# Patient Record
Sex: FEMALE | Race: BLACK OR AFRICAN AMERICAN | NOT HISPANIC OR LATINO | Employment: UNEMPLOYED | URBAN - METROPOLITAN AREA
[De-identification: names, ages, dates, MRNs, and addresses within clinical notes are randomized per-mention and may not be internally consistent; named-entity substitution may affect disease eponyms.]

---

## 2017-01-31 ENCOUNTER — HISTORICAL (OUTPATIENT)
Dept: INTERNAL MEDICINE | Facility: CLINIC | Age: 47
End: 2017-01-31

## 2017-03-01 ENCOUNTER — HISTORICAL (OUTPATIENT)
Dept: ADMINISTRATIVE | Facility: HOSPITAL | Age: 47
End: 2017-03-01

## 2017-03-09 ENCOUNTER — HISTORICAL (OUTPATIENT)
Dept: ADMINISTRATIVE | Facility: HOSPITAL | Age: 47
End: 2017-03-09

## 2017-03-16 ENCOUNTER — HOSPITAL ENCOUNTER (OUTPATIENT)
Dept: MEDSURG UNIT | Facility: HOSPITAL | Age: 47
End: 2017-03-17
Attending: OBSTETRICS & GYNECOLOGY | Admitting: OBSTETRICS & GYNECOLOGY

## 2017-05-24 ENCOUNTER — HISTORICAL (OUTPATIENT)
Dept: ADMINISTRATIVE | Facility: HOSPITAL | Age: 47
End: 2017-05-24

## 2017-09-14 ENCOUNTER — HISTORICAL (OUTPATIENT)
Dept: INTERNAL MEDICINE | Facility: CLINIC | Age: 47
End: 2017-09-14

## 2017-09-14 LAB
ABS NEUT (OLG): 8.66 X10(3)/MCL (ref 2.1–9.2)
ALBUMIN SERPL-MCNC: 3.4 GM/DL (ref 3.4–5)
ALBUMIN/GLOB SERPL: 1 RATIO (ref 1–2)
ALP SERPL-CCNC: 75 UNIT/L (ref 45–117)
ALT SERPL-CCNC: 70 UNIT/L (ref 12–78)
AST SERPL-CCNC: 42 UNIT/L (ref 15–37)
BASOPHILS # BLD AUTO: 0.05 X10(3)/MCL
BASOPHILS NFR BLD AUTO: 0 % (ref 0–1)
BILIRUB SERPL-MCNC: 0.4 MG/DL (ref 0.2–1)
BILIRUBIN DIRECT+TOT PNL SERPL-MCNC: 0.1 MG/DL
BILIRUBIN DIRECT+TOT PNL SERPL-MCNC: 0.3 MG/DL
BUN SERPL-MCNC: 10 MG/DL (ref 7–18)
CALCIUM SERPL-MCNC: 8.8 MG/DL (ref 8.5–10.1)
CHLORIDE SERPL-SCNC: 107 MMOL/L (ref 98–107)
CHOLEST SERPL-MCNC: 216 MG/DL
CHOLEST/HDLC SERPL: 5 {RATIO} (ref 0–4.4)
CO2 SERPL-SCNC: 26 MMOL/L (ref 21–32)
CREAT SERPL-MCNC: 0.7 MG/DL (ref 0.6–1.3)
DEPRECATED CALCIDIOL+CALCIFEROL SERPL-MC: 15.87 NG/ML (ref 30–80)
EOSINOPHIL # BLD AUTO: 0.24 10*3/UL
EOSINOPHIL NFR BLD AUTO: 2 % (ref 0–5)
ERYTHROCYTE [DISTWIDTH] IN BLOOD BY AUTOMATED COUNT: 14 % (ref 11.5–14.5)
EST. AVERAGE GLUCOSE BLD GHB EST-MCNC: 108 MG/DL
GLOBULIN SER-MCNC: 4.6 GM/ML (ref 2.3–3.5)
GLUCOSE SERPL-MCNC: 85 MG/DL (ref 74–106)
HBA1C MFR BLD: 5.4 % (ref 4.2–6.3)
HCT VFR BLD AUTO: 42.5 % (ref 35–46)
HDLC SERPL-MCNC: 43 MG/DL
HGB BLD-MCNC: 13.9 GM/DL (ref 12–16)
IMM GRANULOCYTES # BLD AUTO: 0.04 10*3/UL
IMM GRANULOCYTES NFR BLD AUTO: 0 %
LDLC SERPL CALC-MCNC: 122 MG/DL (ref 0–130)
LYMPHOCYTES # BLD AUTO: 3.08 X10(3)/MCL
LYMPHOCYTES NFR BLD AUTO: 23 % (ref 15–40)
MCH RBC QN AUTO: 27.2 PG (ref 26–34)
MCHC RBC AUTO-ENTMCNC: 32.7 GM/DL (ref 31–37)
MCV RBC AUTO: 83.2 FL (ref 80–100)
MONOCYTES # BLD AUTO: 1.23 X10(3)/MCL
MONOCYTES NFR BLD AUTO: 9 % (ref 4–12)
NEUTROPHILS # BLD AUTO: 8.66 X10(3)/MCL
NEUTROPHILS NFR BLD AUTO: 65 X10(3)/MCL
PLATELET # BLD AUTO: 325 X10(3)/MCL (ref 130–400)
PMV BLD AUTO: 9.5 FL (ref 7.4–10.4)
POTASSIUM SERPL-SCNC: 3.9 MMOL/L (ref 3.5–5.1)
PROT SERPL-MCNC: 8 GM/DL (ref 6.4–8.2)
RBC # BLD AUTO: 5.11 X10(6)/MCL (ref 4–5.2)
SODIUM SERPL-SCNC: 139 MMOL/L (ref 136–145)
TRIGL SERPL-MCNC: 253 MG/DL
TSH SERPL-ACNC: 1.92 MIU/L (ref 0.36–3.74)
VLDLC SERPL CALC-MCNC: 51 MG/DL
WBC # SPEC AUTO: 13.3 X10(3)/MCL (ref 4.5–11)

## 2018-01-24 ENCOUNTER — HISTORICAL (OUTPATIENT)
Dept: RADIOLOGY | Facility: HOSPITAL | Age: 48
End: 2018-01-24

## 2018-02-05 ENCOUNTER — HISTORICAL (OUTPATIENT)
Dept: RADIOLOGY | Facility: HOSPITAL | Age: 48
End: 2018-02-05

## 2018-03-06 ENCOUNTER — HISTORICAL (OUTPATIENT)
Dept: RADIOLOGY | Facility: HOSPITAL | Age: 48
End: 2018-03-06

## 2018-03-12 ENCOUNTER — HISTORICAL (OUTPATIENT)
Dept: RESPIRATORY THERAPY | Facility: HOSPITAL | Age: 48
End: 2018-03-12

## 2018-03-29 ENCOUNTER — HISTORICAL (OUTPATIENT)
Dept: ADMINISTRATIVE | Facility: HOSPITAL | Age: 48
End: 2018-03-29

## 2018-03-29 LAB
CHOLEST SERPL-MCNC: 182 MG/DL
CHOLEST/HDLC SERPL: 5.4 {RATIO} (ref 0–4.4)
DEPRECATED CALCIDIOL+CALCIFEROL SERPL-MC: 11.77 NG/ML (ref 30–80)
EST. AVERAGE GLUCOSE BLD GHB EST-MCNC: 117 MG/DL
HBA1C MFR BLD: 5.7 % (ref 4.2–6.3)
HDLC SERPL-MCNC: 34 MG/DL
LDLC SERPL CALC-MCNC: 97 MG/DL (ref 0–130)
TRIGL SERPL-MCNC: 254 MG/DL
VLDLC SERPL CALC-MCNC: 51 MG/DL

## 2018-05-16 ENCOUNTER — HISTORICAL (OUTPATIENT)
Dept: SLEEP MEDICINE | Facility: HOSPITAL | Age: 48
End: 2018-05-16

## 2018-09-11 ENCOUNTER — HISTORICAL (OUTPATIENT)
Dept: ADMINISTRATIVE | Facility: HOSPITAL | Age: 48
End: 2018-09-11

## 2018-09-11 LAB
APPEARANCE, UA: ABNORMAL
BACTERIA #/AREA URNS AUTO: ABNORMAL /[HPF]
BILIRUB UR QL STRIP: NEGATIVE
COLOR UR: YELLOW
DEPRECATED CALCIDIOL+CALCIFEROL SERPL-MC: 10.34 NG/ML (ref 30–80)
GLUCOSE (UA): NORMAL
HGB UR QL STRIP: NEGATIVE
HYALINE CASTS #/AREA URNS LPF: ABNORMAL /[LPF]
KETONES UR QL STRIP: NEGATIVE
LEUKOCYTE ESTERASE UR QL STRIP: NEGATIVE
NITRITE UR QL STRIP: ABNORMAL
PH UR STRIP: 7.5 [PH] (ref 4.5–8)
PROT UR QL STRIP: NEGATIVE
RBC #/AREA URNS AUTO: ABNORMAL /[HPF]
SP GR UR STRIP: 1.02 (ref 1–1.03)
SQUAMOUS #/AREA URNS LPF: >100 /[LPF]
UROBILINOGEN UR STRIP-ACNC: NORMAL
WBC #/AREA URNS AUTO: ABNORMAL /HPF

## 2018-10-09 ENCOUNTER — HISTORICAL (OUTPATIENT)
Dept: PHYSICAL THERAPY | Facility: HOSPITAL | Age: 48
End: 2018-10-09

## 2018-11-01 ENCOUNTER — HISTORICAL (OUTPATIENT)
Dept: PHYSICAL THERAPY | Facility: HOSPITAL | Age: 48
End: 2018-11-01

## 2018-11-06 ENCOUNTER — HISTORICAL (OUTPATIENT)
Dept: PHYSICAL THERAPY | Facility: HOSPITAL | Age: 48
End: 2018-11-06

## 2018-11-08 ENCOUNTER — HISTORICAL (OUTPATIENT)
Dept: PHYSICAL THERAPY | Facility: HOSPITAL | Age: 48
End: 2018-11-08

## 2018-11-13 ENCOUNTER — HISTORICAL (OUTPATIENT)
Dept: PHYSICAL THERAPY | Facility: HOSPITAL | Age: 48
End: 2018-11-13

## 2018-11-15 ENCOUNTER — HISTORICAL (OUTPATIENT)
Dept: PHYSICAL THERAPY | Facility: HOSPITAL | Age: 48
End: 2018-11-15

## 2018-11-19 ENCOUNTER — HISTORICAL (OUTPATIENT)
Dept: PHYSICAL THERAPY | Facility: HOSPITAL | Age: 48
End: 2018-11-19

## 2018-11-21 ENCOUNTER — HISTORICAL (OUTPATIENT)
Dept: PHYSICAL THERAPY | Facility: HOSPITAL | Age: 48
End: 2018-11-21

## 2018-11-26 ENCOUNTER — HISTORICAL (OUTPATIENT)
Dept: PHYSICAL THERAPY | Facility: HOSPITAL | Age: 48
End: 2018-11-26

## 2018-11-28 ENCOUNTER — HISTORICAL (OUTPATIENT)
Dept: PHYSICAL THERAPY | Facility: HOSPITAL | Age: 48
End: 2018-11-28

## 2018-12-04 ENCOUNTER — HISTORICAL (OUTPATIENT)
Dept: PHYSICAL THERAPY | Facility: HOSPITAL | Age: 48
End: 2018-12-04

## 2018-12-05 ENCOUNTER — HISTORICAL (OUTPATIENT)
Dept: PHYSICAL THERAPY | Facility: HOSPITAL | Age: 48
End: 2018-12-05

## 2018-12-11 ENCOUNTER — HISTORICAL (OUTPATIENT)
Dept: PHYSICAL THERAPY | Facility: HOSPITAL | Age: 48
End: 2018-12-11

## 2018-12-12 ENCOUNTER — HISTORICAL (OUTPATIENT)
Dept: PHYSICAL THERAPY | Facility: HOSPITAL | Age: 48
End: 2018-12-12

## 2019-03-28 ENCOUNTER — HISTORICAL (OUTPATIENT)
Dept: PHYSICAL THERAPY | Facility: HOSPITAL | Age: 49
End: 2019-03-28

## 2019-04-15 ENCOUNTER — HISTORICAL (OUTPATIENT)
Dept: ADMINISTRATIVE | Facility: HOSPITAL | Age: 49
End: 2019-04-15

## 2019-04-15 LAB
APPEARANCE, UA: ABNORMAL
BACTERIA #/AREA URNS AUTO: ABNORMAL /[HPF]
BILIRUB UR QL STRIP: NEGATIVE
COLOR UR: ABNORMAL
GLUCOSE (UA): NORMAL
HGB UR QL STRIP: NEGATIVE
HYALINE CASTS #/AREA URNS LPF: ABNORMAL /[LPF]
KETONES UR QL STRIP: NEGATIVE
LEUKOCYTE ESTERASE UR QL STRIP: 75 LEU/UL
MUCOUS THREADS URNS QL MICRO: ABNORMAL
NITRITE UR QL STRIP: ABNORMAL
PH UR STRIP: 7 [PH] (ref 4.5–8)
PROT UR QL STRIP: NEGATIVE
RBC #/AREA URNS AUTO: ABNORMAL /[HPF]
SP GR UR STRIP: 1.01 (ref 1–1.03)
SQUAMOUS #/AREA URNS LPF: ABNORMAL /[LPF]
UROBILINOGEN UR STRIP-ACNC: NORMAL
WBC #/AREA URNS AUTO: ABNORMAL /HPF

## 2019-05-03 ENCOUNTER — HISTORICAL (OUTPATIENT)
Dept: RADIOLOGY | Facility: HOSPITAL | Age: 49
End: 2019-05-03

## 2019-05-07 ENCOUNTER — HISTORICAL (OUTPATIENT)
Dept: INTERNAL MEDICINE | Facility: CLINIC | Age: 49
End: 2019-05-07

## 2019-05-07 LAB
ABS NEUT (OLG): 8.93 X10(3)/MCL (ref 2.1–9.2)
ALBUMIN SERPL-MCNC: 3.2 GM/DL (ref 3.4–5)
ALBUMIN/GLOB SERPL: 0.7 RATIO (ref 1.1–2)
ALP SERPL-CCNC: 80 UNIT/L (ref 45–117)
ALT SERPL-CCNC: 31 UNIT/L (ref 12–78)
AST SERPL-CCNC: 14 UNIT/L (ref 15–37)
BASOPHILS # BLD AUTO: 0.03 X10(3)/MCL
BASOPHILS NFR BLD AUTO: 0 %
BILIRUB SERPL-MCNC: 0.5 MG/DL (ref 0.2–1)
BILIRUBIN DIRECT+TOT PNL SERPL-MCNC: 0.1 MG/DL
BILIRUBIN DIRECT+TOT PNL SERPL-MCNC: 0.4 MG/DL
BUN SERPL-MCNC: 11 MG/DL (ref 7–18)
CALCIUM SERPL-MCNC: 8.9 MG/DL (ref 8.5–10.1)
CHLORIDE SERPL-SCNC: 110 MMOL/L (ref 98–107)
CO2 SERPL-SCNC: 28 MMOL/L (ref 21–32)
CREAT SERPL-MCNC: 0.8 MG/DL (ref 0.6–1.3)
DEPRECATED CALCIDIOL+CALCIFEROL SERPL-MC: 18.29 NG/ML (ref 30–80)
EOSINOPHIL # BLD AUTO: 0.23 X10(3)/MCL
EOSINOPHIL NFR BLD AUTO: 2 %
ERYTHROCYTE [DISTWIDTH] IN BLOOD BY AUTOMATED COUNT: 14.8 % (ref 11.5–14.5)
EST. AVERAGE GLUCOSE BLD GHB EST-MCNC: 123 MG/DL
GLOBULIN SER-MCNC: 4.3 GM/ML (ref 2.3–3.5)
GLUCOSE SERPL-MCNC: 113 MG/DL (ref 74–106)
HBA1C MFR BLD: 5.9 % (ref 4.2–6.3)
HCT VFR BLD AUTO: 41.3 % (ref 35–46)
HGB BLD-MCNC: 13.6 GM/DL (ref 12–16)
IMM GRANULOCYTES # BLD AUTO: 0.05 10*3/UL
IMM GRANULOCYTES NFR BLD AUTO: 0 %
LYMPHOCYTES # BLD AUTO: 2.64 X10(3)/MCL
LYMPHOCYTES NFR BLD AUTO: 21 % (ref 13–40)
MCH RBC QN AUTO: 27.5 PG (ref 26–34)
MCHC RBC AUTO-ENTMCNC: 32.9 GM/DL (ref 31–37)
MCV RBC AUTO: 83.6 FL (ref 80–100)
MONOCYTES # BLD AUTO: 0.81 X10(3)/MCL
MONOCYTES NFR BLD AUTO: 6 % (ref 4–12)
NEUTROPHILS # BLD AUTO: 8.93 X10(3)/MCL
NEUTROPHILS NFR BLD AUTO: 70 X10(3)/MCL
PLATELET # BLD AUTO: 304 X10(3)/MCL (ref 130–400)
PMV BLD AUTO: 9.1 FL (ref 7.4–10.4)
POTASSIUM SERPL-SCNC: 4 MMOL/L (ref 3.5–5.1)
PROT SERPL-MCNC: 7.5 GM/DL (ref 6.4–8.2)
RBC # BLD AUTO: 4.94 X10(6)/MCL (ref 4–5.2)
SODIUM SERPL-SCNC: 142 MMOL/L (ref 136–145)
WBC # SPEC AUTO: 12.7 X10(3)/MCL (ref 4.5–11)

## 2019-05-15 ENCOUNTER — HISTORICAL (OUTPATIENT)
Dept: RADIOLOGY | Facility: HOSPITAL | Age: 49
End: 2019-05-15

## 2019-05-20 ENCOUNTER — HISTORICAL (OUTPATIENT)
Dept: PHYSICAL THERAPY | Facility: HOSPITAL | Age: 49
End: 2019-05-20

## 2019-05-22 ENCOUNTER — HISTORICAL (OUTPATIENT)
Dept: PHYSICAL THERAPY | Facility: HOSPITAL | Age: 49
End: 2019-05-22

## 2019-05-24 ENCOUNTER — HISTORICAL (OUTPATIENT)
Dept: PHYSICAL THERAPY | Facility: HOSPITAL | Age: 49
End: 2019-05-24

## 2019-05-27 ENCOUNTER — HISTORICAL (OUTPATIENT)
Dept: PHYSICAL THERAPY | Facility: HOSPITAL | Age: 49
End: 2019-05-27

## 2019-05-29 ENCOUNTER — HISTORICAL (OUTPATIENT)
Dept: PHYSICAL THERAPY | Facility: HOSPITAL | Age: 49
End: 2019-05-29

## 2019-05-31 ENCOUNTER — HISTORICAL (OUTPATIENT)
Dept: PHYSICAL THERAPY | Facility: HOSPITAL | Age: 49
End: 2019-05-31

## 2019-06-03 ENCOUNTER — HISTORICAL (OUTPATIENT)
Dept: PHYSICAL THERAPY | Facility: HOSPITAL | Age: 49
End: 2019-06-03

## 2019-06-07 ENCOUNTER — HISTORICAL (OUTPATIENT)
Dept: PHYSICAL THERAPY | Facility: HOSPITAL | Age: 49
End: 2019-06-07

## 2019-06-10 ENCOUNTER — HISTORICAL (OUTPATIENT)
Dept: PHYSICAL THERAPY | Facility: HOSPITAL | Age: 49
End: 2019-06-10

## 2019-06-12 ENCOUNTER — HISTORICAL (OUTPATIENT)
Dept: PHYSICAL THERAPY | Facility: HOSPITAL | Age: 49
End: 2019-06-12

## 2019-06-14 ENCOUNTER — HISTORICAL (OUTPATIENT)
Dept: PHYSICAL THERAPY | Facility: HOSPITAL | Age: 49
End: 2019-06-14

## 2019-06-17 ENCOUNTER — HISTORICAL (OUTPATIENT)
Dept: PHYSICAL THERAPY | Facility: HOSPITAL | Age: 49
End: 2019-06-17

## 2019-06-19 ENCOUNTER — HISTORICAL (OUTPATIENT)
Dept: PHYSICAL THERAPY | Facility: HOSPITAL | Age: 49
End: 2019-06-19

## 2019-06-21 ENCOUNTER — HISTORICAL (OUTPATIENT)
Dept: PHYSICAL THERAPY | Facility: HOSPITAL | Age: 49
End: 2019-06-21

## 2019-06-24 ENCOUNTER — HISTORICAL (OUTPATIENT)
Dept: PHYSICAL THERAPY | Facility: HOSPITAL | Age: 49
End: 2019-06-24

## 2019-06-26 ENCOUNTER — HISTORICAL (OUTPATIENT)
Dept: PHYSICAL THERAPY | Facility: HOSPITAL | Age: 49
End: 2019-06-26

## 2019-06-27 ENCOUNTER — HISTORICAL (OUTPATIENT)
Dept: PHYSICAL THERAPY | Facility: HOSPITAL | Age: 49
End: 2019-06-27

## 2019-07-16 ENCOUNTER — HISTORICAL (OUTPATIENT)
Dept: INTERNAL MEDICINE | Facility: CLINIC | Age: 49
End: 2019-07-16

## 2019-07-16 LAB
ABS NEUT (OLG): 9.41 X10(3)/MCL (ref 2.1–9.2)
BASOPHILS # BLD AUTO: 0.03 X10(3)/MCL
BASOPHILS NFR BLD AUTO: 0 %
CHOLEST SERPL-MCNC: 226 MG/DL
CHOLEST/HDLC SERPL: 5 {RATIO} (ref 0–4.4)
EOSINOPHIL # BLD AUTO: 0.21 X10(3)/MCL
EOSINOPHIL NFR BLD AUTO: 2 %
ERYTHROCYTE [DISTWIDTH] IN BLOOD BY AUTOMATED COUNT: 13.6 % (ref 11.5–14.5)
HCT VFR BLD AUTO: 41.7 % (ref 35–46)
HDLC SERPL-MCNC: 45 MG/DL
HGB BLD-MCNC: 13.3 GM/DL (ref 12–16)
HIV 1+2 AB+HIV1 P24 AG SERPL QL IA: NONREACTIVE
IMM GRANULOCYTES # BLD AUTO: 0.05 10*3/UL
IMM GRANULOCYTES NFR BLD AUTO: 0 %
LDLC SERPL CALC-MCNC: 132 MG/DL (ref 0–130)
LYMPHOCYTES # BLD AUTO: 2.86 X10(3)/MCL
LYMPHOCYTES NFR BLD AUTO: 21 % (ref 13–40)
MCH RBC QN AUTO: 27.1 PG (ref 26–34)
MCHC RBC AUTO-ENTMCNC: 31.9 GM/DL (ref 31–37)
MCV RBC AUTO: 84.9 FL (ref 80–100)
MONOCYTES # BLD AUTO: 0.87 X10(3)/MCL
MONOCYTES NFR BLD AUTO: 6 % (ref 4–12)
NEUTROPHILS # BLD AUTO: 9.41 X10(3)/MCL
NEUTROPHILS NFR BLD AUTO: 70 X10(3)/MCL
PLATELET # BLD AUTO: 262 X10(3)/MCL (ref 130–400)
PMV BLD AUTO: 9 FL (ref 7.4–10.4)
RBC # BLD AUTO: 4.91 X10(6)/MCL (ref 4–5.2)
TRIGL SERPL-MCNC: 246 MG/DL
VLDLC SERPL CALC-MCNC: 49 MG/DL
WBC # SPEC AUTO: 13.4 X10(3)/MCL (ref 4.5–11)

## 2019-07-24 ENCOUNTER — HISTORICAL (OUTPATIENT)
Dept: PHYSICAL THERAPY | Facility: HOSPITAL | Age: 49
End: 2019-07-24

## 2019-07-25 ENCOUNTER — HISTORICAL (OUTPATIENT)
Dept: PHYSICAL THERAPY | Facility: HOSPITAL | Age: 49
End: 2019-07-25

## 2019-07-29 ENCOUNTER — HISTORICAL (OUTPATIENT)
Dept: PHYSICAL THERAPY | Facility: HOSPITAL | Age: 49
End: 2019-07-29

## 2019-07-31 ENCOUNTER — HISTORICAL (OUTPATIENT)
Dept: PHYSICAL THERAPY | Facility: HOSPITAL | Age: 49
End: 2019-07-31

## 2019-08-02 ENCOUNTER — HISTORICAL (OUTPATIENT)
Dept: PHYSICAL THERAPY | Facility: HOSPITAL | Age: 49
End: 2019-08-02

## 2019-08-05 ENCOUNTER — HISTORICAL (OUTPATIENT)
Dept: PHYSICAL THERAPY | Facility: HOSPITAL | Age: 49
End: 2019-08-05

## 2019-08-07 ENCOUNTER — HISTORICAL (OUTPATIENT)
Dept: PHYSICAL THERAPY | Facility: HOSPITAL | Age: 49
End: 2019-08-07

## 2019-08-08 ENCOUNTER — HISTORICAL (OUTPATIENT)
Dept: SLEEP MEDICINE | Facility: HOSPITAL | Age: 49
End: 2019-08-08

## 2019-08-09 ENCOUNTER — HISTORICAL (OUTPATIENT)
Dept: PHYSICAL THERAPY | Facility: HOSPITAL | Age: 49
End: 2019-08-09

## 2019-08-13 ENCOUNTER — HISTORICAL (OUTPATIENT)
Dept: PHYSICAL THERAPY | Facility: HOSPITAL | Age: 49
End: 2019-08-13

## 2019-08-16 ENCOUNTER — HISTORICAL (OUTPATIENT)
Dept: PHYSICAL THERAPY | Facility: HOSPITAL | Age: 49
End: 2019-08-16

## 2019-08-19 ENCOUNTER — HISTORICAL (OUTPATIENT)
Dept: PHYSICAL THERAPY | Facility: HOSPITAL | Age: 49
End: 2019-08-19

## 2019-08-23 ENCOUNTER — HISTORICAL (OUTPATIENT)
Dept: PHYSICAL THERAPY | Facility: HOSPITAL | Age: 49
End: 2019-08-23

## 2020-05-20 ENCOUNTER — HISTORICAL (OUTPATIENT)
Dept: INTERNAL MEDICINE | Facility: CLINIC | Age: 50
End: 2020-05-20

## 2020-05-20 LAB
ABS NEUT (OLG): 7.31 X10(3)/MCL (ref 2.1–9.2)
ALBUMIN SERPL-MCNC: 3.4 GM/DL (ref 3.4–5)
ALBUMIN/GLOB SERPL: 0.8 RATIO (ref 1.1–2)
ALP SERPL-CCNC: 72 UNIT/L (ref 45–117)
ALT SERPL-CCNC: 66 UNIT/L (ref 12–78)
AST SERPL-CCNC: 30 UNIT/L (ref 15–37)
BASOPHILS # BLD AUTO: 0 X10(3)/MCL (ref 0–0.2)
BASOPHILS NFR BLD AUTO: 0 %
BILIRUB SERPL-MCNC: 0.6 MG/DL (ref 0.2–1)
BILIRUBIN DIRECT+TOT PNL SERPL-MCNC: 0.1 MG/DL (ref 0–0.2)
BILIRUBIN DIRECT+TOT PNL SERPL-MCNC: 0.5 MG/DL
BUN SERPL-MCNC: 11 MG/DL (ref 7–18)
CALCIUM SERPL-MCNC: 9 MG/DL (ref 8.5–10.1)
CHLORIDE SERPL-SCNC: 109 MMOL/L (ref 98–107)
CHOLEST SERPL-MCNC: 236 MG/DL
CHOLEST/HDLC SERPL: 6.1 {RATIO} (ref 0–4.4)
CO2 SERPL-SCNC: 26 MMOL/L (ref 21–32)
CREAT SERPL-MCNC: 0.9 MG/DL (ref 0.6–1.3)
DEPRECATED CALCIDIOL+CALCIFEROL SERPL-MC: 11.8 NG/ML (ref 30–80)
EOSINOPHIL # BLD AUTO: 0.2 X10(3)/MCL (ref 0–0.9)
EOSINOPHIL NFR BLD AUTO: 2 %
ERYTHROCYTE [DISTWIDTH] IN BLOOD BY AUTOMATED COUNT: 13.7 % (ref 11.5–14.5)
EST. AVERAGE GLUCOSE BLD GHB EST-MCNC: 137 MG/DL
GLOBULIN SER-MCNC: 4.5 GM/ML (ref 2.3–3.5)
GLUCOSE SERPL-MCNC: 136 MG/DL (ref 74–106)
HBA1C MFR BLD: 6.4 % (ref 4.2–6.3)
HCT VFR BLD AUTO: 42.1 % (ref 35–46)
HDLC SERPL-MCNC: 39 MG/DL (ref 40–59)
HGB BLD-MCNC: 13.6 GM/DL (ref 12–16)
IMM GRANULOCYTES # BLD AUTO: 0.04 10*3/UL
IMM GRANULOCYTES NFR BLD AUTO: 0 %
LDLC SERPL CALC-MCNC: 145 MG/DL
LYMPHOCYTES # BLD AUTO: 3.1 X10(3)/MCL (ref 0.6–4.6)
LYMPHOCYTES NFR BLD AUTO: 27 %
MCH RBC QN AUTO: 27.1 PG (ref 26–34)
MCHC RBC AUTO-ENTMCNC: 32.3 GM/DL (ref 31–37)
MCV RBC AUTO: 83.9 FL (ref 80–100)
MONOCYTES # BLD AUTO: 0.8 X10(3)/MCL (ref 0.1–1.3)
MONOCYTES NFR BLD AUTO: 7 %
NEUTROPHILS # BLD AUTO: 7.31 X10(3)/MCL (ref 2.1–9.2)
NEUTROPHILS NFR BLD AUTO: 63 %
PLATELET # BLD AUTO: 277 X10(3)/MCL (ref 130–400)
PMV BLD AUTO: 9.6 FL (ref 7.4–10.4)
POTASSIUM SERPL-SCNC: 4 MMOL/L (ref 3.5–5.1)
PROT SERPL-MCNC: 7.9 GM/DL (ref 6.4–8.2)
RBC # BLD AUTO: 5.02 X10(6)/MCL (ref 4–5.2)
SODIUM SERPL-SCNC: 139 MMOL/L (ref 136–145)
TRIGL SERPL-MCNC: 259 MG/DL
VLDLC SERPL CALC-MCNC: 52 MG/DL
WBC # SPEC AUTO: 11.6 X10(3)/MCL (ref 4.5–11)

## 2020-06-11 ENCOUNTER — HISTORICAL (OUTPATIENT)
Dept: RADIOLOGY | Facility: HOSPITAL | Age: 50
End: 2020-06-11

## 2020-08-25 ENCOUNTER — HISTORICAL (OUTPATIENT)
Dept: GASTROENTEROLOGY | Facility: CLINIC | Age: 50
End: 2020-08-25

## 2020-08-28 ENCOUNTER — HISTORICAL (OUTPATIENT)
Dept: ENDOSCOPY | Facility: HOSPITAL | Age: 50
End: 2020-08-28

## 2020-09-25 ENCOUNTER — HISTORICAL (OUTPATIENT)
Dept: RADIOLOGY | Facility: HOSPITAL | Age: 50
End: 2020-09-25

## 2020-11-23 ENCOUNTER — HISTORICAL (OUTPATIENT)
Dept: ADMINISTRATIVE | Facility: HOSPITAL | Age: 50
End: 2020-11-23

## 2020-11-30 ENCOUNTER — HISTORICAL (OUTPATIENT)
Dept: PHYSICAL THERAPY | Facility: HOSPITAL | Age: 50
End: 2020-11-30

## 2021-01-05 ENCOUNTER — HISTORICAL (OUTPATIENT)
Dept: PHYSICAL THERAPY | Facility: HOSPITAL | Age: 51
End: 2021-01-05

## 2021-01-08 ENCOUNTER — HISTORICAL (OUTPATIENT)
Dept: PHYSICAL THERAPY | Facility: HOSPITAL | Age: 51
End: 2021-01-08

## 2021-01-12 ENCOUNTER — HISTORICAL (OUTPATIENT)
Dept: PHYSICAL THERAPY | Facility: HOSPITAL | Age: 51
End: 2021-01-12

## 2021-01-14 ENCOUNTER — HISTORICAL (OUTPATIENT)
Dept: PHYSICAL THERAPY | Facility: HOSPITAL | Age: 51
End: 2021-01-14

## 2021-01-19 ENCOUNTER — HISTORICAL (OUTPATIENT)
Dept: PHYSICAL THERAPY | Facility: HOSPITAL | Age: 51
End: 2021-01-19

## 2021-01-21 ENCOUNTER — HISTORICAL (OUTPATIENT)
Dept: PHYSICAL THERAPY | Facility: HOSPITAL | Age: 51
End: 2021-01-21

## 2021-01-26 ENCOUNTER — HISTORICAL (OUTPATIENT)
Dept: PHYSICAL THERAPY | Facility: HOSPITAL | Age: 51
End: 2021-01-26

## 2021-01-28 ENCOUNTER — HISTORICAL (OUTPATIENT)
Dept: PHYSICAL THERAPY | Facility: HOSPITAL | Age: 51
End: 2021-01-28

## 2021-02-02 ENCOUNTER — HISTORICAL (OUTPATIENT)
Dept: PHYSICAL THERAPY | Facility: HOSPITAL | Age: 51
End: 2021-02-02

## 2021-02-04 ENCOUNTER — HISTORICAL (OUTPATIENT)
Dept: PHYSICAL THERAPY | Facility: HOSPITAL | Age: 51
End: 2021-02-04

## 2021-02-09 ENCOUNTER — HISTORICAL (OUTPATIENT)
Dept: PHYSICAL THERAPY | Facility: HOSPITAL | Age: 51
End: 2021-02-09

## 2021-02-11 ENCOUNTER — HISTORICAL (OUTPATIENT)
Dept: PHYSICAL THERAPY | Facility: HOSPITAL | Age: 51
End: 2021-02-11

## 2021-02-18 ENCOUNTER — HISTORICAL (OUTPATIENT)
Dept: PHYSICAL THERAPY | Facility: HOSPITAL | Age: 51
End: 2021-02-18

## 2021-02-23 ENCOUNTER — HISTORICAL (OUTPATIENT)
Dept: PHYSICAL THERAPY | Facility: HOSPITAL | Age: 51
End: 2021-02-23

## 2021-02-25 ENCOUNTER — HISTORICAL (OUTPATIENT)
Dept: PHYSICAL THERAPY | Facility: HOSPITAL | Age: 51
End: 2021-02-25

## 2021-03-11 ENCOUNTER — HISTORICAL (OUTPATIENT)
Dept: PHYSICAL THERAPY | Facility: HOSPITAL | Age: 51
End: 2021-03-11

## 2021-03-16 ENCOUNTER — HISTORICAL (OUTPATIENT)
Dept: PHYSICAL THERAPY | Facility: HOSPITAL | Age: 51
End: 2021-03-16

## 2021-03-18 ENCOUNTER — HISTORICAL (OUTPATIENT)
Dept: PHYSICAL THERAPY | Facility: HOSPITAL | Age: 51
End: 2021-03-18

## 2021-03-23 ENCOUNTER — HISTORICAL (OUTPATIENT)
Dept: PHYSICAL THERAPY | Facility: HOSPITAL | Age: 51
End: 2021-03-23

## 2021-03-25 ENCOUNTER — HISTORICAL (OUTPATIENT)
Dept: ADMINISTRATIVE | Facility: HOSPITAL | Age: 51
End: 2021-03-25

## 2021-03-25 ENCOUNTER — HISTORICAL (OUTPATIENT)
Dept: PHYSICAL THERAPY | Facility: HOSPITAL | Age: 51
End: 2021-03-25

## 2021-03-30 ENCOUNTER — HISTORICAL (OUTPATIENT)
Dept: PHYSICAL THERAPY | Facility: HOSPITAL | Age: 51
End: 2021-03-30

## 2021-03-31 ENCOUNTER — HISTORICAL (OUTPATIENT)
Dept: INTERNAL MEDICINE | Facility: CLINIC | Age: 51
End: 2021-03-31

## 2021-03-31 LAB
ABS NEUT (OLG): 9.27 X10(3)/MCL (ref 2.1–9.2)
ALBUMIN SERPL-MCNC: 3.8 GM/DL (ref 3.5–5)
ALBUMIN/GLOB SERPL: 0.9 RATIO (ref 1.1–2)
ALP SERPL-CCNC: 81 UNIT/L (ref 40–150)
ALT SERPL-CCNC: 41 UNIT/L (ref 0–55)
AST SERPL-CCNC: 27 UNIT/L (ref 5–34)
BASOPHILS # BLD AUTO: 0 X10(3)/MCL (ref 0–0.2)
BASOPHILS NFR BLD AUTO: 0 %
BILIRUB SERPL-MCNC: 0.6 MG/DL
BILIRUBIN DIRECT+TOT PNL SERPL-MCNC: 0.2 MG/DL (ref 0–0.5)
BILIRUBIN DIRECT+TOT PNL SERPL-MCNC: 0.4 MG/DL (ref 0–0.8)
BUN SERPL-MCNC: 9.9 MG/DL (ref 7–18.7)
CALCIUM SERPL-MCNC: 9.3 MG/DL (ref 8.4–10.2)
CHLORIDE SERPL-SCNC: 108 MMOL/L (ref 98–107)
CHOLEST SERPL-MCNC: 148 MG/DL
CHOLEST/HDLC SERPL: 4 {RATIO} (ref 0–5)
CO2 SERPL-SCNC: 23 MMOL/L (ref 22–29)
CREAT SERPL-MCNC: 0.78 MG/DL (ref 0.55–1.02)
CRP SERPL-MCNC: 1.43 MG/DL
DEPRECATED CALCIDIOL+CALCIFEROL SERPL-MC: 15.3 NG/ML (ref 30–80)
EOSINOPHIL # BLD AUTO: 0.5 X10(3)/MCL (ref 0–0.9)
EOSINOPHIL NFR BLD AUTO: 4 %
ERYTHROCYTE [DISTWIDTH] IN BLOOD BY AUTOMATED COUNT: 14.7 % (ref 11.5–14.5)
ERYTHROCYTE [SEDIMENTATION RATE] IN BLOOD: 16 MM/HR (ref 0–20)
EST. AVERAGE GLUCOSE BLD GHB EST-MCNC: 114 MG/DL
GLOBULIN SER-MCNC: 4.3 GM/DL (ref 2.4–3.5)
GLUCOSE SERPL-MCNC: 107 MG/DL (ref 74–100)
HBA1C MFR BLD: 5.6 %
HCT VFR BLD AUTO: 42.6 % (ref 35–46)
HDLC SERPL-MCNC: 40 MG/DL (ref 35–60)
HGB BLD-MCNC: 13.6 GM/DL (ref 12–16)
IMM GRANULOCYTES # BLD AUTO: 0.06 10*3/UL
IMM GRANULOCYTES NFR BLD AUTO: 0 %
LDLC SERPL CALC-MCNC: 73 MG/DL (ref 50–140)
LYMPHOCYTES # BLD AUTO: 2.7 X10(3)/MCL (ref 0.6–4.6)
LYMPHOCYTES NFR BLD AUTO: 20 %
MCH RBC QN AUTO: 26.6 PG (ref 26–34)
MCHC RBC AUTO-ENTMCNC: 31.9 GM/DL (ref 31–37)
MCV RBC AUTO: 83.2 FL (ref 80–100)
MONOCYTES # BLD AUTO: 0.8 X10(3)/MCL (ref 0.1–1.3)
MONOCYTES NFR BLD AUTO: 6 %
NEUTROPHILS # BLD AUTO: 9.27 X10(3)/MCL (ref 2.1–9.2)
NEUTROPHILS NFR BLD AUTO: 69 %
PLATELET # BLD AUTO: 294 X10(3)/MCL (ref 130–400)
PMV BLD AUTO: 9.2 FL (ref 7.4–10.4)
POTASSIUM SERPL-SCNC: 4.3 MMOL/L (ref 3.5–5.1)
PROT SERPL-MCNC: 8.1 GM/DL (ref 6.4–8.3)
RBC # BLD AUTO: 5.12 X10(6)/MCL (ref 4–5.2)
SODIUM SERPL-SCNC: 140 MMOL/L (ref 136–145)
TRIGL SERPL-MCNC: 177 MG/DL (ref 37–140)
TSH SERPL-ACNC: 1.95 UIU/ML (ref 0.35–4.94)
VLDLC SERPL CALC-MCNC: 35 MG/DL
WBC # SPEC AUTO: 13.4 X10(3)/MCL (ref 4.5–11)

## 2021-04-09 ENCOUNTER — HISTORICAL (OUTPATIENT)
Dept: RADIOLOGY | Facility: HOSPITAL | Age: 51
End: 2021-04-09

## 2021-05-27 ENCOUNTER — HISTORICAL (OUTPATIENT)
Dept: RADIOLOGY | Facility: HOSPITAL | Age: 51
End: 2021-05-27

## 2021-06-04 ENCOUNTER — HISTORICAL (OUTPATIENT)
Dept: ADMINISTRATIVE | Facility: HOSPITAL | Age: 51
End: 2021-06-04

## 2021-06-14 ENCOUNTER — HISTORICAL (OUTPATIENT)
Dept: ADMINISTRATIVE | Facility: HOSPITAL | Age: 51
End: 2021-06-14

## 2021-06-14 LAB — SARS-COV-2 RNA RESP QL NAA+PROBE: NOT DETECTED

## 2021-06-16 ENCOUNTER — HISTORICAL (OUTPATIENT)
Dept: SURGERY | Facility: HOSPITAL | Age: 51
End: 2021-06-16

## 2021-07-23 ENCOUNTER — HISTORICAL (OUTPATIENT)
Dept: RADIOLOGY | Facility: HOSPITAL | Age: 51
End: 2021-07-23

## 2021-07-27 ENCOUNTER — HISTORICAL (OUTPATIENT)
Dept: RADIOLOGY | Facility: HOSPITAL | Age: 51
End: 2021-07-27

## 2021-07-29 ENCOUNTER — HISTORICAL (OUTPATIENT)
Dept: ADMINISTRATIVE | Facility: HOSPITAL | Age: 51
End: 2021-07-29

## 2021-07-29 LAB
ABS NEUT (OLG): 11.72 X10(3)/MCL (ref 2.1–9.2)
ALBUMIN SERPL-MCNC: 3.7 GM/DL (ref 3.5–5)
ALBUMIN/GLOB SERPL: 0.9 RATIO (ref 1.1–2)
ALP SERPL-CCNC: 70 UNIT/L (ref 40–150)
ALT SERPL-CCNC: 36 UNIT/L (ref 0–55)
AST SERPL-CCNC: 16 UNIT/L (ref 5–34)
BASOPHILS # BLD AUTO: 0 X10(3)/MCL (ref 0–0.2)
BASOPHILS NFR BLD AUTO: 0 %
BILIRUB SERPL-MCNC: 0.3 MG/DL
BILIRUBIN DIRECT+TOT PNL SERPL-MCNC: 0.1 MG/DL (ref 0–0.8)
BILIRUBIN DIRECT+TOT PNL SERPL-MCNC: 0.2 MG/DL (ref 0–0.5)
BUN SERPL-MCNC: 8.5 MG/DL (ref 7–18.7)
CALCIUM SERPL-MCNC: 9.9 MG/DL (ref 8.4–10.2)
CHLORIDE SERPL-SCNC: 107 MMOL/L (ref 98–107)
CO2 SERPL-SCNC: 27 MMOL/L (ref 22–29)
CREAT SERPL-MCNC: 0.73 MG/DL (ref 0.55–1.02)
EOSINOPHIL # BLD AUTO: 0.8 X10(3)/MCL (ref 0–0.9)
EOSINOPHIL NFR BLD AUTO: 5 %
ERYTHROCYTE [DISTWIDTH] IN BLOOD BY AUTOMATED COUNT: 15.8 % (ref 11.5–14.5)
GLOBULIN SER-MCNC: 4.2 GM/DL (ref 2.4–3.5)
GLUCOSE SERPL-MCNC: 86 MG/DL (ref 74–100)
HCT VFR BLD AUTO: 40.9 % (ref 35–46)
HGB BLD-MCNC: 13 GM/DL (ref 12–16)
HIV 1+2 AB+HIV1 P24 AG SERPL QL IA: NONREACTIVE
IMM GRANULOCYTES # BLD AUTO: 0.07 10*3/UL
IMM GRANULOCYTES NFR BLD AUTO: 0 %
LYMPHOCYTES # BLD AUTO: 3.3 X10(3)/MCL (ref 0.6–4.6)
LYMPHOCYTES NFR BLD AUTO: 19 %
MCH RBC QN AUTO: 26.6 PG (ref 26–34)
MCHC RBC AUTO-ENTMCNC: 31.8 GM/DL (ref 31–37)
MCV RBC AUTO: 83.6 FL (ref 80–100)
MONOCYTES # BLD AUTO: 1.1 X10(3)/MCL (ref 0.1–1.3)
MONOCYTES NFR BLD AUTO: 7 %
NEUTROPHILS # BLD AUTO: 11.72 X10(3)/MCL (ref 2.1–9.2)
NEUTROPHILS NFR BLD AUTO: 68 %
NRBC BLD AUTO-RTO: 0 % (ref 0–0.2)
PLATELET # BLD AUTO: 345 X10(3)/MCL (ref 130–400)
PMV BLD AUTO: 9.4 FL (ref 7.4–10.4)
POTASSIUM SERPL-SCNC: 4 MMOL/L (ref 3.5–5.1)
PROT SERPL-MCNC: 7.9 GM/DL (ref 6.4–8.3)
RBC # BLD AUTO: 4.89 X10(6)/MCL (ref 4–5.2)
SODIUM SERPL-SCNC: 140 MMOL/L (ref 136–145)
WBC # SPEC AUTO: 17.1 X10(3)/MCL (ref 4.5–11)

## 2021-08-04 LAB — SARS-COV-2 AG RESP QL IA.RAPID: NEGATIVE

## 2021-08-12 ENCOUNTER — HISTORICAL (OUTPATIENT)
Dept: RADIOLOGY | Facility: HOSPITAL | Age: 51
End: 2021-08-12

## 2021-08-17 ENCOUNTER — HISTORICAL (OUTPATIENT)
Dept: RADIOLOGY | Facility: HOSPITAL | Age: 51
End: 2021-08-17

## 2021-08-24 ENCOUNTER — HISTORICAL (OUTPATIENT)
Dept: RADIOLOGY | Facility: HOSPITAL | Age: 51
End: 2021-08-24

## 2021-09-07 ENCOUNTER — HISTORICAL (OUTPATIENT)
Dept: ADMINISTRATIVE | Facility: HOSPITAL | Age: 51
End: 2021-09-07

## 2021-09-07 LAB — SARS-COV-2 AG RESP QL IA.RAPID: NEGATIVE

## 2021-09-30 ENCOUNTER — HISTORICAL (OUTPATIENT)
Dept: ADMINISTRATIVE | Facility: HOSPITAL | Age: 51
End: 2021-09-30

## 2021-09-30 LAB — SARS-COV-2 AG RESP QL IA.RAPID: NEGATIVE

## 2021-10-01 ENCOUNTER — HISTORICAL (OUTPATIENT)
Dept: ADMINISTRATIVE | Facility: HOSPITAL | Age: 51
End: 2021-10-01

## 2021-10-01 LAB — POC CREATININE: 0.6 MG/DL (ref 0.6–1.3)

## 2021-10-07 ENCOUNTER — HISTORICAL (OUTPATIENT)
Dept: ADMINISTRATIVE | Facility: HOSPITAL | Age: 51
End: 2021-10-07

## 2021-10-12 ENCOUNTER — HISTORICAL (OUTPATIENT)
Dept: ADMINISTRATIVE | Facility: HOSPITAL | Age: 51
End: 2021-10-12

## 2021-10-12 LAB — SARS-COV-2 AG RESP QL IA.RAPID: NEGATIVE

## 2021-10-13 ENCOUNTER — HISTORICAL (OUTPATIENT)
Dept: SURGERY | Facility: HOSPITAL | Age: 51
End: 2021-10-13

## 2021-11-04 ENCOUNTER — HISTORICAL (OUTPATIENT)
Dept: ADMINISTRATIVE | Facility: HOSPITAL | Age: 51
End: 2021-11-04

## 2021-11-04 LAB
APPEARANCE, UA: ABNORMAL
BACTERIA #/AREA URNS AUTO: ABNORMAL /HPF
BILIRUB UR QL STRIP: NEGATIVE
COLOR UR: YELLOW
DEPRECATED CALCIDIOL+CALCIFEROL SERPL-MC: 13.5 NG/ML (ref 30–80)
EST. AVERAGE GLUCOSE BLD GHB EST-MCNC: 114 MG/DL
GLUCOSE (UA): NEGATIVE
HBA1C MFR BLD: 5.6 %
HGB UR QL STRIP: NEGATIVE
HYALINE CASTS #/AREA URNS LPF: ABNORMAL /LPF
KETONES UR QL STRIP: ABNORMAL
LEUKOCYTE ESTERASE UR QL STRIP: NEGATIVE
NITRITE UR QL STRIP: ABNORMAL
PH UR STRIP: 5.5 [PH] (ref 4.5–8)
PROT UR QL STRIP: 10 MG/DL
RBC #/AREA URNS AUTO: ABNORMAL /HPF
SP GR UR STRIP: 1.03 (ref 1–1.03)
SQUAMOUS #/AREA URNS LPF: >100 /LPF
UROBILINOGEN UR STRIP-ACNC: NORMAL
WBC #/AREA URNS AUTO: ABNORMAL /HPF

## 2021-11-17 ENCOUNTER — HISTORICAL (OUTPATIENT)
Dept: RADIOLOGY | Facility: HOSPITAL | Age: 51
End: 2021-11-17

## 2021-12-29 ENCOUNTER — HISTORICAL (OUTPATIENT)
Dept: ADMINISTRATIVE | Facility: HOSPITAL | Age: 51
End: 2021-12-29

## 2021-12-29 LAB
APPEARANCE, UA: ABNORMAL
BACTERIA SPEC CULT: ABNORMAL
BILIRUB SERPL-MCNC: NEGATIVE MG/DL
BILIRUB UR QL STRIP: NEGATIVE
BLOOD URINE, POC: NORMAL
CLARITY, POC UA: CLEAR
COLOR UR: YELLOW
COLOR, POC UA: YELLOW
GLUCOSE (UA): NORMAL /UL
GLUCOSE UR QL STRIP: NEGATIVE
HGB UR QL STRIP: NEGATIVE /HPF
HYALINE CASTS #/AREA URNS LPF: ABNORMAL /LPF
KETONES UR QL STRIP: NEGATIVE
KETONES UR QL STRIP: NEGATIVE /UL
LEUKOCYTE EST, POC UA: NEGATIVE
LEUKOCYTE ESTERASE UR QL STRIP: 25
MUCOUS THREADS URNS QL MICRO: ABNORMAL /LPF
NITRITE UR QL STRIP: ABNORMAL
NITRITE, POC UA: POSITIVE
PH UR STRIP: 5.5 /UL (ref 4.5–8)
PH, POC UA: 6
PROT UR QL STRIP: ABNORMAL /UL
PROTEIN, POC: NEGATIVE
RBC #/AREA URNS HPF: ABNORMAL /HPF
SP GR UR STRIP: 1.03 (ref 1–1.03)
SPECIFIC GRAVITY, POC UA: 1.03
SQUAMOUS EPITHELIAL, UA: ABNORMAL /LPF
UROBILINOGEN UR STRIP-ACNC: NORMAL /HPF
UROBILINOGEN, POC UA: NORMAL
WBC #/AREA URNS HPF: ABNORMAL /HPF

## 2022-01-21 ENCOUNTER — HISTORICAL (OUTPATIENT)
Dept: ADMINISTRATIVE | Facility: HOSPITAL | Age: 52
End: 2022-01-21

## 2022-01-21 LAB
ABS NEUT (OLG): 10.81 X10(3)/MCL (ref 2.1–9.2)
ALBUMIN SERPL-MCNC: 3.4 GM/DL (ref 3.5–5)
ALBUMIN/GLOB SERPL: 0.8 RATIO (ref 1.1–2)
ALP SERPL-CCNC: 86 UNIT/L (ref 40–150)
ALT SERPL-CCNC: 44 UNIT/L (ref 0–55)
AST SERPL-CCNC: 14 UNIT/L (ref 5–34)
BASOPHILS # BLD AUTO: 0.1 X10(3)/MCL (ref 0–0.2)
BASOPHILS NFR BLD AUTO: 0 %
BILIRUB SERPL-MCNC: 0.3 MG/DL
BILIRUBIN DIRECT+TOT PNL SERPL-MCNC: 0.1 MG/DL (ref 0–0.5)
BILIRUBIN DIRECT+TOT PNL SERPL-MCNC: 0.2 MG/DL (ref 0–0.8)
BUN SERPL-MCNC: 7.7 MG/DL (ref 7–18.7)
CALCIUM SERPL-MCNC: 9.6 MG/DL (ref 8.7–10.5)
CHLORIDE SERPL-SCNC: 107 MMOL/L (ref 98–107)
CO2 SERPL-SCNC: 25 MMOL/L (ref 22–29)
CREAT SERPL-MCNC: 0.67 MG/DL (ref 0.55–1.02)
EOSINOPHIL # BLD AUTO: 1.6 X10(3)/MCL (ref 0–0.9)
EOSINOPHIL NFR BLD AUTO: 10 %
ERYTHROCYTE [DISTWIDTH] IN BLOOD BY AUTOMATED COUNT: 14.3 % (ref 11.5–14.5)
GLOBULIN SER-MCNC: 4.3 GM/DL (ref 2.4–3.5)
GLUCOSE SERPL-MCNC: 96 MG/DL (ref 74–100)
HCT VFR BLD AUTO: 38.3 % (ref 35–46)
HGB BLD-MCNC: 11.9 GM/DL (ref 12–16)
IMM GRANULOCYTES # BLD AUTO: 0.05 10*3/UL
IMM GRANULOCYTES NFR BLD AUTO: 0 %
LYMPHOCYTES # BLD AUTO: 2.6 X10(3)/MCL (ref 0.6–4.6)
LYMPHOCYTES NFR BLD AUTO: 16 %
MCH RBC QN AUTO: 26.5 PG (ref 26–34)
MCHC RBC AUTO-ENTMCNC: 31.1 GM/DL (ref 31–37)
MCV RBC AUTO: 85.3 FL (ref 80–100)
MONOCYTES # BLD AUTO: 1.1 X10(3)/MCL (ref 0.1–1.3)
MONOCYTES NFR BLD AUTO: 7 %
NEUTROPHILS # BLD AUTO: 10.81 X10(3)/MCL (ref 2.1–9.2)
NEUTROPHILS NFR BLD AUTO: 67 %
NRBC BLD AUTO-RTO: 0 % (ref 0–0.2)
PLATELET # BLD AUTO: 406 X10(3)/MCL (ref 130–400)
PMV BLD AUTO: 8.7 FL (ref 7.4–10.4)
POTASSIUM SERPL-SCNC: 3.8 MMOL/L (ref 3.5–5.1)
PROT SERPL-MCNC: 7.7 GM/DL (ref 6.4–8.3)
RBC # BLD AUTO: 4.49 X10(6)/MCL (ref 4–5.2)
SODIUM SERPL-SCNC: 137 MMOL/L (ref 136–145)
WBC # SPEC AUTO: 16.2 X10(3)/MCL (ref 4.5–11)

## 2022-01-25 ENCOUNTER — HISTORICAL (OUTPATIENT)
Dept: ADMINISTRATIVE | Facility: HOSPITAL | Age: 52
End: 2022-01-25

## 2022-01-25 LAB
ABS NEUT (OLG): 10.42 X10(3)/MCL (ref 2.1–9.2)
ALBUMIN SERPL-MCNC: 3.6 GM/DL (ref 3.5–5)
ALBUMIN/GLOB SERPL: 0.9 RATIO (ref 1.1–2)
ALP SERPL-CCNC: 95 UNIT/L (ref 40–150)
ALT SERPL-CCNC: 39 UNIT/L (ref 0–55)
APPEARANCE, UA: ABNORMAL
AST SERPL-CCNC: 14 UNIT/L (ref 5–34)
B-HCG SERPL QL: NEGATIVE
BACTERIA SPEC CULT: ABNORMAL /HPF
BASOPHILS # BLD AUTO: 0.1 X10(3)/MCL (ref 0–0.2)
BASOPHILS NFR BLD AUTO: 0 %
BILIRUB SERPL-MCNC: 0.2 MG/DL
BILIRUB UR QL STRIP: NEGATIVE
BILIRUBIN DIRECT+TOT PNL SERPL-MCNC: 0.1 MG/DL (ref 0–0.5)
BILIRUBIN DIRECT+TOT PNL SERPL-MCNC: 0.1 MG/DL (ref 0–0.8)
BUN SERPL-MCNC: 12.6 MG/DL (ref 7–18.7)
CALCIUM SERPL-MCNC: 9.4 MG/DL (ref 8.7–10.5)
CHLORIDE SERPL-SCNC: 107 MMOL/L (ref 98–107)
CO2 SERPL-SCNC: 25 MMOL/L (ref 22–29)
COLOR UR: YELLOW
CREAT SERPL-MCNC: 0.73 MG/DL (ref 0.55–1.02)
EOSINOPHIL # BLD AUTO: 1.6 X10(3)/MCL (ref 0–0.9)
EOSINOPHIL NFR BLD AUTO: 10 %
ERYTHROCYTE [DISTWIDTH] IN BLOOD BY AUTOMATED COUNT: 14.2 % (ref 11.5–14.5)
GLOBULIN SER-MCNC: 3.9 GM/DL (ref 2.4–3.5)
GLUCOSE (UA): NORMAL /UL
GLUCOSE SERPL-MCNC: 126 MG/DL (ref 74–100)
HCT VFR BLD AUTO: 39.4 % (ref 35–46)
HGB BLD-MCNC: 12.2 GM/DL (ref 12–16)
HGB UR QL STRIP: NEGATIVE /HPF
HIV 1+2 AB+HIV1 P24 AG SERPL QL IA: NONREACTIVE
HYALINE CASTS #/AREA URNS LPF: ABNORMAL /LPF
IMM GRANULOCYTES # BLD AUTO: 0.06 10*3/UL
IMM GRANULOCYTES NFR BLD AUTO: 0 %
KETONES UR QL STRIP: NEGATIVE /UL
LEUKOCYTE ESTERASE UR QL STRIP: NEGATIVE
LYMPHOCYTES # BLD AUTO: 3.2 X10(3)/MCL (ref 0.6–4.6)
LYMPHOCYTES NFR BLD AUTO: 20 %
MCH RBC QN AUTO: 26.6 PG (ref 26–34)
MCHC RBC AUTO-ENTMCNC: 31 GM/DL (ref 31–37)
MCV RBC AUTO: 85.8 FL (ref 80–100)
MONOCYTES # BLD AUTO: 0.9 X10(3)/MCL (ref 0.1–1.3)
MONOCYTES NFR BLD AUTO: 5 %
MUCOUS THREADS URNS QL MICRO: ABNORMAL
NEUTROPHILS # BLD AUTO: 10.42 X10(3)/MCL (ref 2.1–9.2)
NEUTROPHILS NFR BLD AUTO: 64 %
NITRITE UR QL STRIP: ABNORMAL
NRBC BLD AUTO-RTO: 0 % (ref 0–0.2)
PH UR STRIP: 5.5 /UL (ref 4.5–8)
PLATELET # BLD AUTO: 445 X10(3)/MCL (ref 130–400)
PMV BLD AUTO: 9 FL (ref 7.4–10.4)
POTASSIUM SERPL-SCNC: 4 MMOL/L (ref 3.5–5.1)
PROT SERPL-MCNC: 7.5 GM/DL (ref 6.4–8.3)
PROT UR QL STRIP: ABNORMAL /UL
RBC # BLD AUTO: 4.59 X10(6)/MCL (ref 4–5.2)
RBC #/AREA URNS HPF: ABNORMAL /HPF
SODIUM SERPL-SCNC: 139 MMOL/L (ref 136–145)
SP GR UR STRIP: 1.03 (ref 1–1.03)
SQUAMOUS EPITHELIAL, UA: ABNORMAL /HPF
T PALLIDUM AB SER QL: REACTIVE
UROBILINOGEN UR STRIP-ACNC: NORMAL /HPF
WBC # SPEC AUTO: 16.2 X10(3)/MCL (ref 4.5–11)
WBC #/AREA URNS HPF: ABNORMAL /HPF

## 2022-01-31 ENCOUNTER — HISTORICAL (OUTPATIENT)
Dept: RADIOLOGY | Facility: HOSPITAL | Age: 52
End: 2022-01-31

## 2022-01-31 ENCOUNTER — HISTORICAL (OUTPATIENT)
Dept: ADMINISTRATIVE | Facility: HOSPITAL | Age: 52
End: 2022-01-31

## 2022-02-21 ENCOUNTER — HISTORICAL (OUTPATIENT)
Dept: ADMINISTRATIVE | Facility: HOSPITAL | Age: 52
End: 2022-02-21

## 2022-02-21 LAB
ABS NEUT (OLG): 11.82 (ref 2.1–9.2)
BASOPHILS # BLD AUTO: 0.1 10*3/UL (ref 0–0.2)
BASOPHILS NFR BLD AUTO: 0 %
EOSINOPHIL # BLD AUTO: 1.8 10*3/UL (ref 0–0.9)
EOSINOPHIL NFR BLD AUTO: 10 %
ERYTHROCYTE [DISTWIDTH] IN BLOOD BY AUTOMATED COUNT: 14 % (ref 11.5–14.5)
FLAG2 (OHS): 60
FLAG3 (OHS): 80
FLAGS (OHS): 90
HCT VFR BLD AUTO: 35.5 % (ref 35–46)
HGB BLD-MCNC: 11.4 G/DL (ref 12–16)
IMM GRANULOCYTES # BLD AUTO: 0.07 10*3/UL
IMM GRANULOCYTES NFR BLD AUTO: 0 %
IMM. NE 1 SUSPECT FLAG (OHS): 10
IMM. NE 2 SUSPECT FLAG (OHS): 10
LOW EVENT # SUSPECT FLAG (OHS): 90
LYMPHOCYTES # BLD AUTO: 3.3 10*3/UL (ref 0.6–4.6)
LYMPHOCYTES NFR BLD AUTO: 18 %
MANUAL DIFF? (OHS): NO
MCH RBC QN AUTO: 26.5 PG (ref 26–34)
MCHC RBC AUTO-ENTMCNC: 32.1 G/DL (ref 31–37)
MCV RBC AUTO: 82.6 FL (ref 80–100)
MO BLASTS SUSPECT FLAG (OHS): 40
MONOCYTES # BLD AUTO: 1.1 10*3/UL (ref 0.1–1.3)
MONOCYTES NFR BLD AUTO: 6 %
NEUTROPHILS # BLD AUTO: 11.82 10*3/UL (ref 2.1–9.2)
NEUTROPHILS NFR BLD AUTO: 65 %
NRBC BLD AUTO-RTO: 0 % (ref 0–0.2)
PLATELET # BLD AUTO: 341 10*3/UL (ref 130–400)
PMV BLD AUTO: 8.9 FL (ref 7.4–10.4)
RBC # BLD AUTO: 4.3 10*6/UL (ref 4–5.2)
WBC # SPEC AUTO: 18.1 10*3/UL (ref 4.5–11)

## 2022-02-22 ENCOUNTER — HISTORICAL (OUTPATIENT)
Dept: RADIATION THERAPY | Facility: HOSPITAL | Age: 52
End: 2022-02-22

## 2022-03-02 ENCOUNTER — HISTORICAL (OUTPATIENT)
Dept: INFUSION THERAPY | Facility: HOSPITAL | Age: 52
End: 2022-03-02

## 2022-03-02 ENCOUNTER — HISTORICAL (OUTPATIENT)
Dept: RADIATION THERAPY | Facility: HOSPITAL | Age: 52
End: 2022-03-02

## 2022-03-02 LAB
ABS NEUT (OLG): 12.44 (ref 2.1–9.2)
ALBUMIN SERPL-MCNC: 3.4 G/DL (ref 3.5–5)
ALBUMIN/GLOB SERPL: 0.8 {RATIO} (ref 1.1–2)
ALP SERPL-CCNC: 90 U/L (ref 40–150)
ALT SERPL-CCNC: 26 U/L (ref 0–55)
AST SERPL-CCNC: 12 U/L (ref 5–34)
BASOPHILS # BLD AUTO: 0.1 10*3/UL (ref 0–0.2)
BASOPHILS NFR BLD AUTO: 0 %
BILIRUB SERPL-MCNC: 0.3 MG/DL
BILIRUBIN DIRECT+TOT PNL SERPL-MCNC: 0.1 (ref 0–0.8)
BILIRUBIN DIRECT+TOT PNL SERPL-MCNC: 0.2 (ref 0–0.5)
BUN SERPL-MCNC: 8.2 MG/DL (ref 7–18.7)
CALCIUM SERPL-MCNC: 9.4 MG/DL (ref 8.7–10.5)
CHLORIDE SERPL-SCNC: 104 MMOL/L (ref 98–107)
CO2 SERPL-SCNC: 27 MMOL/L (ref 22–29)
CREAT SERPL-MCNC: 0.73 MG/DL (ref 0.55–1.02)
EOSINOPHIL # BLD AUTO: 1.6 10*3/UL (ref 0–0.9)
EOSINOPHIL NFR BLD AUTO: 8 %
ERYTHROCYTE [DISTWIDTH] IN BLOOD BY AUTOMATED COUNT: 14 % (ref 11.5–14.5)
FLAG2 (OHS): 60
FLAG3 (OHS): 80
FLAGS (OHS): 90
GLOBULIN SER-MCNC: 4.3 G/DL (ref 2.4–3.5)
GLUCOSE SERPL-MCNC: 117 MG/DL (ref 74–100)
HCT VFR BLD AUTO: 35.7 % (ref 35–46)
HEMOLYSIS INTERF INDEX SERPL-ACNC: 2
HEMOLYSIS INTERF INDEX SERPL-ACNC: 2
HGB BLD-MCNC: 11.4 G/DL (ref 12–16)
ICTERIC INTERF INDEX SERPL-ACNC: 0
IMM GRANULOCYTES # BLD AUTO: 0.05 10*3/UL
IMM GRANULOCYTES NFR BLD AUTO: 0 %
IMM. NE 2 SUSPECT FLAG (OHS): 110
LIPEMIC INTERF INDEX SERPL-ACNC: 4
LOW EVENT # SUSPECT FLAG (OHS): 90
LYMPHOCYTES # BLD AUTO: 3.3 10*3/UL (ref 0.6–4.6)
LYMPHOCYTES NFR BLD AUTO: 18 %
MAGNESIUM SERPL-MCNC: 2 MG/DL (ref 1.6–2.6)
MANUAL DIFF? (OHS): NO
MCH RBC QN AUTO: 26.3 PG (ref 26–34)
MCHC RBC AUTO-ENTMCNC: 31.9 G/DL (ref 31–37)
MCV RBC AUTO: 82.4 FL (ref 80–100)
MO BLASTS SUSPECT FLAG (OHS): 110
MONOCYTES # BLD AUTO: 1 10*3/UL (ref 0.1–1.3)
MONOCYTES NFR BLD AUTO: 6 %
NEUTROPHILS # BLD AUTO: 12.44 10*3/UL (ref 2.1–9.2)
NEUTROPHILS NFR BLD AUTO: 67 %
NRBC BLD AUTO-RTO: 0 % (ref 0–0.2)
PLATELET # BLD AUTO: 384 10*3/UL (ref 130–400)
PLATELET CLUMPS SUSPECT FLAG (OHS): 10
PMV BLD AUTO: 8.9 FL (ref 7.4–10.4)
POTASSIUM SERPL-SCNC: 3.6 MMOL/L (ref 3.5–5.1)
PROT SERPL-MCNC: 7.7 G/DL (ref 6.4–8.3)
RBC # BLD AUTO: 4.33 10*6/UL (ref 4–5.2)
SODIUM SERPL-SCNC: 136 MMOL/L (ref 136–145)
WBC # SPEC AUTO: 18.4 10*3/UL (ref 4.5–11)

## 2022-03-07 ENCOUNTER — HISTORICAL (OUTPATIENT)
Dept: RADIATION THERAPY | Facility: HOSPITAL | Age: 52
End: 2022-03-07

## 2022-03-10 ENCOUNTER — HISTORICAL (OUTPATIENT)
Dept: ADMINISTRATIVE | Facility: HOSPITAL | Age: 52
End: 2022-03-10

## 2022-03-10 ENCOUNTER — HISTORICAL (OUTPATIENT)
Dept: RADIATION THERAPY | Facility: HOSPITAL | Age: 52
End: 2022-03-10

## 2022-03-10 LAB
ABS NEUT (OLG): 11.45 (ref 2.1–9.2)
ALBUMIN SERPL-MCNC: 3.1 G/DL (ref 3.5–5)
ALBUMIN/GLOB SERPL: 0.8 {RATIO} (ref 1.1–2)
ALP SERPL-CCNC: 74 U/L (ref 40–150)
ALT SERPL-CCNC: 19 U/L (ref 0–55)
AST SERPL-CCNC: 10 U/L (ref 5–34)
BASOPHILS # BLD AUTO: 0.1 10*3/UL (ref 0–0.2)
BASOPHILS NFR BLD AUTO: 0 %
BILIRUB SERPL-MCNC: 0.3 MG/DL
BILIRUBIN DIRECT+TOT PNL SERPL-MCNC: 0.1 (ref 0–0.5)
BILIRUBIN DIRECT+TOT PNL SERPL-MCNC: 0.2 (ref 0–0.8)
BUN SERPL-MCNC: 10.4 MG/DL (ref 7–18.7)
CALCIUM SERPL-MCNC: 8.8 MG/DL (ref 8.7–10.5)
CHLORIDE SERPL-SCNC: 105 MMOL/L (ref 98–107)
CO2 SERPL-SCNC: 26 MMOL/L (ref 22–29)
CREAT SERPL-MCNC: 0.66 MG/DL (ref 0.55–1.02)
EOSINOPHIL # BLD AUTO: 1.2 10*3/UL (ref 0–0.9)
EOSINOPHIL NFR BLD AUTO: 7 %
ERYTHROCYTE [DISTWIDTH] IN BLOOD BY AUTOMATED COUNT: 14.1 % (ref 11.5–14.5)
FLAG2 (OHS): 60
FLAG3 (OHS): 80
FLAGS (OHS): 90
GLOBULIN SER-MCNC: 3.7 G/DL (ref 2.4–3.5)
GLUCOSE SERPL-MCNC: 147 MG/DL (ref 74–100)
HCT VFR BLD AUTO: 33.2 % (ref 35–46)
HEMOLYSIS INTERF INDEX SERPL-ACNC: 3
HEMOLYSIS INTERF INDEX SERPL-ACNC: 3
HGB BLD-MCNC: 10.8 G/DL (ref 12–16)
ICTERIC INTERF INDEX SERPL-ACNC: 0
IMM GRANULOCYTES # BLD AUTO: 0.12 10*3/UL
IMM GRANULOCYTES NFR BLD AUTO: 1 %
IMM. NE 1 SUSPECT FLAG (OHS): 10
LIPEMIC INTERF INDEX SERPL-ACNC: 11
LOW EVENT # SUSPECT FLAG (OHS): 90
LYMPHOCYTES # BLD AUTO: 3.2 10*3/UL (ref 0.6–4.6)
LYMPHOCYTES NFR BLD AUTO: 19 %
MAGNESIUM SERPL-MCNC: 2.2 MG/DL (ref 1.6–2.6)
MANUAL DIFF? (OHS): NO
MCH RBC QN AUTO: 26.4 PG (ref 26–34)
MCHC RBC AUTO-ENTMCNC: 32.5 G/DL (ref 31–37)
MCV RBC AUTO: 81.2 FL (ref 80–100)
MO BLASTS SUSPECT FLAG (OHS): 40
MONOCYTES # BLD AUTO: 1.1 10*3/UL (ref 0.1–1.3)
MONOCYTES NFR BLD AUTO: 7 %
NEUTROPHILS # BLD AUTO: 11.45 10*3/UL (ref 2.1–9.2)
NEUTROPHILS NFR BLD AUTO: 67 %
NRBC BLD AUTO-RTO: 0 % (ref 0–0.2)
PLATELET # BLD AUTO: 404 10*3/UL (ref 130–400)
PMV BLD AUTO: 8.6 FL (ref 7.4–10.4)
POTASSIUM SERPL-SCNC: 3.5 MMOL/L (ref 3.5–5.1)
PROT SERPL-MCNC: 6.8 G/DL (ref 6.4–8.3)
RBC # BLD AUTO: 4.09 10*6/UL (ref 4–5.2)
SODIUM SERPL-SCNC: 138 MMOL/L (ref 136–145)
WBC # SPEC AUTO: 17.2 10*3/UL (ref 4.5–11)

## 2022-03-11 ENCOUNTER — HISTORICAL (OUTPATIENT)
Dept: RADIATION THERAPY | Facility: HOSPITAL | Age: 52
End: 2022-03-11

## 2022-03-14 ENCOUNTER — HISTORICAL (OUTPATIENT)
Dept: RADIATION THERAPY | Facility: HOSPITAL | Age: 52
End: 2022-03-14

## 2022-03-15 ENCOUNTER — HISTORICAL (OUTPATIENT)
Dept: RADIATION THERAPY | Facility: HOSPITAL | Age: 52
End: 2022-03-15

## 2022-03-16 ENCOUNTER — HISTORICAL (OUTPATIENT)
Dept: RADIATION THERAPY | Facility: HOSPITAL | Age: 52
End: 2022-03-16

## 2022-03-17 ENCOUNTER — HISTORICAL (OUTPATIENT)
Dept: RADIATION THERAPY | Facility: HOSPITAL | Age: 52
End: 2022-03-17

## 2022-03-17 ENCOUNTER — HISTORICAL (OUTPATIENT)
Dept: ADMINISTRATIVE | Facility: HOSPITAL | Age: 52
End: 2022-03-17

## 2022-03-17 LAB
ABS NEUT (OLG): 7.49 (ref 2.1–9.2)
ALBUMIN SERPL-MCNC: 2.8 G/DL (ref 3.5–5)
ALBUMIN/GLOB SERPL: 0.7 {RATIO} (ref 1.1–2)
ALP SERPL-CCNC: 64 U/L (ref 40–150)
ALT SERPL-CCNC: 17 U/L (ref 0–55)
AST SERPL-CCNC: 11 U/L (ref 5–34)
BASOPHILS # BLD AUTO: 0 10*3/UL (ref 0–0.2)
BASOPHILS NFR BLD AUTO: 0 %
BILIRUB SERPL-MCNC: 0.4 MG/DL
BILIRUBIN DIRECT+TOT PNL SERPL-MCNC: 0.2 (ref 0–0.5)
BILIRUBIN DIRECT+TOT PNL SERPL-MCNC: 0.2 (ref 0–0.8)
BUN SERPL-MCNC: 7.7 MG/DL (ref 7–18.7)
CALCIUM SERPL-MCNC: 9 MG/DL (ref 8.7–10.5)
CHLORIDE SERPL-SCNC: 106 MMOL/L (ref 98–107)
CO2 SERPL-SCNC: 24 MMOL/L (ref 22–29)
CREAT SERPL-MCNC: 0.7 MG/DL (ref 0.55–1.02)
EOSINOPHIL # BLD AUTO: 0.4 10*3/UL (ref 0–0.9)
EOSINOPHIL NFR BLD AUTO: 4 %
ERYTHROCYTE [DISTWIDTH] IN BLOOD BY AUTOMATED COUNT: 14.7 % (ref 11.5–14.5)
FLAG2 (OHS): 60
FLAG3 (OHS): 80
FLAGS (OHS): 90
GLOBULIN SER-MCNC: 3.8 G/DL (ref 2.4–3.5)
GLUCOSE SERPL-MCNC: 173 MG/DL (ref 74–100)
HCT VFR BLD AUTO: 30.8 % (ref 35–46)
HEMOLYSIS INTERF INDEX SERPL-ACNC: 2
HEMOLYSIS INTERF INDEX SERPL-ACNC: 2
HGB BLD-MCNC: 9.8 G/DL (ref 12–16)
ICTERIC INTERF INDEX SERPL-ACNC: 0
IMM GRANULOCYTES # BLD AUTO: 0.1 10*3/UL
IMM GRANULOCYTES NFR BLD AUTO: 1 %
IMM. NE 1 SUSPECT FLAG (OHS): 10
IMM. NE 2 SUSPECT FLAG (OHS): 10
LIPEMIC INTERF INDEX SERPL-ACNC: 3
LOW EVENT # SUSPECT FLAG (OHS): 90
LYMPHOCYTES # BLD AUTO: 0.8 10*3/UL (ref 0.6–4.6)
LYMPHOCYTES NFR BLD AUTO: 8 %
MAGNESIUM SERPL-MCNC: 2.3 MG/DL (ref 1.6–2.6)
MANUAL DIFF? (OHS): NO
MCH RBC QN AUTO: 26.4 PG (ref 26–34)
MCHC RBC AUTO-ENTMCNC: 31.8 G/DL (ref 31–37)
MCV RBC AUTO: 83 FL (ref 80–100)
MO BLASTS SUSPECT FLAG (OHS): 40
MONOCYTES # BLD AUTO: 0.5 10*3/UL (ref 0.1–1.3)
MONOCYTES NFR BLD AUTO: 5 %
NEUTROPHILS # BLD AUTO: 7.49 10*3/UL (ref 2.1–9.2)
NEUTROPHILS NFR BLD AUTO: 80 %
NRBC BLD AUTO-RTO: 0 % (ref 0–0.2)
PLATELET # BLD AUTO: 321 10*3/UL (ref 130–400)
PMV BLD AUTO: 8.6 FL (ref 7.4–10.4)
POTASSIUM SERPL-SCNC: 3.6 MMOL/L (ref 3.5–5.1)
PROT SERPL-MCNC: 6.6 G/DL (ref 6.4–8.3)
RBC # BLD AUTO: 3.71 10*6/UL (ref 4–5.2)
SODIUM SERPL-SCNC: 136 MMOL/L (ref 136–145)
WBC # SPEC AUTO: 9.3 10*3/UL (ref 4.5–11)

## 2022-03-18 ENCOUNTER — HISTORICAL (OUTPATIENT)
Dept: RADIATION THERAPY | Facility: HOSPITAL | Age: 52
End: 2022-03-18

## 2022-03-21 ENCOUNTER — HISTORICAL (OUTPATIENT)
Dept: RADIATION THERAPY | Facility: HOSPITAL | Age: 52
End: 2022-03-21

## 2022-03-22 ENCOUNTER — HISTORICAL (OUTPATIENT)
Dept: RADIATION THERAPY | Facility: HOSPITAL | Age: 52
End: 2022-03-22

## 2022-03-23 ENCOUNTER — HISTORICAL (OUTPATIENT)
Dept: RADIATION THERAPY | Facility: HOSPITAL | Age: 52
End: 2022-03-23

## 2022-03-24 ENCOUNTER — HISTORICAL (OUTPATIENT)
Dept: RADIATION THERAPY | Facility: HOSPITAL | Age: 52
End: 2022-03-24

## 2022-03-24 ENCOUNTER — HISTORICAL (OUTPATIENT)
Dept: ADMINISTRATIVE | Facility: HOSPITAL | Age: 52
End: 2022-03-24

## 2022-03-24 LAB
ABS NEUT (OLG): 3.53 (ref 2.1–9.2)
ALBUMIN SERPL-MCNC: 3.1 G/DL (ref 3.5–5)
ALBUMIN/GLOB SERPL: 0.8 {RATIO} (ref 1.1–2)
ALP SERPL-CCNC: 58 U/L (ref 40–150)
ALT SERPL-CCNC: 16 U/L (ref 0–55)
AST SERPL-CCNC: 13 U/L (ref 5–34)
BASOPHILS # BLD AUTO: 0 10*3/UL (ref 0–0.2)
BASOPHILS NFR BLD AUTO: 0 %
BILIRUB SERPL-MCNC: 0.3 MG/DL
BILIRUBIN DIRECT+TOT PNL SERPL-MCNC: 0.1 (ref 0–0.5)
BILIRUBIN DIRECT+TOT PNL SERPL-MCNC: 0.2 (ref 0–0.8)
BUN SERPL-MCNC: 12.3 MG/DL (ref 7–18.7)
CALCIUM SERPL-MCNC: 9.2 MG/DL (ref 8.7–10.5)
CHLORIDE SERPL-SCNC: 109 MMOL/L (ref 98–107)
CO2 SERPL-SCNC: 25 MMOL/L (ref 22–29)
CREAT SERPL-MCNC: 0.7 MG/DL (ref 0.55–1.02)
EOSINOPHIL # BLD AUTO: 0.3 10*3/UL (ref 0–0.9)
EOSINOPHIL NFR BLD AUTO: 7 %
ERYTHROCYTE [DISTWIDTH] IN BLOOD BY AUTOMATED COUNT: 15.7 % (ref 11.5–14.5)
FLAG2 (OHS): 60
FLAG3 (OHS): 80
FLAGS (OHS): 90
GLOBULIN SER-MCNC: 3.9 G/DL (ref 2.4–3.5)
GLUCOSE SERPL-MCNC: 117 MG/DL (ref 74–100)
HCT VFR BLD AUTO: 31.7 % (ref 35–46)
HEMOLYSIS INTERF INDEX SERPL-ACNC: 6
HEMOLYSIS INTERF INDEX SERPL-ACNC: 6
HGB BLD-MCNC: 10.1 G/DL (ref 12–16)
ICTERIC INTERF INDEX SERPL-ACNC: 0
IMM GRANULOCYTES # BLD AUTO: 0.02 10*3/UL
IMM GRANULOCYTES NFR BLD AUTO: 0 %
IMM. NE 2 SUSPECT FLAG (OHS): 10
LIPEMIC INTERF INDEX SERPL-ACNC: 11
LOW EVENT # SUSPECT FLAG (OHS): 90
LYMPHOCYTES # BLD AUTO: 0.6 10*3/UL (ref 0.6–4.6)
LYMPHOCYTES NFR BLD AUTO: 12 %
MAGNESIUM SERPL-MCNC: 2.3 MG/DL (ref 1.6–2.6)
MANUAL DIFF? (OHS): NO
MCH RBC QN AUTO: 26.4 PG (ref 26–34)
MCHC RBC AUTO-ENTMCNC: 31.9 G/DL (ref 31–37)
MCV RBC AUTO: 82.8 FL (ref 80–100)
MO BLASTS SUSPECT FLAG (OHS): 40
MONOCYTES # BLD AUTO: 0.4 10*3/UL (ref 0.1–1.3)
MONOCYTES NFR BLD AUTO: 8 %
NEUTROPHILS # BLD AUTO: 3.53 10*3/UL (ref 2.1–9.2)
NEUTROPHILS NFR BLD AUTO: 72 %
NRBC BLD AUTO-RTO: 0 % (ref 0–0.2)
PLATELET # BLD AUTO: 204 10*3/UL (ref 130–400)
PMV BLD AUTO: 8 FL (ref 7.4–10.4)
POTASSIUM SERPL-SCNC: 3.9 MMOL/L (ref 3.5–5.1)
PROT SERPL-MCNC: 7 G/DL (ref 6.4–8.3)
RBC # BLD AUTO: 3.83 10*6/UL (ref 4–5.2)
SODIUM SERPL-SCNC: 139 MMOL/L (ref 136–145)
WBC # SPEC AUTO: 4.9 10*3/UL (ref 4.5–11)

## 2022-03-25 ENCOUNTER — HISTORICAL (OUTPATIENT)
Dept: RADIATION THERAPY | Facility: HOSPITAL | Age: 52
End: 2022-03-25

## 2022-03-28 ENCOUNTER — HISTORICAL (OUTPATIENT)
Dept: RADIATION THERAPY | Facility: HOSPITAL | Age: 52
End: 2022-03-28

## 2022-03-29 ENCOUNTER — HISTORICAL (OUTPATIENT)
Dept: RADIATION THERAPY | Facility: HOSPITAL | Age: 52
End: 2022-03-29

## 2022-03-30 ENCOUNTER — HISTORICAL (OUTPATIENT)
Dept: RADIATION THERAPY | Facility: HOSPITAL | Age: 52
End: 2022-03-30

## 2022-03-31 ENCOUNTER — HISTORICAL (OUTPATIENT)
Dept: INFUSION THERAPY | Facility: HOSPITAL | Age: 52
End: 2022-03-31

## 2022-03-31 ENCOUNTER — HISTORICAL (OUTPATIENT)
Dept: RADIATION THERAPY | Facility: HOSPITAL | Age: 52
End: 2022-03-31

## 2022-03-31 LAB
ABS NEUT (OLG): 4.46 (ref 2.1–9.2)
ALBUMIN SERPL-MCNC: 3.2 G/DL (ref 3.5–5)
ALBUMIN/GLOB SERPL: 0.9 {RATIO} (ref 1.1–2)
ALP SERPL-CCNC: 68 U/L (ref 40–150)
ALT SERPL-CCNC: 24 U/L (ref 0–55)
AST SERPL-CCNC: 13 U/L (ref 5–34)
BASOPHILS # BLD AUTO: 0 10*3/UL (ref 0–0.2)
BASOPHILS NFR BLD AUTO: 0 %
BILIRUB SERPL-MCNC: 0.3 MG/DL
BILIRUBIN DIRECT+TOT PNL SERPL-MCNC: 0.1 (ref 0–0.5)
BILIRUBIN DIRECT+TOT PNL SERPL-MCNC: 0.2 (ref 0–0.8)
BUN SERPL-MCNC: 11.7 MG/DL (ref 7–18.7)
CALCIUM SERPL-MCNC: 9 MG/DL (ref 8.7–10.5)
CHLORIDE SERPL-SCNC: 107 MMOL/L (ref 98–107)
CO2 SERPL-SCNC: 25 MMOL/L (ref 22–29)
CREAT SERPL-MCNC: 0.71 MG/DL (ref 0.55–1.02)
EOSINOPHIL # BLD AUTO: 0.5 10*3/UL (ref 0–0.9)
EOSINOPHIL NFR BLD AUTO: 9 %
ERYTHROCYTE [DISTWIDTH] IN BLOOD BY AUTOMATED COUNT: 17.7 % (ref 11.5–14.5)
FLAG2 (OHS): 60
FLAG3 (OHS): 80
FLAGS (OHS): 80
GLOBULIN SER-MCNC: 3.7 G/DL (ref 2.4–3.5)
GLUCOSE SERPL-MCNC: 150 MG/DL (ref 74–100)
HCT VFR BLD AUTO: 31.8 % (ref 35–46)
HEMOLYSIS INTERF INDEX SERPL-ACNC: 5
HEMOLYSIS INTERF INDEX SERPL-ACNC: 5
HGB BLD-MCNC: 10.1 G/DL (ref 12–16)
ICTERIC INTERF INDEX SERPL-ACNC: 0
IMM GRANULOCYTES # BLD AUTO: 0.02 10*3/UL
IMM GRANULOCYTES NFR BLD AUTO: 0 %
LIPEMIC INTERF INDEX SERPL-ACNC: 13
LOW EVENT # SUSPECT FLAG (OHS): 80
LYMPHOCYTES # BLD AUTO: 0.4 10*3/UL (ref 0.6–4.6)
LYMPHOCYTES NFR BLD AUTO: 6 %
MAGNESIUM SERPL-MCNC: 2.1 MG/DL (ref 1.6–2.6)
MANUAL DIFF? (OHS): NO
MCH RBC QN AUTO: 27 PG (ref 26–34)
MCHC RBC AUTO-ENTMCNC: 31.8 G/DL (ref 31–37)
MCV RBC AUTO: 85 FL (ref 80–100)
MO BLASTS SUSPECT FLAG (OHS): 40
MONOCYTES # BLD AUTO: 0.4 10*3/UL (ref 0.1–1.3)
MONOCYTES NFR BLD AUTO: 6 %
NEUTROPHILS # BLD AUTO: 4.46 10*3/UL (ref 2.1–9.2)
NEUTROPHILS NFR BLD AUTO: 78 %
NRBC BLD AUTO-RTO: 0 % (ref 0–0.2)
PLATELET # BLD AUTO: 188 10*3/UL (ref 130–400)
PLATELET CLUMPS SUSPECT FLAG (OHS): 10
PMV BLD AUTO: 8.7 FL (ref 7.4–10.4)
POTASSIUM SERPL-SCNC: 4 MMOL/L (ref 3.5–5.1)
PROT SERPL-MCNC: 6.9 G/DL (ref 6.4–8.3)
RBC # BLD AUTO: 3.74 10*6/UL (ref 4–5.2)
SODIUM SERPL-SCNC: 138 MMOL/L (ref 136–145)
WBC # SPEC AUTO: 5.8 10*3/UL (ref 4.5–11)

## 2022-04-01 ENCOUNTER — HISTORICAL (OUTPATIENT)
Dept: RADIATION THERAPY | Facility: HOSPITAL | Age: 52
End: 2022-04-01

## 2022-04-04 ENCOUNTER — HISTORICAL (OUTPATIENT)
Dept: RADIATION THERAPY | Facility: HOSPITAL | Age: 52
End: 2022-04-04

## 2022-04-05 ENCOUNTER — HISTORICAL (OUTPATIENT)
Dept: RADIATION THERAPY | Facility: HOSPITAL | Age: 52
End: 2022-04-05

## 2022-04-06 ENCOUNTER — HISTORICAL (OUTPATIENT)
Dept: RADIATION THERAPY | Facility: HOSPITAL | Age: 52
End: 2022-04-06

## 2022-04-07 ENCOUNTER — HISTORICAL (OUTPATIENT)
Dept: ADMINISTRATIVE | Facility: HOSPITAL | Age: 52
End: 2022-04-07

## 2022-04-07 ENCOUNTER — HISTORICAL (OUTPATIENT)
Dept: RADIATION THERAPY | Facility: HOSPITAL | Age: 52
End: 2022-04-07

## 2022-04-07 LAB
ABS NEUT (OLG): 4.62 (ref 2.1–9.2)
ALBUMIN SERPL-MCNC: 3.1 G/DL (ref 3.5–5)
ALBUMIN/GLOB SERPL: 0.9 {RATIO} (ref 1.1–2)
ALP SERPL-CCNC: 60 U/L (ref 40–150)
ALT SERPL-CCNC: 15 U/L (ref 0–55)
AST SERPL-CCNC: 12 U/L (ref 5–34)
BASOPHILS # BLD AUTO: 0 10*3/UL (ref 0–0.2)
BASOPHILS NFR BLD AUTO: 0 %
BILIRUB SERPL-MCNC: 0.4 MG/DL
BILIRUBIN DIRECT+TOT PNL SERPL-MCNC: 0.1 (ref 0–0.5)
BILIRUBIN DIRECT+TOT PNL SERPL-MCNC: 0.3 (ref 0–0.8)
BUN SERPL-MCNC: 7.8 MG/DL (ref 7–18.7)
CALCIUM SERPL-MCNC: 8.9 MG/DL (ref 8.7–10.5)
CHLORIDE SERPL-SCNC: 109 MMOL/L (ref 98–107)
CO2 SERPL-SCNC: 24 MMOL/L (ref 22–29)
CREAT SERPL-MCNC: 0.64 MG/DL (ref 0.55–1.02)
EOSINOPHIL # BLD AUTO: 0.2 10*3/UL (ref 0–0.9)
EOSINOPHIL NFR BLD AUTO: 4 %
ERYTHROCYTE [DISTWIDTH] IN BLOOD BY AUTOMATED COUNT: 19.9 % (ref 11.5–14.5)
FLAG2 (OHS): 60
FLAG3 (OHS): 80
FLAGS (OHS): 70
GLOBULIN SER-MCNC: 3.5 G/DL (ref 2.4–3.5)
GLUCOSE SERPL-MCNC: 116 MG/DL (ref 74–100)
HCT VFR BLD AUTO: 31.6 % (ref 35–46)
HEMOLYSIS INTERF INDEX SERPL-ACNC: 1
HEMOLYSIS INTERF INDEX SERPL-ACNC: 1
HGB BLD-MCNC: 9.9 G/DL (ref 12–16)
ICTERIC INTERF INDEX SERPL-ACNC: 0
IMM GRANULOCYTES # BLD AUTO: 0.04 10*3/UL
IMM GRANULOCYTES NFR BLD AUTO: 1 %
IMM. NE 1 SUSPECT FLAG (OHS): 30
LIPEMIC INTERF INDEX SERPL-ACNC: 8
LOW EVENT # SUSPECT FLAG (OHS): 80
LYMPHOCYTES # BLD AUTO: 0.2 10*3/UL (ref 0.6–4.6)
LYMPHOCYTES NFR BLD AUTO: 4 %
MAGNESIUM SERPL-MCNC: 2 MG/DL (ref 1.6–2.6)
MANUAL DIFF? (OHS): NO
MCH RBC QN AUTO: 26.5 PG (ref 26–34)
MCHC RBC AUTO-ENTMCNC: 31.3 G/DL (ref 31–37)
MCV RBC AUTO: 84.5 FL (ref 80–100)
MO BLASTS SUSPECT FLAG (OHS): 50
MONOCYTES # BLD AUTO: 0.5 10*3/UL (ref 0.1–1.3)
MONOCYTES NFR BLD AUTO: 9 %
NEUTROPHILS # BLD AUTO: 4.62 10*3/UL (ref 2.1–9.2)
NEUTROPHILS NFR BLD AUTO: 82 %
NRBC BLD AUTO-RTO: 0 % (ref 0–0.2)
PLATELET # BLD AUTO: 201 10*3/UL (ref 130–400)
PLATELET CLUMPS SUSPECT FLAG (OHS): 10
PMV BLD AUTO: 8.9 FL (ref 7.4–10.4)
POTASSIUM SERPL-SCNC: 3.6 MMOL/L (ref 3.5–5.1)
PROT SERPL-MCNC: 6.6 G/DL (ref 6.4–8.3)
RBC # BLD AUTO: 3.74 10*6/UL (ref 4–5.2)
SODIUM SERPL-SCNC: 138 MMOL/L (ref 136–145)
WBC # SPEC AUTO: 5.6 10*3/UL (ref 4.5–11)

## 2022-04-08 ENCOUNTER — HISTORICAL (OUTPATIENT)
Dept: RADIATION THERAPY | Facility: HOSPITAL | Age: 52
End: 2022-04-08

## 2022-04-10 ENCOUNTER — HISTORICAL (OUTPATIENT)
Dept: ADMINISTRATIVE | Facility: HOSPITAL | Age: 52
End: 2022-04-10
Payer: MEDICAID

## 2022-04-11 ENCOUNTER — HISTORICAL (OUTPATIENT)
Dept: RADIATION THERAPY | Facility: HOSPITAL | Age: 52
End: 2022-04-11

## 2022-04-12 ENCOUNTER — HISTORICAL (OUTPATIENT)
Dept: RADIATION THERAPY | Facility: HOSPITAL | Age: 52
End: 2022-04-12

## 2022-04-13 ENCOUNTER — HISTORICAL (OUTPATIENT)
Dept: RADIATION THERAPY | Facility: HOSPITAL | Age: 52
End: 2022-04-13
Payer: MEDICAID

## 2022-04-14 ENCOUNTER — HISTORICAL (OUTPATIENT)
Dept: RADIATION THERAPY | Facility: HOSPITAL | Age: 52
End: 2022-04-14
Payer: MEDICAID

## 2022-04-14 ENCOUNTER — HISTORICAL (OUTPATIENT)
Dept: HEMATOLOGY/ONCOLOGY | Facility: CLINIC | Age: 52
End: 2022-04-14
Payer: MEDICAID

## 2022-04-14 LAB
ABS NEUT (OLG): 5.02 (ref 2.1–9.2)
ALBUMIN SERPL-MCNC: 3.2 G/DL (ref 3.5–5)
ALBUMIN/GLOB SERPL: 0.9 {RATIO} (ref 1.1–2)
ALP SERPL-CCNC: 55 U/L (ref 40–150)
ALT SERPL-CCNC: 19 U/L (ref 0–55)
AST SERPL-CCNC: 13 U/L (ref 5–34)
BASOPHILS # BLD AUTO: 0 10*3/UL (ref 0–0.2)
BASOPHILS NFR BLD AUTO: 0 %
BILIRUB SERPL-MCNC: 0.4 MG/DL
BILIRUBIN DIRECT+TOT PNL SERPL-MCNC: 0.1 (ref 0–0.5)
BILIRUBIN DIRECT+TOT PNL SERPL-MCNC: 0.3 (ref 0–0.8)
BUN SERPL-MCNC: 10.8 MG/DL (ref 7–18.7)
CALCIUM SERPL-MCNC: 9.3 MG/DL (ref 8.7–10.5)
CHLORIDE SERPL-SCNC: 106 MMOL/L (ref 98–107)
CO2 SERPL-SCNC: 23 MMOL/L (ref 22–29)
CREAT SERPL-MCNC: 0.69 MG/DL (ref 0.55–1.02)
EOSINOPHIL # BLD AUTO: 0.1 10*3/UL (ref 0–0.9)
EOSINOPHIL NFR BLD AUTO: 2 %
ERYTHROCYTE [DISTWIDTH] IN BLOOD BY AUTOMATED COUNT: 22.4 % (ref 11.5–14.5)
FLAG2 (OHS): 60
FLAG3 (OHS): 80
FLAGS (OHS): 60
GLOBULIN SER-MCNC: 3.6 G/DL (ref 2.4–3.5)
GLUCOSE SERPL-MCNC: 118 MG/DL (ref 74–100)
HCT VFR BLD AUTO: 32.2 % (ref 35–46)
HEMOLYSIS INTERF INDEX SERPL-ACNC: 3
HGB BLD-MCNC: 9.9 G/DL (ref 12–16)
ICTERIC INTERF INDEX SERPL-ACNC: 0
IMM GRANULOCYTES # BLD AUTO: 0.04 10*3/UL
IMM GRANULOCYTES NFR BLD AUTO: 1 %
IMM. NE 1 SUSPECT FLAG (OHS): 110
IMM. NE 2 SUSPECT FLAG (OHS): 100
LIPEMIC INTERF INDEX SERPL-ACNC: 3
LOW EVENT # SUSPECT FLAG (OHS): 80
LYMPHOCYTES # BLD AUTO: 0.2 10*3/UL (ref 0.6–4.6)
LYMPHOCYTES NFR BLD AUTO: 3 %
MANUAL DIFF? (OHS): NO
MCH RBC QN AUTO: 26.8 PG (ref 26–34)
MCHC RBC AUTO-ENTMCNC: 30.7 G/DL (ref 31–37)
MCV RBC AUTO: 87.3 FL (ref 80–100)
MO BLASTS SUSPECT FLAG (OHS): 100
MONOCYTES # BLD AUTO: 0.9 10*3/UL (ref 0.1–1.3)
MONOCYTES NFR BLD AUTO: 15 %
NEUTROPHILS # BLD AUTO: 5.02 10*3/UL (ref 2.1–9.2)
NEUTROPHILS NFR BLD AUTO: 80 %
NRBC BLD AUTO-RTO: 0 % (ref 0–0.2)
PLATELET # BLD AUTO: 217 10*3/UL (ref 130–400)
PLATELET CLUMPS SUSPECT FLAG (OHS): 20
PMV BLD AUTO: 9 FL (ref 7.4–10.4)
POTASSIUM SERPL-SCNC: 3.5 MMOL/L (ref 3.5–5.1)
PROT SERPL-MCNC: 6.8 G/DL (ref 6.4–8.3)
RBC # BLD AUTO: 3.69 10*6/UL (ref 4–5.2)
SODIUM SERPL-SCNC: 135 MMOL/L (ref 136–145)
WBC # SPEC AUTO: 6.3 10*3/UL (ref 4.5–11)

## 2022-04-18 ENCOUNTER — HISTORICAL (OUTPATIENT)
Dept: RADIATION THERAPY | Facility: HOSPITAL | Age: 52
End: 2022-04-18
Payer: MEDICAID

## 2022-04-21 ENCOUNTER — HISTORICAL (OUTPATIENT)
Dept: ADMINISTRATIVE | Facility: HOSPITAL | Age: 52
End: 2022-04-21
Payer: MEDICAID

## 2022-04-21 ENCOUNTER — HISTORICAL (OUTPATIENT)
Dept: RADIATION THERAPY | Facility: HOSPITAL | Age: 52
End: 2022-04-21
Payer: MEDICAID

## 2022-04-21 LAB
ABS NEUT (OLG): 5.15 (ref 2.1–9.2)
ALBUMIN SERPL-MCNC: 3 G/DL (ref 3.5–5)
ALBUMIN/GLOB SERPL: 0.8 {RATIO} (ref 1.1–2)
ALP SERPL-CCNC: 59 U/L (ref 40–150)
ALT SERPL-CCNC: 16 U/L (ref 0–55)
AST SERPL-CCNC: 14 U/L (ref 5–34)
BASOPHILS # BLD AUTO: 0 10*3/UL (ref 0–0.2)
BASOPHILS NFR BLD AUTO: 0 %
BILIRUB SERPL-MCNC: 0.4 MG/DL
BILIRUBIN DIRECT+TOT PNL SERPL-MCNC: 0.1 (ref 0–0.5)
BILIRUBIN DIRECT+TOT PNL SERPL-MCNC: 0.3 (ref 0–0.8)
BUN SERPL-MCNC: 6.5 MG/DL (ref 7–18.7)
CALCIUM SERPL-MCNC: 9.6 MG/DL (ref 8.7–10.5)
CHLORIDE SERPL-SCNC: 108 MMOL/L (ref 98–107)
CHOLEST SERPL-MCNC: 163 MG/DL
CHOLEST/HDLC SERPL: 6 {RATIO} (ref 0–5)
CO2 SERPL-SCNC: 22 MMOL/L (ref 22–29)
CREAT SERPL-MCNC: 0.7 MG/DL (ref 0.55–1.02)
DEPRECATED CALCIDIOL+CALCIFEROL SERPL-MC: 16 NG/ML (ref 30–80)
EOSINOPHIL # BLD AUTO: 0.1 10*3/UL (ref 0–0.9)
EOSINOPHIL NFR BLD AUTO: 2 %
ERYTHROCYTE [DISTWIDTH] IN BLOOD BY AUTOMATED COUNT: 22.9 % (ref 11.5–14.5)
EST. AVERAGE GLUCOSE BLD GHB EST-MCNC: 125.5 MG/DL
FLAG2 (OHS): 60
FLAG3 (OHS): 80
FLAGS (OHS): 60
GLOBULIN SER-MCNC: 3.7 G/DL (ref 2.4–3.5)
GLUCOSE SERPL-MCNC: 114 MG/DL (ref 74–100)
HBA1C MFR BLD: 6 %
HCT VFR BLD AUTO: 31.6 % (ref 35–46)
HDLC SERPL-MCNC: 27 MG/DL (ref 35–60)
HEMOLYSIS INTERF INDEX SERPL-ACNC: 1
HEMOLYSIS INTERF INDEX SERPL-ACNC: 1
HGB BLD-MCNC: 9.7 G/DL (ref 12–16)
ICTERIC INTERF INDEX SERPL-ACNC: 0
IMM GRANULOCYTES # BLD AUTO: 0.07 10*3/UL
IMM GRANULOCYTES NFR BLD AUTO: 1 %
IMM. NE 1 SUSPECT FLAG (OHS): 20
IMM. NE 2 SUSPECT FLAG (OHS): 100
LDLC SERPL CALC-MCNC: 81 MG/DL (ref 50–140)
LIPEMIC INTERF INDEX SERPL-ACNC: 5
LOW EVENT # SUSPECT FLAG (OHS): 80
LYMPHOCYTES # BLD AUTO: 0.4 10*3/UL (ref 0.6–4.6)
LYMPHOCYTES NFR BLD AUTO: 6 %
MAGNESIUM SERPL-MCNC: 2 MG/DL (ref 1.6–2.6)
MANUAL DIFF? (OHS): NO
MCH RBC QN AUTO: 27 PG (ref 26–34)
MCHC RBC AUTO-ENTMCNC: 30.7 G/DL (ref 31–37)
MCV RBC AUTO: 88 FL (ref 80–100)
MO BLASTS SUSPECT FLAG (OHS): 100
MONOCYTES # BLD AUTO: 0.9 10*3/UL (ref 0.1–1.3)
MONOCYTES NFR BLD AUTO: 14 %
NEUTROPHILS # BLD AUTO: 5.15 10*3/UL (ref 2.1–9.2)
NEUTROPHILS NFR BLD AUTO: 77 %
NRBC BLD AUTO-RTO: 0 % (ref 0–0.2)
PLATELET # BLD AUTO: 181 10*3/UL (ref 130–400)
PLATELET CLUMPS SUSPECT FLAG (OHS): 10
PMV BLD AUTO: 8.5 FL (ref 7.4–10.4)
POTASSIUM SERPL-SCNC: 3.6 MMOL/L (ref 3.5–5.1)
PROT SERPL-MCNC: 6.7 G/DL (ref 6.4–8.3)
RBC # BLD AUTO: 3.59 10*6/UL (ref 4–5.2)
SODIUM SERPL-SCNC: 138 MMOL/L (ref 136–145)
TRIGL SERPL-MCNC: 275 MG/DL (ref 37–140)
TSH SERPL-ACNC: 1.23 M[IU]/L (ref 0.35–4.94)
VLDLC SERPL CALC-MCNC: 55 MG/DL
WBC # SPEC AUTO: 6.7 10*3/UL (ref 4.5–11)

## 2022-04-25 ENCOUNTER — HISTORICAL (OUTPATIENT)
Dept: RADIATION THERAPY | Facility: HOSPITAL | Age: 52
End: 2022-04-25
Payer: MEDICAID

## 2022-04-26 ENCOUNTER — HISTORICAL (OUTPATIENT)
Dept: RADIATION THERAPY | Facility: HOSPITAL | Age: 52
End: 2022-04-26
Payer: MEDICAID

## 2022-04-27 ENCOUNTER — HISTORICAL (OUTPATIENT)
Dept: RADIATION THERAPY | Facility: HOSPITAL | Age: 52
End: 2022-04-27
Payer: MEDICAID

## 2022-04-28 ENCOUNTER — HISTORICAL (OUTPATIENT)
Dept: RADIATION THERAPY | Facility: HOSPITAL | Age: 52
End: 2022-04-28
Payer: MEDICAID

## 2022-04-28 ENCOUNTER — HISTORICAL (OUTPATIENT)
Dept: ADMINISTRATIVE | Facility: HOSPITAL | Age: 52
End: 2022-04-28
Payer: MEDICAID

## 2022-04-28 VITALS
HEIGHT: 66 IN | OXYGEN SATURATION: 99 % | WEIGHT: 239.44 LBS | SYSTOLIC BLOOD PRESSURE: 114 MMHG | DIASTOLIC BLOOD PRESSURE: 72 MMHG | BODY MASS INDEX: 38.48 KG/M2

## 2022-04-28 LAB
ABS NEUT (OLG): 3.52 (ref 2.1–9.2)
ALBUMIN SERPL-MCNC: 2.9 G/DL (ref 3.5–5)
ALBUMIN/GLOB SERPL: 0.8 {RATIO} (ref 1.1–2)
ALP SERPL-CCNC: 61 U/L (ref 40–150)
ALT SERPL-CCNC: 16 U/L (ref 0–55)
ANISOCYTOSIS BLD QL SMEAR: NORMAL
AST SERPL-CCNC: 17 U/L (ref 5–34)
BASOPHILS # BLD AUTO: 0 10*3/UL (ref 0–0.2)
BASOPHILS NFR BLD AUTO: 0 %
BILIRUB SERPL-MCNC: 0.3 MG/DL
BILIRUBIN DIRECT+TOT PNL SERPL-MCNC: 0.1 (ref 0–0.5)
BILIRUBIN DIRECT+TOT PNL SERPL-MCNC: 0.2 (ref 0–0.8)
BUN SERPL-MCNC: 8.1 MG/DL (ref 7–18.7)
CALCIUM SERPL-MCNC: 9 MG/DL (ref 8.7–10.5)
CEA SERPL-MCNC: <1.73 NG/ML (ref 0–3)
CHLORIDE SERPL-SCNC: 110 MMOL/L (ref 98–107)
CO2 SERPL-SCNC: 23 MMOL/L (ref 22–29)
CREAT SERPL-MCNC: 0.66 MG/DL (ref 0.55–1.02)
EOSINOPHIL # BLD AUTO: 0.2 10*3/UL (ref 0–0.9)
EOSINOPHIL NFR BLD AUTO: 3 %
ERYTHROCYTE [DISTWIDTH] IN BLOOD BY AUTOMATED COUNT: NORMAL % (ref 11.5–14.5)
FLAG2 (OHS): 60
FLAG3 (OHS): 80
GLOBULIN SER-MCNC: 3.5 G/DL (ref 2.4–3.5)
GLUCOSE SERPL-MCNC: 87 MG/DL (ref 74–100)
HCT VFR BLD AUTO: 31.1 % (ref 35–46)
HEMOLYSIS INTERF INDEX SERPL-ACNC: 9
HEMOLYSIS INTERF INDEX SERPL-ACNC: 9
HGB BLD-MCNC: 9.4 G/DL (ref 12–16)
ICTERIC INTERF INDEX SERPL-ACNC: 0
IMM GRANULOCYTES # BLD AUTO: 0.05 10*3/UL
IMM GRANULOCYTES NFR BLD AUTO: 1 %
IMM. NE 1 SUSPECT FLAG (OHS): 10
LIPEMIC INTERF INDEX SERPL-ACNC: 14
LYMPHOCYTES # BLD AUTO: 0.3 10*3/UL (ref 0.6–4.6)
LYMPHOCYTES NFR BLD AUTO: 7 %
MAGNESIUM SERPL-MCNC: 2.2 MG/DL (ref 1.6–2.6)
MANUAL DIFF? (OHS): NO
MCH RBC QN AUTO: 26.9 PG (ref 26–34)
MCHC RBC AUTO-ENTMCNC: 30.2 G/DL (ref 31–37)
MCV RBC AUTO: 89.1 FL (ref 80–100)
MO BLASTS SUSPECT FLAG (OHS): 40
MONOCYTES # BLD AUTO: 0.6 10*3/UL (ref 0.1–1.3)
MONOCYTES NFR BLD AUTO: 13 %
NEUTROPHILS # BLD AUTO: 3.52 10*3/UL (ref 2.1–9.2)
NEUTROPHILS NFR BLD AUTO: 75 %
NRBC BLD AUTO-RTO: 0 % (ref 0–0.2)
PLATELET # BLD AUTO: 209 10*3/UL (ref 130–400)
PLATELET # BLD EST: ADEQUATE 10*3/UL
PMV BLD AUTO: 9.3 FL (ref 7.4–10.4)
POTASSIUM SERPL-SCNC: 3.6 MMOL/L (ref 3.5–5.1)
PROT SERPL-MCNC: 6.4 G/DL (ref 6.4–8.3)
RBC # BLD AUTO: 3.49 10*6/UL (ref 4–5.2)
SCAN RECIEVED (OHS): YES
SODIUM SERPL-SCNC: 139 MMOL/L (ref 136–145)
WBC # SPEC AUTO: 4.7 10*3/UL (ref 4.5–11)

## 2022-04-29 ENCOUNTER — HISTORICAL (OUTPATIENT)
Dept: RADIATION THERAPY | Facility: HOSPITAL | Age: 52
End: 2022-04-29
Payer: MEDICAID

## 2022-05-02 PROCEDURE — 77386 HC IMRT, COMPLEX: CPT | Performed by: RADIOLOGY

## 2022-05-02 PROCEDURE — 77336 RADIATION PHYSICS CONSULT: CPT | Performed by: RADIOLOGY

## 2022-05-03 PROCEDURE — 77386 HC IMRT, COMPLEX: CPT | Performed by: RADIOLOGY

## 2022-05-04 DIAGNOSIS — F41.9 ANXIETY: Primary | ICD-10-CM

## 2022-05-04 PROCEDURE — 77386 HC IMRT, COMPLEX: CPT | Performed by: RADIOLOGY

## 2022-05-04 NOTE — HISTORICAL [facility] CERNER
This is a historical note converted from [employer]. Formatting and pictures may have been removed.  Please reference [employer] for original formatting and attached multimedia. Patient seen and examined. No changes to H&P performed within the last 30 days.  -To OR today for EUA and rectal mass biopsy  -Informed consent in chart  ?  Magaly Pastrana MD  [facility], PGY-1  ?

## 2022-05-04 NOTE — HISTORICAL [facility] CERNER
This is a historical note converted from [employer]. Formatting and pictures may have been removed.  Please reference [employer] for original formatting and attached multimedia. Indication for Surgery  Anal squamous cell carcinoma, need for infusion?chemotherapy  Preoperative Diagnosis  Anal squamous cell carcinoma  Postoperative Diagnosis  Anal squamous cell carcinoma  Operation  Insertion of tunnelled central venous catheter with port  Surgeon(s)  Silva- Staff  Yared- Resident  Salomon- Resident  Anesthesia  MAC and local, see anesthesia report  Estimated Blood Loss  5mL  Urine Output  None  Specimen(s)  None  Complications  None  Technique  After proper consent was obtained, the patient was brought into the operating room and placed in the supine position. MAC anesthesia was administered per anesthesia team and she was prepped and draped in the standard sterile fashion. A?time out was performed ensuring correct patient, procedure, medications, and other pertinent information. 1% lidocaine with epi was injected into the subcutaneous tissue just inferior to the L clavicle over the deltopectoral groove. A large bore needle was used to attempt subclavian venous access on the L side; however the subcutaneous tissue was noted to be thick and the decision was made to convert to a R internal jugular insertion site. Ultrasound was used to locate the R internal jugular vein and the skin was anesthetized above this area. A large bore needle was used to establish venous access and several ccs of venous blood were aspirated into the syringe. A guide wire was threaded easily into the needle and an xray confirmed placement of the guide wire. The needle was removed. next, lidocaine was injected into the skin and subcutaneous tissue just inferior the the right clavicle near the deltopectoral groove and a 3cm skin incision was made. A skin pocket was created using blunt dissection and hemostasis was noted. Lidocaine was injected along the  subcutaneous tissue between the skin incision and the venipuncture site. A tunneling device was used to create a tunnel between these two areas and a catheter was threaded through. Next, a dilator sheath was introduced over the wire and was inserted into the vein under direct fluoroscopic visualization. The catheter was threaded into the dilator and the sheath was removed. The port was attached to the catheter and secured to the underlying tissue with PDS suture at one point. The port was easily?flushed with saline & heparin lock. Vicryl suture was used to close the subcutaneous tissue and the skin was closed with monocryl in a running subcuticular fashion. The R IJ venipuncutre site was closed with a single interrupted monocryl. All counts were correct x2. Dr. Ascencio was available during the entire procedure. The patient was taken to PACU without complication and xray confirmed correct location of port, catheter, and no pneumothorax was noted.  ?  Luciana Salomon MD  [facility] HO-1  ?  I agree with resident documentation. I was physically present, supervised resident, ?and discussed plan of care.  Alejandra Ascencio MD

## 2022-05-04 NOTE — HISTORICAL [facility] CERNER
This is a historical note converted from [employer]. Formatting and pictures may have been removed.  Please reference [employer] for original formatting and attached multimedia. I have seen and examined the patient. There are no changes to the below H&P. Pt will undergo mediport insertion today. Consented and all questions answered.  ?   Luciana Salomon MD  [facility] HO-1  ?   I agree with resident documentation. I was physically present, supervised resident, ?and discussed plan of care.  Alejandra Ascencio MD  ?  ?   Chief Complaint  MRI results  History of Present Illness  Antoine Banegas is a 50 year old female with history of enlarging anal lump for 1 year with intermittent bleeding noted to have a fungating anal mass on the right anal verge ~ 3 cm in size s/p EUA with biopsy on?6/16 with squamous cell carcinoma. Intraoperatively she was found to have a mass extending from the anal verge from the right anterior midline to almost the posterior midline into the anal canal to the dentate line. Her case discussed in Tumor Board and she had work up scans performed. Her CT AP 5/19 with borderline mesorectal lymph node and no visualized mass. No acute abnormality in the CT chest. PET/CT with uptake along the anal canal and mildly hypermetabolic right sidewall and inguinal nodes. The patient was supposed to get an MRI of her pelvis but this was re-scheduled because of claustrophobia. The patient states that since her last visit she has had persistent drainage from her mass which she is controlling with pads. She is tolerating regular diet without nausea or vomiting and having regular bowel movements. She does have some bleeding from her wound intermittently and the pain limits her ability to lay supine or on a chair.  ?  Interval H&P 10/5/21: Patient presents today to follow up pelvic MRI results and schedule mediport placement. No significant change in her symptoms from the above.  Review of Systems  General: no  fevers, chills, fatigue  CV: denies CP  Pulm: denies cough, SOB, continues to smoke 1PPD  GI: continues to have some anal pain and hard stools, some bleeding from rectum, unchanged from prior  MSK: no complaints.  Physical Exam  ???Vitals & Measurements  ??T:?37.1? ?C (Oral)? HR:?86(Peripheral)? RR:?20? BP:?122/78? SpO2:?100%?  ??HT:?167.00?cm? WT:?105.400?kg? BMI:?37.79?  Gen - well appearing, nad  Neuro - alert and oriented  Head - atraumatic, normocephalic  Pulm - normal wob, no respiratory distress  Ext - moves all spontaneously  Skin - warm and dry  Assessment/Plan  1.?Anal squamous cell carcinoma?C21.0  ???50 year old female smoker (1 ppd) with history of anxiety, obesity, HOLLY on CPAP and bx proven squamous cell carcinoma of the anus. MRI completed 10/1/21 showing 55mm mass along the R anal canal and several suspicious perirectal lymph nodes  ?  - plan for mediport placement 10/13/21  - will inform oncology of plans for mediport placement to facilitate starting MARIANNE protocol  ?  ??Above Hx and assessment reviewed and discussed with Resident.  Agree with plan of care.  ?  Juan Franklin MD  ?  ?  ??Ordered:  Office/Outpatient Visit Level 4 Established [id number] PC, Anal squamous cell carcinoma, [facility], 10/05/21 10:58:00 CDT  Request for Surgery, Insertion of peripherally inserted central venous access device, with subcutaneous port; younger than 5 years of age (99026), Luciana Salomon MD, 10/13/21, General Endo, Supine, Outpatient, Anal squamous cell carcinoma  ? Problem List/Past Medical History  Ongoing  ??Anxiety  ?Chronic lower back pain  ?Chronic neck and back pain  ?Chronic pain of lower extremity, bilateral  ?Claustrophobia  ?Depression  ?Hyperlipemia  ?Hypertriglyceridemia  ?Obesity  ?HOLLY on CPAP  ?Osteoarthritis  ?Tobacco abuse  ?Tobacco user  ?Tumor of anal canal  ?Uterine fibroids  ?Vitamin D deficiency  ?Weakness of both legs  Historical  ??Bloody stool  ??Pregnant  Procedure/Surgical  HistoryExam Under Anesthesia Rectal (None) (06/16/2021)  Excision of Anus, Via Natural or Artificial Opening, Diagnostic (06/16/2021)  Unlisted procedure, anus (06/16/2021)  Colonoscopy (None) (08/28/2020)  Colonoscopy, flexible; with removal of tumor(s), polyp(s), or other lesion(s) by snare technique (08/28/2020)  Excision of Ascending Colon, Via Natural or Artificial Opening Endoscopic, Diagnostic (08/28/2020)  Polypectomy (None) (08/28/2020)  Cystoscopy (None) (03/16/2017)  Hysterectomy Total Laparoscopic (None) (03/16/2017)  Laparoscopy, surgical, with total hysterectomy, for uterus greater than 250 g; with removal of tube(s) and/or ovary(s) (03/16/2017)  Resection of Bilateral Fallopian Tubes, Percutaneous Endoscopic Approach (03/16/2017)  Resection of Cervix, Percutaneous Endoscopic Approach (03/16/2017)  Resection of Uterus, Percutaneous Endoscopic Approach (03/16/2017)  Salpingectomy (Bilateral) (03/16/2017)  FEMUR (04/03/2003)  Hip joint operations (04/03/2003)  left knee sx  left thigh sx   ?  Medications  ?acetaminophen-hydrocodone 325 mg-5 mg oral tablet, 1 tab(s), Oral, q6hr  ?amitriptyline 50 mg oral tablet, 50 mg= 1 tab(s), Oral, Once a day (at bedtime), 3 refills  ?atorvastatin 40 mg oral tablet, 40 mg= 1 tab(s), Oral, Daily, 3 refills  ?fluconazole 150 mg oral tablet, 150 mg= 1 tab(s), Oral, Once  ?Hemorrhoid seating cushion, See Instructions  ?hydrOXYzine pamoate 25 mg oral capsule, 25 mg= 1 cap(s), Oral, TID,? ?Not taking  ?ibuprofen 800 mg oral tablet, 800 mg= 1 tab(s), Oral, TID  ?lidocaine 5% topical ointment, TOP, TID  ?LORazepam 1 mg oral tablet, 1 mg= 1 tab(s), Oral, Daily  ?pregabalin 50 mg oral capsule, 50 mg= 1 cap(s), Oral, BID, 1 refills  ?Small base quad cane, See Instructions  ?traMADol 50 mg oral tablet, 50 mg= 1 tab(s), Oral, q6hr,? ?Not taking  ?Vistaril 50 mg oral capsule, 50 mg= 1 cap(s), Oral, QID, PRN, 2 refills  ?Vitamin D3 2000 intl units (50 mcg) oral capsule, 2000 IntUnit=  1 cap(s), Oral, Daily, 4 refills,? ?Not taking  Allergies  No Known Allergies  Social History  Abuse/Neglect  ?No, 10/01/2021  ?No, No, Yes, 09/02/2021  Alcohol - Low Risk, 09/22/2015  ?Current, Beer, 1-2 times per week, 08/27/2021  Employment/School  ?Unemployed, Work/School description: applied for disability. Activity level: Heavy physical work. Workplace hazards: Heavy lifting/twisting, Repetitive motion., 01/03/2019  Exercise - Does not exercise, 08/05/2015  Home/Environment  ?Lives with Significant other. Living situation: Home/Independent. Home equipment: CPAP/BiPAP, Walker/Cane. Alcohol abuse in household: No. Substance abuse in household: No. Smoker in household: Yes. Injuries/Abuse/Neglect in household: No. Feels unsafe at home: No. Safe place to go: Yes. Family/Friends available for support: Yes., 01/03/2019  Nutrition/Health  ?Regular, Good, 11/08/2019  Sexual  ?Sexual orientation: Straight or heterosexual. Gender Identity Identifies as female., 04/15/2019  Spiritual/Cultural  ?Voodoo, 08/02/2021  Substance Use - Denies Substance Abuse, 04/09/2013  ?Never, 08/27/2021  Tobacco - High Risk, 03/10/2014  ?10 or more cigarettes (1/2 pack or more)/day in last 30 days, Cigarettes, No, 20 per day., 10/05/2021  Family History  ?Diabetes: Mother.  ?Diabetes mellitus type 2: Mother.  ?Dialysis: Mother.  ?Heart disease: Mother.  ?Hypertension.: Mother, Mother and Sister.  ?Kidney failure: Mother.  ?Primary malignant neoplasm of lung: Grandmother.  ?Renal failure syndrome.: Mother.  ?Seizure: Sister.  ?Stroke: Mother.  Immunizations  ?Vaccine ?Date ?Status ?Comments   ?COVID-19 mRNA, LNP-S, PF - Moderna 06/01/2021 Recorded 2nd dose   ?COVID-19 mRNA, LNP-S, PF - Moderna 05/04/2021 Recorded 1st dose   ?tetanus/diphtheria/pertussis, acel(Tdap) 09/14/2017 Given    ?  Health Maintenance  Health Maintenance  ???Pending?(in the next year)  ???There are no current recommendations pending  ??? ??Due In Future?  ??? ? ?  ?Obesity Screening not due until??01/01/22??and every 1??year(s)  ??? ? ? ?Smoking Cessation not due until??01/01/22??and every 1??year(s)  ??? ? ? ?Alcohol Misuse Screening not due until??01/02/22??and every 1??year(s)  ??? ? ? ?Diabetes Screening not due until??08/24/22??and every 1??year(s)  ??? ? ? ?ADL Screening not due until??09/20/22??and every 1??year(s)  ???Satisfied?(in the past 1 year)  ??? ??Satisfied?  ??? ? ? ?ADL Screening on??09/20/21.??Satisfied by Randell Gonzáles  ??? ? ? ?Alcohol Misuse Screening on??07/12/21.??Satisfied by Marin Monte MD  ??? ? ? ?Blood Pressure Screening on??10/05/21.??Satisfied by Adele Haynes LPN  ??? ? ? ?Body Mass Index Check on??10/05/21.??Satisfied by Adele Haynes LPN  ??? ? ? ?Coronary Artery Disease Maintenance-Lipid Lowering Therapy on??07/12/21.??Satisfied by Monica Velez MD  ??? ? ? ?Depression Screening on??10/05/21.??Satisfied by Adele Haynes LPN  ??? ? ? ?Diabetes Screening on??07/29/21.??Satisfied by Benji Priest  ??? ? ? ?Influenza Vaccine on??10/05/21.??Satisfied by Adele Haynes LPN  ??? ? ? ?Lipid Screening on??03/31/21.??Satisfied by Lianna Parker  ??? ? ? ?Obesity Screening on??10/05/21.??Satisfied by Adele Haynes LPN  ??? ? ? ?Smoking Cessation on??07/12/21.??Satisfied by Marin Monte MD  ??? ??Refused?  ??? ? ? ?Influenza Vaccine on??03/25/21.??Recorded by Maxi Tovar LPN??Reason: Patient Refuses  ?  Diagnostic Results  MRI 10/1/21:  1. Mass along the right anal canal measuring up to 55 mm. Suspected  areas of soft tissue extension into the posterior lower  vagina/introitus.  2. Several suspicious perirectal lymph nodes. Nonspecific borderline  enlarged bilateral iliac and inguinal lymph nodes.

## 2022-05-04 NOTE — HISTORICAL [facility] CERNER
This is a historical note converted from [employer]. Formatting and pictures may have been removed.  Please reference [employer] for original formatting and attached multimedia. Indication for Surgery  49 yo F with anal mass noted for less than a year. Bedside biopsy attempted, pathology with markedly?atypical squamous epithelium. Here for exam under anesthesia for deeper biopsy.  Preoperative Diagnosis  anal mass  Postoperative Diagnosis  same  Operation  anal mass biopsy  Surgeon(s)  MD Berta Dickson MD PGY 3  Anesthesia  GETA  Estimated Blood Loss  10 cc  Findings  anal mass extending from right anterior midline almost to posterior midline into anal canal to dentate line  Specimen(s)  anal mass  Complications  none  Technique  Patient consented prior to procedure. All risks, benefits, alternatives explained. Patient brought into the operating suite and placed in lithotomy. Anesthesia induced. A timeout was held indicating correct patient, procedure, VTE prophylaxis and preoperative antibiotics. The anus was prepped and draped in the standard fashion.  ?  The mass extended from the anal verge from right anterior midline almost to posterior midline. A hill-vieira was inserted into the anus and it appeared that the mass extended into the anal canal to the dentate line. The mass was friable, hard and erythematous. Two sharp biopsies were obtained and handed off for permanent pathology. Hemostasis was achieved with bovie cautery and pressure. 0.5% marcaine was injected for local anesthetic. All counts correct x 2. The patient was awakened from anethesia and transferred to PACU with no complications. Dr. Robertson was present and scrubbed for the entire procedure.  ?  Given that we are concerned for malignancy, pending final pathology plan will be to order pelvic MRI/PET at follow up visit.  ?  Berta Garces MD PGY 3

## 2022-05-05 PROCEDURE — 80053 COMPREHEN METABOLIC PANEL: CPT | Performed by: INTERNAL MEDICINE

## 2022-05-05 PROCEDURE — 85025 COMPLETE CBC W/AUTO DIFF WBC: CPT | Performed by: INTERNAL MEDICINE

## 2022-05-14 NOTE — PROGRESS NOTES
Patient:   Antoine Banegas            MRN: [id number]            FIN: [id number]               Age:   51 years     Sex:  Female     :  1970   Associated Diagnoses:   None   Author:   Monica Velez MD      Called patient at [phone] and informed of urine analysis/culture results.    States that she is about to complete her Macrobid soon.    Urine cultures grew Klebsiella pneumonia subspecies pneumoniae which is sensitive to Macrobid that was prescribed to her.  Urine GC NAAT negative.    States that her itching is much improved, no dysuria, frequency, urgency.  No fevers chills.

## 2022-05-18 ENCOUNTER — APPOINTMENT (OUTPATIENT)
Dept: RADIATION THERAPY | Facility: HOSPITAL | Age: 52
End: 2022-05-18
Attending: RADIOLOGY
Payer: MEDICAID

## 2022-05-18 ENCOUNTER — OFFICE VISIT (OUTPATIENT)
Dept: WOUND CARE | Facility: HOSPITAL | Age: 52
End: 2022-05-18
Payer: MEDICAID

## 2022-05-18 DIAGNOSIS — M54.50 CHRONIC BILATERAL LOW BACK PAIN, UNSPECIFIED WHETHER SCIATICA PRESENT: ICD-10-CM

## 2022-05-18 DIAGNOSIS — M19.90 OSTEOARTHRITIS, UNSPECIFIED OSTEOARTHRITIS TYPE, UNSPECIFIED SITE: ICD-10-CM

## 2022-05-18 DIAGNOSIS — C21.0 MALIGNANT NEOPLASM OF ANUS: ICD-10-CM

## 2022-05-18 DIAGNOSIS — F32.A DEPRESSIVE DISORDER: ICD-10-CM

## 2022-05-18 DIAGNOSIS — F41.9 ANXIETY DISORDER, UNSPECIFIED TYPE: ICD-10-CM

## 2022-05-18 DIAGNOSIS — M79.604 PAIN IN BOTH LOWER EXTREMITIES: ICD-10-CM

## 2022-05-18 DIAGNOSIS — Z72.0 TOBACCO USER: ICD-10-CM

## 2022-05-18 DIAGNOSIS — S31.809A OPEN WOUND OF BUTTOCK WITH COMPLICATION, UNSPECIFIED LATERALITY, INITIAL ENCOUNTER: ICD-10-CM

## 2022-05-18 DIAGNOSIS — G89.29 CHRONIC BILATERAL LOW BACK PAIN, UNSPECIFIED WHETHER SCIATICA PRESENT: ICD-10-CM

## 2022-05-18 DIAGNOSIS — Z85.048 HISTORY OF RECTAL OR ANAL CANCER: ICD-10-CM

## 2022-05-18 DIAGNOSIS — G47.33 OBSTRUCTIVE SLEEP APNEA SYNDROME: ICD-10-CM

## 2022-05-18 DIAGNOSIS — L30.8 DERMATITIS ASSOCIATED WITH MOISTURE: ICD-10-CM

## 2022-05-18 DIAGNOSIS — E66.9 OBESITY, UNSPECIFIED CLASSIFICATION, UNSPECIFIED OBESITY TYPE, UNSPECIFIED WHETHER SERIOUS COMORBIDITY PRESENT: ICD-10-CM

## 2022-05-18 DIAGNOSIS — M79.605 PAIN IN BOTH LOWER EXTREMITIES: ICD-10-CM

## 2022-05-18 DIAGNOSIS — E55.9 VITAMIN D DEFICIENCY: ICD-10-CM

## 2022-05-18 PROBLEM — E78.5 HYPERLIPIDEMIA: Status: ACTIVE | Noted: 2022-05-18

## 2022-05-18 PROBLEM — M79.606 PAIN OF LOWER EXTREMITY: Status: ACTIVE | Noted: 2022-05-18

## 2022-05-18 PROBLEM — G82.20 PARAPARESIS: Status: ACTIVE | Noted: 2022-05-18

## 2022-05-18 PROCEDURE — 99203 OFFICE O/P NEW LOW 30 MIN: CPT | Performed by: NURSE PRACTITIONER

## 2022-05-18 RX ORDER — AMITRIPTYLINE HYDROCHLORIDE 50 MG/1
50 TABLET, FILM COATED ORAL NIGHTLY
COMMUNITY
Start: 2021-11-24

## 2022-05-18 RX ORDER — ESCITALOPRAM OXALATE 10 MG/1
10 TABLET ORAL DAILY
COMMUNITY
Start: 2022-01-06

## 2022-05-18 RX ORDER — OXYCODONE AND ACETAMINOPHEN 10; 325 MG/1; MG/1
1 TABLET ORAL EVERY 6 HOURS PRN
COMMUNITY
Start: 2022-04-21

## 2022-05-18 RX ORDER — PREGABALIN 50 MG/1
CAPSULE ORAL
COMMUNITY
Start: 2022-02-08 | End: 2022-08-11 | Stop reason: SDUPTHER

## 2022-05-18 RX ORDER — ERGOCALCIFEROL 1.25 MG/1
CAPSULE ORAL
COMMUNITY
Start: 2022-04-25 | End: 2022-08-11 | Stop reason: ALTCHOICE

## 2022-05-18 RX ORDER — MORPHINE SULFATE 30 MG/1
30 TABLET, FILM COATED, EXTENDED RELEASE ORAL
COMMUNITY
Start: 2022-04-21

## 2022-05-18 RX ORDER — ATORVASTATIN CALCIUM 40 MG/1
40 TABLET, FILM COATED ORAL
COMMUNITY
Start: 2021-11-04 | End: 2022-08-11 | Stop reason: SDUPTHER

## 2022-05-18 RX ORDER — ALPRAZOLAM 0.5 MG/1
TABLET ORAL
COMMUNITY
Start: 2022-01-25 | End: 2023-02-16

## 2022-05-18 NOTE — PROGRESS NOTES
Subjective:       Patient ID: Antoine Banegas is a 51 y.o. female.    Chief Complaint: No chief complaint on file.    51yr female referred to our clinic for wound care to non-healing wounds to her inner groin region as well as buttock cheeks and inner gluteal folds. 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 She had been having rectal pain and bleeding. A MRI done on 10/1/21 demonstrated a 5.5 cm mass along the right aspect of the anal canal extending to the anal margin and through the external sphincter. She had b/l external lumph nodes measurin gup to 8mm as well as prominent perirectal lymph nodes.   A bone scan done 1/31/22 was negative for metastatic lesions. However,  A CT of chest, abdomen, pelvis done 1/31/22 showed increase in soft tissue along right side of anal canal.   Patient underwent chemotherapy with Mitomycin-C and is on oral Xeloda - she then began radiotherapy on 3/2/22 and has subsequently completed it. However, during this time she has complained of persistant rectal pain as well as weakness in her legs (at thigh region/groin) with burning pain that began during /after radiation treatments. She has difficulty walking without a walker and must do so with legs stiff b/c hurts to flex/externally rotate her hips due to pain at groin and across pubic region. She is a very poor historian but seems very authentic in her pain. She is on chronic pain meds by her PCP.  She is also treated for anxiety and when I review her old chart she has also been treated for Depression since falling ill.   She often voids in the shower bc it is so painful to sit and pee due to skin irritation at labia and groin. She says most recently noticed a bad odor from vaginal area and was concerned.   She has problems having a bowel movement but her significant other tells me she often does not take the stool softener as prescribed.   PMH:  HOLLY, Obesity, Squamous carcinoma Anus, Chronic pain, HLD, OA, uterine fibroids, Anxiety disorder   PSH: anal mass biopsy, colonoscopy, cystoscopy, ORIF left hip/femur, L knee surgery, mediport right SCV, Total hysterectomy  SH- cigarettes: 1ppd x 20+ years, etoh; beer twice/week, drugs: neg  FH: + DM/ Seizures/ HTN      Review of Systems   Constitutional: Positive for activity change (poor appetite) and fatigue.   HENT: Negative.    Eyes: Negative.    Respiratory: Negative.    Cardiovascular: Negative.    Gastrointestinal: Positive for rectal pain.   Genitourinary: Positive for pelvic pain and vaginal pain.   Musculoskeletal: Positive for back pain.   Skin: Positive for rash (inner groin R>L with hyperpigmented skin changes, patches of ulcers innner buttock).   Neurological: Positive for weakness.   Hematological: Negative.    Psychiatric/Behavioral: The patient is nervous/anxious.        Objective:      Physical Exam  Vitals and nursing note reviewed.   Constitutional:       Appearance: She is normal weight.   HENT:      Head: Normocephalic and atraumatic.      Nose: Nose normal.      Mouth/Throat:      Mouth: Mucous membranes are moist.   Eyes:      Extraocular Movements: Extraocular movements intact.      Pupils: Pupils are equal, round, and reactive to light.   Cardiovascular:      Rate and Rhythm: Normal rate and regular rhythm.      Pulses: Normal pulses.      Heart sounds: Normal heart sounds.   Pulmonary:      Effort: Pulmonary effort is normal.      Breath sounds: Normal breath sounds.   Genitourinary:     Comments: Hairless vagina with skin darkening noted as well as some areas of firm tissue texture, inner groin with flat, dry excoriated rash noted, no open wound or bleeding, exudate buildup noted and odor  Musculoskeletal:         General: Swelling and tenderness present.      Cervical back: Normal range of motion.      Right lower leg: Edema present.      Left lower leg: Edema present.      Comments:  Both upper thigh are tender to move and palpate with fullness and fluid type feeling beneath the skin- predominantly on back of thigh, no skin changes to upper thighs noted/stiff hip joint with pain to externally rotate or internally move through FROM.   Skin:     General: Skin is warm.      Capillary Refill: Capillary refill takes less than 2 seconds.      Findings: Lesion (patches of pink ulceration that appears from moisture along inner buttock cheeks but tenderness to palpate buttock tissue as well. no redness or erythema.) present.   Neurological:      Mental Status: She is alert and oriented to person, place, and time. Mental status is at baseline.   Psychiatric:      Comments: Tearful and anxious about exam and treatment. Depressed affect         Assessment:       1. Anxiety disorder, unspecified type    2. Depressive disorder    3. History of rectal or anal cancer    4. Malignant neoplasm of anus    5. Obesity, unspecified classification, unspecified obesity type, unspecified whether serious comorbidity present    6. Vitamin D deficiency    7. Chronic bilateral low back pain, unspecified whether sciatica present    8. Osteoarthritis, unspecified osteoarthritis type, unspecified site    9. Pain in both lower extremities    10. Obstructive sleep apnea syndrome    11. Tobacco user    12. Dermatitis associated with moisture    13. Open wound of buttock with complication, unspecified laterality, initial encounter           Wound 05/18/22 1210 Radiation Right anterior Greater trochanter #1 (Active)   05/18/22 1210    Pre-existing: Yes   Primary Wound Type: Radiation   Side: Right   Orientation: anterior   Location: Greater trochanter   Wound Number: #1   Ankle-Brachial Index:    Pulses:    Removal Indication and Assessment:    Wound Outcome:    (Retired) Wound Type:    (Retired) Wound Length (cm):    (Retired) Wound Width (cm):    (Retired) Depth (cm):    Wound Description (Comments):    Removal Indications:     Dressing Appearance No dressing 05/18/22 1210   Drainage Amount Moderate 05/18/22 1210   Drainage Characteristics/Odor Serosanguineous;Yellow 05/18/22 1210   Appearance Pink;Blistered;Not granulating 05/18/22 1210   Tissue loss description Full thickness 05/18/22 1210   Black (%), Wound Tissue Color 0 % 05/18/22 1210   Red (%), Wound Tissue Color 0 % 05/18/22 1210   Yellow (%), Wound Tissue Color 0 % 05/18/22 1210   Periwound Area Intact;Dry 05/18/22 1210   Wound Edges Approximated 05/18/22 1210   Wound Length (cm) 1.5 cm 05/18/22 1210   Wound Width (cm) 1 cm 05/18/22 1210   Wound Depth (cm) 0.2 cm 05/18/22 1210   Wound Volume (cm^3) 0.3 cm^3 05/18/22 1210   Wound Surface Area (cm^2) 1.5 cm^2 05/18/22 1210   Periwound Care Absorptive dressing applied 05/18/22 1210            Wound 05/18/22 1210 Radiation Left anterior Greater trochanter #2 (Active)   05/18/22 1210    Pre-existing: Yes   Primary Wound Type: Radiation   Side: Left   Orientation: anterior   Location: Greater trochanter   Wound Number: #2   Ankle-Brachial Index:    Pulses:    Removal Indication and Assessment:    Wound Outcome:    (Retired) Wound Type:    (Retired) Wound Length (cm):    (Retired) Wound Width (cm):    (Retired) Depth (cm):    Wound Description (Comments):    Removal Indications:    Dressing Appearance No dressing 05/18/22 1210   Drainage Amount Moderate 05/18/22 1210   Drainage Characteristics/Odor Serosanguineous;Yellow 05/18/22 1210   Appearance Yellow;Fibrin 05/18/22 1210   Tissue loss description Full thickness 05/18/22 1210            Wound 05/18/22 1212 Radiation Coccyx #3 (Active)   05/18/22 1212    Pre-existing: Yes   Primary Wound Type: Radiation   Side:    Orientation:    Location: Coccyx   Wound Number: #3   Ankle-Brachial Index:    Pulses:    Removal Indication and Assessment:    Wound Outcome:    (Retired) Wound Type:    (Retired) Wound Length (cm):    (Retired) Wound Width (cm):    (Retired) Depth (cm):    Wound  Description (Comments):    Removal Indications:    Dressing Appearance No dressing 05/18/22 1300   Drainage Amount Moderate 05/18/22 1300   Drainage Characteristics/Odor Serosanguineous;Yellow 05/18/22 1300   Appearance Blistered 05/18/22 1300   Tissue loss description Full thickness 05/18/22 1300   Black (%), Wound Tissue Color 0 % 05/18/22 1300   Red (%), Wound Tissue Color 0 % 05/18/22 1300   Yellow (%), Wound Tissue Color 0 % 05/18/22 1300   Periwound Area Dry 05/18/22 1300   Wound Edges Defined 05/18/22 1300   Wound Length (cm) 2 cm 05/18/22 1300   Wound Width (cm) 2 cm 05/18/22 1300   Wound Depth (cm) 0.2 cm 05/18/22 1300   Wound Volume (cm^3) 0.8 cm^3 05/18/22 1300   Wound Surface Area (cm^2) 4 cm^2 05/18/22 1300   Care Cleansed with:;Soap and water 05/18/22 1300   Dressing Applied;Silver;Calcium alginate 05/18/22 1300       [REMOVED]      Wound 05/18/22 1211 Radiation Right posterior Greater trochanter #3 (Removed)   05/18/22 1211    Pre-existing: Yes   Primary Wound Type: Radiation   Side: Right   Orientation: posterior   Location: Greater trochanter   Wound Number: #3   Ankle-Brachial Index:    Pulses:    Removal Indication and Assessment: Other (see comments)   Wound Outcome:    (Retired) Wound Type:    (Retired) Wound Length (cm):    (Retired) Wound Width (cm):    (Retired) Depth (cm):    Wound Description (Comments):    Removal Indications:    Removed 05/18/22 1212   Dressing Appearance No dressing 05/18/22 1210           Plan:              1. Patient is new to clinic referred by Dr IZA Calhoun. She is a poor historian but clearly very uncomfortable in groin region since undergoing treatment /diagnosis for Anal carcinoma. I pulled up her most recent doctor visit I( Dr Jiang) as well as reviewing her radiation oncologist referral note.   2. Patient is unable to do normal daily activities such as sit on a toilet to void bc of pain-spouse tells me she often goes in shower to void bc it burns her skin  otherwise. She uses a walker to get around bc of decreased strength and pain in thighs /buttock to walk. I do not find any recent imaging (ultrasound or repeat MRI /CT) since 1/21.   3. I tried to debride her wounds but she really could not tolerate the scalpel so today we just applied numbing gel and cleaned area well. We then educated she and boyfriend how to dress her wounds which appear to be due to excess moisture and perhaps yeast as well as pressure with skin on skin.   4. I spoke with them about her diet as she has known vitamin D defeciency but is not currently taking her meds. Advised to start Vitamin C, D, zinc, MVI as well as protein twcie/day. I gave her sample of Juveen and told her to start atleast once/day and we can work up to twice.   5. Having outpatient lab tomorrow and I added a prealbumin to order  -given script for this.   6. I am going to order an outpatient MRI of pelvis and upper thighs to evaluate for possibility of metastasis orother issues causing the swelling and pain in her buttock and upper thighs. It is scheduled for next week.  7. I will have her return in one week.

## 2022-05-18 NOTE — PATIENT INSTRUCTIONS
Pt seen today by: Zaid/    Home health and self care DRESSING INSTRUCTIONS:        Wound location: Gluteal folds    Dressings to be changed Daily and as needed if soiled or not intact.  Home health to visit and assist with dressing changes :  3x/week on Thurs/Sat/Mon Patient will be seen in this clinic on Wednesdays  Patient and/or family may be asked to assist on other days.      Cleanse wound with wound cleanser or saline  Apply sensicare to the skin around the wound if needed  Apply silver alginate to the wound bed   Cover with mepilex transfer    Wound Location: Bilateral Inguinal Folds      Cleanse wound with wound cleanser or saline  Apply sensicare to the Gluteal folds  Cover with ultrasorb (tucked into folds)    Nutrition:  The current daily value (%DV) for protein is 50 grams per day and is meant as a general goal for most people. Further increasing your dietary protein intake is very important for wound healing. Typically one needs over 100g of protein per day to help with wound healing needs.  If you are a dialysis patient or have problems with your kidneys, talk to your Nephrologist about how much protein you can take in with your condition.  Examples of high protein items that can be added to your diet include: eggs, chicken, red meats, almonds, cottage cheese, Greek yogurt, beans, and peanut butter.  Fortified protein bars, shakes and drinks can add 15-30 additional grams of protein per serving.   Also add:   1 daily general multivitamin   Davin : 1 packet twice daily   Vitamin C : 500mg twice daily   Zinc 220 mg daily  Vit D : once daily  Call our [facility] wound clinic for questions/concerns a [phone] .

## 2022-05-20 NOTE — HISTORICAL [facility] CERNER
This is a historical note converted from Max. Formatting and pictures may have been removed.  Please reference Max for original formatting and attached multimedia. Chief Complaint  Carcinoma of the Anus  History of Present Illness  Problem List:  -Squamous cell carcinoma of anus  -Tobacco abuse, alcohol abuse  ?   Current Treatment:  1.? Capecitabine 825 mg/m? p.o. twice daily, Monday-Friday, on each day that radiation therapy is given, throughout the duration of radiation therapy (typically 28 treatment days);  2.? Mitomycin 10 mg/m? days 1 and 29.  Definitive CRT  ?   Start: 3/2/2022  ?   Treatment History:  ?   Past medical history: Chronic low back pain.? Chronic neck pain.? Dyslipidemia.? Obesity.? HOLLY, on CPAP.? Osteoarthritis.? Tobacco abuse.? Alcohol abuse.? Uterine fibroids.? Vitamin D deficiency.? Decreased hearing left ear.  Procedure/surgical history: Left eye surgery.? Left knee surgery.? Hip joint surgery (04/03/2003).? Total laparoscopic hysterectomy and bilateral salpingectomy for abnormal uterine bleeding, uterine fibroids, uteromegaly (03/16/2017).? Colon polypectomy (08/28/2020).? Uterine artery embolization (08/17/2016) for abnormal uterine bleeding secondary to enlarged uterus with multiple fibroids.  Social history: Single. ?Lives in [place], [place]. ?Has 1 child. ?Does not work. ?Has been smoking a pack of cigarettes daily for 18 years. ?Has been drinking 24 beers over the weekend for 16 years. ?Denies illicit drug abuse.??  Family history: Maternal grandmother experienced lung cancer in her 80s; used to smoke.  Health maintenance:  -07/20/2014: ThinPrep cervical Pap smear: NIL  -08/28/2020: Screening colonoscopy (positive FIT): 5 mm flat polyp ascending colon, removed (pathology: Ascending colon polyp, polypectomy: Adenomatous polyp; no evidence of malignancy) (rescope in 5 years)  -09/25/2020: Screening mammogram (comparison: 05/15/2019): Bilateral breast negative (BI-RADS  1)  Menstrual and OB/GYN history:?S/p?laparoscopic hysterectomy and bilateral salpingectomy?for abnormal uterine bleeding, uterine fibroids,?and uteromegaly (03/16/2017)  ?  ?   History of Present Illness:  50-year-old female referred by surgery, for evaluation and management of anal cancer.  Refer to  note dated 2/7/2022?for detailed HPI.  ?   Interval History 3/10/2022: Patient presents today for scheduled follow up/toxicity check for Squamous Cell Carcinoma of Anus. She is currently receiving CRT with Xeloda/Mitomycin. She reports tolerating well without any side effects. She continues to report perianal pain and constipation but relieved with laxative treatment. Lab work reviewed with patient, stable. Medications reviewed, no refills needed at this time.  Review of Systems  A complete 12-point ROS was performed in full with pertinent positives as described in interval history. Remainder of ROS done in full and are negative.  ?  Physical Exam  Vitals & Measurements  T:?37.0? ?C (Oral)? HR:?98(Peripheral)? RR:?18? BP:?136/91? SpO2:?100%?  HT:?167.64?cm? WT:?109.310?kg? BMI:?38.9?  Physical Exam:  General: Alert and oriented, No acute distress.?  Appearance: Well-groomed wearing mask  HEENT: Normocephalic, Oral mucosa is moist. Pupils are equal, round and reactive to light, Extraocular movements are intact, Normal conjunctiva.?  Neck: Supple, Non-tender, No lymphadenopathy, No thyromegaly.?  Respiratory: Lungs are clear to auscultation, Respirations are non-labored, Breath sounds are equal, Symmetrical chest wall expansion.?  Cardiovascular: Normal rate, Regular rhythm, No edema.?  Breast: Breast exam not performed on todays visit.?  Gastrointestinal: Rounded, Soft, Non-tender, Non-distended, Normal bowel sounds.?  Musculoskeletal: Normal strength. Ambulates without assistance  Integumentary: Warm, dry, intact.?  Neurologic: Alert, Oriented, No focal deficits, Cranial Nerves II-XII are grossly  intact.?  Cognition and Speech: Oriented, Speech clear and coherent.?  Psychiatric: Cooperative, Appropriate mood & affect.?  ECOG Performance Scale:?0 - Capable of all self-care no restrictions  ?  Assessment/Plan  1.?Anal cancer?C21.0  #Squamous cell carcinoma of anus:  -Presentation (05/2021): Enlarging anal lump for 1 year; anal pain and intermittent bleeding for 3 weeks  -Fungating anal mass on the right anal verge, approximately 3 cm?(05/19/2021)  -On EUA,?anal mass extending from?right anterior midline almost to posterior midline into anal canal dentate line  -Borderline mesorectal lymph node, 0.8 cm on CT A/P (05/19/2021)  -Anal mass?not visualized on CT A/P  -EUA and biopsy (06/16/2021)  -08/12/2021: CT chest with contrast (staging): No acute abnormality  -08/24/2021: PET/CT (comparison: CT chest 08/12/2021; CT A/P 05/19/2021): FDG uptake around the anal canal likely corresponding to known malignancy (maximum SUV 20.8); stable size of small mildly hypermetabolic right mesorectal, pelvic sidewall, and inguinal lymph nodes, nonspecific  -10/01/2021: MRI pelvis with and without contrast (comparison: PET/CT 08/24/2021): Mass along the right anal canal measuring up to 55 mm; suspected area of soft tissue extension into the posterior lower vagina/introitus; several suspicious perirectal lymph nodes; nonspecific borderline enlarged bilateral iliac and inguinal lymph nodes (mass 42 x 17 x 55 mm, extension anal margin, most likely extends through the external sphincter; several prominent perirectal lymph nodes measuring up to 8 mm in short axis; bilateral external iliac lymph nodes also measure up to 8 mm in short axis; bilateral inguinal lymph nodes measure up to 10 mm)  -10/13/2021: Mediport placed  -11/17/2021: Screening mammogram (comparison: 09/25/2020 mammogram): Right breast benign (BI-RADS 2); left breast negative (BI-RADS 1)  -12/09/2021: She called our office stating that she wants to start chemoradiation  therapy ASAP and that she is passing blood clots and that tumor in the anus is bleeding a lot  -01/05/2022: Presented to ED with worsening depression for 3 days; suicidal ideation; feeling overwhelmed; transferred to a psych facility ([facility] [place])  -Follows up with ID for latent syphilis (antibody + 01/06/2022 at [facility]; no prior treatment; ID planning to treat with Bicillin LA 2,400,000 units weekly x3 weeks)  -01/31/2022: Restaging CT C/A/P with contrast: Soft tissue along the right anal canal is stable to perhaps slightly larger since 10/01/2021 (43 x 31 mm, previously 42 x 23 mm); slight enlargement of the left internal iliac lymph node, now measuring up to 10 mm; multiple other pelvic lymph nodes are stable; no metastasis; trace ascites  -01/31/2022: Whole-body nuclear medicine bone scan: No bone metastasis  -01/21/2022: CMP unremarkable; mild neutrophilic leukocytosis, hemoglobin 11.9  -01/25/2022: Syphilis antibody positive; HIV negative; RPR nonreactive  To summarize:  -Squamous cell carcinoma of the anus  -Presentation (05/2021): Enlarging anal lump for 1 year; anal pain and intermittent bleeding for 3 weeks  -Fungating anal mass right anal verge  -S/p EUA (06/16/2021)  -Mass 3 cm on physical exam (05/19/2021); mass not visualized on CT A/P; mass 55 mm on MRI; mass extending into lower vagina/introitus on MRI  -Several suspicious perirectal and nonspecific borderline enlarged bilateral iliac, mesorectal,?and inguinal lymph nodes on MRI and PET/CT  >>> ?01/21/2022:?cT4 cN1c M0, at least stage IIIC  -Noncompliant with follow-up  -Restaging CTs and bone scan?(01/31/2022):?43 x 31 mm?right?anal canal?soft tissue (anal cancer);?left internal iliac lymph node 10 mm, slightly enlarged;?other pelvic lymph nodes stable; no metastasis  ?   #Tobacco abuse, alcohol abuse  ?   #History of depression, suicidal ideation  ?   #Latent syphilis:?Syphilis antibody positive (11/25/2022);?following up with ID,?Bicillin x3  doses plan  ?   #Comorbidities: Chronic low back pain, chronic neck pain, obesity, HOLLY, on CPAP, etc.  ?   Plan:  -Squamous cell carcinoma of anus, biopsy 06/16/2021, noncompliant with follow-up, lost to follow-up until 01/21/2022  -55 mm on MRI; extending into lower vagina/introitus on MRI; several suspicious perirectal and nonspecific borderline enlarged bilateral iliac and inguinal lymph nodes on MRI and PET/CT  -No distant metastasis on PET/CT  >>> cT4 cN1c M0, at least stage IIIC  ?   -Locoregional disease;?needs definitive concurrent chemoradiation therapy (RT + Capecitabine/mitomycin)?([facility] protocol)  ?   Chemotherapy:  1.? Capecitabine 825 mg/m? p.o. twice daily, Monday-Friday, on each day that radiation therapy is given, throughout the duration of radiation therapy (typically 28 treatment days);  2.? Mitomycin 10 mg/m? days 1 and 29.  ?  Potential side effects of Xeloda include but are not limited to coagulopathy, diarrhea, cardiotoxicity, increased risk of severe or fatal adverse reactions in the presence of deficiency of dihydropyridine dehydrogenase activity, dehydration and renal failure, mucocutaneous and dermatologic toxicity in the form of Cabrera-Servando syndrome, toxic epidermal necrolysis, hand-foot syndrome, hyperbilirubinemia, thrombocytopenia, neutropenia, nausea/vomiting/abdominal pain/weakness/fatigue, etc.  ?  Mitomycin-C:  Warnings/precautions:  1.? Bladder fibrosis/contraction with intravesical administration;  2.? Bone marrow suppression: May be severe.? WBC and platelet bharath usually occur at 4 weeks, although may occur at up to 8 weeks; recovery occurs within 10 weeks;  3.? Extravasation: Mitomycin is important vesicant;  4.? Heart failure: May either cause reversible direct myocardial toxicity or exacerbate underlying myocardial dysfunction;  5.? Hemolytic uremic syndrome: Involves microangiopathic hemolytic anemia; hematocrit anytime; may be exacerbated by blood transfusion;  6.?  Pulmonary toxicity: Acute respiratory distress syndrome in patients receiving mitomycin in combination with other chemotherapy who are maintained at FiO2 concentrations greater than 50% perioperatively.  Adverse reactions:  1.? In greater than 10%, anorexia, nausea, vomiting, bone marrow suppression, hemolytic uremic syndrome, TTP, fever;  2.? In 1% - 10%: Alopecia, mucous membrane toxicity, stomatitis, renal dysfunction, etc.  ?  During chemoradiation therapy,?check CBC and CMP weekly  Follow up w/NP in?3 week on 3/31/2022 toxicity check with CBC,CMP,MG prior to Mitomycin day 29  ?  ?  For response assessment, see below.  ?  Has already been referred to radiation oncology.? Will coordinate chemotherapy with them.  ?  -History of depression and suicidal ideation, ER presentation 01/05/2022, transferred to outside psych facility; as of 01/21/2022,?talking to a counselor via telephone  ?  -01/21/2022:?Started norco 5 mg every 4 hours?as needed for excruciating?anorectal pain secondary to worsening cancer  ?  -01/21/2022: Still smoking a pack of cigarettes daily;?tobacco cessation emphasized.  ?  -Latent syphilis;?follow-up with ID for Bicillin?weekly x3 doses  ?  Above discussed at length with the patient?and her boyfriend. ?All questions answered.  Discussed plan of management going forward.? Compliance emphasized.  Discussed potential side effects of?chemotherapy.?Formal chemotherapy teaching with nursing staff to follow.  Clearly explained to her that long-term prognosis is guarded.  She understands and agrees this plan.  ------------------  ?  Follow-up:  Evaluate in 8-12 weeks?(after completion of chemoradiation therapy) with exam + PERLA:  1.? If complete remission, then: Surveillance;  ?  2.? If persistent disease, then: Reevaluate in 4 weeks, including imaging studies; if progression or no progression on serial exams, then continue observation and reevaluation at 3-month intervals, utilizing imaging studies; if  complete remission, then surveillance  (Follow patients who have not achieved a complete clinical response with persistent anal cancer up to 6 months following completion of RT and chemotherapy as long as there is no evidence of progressive disease during this period of follow-up; persistent disease may continue to regress even at 26 weeks from the start of treatment)  ?  3.? If progressive disease, then: If biopsy-proven, then restage, including imaging studies; if locally recurrent, then, APR/groin dissection if positive inguinal lymph nodes, followed by surveillance; if, on restaging, metastatic disease, then, systemic therapy  (Follow patients who have not achieved a complete clinical response with persistent anal cancer up to 6 months following completion of RT and chemotherapy as long as there is no evidence of progressive disease during this period of follow-up; persistent disease may continue to regress even at 26 weeks from the start of treatment)   Problem List/Past Medical History  Ongoing  Anal cancer  Anxiety  Anxiety  Chronic lower back pain  Chronic neck and back pain  Chronic pain of lower extremity, bilateral  Claustrophobia  Depression  Depression  Hyperlipemia  Hypertriglyceridemia  Obesity  HOLLY on CPAP  Osteoarthritis  Squamous cell carcinoma of anus  Tobacco abuse  Tobacco user  Tumor of anal canal  Uterine fibroids  Vitamin D deficiency  Weakness of both legs  Historical  Bloody stool  Pregnant  Procedure/Surgical History  Catheter Insertion Mediport (None) (10/13/2021)  Insertion of Infusion Device into Superior Vena Cava, Percutaneous Approach (10/13/2021)  Insertion of tunneled centrally inserted central venous access device, with subcutaneous port; age 5 years or older (10/13/2021)  Exam Under Anesthesia Rectal (None) (06/16/2021)  Excision of Anus, Via Natural or Artificial Opening, Diagnostic (06/16/2021)  Unlisted procedure, anus (06/16/2021)  Colonoscopy (None)  (08/28/2020)  Colonoscopy, flexible; with removal of tumor(s), polyp(s), or other lesion(s) by snare technique (08/28/2020)  Excision of Ascending Colon, Via Natural or Artificial Opening Endoscopic, Diagnostic (08/28/2020)  Polypectomy (None) (08/28/2020)  Cystoscopy (None) (03/16/2017)  Hysterectomy Total Laparoscopic (None) (03/16/2017)  Laparoscopy, surgical, with total hysterectomy, for uterus greater than 250 g; with removal of tube(s) and/or ovary(s) (03/16/2017)  Resection of Bilateral Fallopian Tubes, Percutaneous Endoscopic Approach (03/16/2017)  Resection of Cervix, Percutaneous Endoscopic Approach (03/16/2017)  Resection of Uterus, Percutaneous Endoscopic Approach (03/16/2017)  Salpingectomy (Bilateral) (03/16/2017)  FEMUR (04/03/2003)  Hip joint operations (04/03/2003)  left knee sx  left thigh sx   Medications  acetaminophen-oxycodone 325 mg-5 mg oral tablet, 1 tab(s), Oral, QID  acetaminophen-oxycodone 325 mg-5 mg oral tablet, 1 tab(s), Oral, QID, PRN  amitriptyline 50 mg oral tablet, 50 mg= 1 tab(s), Oral, Once a day (at bedtime), 3 refills  atorvastatin 40 mg oral tablet, 40 mg= 1 tab(s), Oral, Daily, 3 refills  escitalopram 10 mg oral tablet, 10 mg= 1 tab(s), Oral, Daily  Hemorrhoid seating cushion, See Instructions,? ?Not taking  lidocaine 5% topical ointment, 1 jay, TOP, TID, 1 refills  LORazepam 1 mg oral tablet, 1 mg= 1 tab(s), Oral, Daily  pregabalin 50 mg oral capsule, 50 mg= 1 cap(s), Oral, BID, 1 refills  Small base quad cane, See Instructions,? ?Not taking  Vistaril 50 mg oral capsule, 50 mg= 1 cap(s), Oral, QID, PRN, 3 refills  Vitamin D3 2000 intl units (50 mcg) oral capsule, 50 mcg= 1 cap(s), Oral, Daily, 5 refills  Xanax 0.5 mg oral tablet, See Instructions,? ?Not taking  Xeloda 500 mg oral tablet, See Instructions  Zofran ODT 8 mg oral tablet, disintegrating, 8 mg= 1 tab(s), Oral, TID, 1 refills  Allergies  No Known Allergies  Social History  Abuse/Neglect  No, No, Yes,  03/10/2022  Alcohol - Low Risk, 09/22/2015  Current, wine cooler, 1-2 times per week, 11/04/2021  Employment/School  Unemployed, Work/School description: applied for disability. Activity level: Heavy physical work. Workplace hazards: Heavy lifting/twisting, Repetitive motion., 01/03/2019  Exercise - Does not exercise, 08/05/2015  Home/Environment  Lives with Significant other. Living situation: Home/Independent. Home equipment: CPAP/BiPAP, Walker/Cane. Alcohol abuse in household: No. Substance abuse in household: No. Smoker in household: Yes. Injuries/Abuse/Neglect in household: No. Feels unsafe at home: No. Safe place to go: Yes. Family/Friends available for support: Yes., 01/03/2019  Nutrition/Health  Regular, Good, 11/08/2019  Sexual  Sexual orientation: Straight or heterosexual. Gender Identity Identifies as female., 04/15/2019  Spiritual/Cultural  Restoration, 08/02/2021  Substance Use - Denies Substance Abuse, 04/09/2013  Never, 08/27/2021  Tobacco - High Risk, 03/10/2014  10 or more cigarettes (1/2 pack or more)/day in last 30 days, No, 03/10/2022  Family History  Diabetes: Mother.  Diabetes mellitus type 2: Mother.  Dialysis: Mother.  Heart disease: Mother.  Hypertension.: Mother, Mother and Sister.  Kidney failure: Mother.  Primary malignant neoplasm of lung: Grandmother.  Renal failure syndrome.: Mother.  Seizure: Sister.  Stroke: Mother.  Immunizations  Vaccine Date Status Comments   COVID-19 mRNA, LNP-S, PF - Moderna 06/01/2021 Recorded 2nd dose   COVID-19 mRNA, LNP-S, PF - Moderna 05/04/2021 Recorded 1st dose   tetanus/diphtheria/pertussis, acel(Tdap) 09/14/2017 Given    Health Maintenance  Health Maintenance  ???Pending?(in the next year)  ??? ??OverDue  ??? ? ? ?Alcohol Misuse Screening due??01/02/22??and every 1??year(s)  ??? ??Due In Future?  ??? ? ? ?ADL Screening not due until??11/24/22??and every 1??year(s)  ??? ? ? ?Obesity Screening not due until??01/01/23??and every 1??year(s)  ??? ? ? ?Smoking  Cessation not due until??01/01/23??and every 1??year(s)  ???Satisfied?(in the past 1 year)  ??? ??Satisfied?  ??? ? ? ?ADL Screening on??11/24/21.??Satisfied by Cherie Pagan  ??? ? ? ?Alcohol Misuse Screening on??07/12/21.??Satisfied by Marin Monte MD  ??? ? ? ?Blood Pressure Screening on??03/10/22.??Satisfied by MAXIMINO Mejias Ericka  ??? ? ? ?Body Mass Index Check on??03/10/22.??Satisfied by MAXIMINO Mejias Ericka  ??? ? ? ?Breast Cancer Screening on??11/17/21.??Satisfied by Antionette Barron  ??? ? ? ?Coronary Artery Disease Maintenance-Lipid Lowering Therapy on??11/04/21.??Satisfied by Monica Velez MD  ??? ? ? ?Depression Screening on??02/21/22.??Satisfied by MAXIMINO Mejias Ericka  ??? ? ? ?Diabetes Screening on??03/10/22.??Satisfied by Ava Nobles MT  ??? ? ? ?Influenza Vaccine on??03/10/22.??Satisfied by MAXIMINO Mejias Ericka  ??? ? ? ?Lipid Screening on??03/31/21.??Satisfied by Lianna Parker  ??? ? ? ?Obesity Screening on??03/10/22.??Satisfied by MAXIMINO Mejias Ericka  ??? ? ? ?Smoking Cessation on??01/25/22.??Satisfied by Maxi Tovar LPN??Reason: Expectation Satisfied Elsewhere  ??? ??Refused?  ??? ? ? ?Influenza Vaccine on??03/25/21.??Recorded by Maxi Tovar LPN??Reason: Patient Refuses  ?  Lab Results  Test Name Test Result Date/Time   Sodium Lvl 138 mmol/L 03/10/2022 10:44 CST   Potassium Lvl 3.5 mmol/L 03/10/2022 10:44 CST   Chloride 105 mmol/L 03/10/2022 10:44 CST   CO2 26 mmol/L 03/10/2022 10:44 CST   Calcium Lvl 8.8 mg/dL 03/10/2022 10:44 CST   Magnesium Lvl 2.20 mg/dL 03/10/2022 10:44 CST   Glucose Lvl 147 mg/dL (High) 03/10/2022 10:44 CST   BUN 10.4 mg/dL 03/10/2022 10:44 CST   Creatinine 0.66 mg/dL 03/10/2022 10:44 CST   eGFR-AA >105 03/10/2022 10:44 CST   eGFR-DURAN 101 mL/min/1.73 m2 03/10/2022 10:44 CST   Bili Total 0.3 mg/dL 03/10/2022 10:44 CST   Bili Direct 0.1 mg/dL 03/10/2022 10:44 CST   Bili Indirect 0.20 mg/dL 03/10/2022 10:44 CST   AST 10 unit/L 03/10/2022 10:44 CST    ALT 19 unit/L 03/10/2022 10:44 CST   Alk Phos 74 unit/L 03/10/2022 10:44 CST   Total Protein 6.8 gm/dL 03/10/2022 10:44 CST   Albumin Lvl 3.1 gm/dL (Low) 03/10/2022 10:44 CST   Globulin 3.7 gm/dL (High) 03/10/2022 10:44 CST   A/G Ratio 0.8 ratio (Low) 03/10/2022 10:44 CST   WBC 17.2 x10(3)/mcL (High) 03/10/2022 10:44 CST   RBC 4.09 x10(6)/mcL 03/10/2022 10:44 CST   Hgb 10.8 gm/dL (Low) 03/10/2022 10:44 CST   Hct 33.2 % (Low) 03/10/2022 10:44 CST   Platelet 404 x10(3)/mcL (High) 03/10/2022 10:44 CST   MCV 81.2 fL 03/10/2022 10:44 CST   MCH 26.4 pg 03/10/2022 10:44 CST   MCHC 32.5 gm/dL 03/10/2022 10:44 CST   RDW 14.1 % 03/10/2022 10:44 CST   MPV 8.6 fL 03/10/2022 10:44 CST   Abs Neut 11.45 x10(3)/mcL (High) 03/10/2022 10:44 CST   Neutro Auto 67 % 03/10/2022 10:44 CST   Lymph Auto 19 % 03/10/2022 10:44 CST   Mono Auto 7 % 03/10/2022 10:44 CST   Eos Auto 7 % 03/10/2022 10:44 CST   Abs Eos 1.2 x10(3)/mcL (High) 03/10/2022 10:44 CST   Basophil Auto 0 % 03/10/2022 10:44 CST   Abs Neutro 11.45 x10(3)/mcL (High) 03/10/2022 10:44 CST   Abs Lymph 3.2 x10(3)/mcL 03/10/2022 10:44 CST   Abs Mono 1.1 x10(3)/mcL 03/10/2022 10:44 CST   Abs Baso 0.1 x10(3)/mcL 03/10/2022 10:44 CST   NRBC% 0.0 % 03/10/2022 10:44 CST   IG% 1 % 03/10/2022 10:44 CST   IG# 0.120 03/10/2022 10:44 CST

## 2022-05-20 NOTE — HISTORICAL [facility] CERNER
This is a historical note converted from Max. Formatting and pictures may have been removed.  Please reference Max for original formatting and attached multimedia. History of Present Illness  Past medical history: Chronic low back pain.? Chronic neck pain.? Dyslipidemia.? Obesity.? HOLLY, on CPAP.? Osteoarthritis.? Tobacco abuse.? Alcohol abuse.? Uterine fibroids.? Vitamin D deficiency.? Decreased hearing left ear.  Procedure/surgical history: Left eye surgery.? Left knee surgery.? Hip joint surgery (04/03/2003).? Total laparoscopic hysterectomy and bilateral salpingectomy for abnormal uterine bleeding, uterine fibroids, uteromegaly (03/16/2017).? Colon polypectomy (08/28/2020).? Uterine artery embolization (08/17/2016) for abnormal uterine bleeding secondary to enlarged uterus with multiple fibroids.  Social history: Single. ?Lives in [place], [place]. ?Has 1 child. ?Does not work. ?Has been smoking a pack of cigarettes daily for 18 years. ?Has been drinking 24 beers over the weekend for 16 years. ?Denies illicit drug abuse.??  Family history: Maternal grandmother experienced lung cancer in her 80s; used to smoke.  Health maintenance:  -07/20/2014: ThinPrep cervical Pap smear: NIL  -08/28/2020: Screening colonoscopy (positive FIT): 5 mm flat polyp ascending colon, removed (pathology: Ascending colon polyp, polypectomy: Adenomatous polyp; no evidence of malignancy) (rescope in 5 years)  -09/25/2020: Screening mammogram (comparison: 05/15/2019): Bilateral breast negative (BI-RADS 1)  Menstrual and OB/GYN history:?S/p?laparoscopic hysterectomy and bilateral salpingectomy?for abnormal uterine bleeding, uterine fibroids,?and uteromegaly (03/16/2017)  ?  ?   Reason for follow-up:  -Squamous cell carcinoma of anus  -Tobacco abuse, alcohol abuse  ?  ?   History of present illness:  50-year-old female referred by surgery, for evaluation and management of anal cancer.  ?   She was evaluated by surgery on 05/19/2021  when she presented to emergency department with 3-week history of anal pain and intermittent bleeding.? She noticed a large lump that was painful and intermittently bleeding.? She first noticed this very small lump a few months/1 year prior and it was not painful nor bleeding at the time, therefore, she did not seek attention.? About 3 days prior to presentation, she noticed increasing pain in the anus with intermittent bleeding and then the pain became worse, then, she presented to the emergency department for evaluation.? Denied fevers, chills, nausea, vomiting, abdominal pain, changes in stool color or caliber, melena, dysuria, anal pruritus, vaginal discharge.? Denied being sexually active or history of STIs.? Reported smoking 1 pack of cigarettes daily, and drinking 12 beers over the weekend.? Denies illicit drug abuse.? Most recent colonoscopy 08/28/2020 showed a 5 mm adenomatous polyp which was removed.? No family history of cancers of colon, rectum, illness, etc.? Physical examination revealed a fungating anal mass on the right anal verge, approximately 3 cm size, without evidence of bleeding. Biopsy at bedside was attempted but patient did not tolerate well, resulting in nondiagnostic pathology.? EUA on 06/16/2021 found a mass extending from the anal verge from the right anterior midline almost to posterior midline into the anal canal to the dentate line.? Pathology returned as squamous cell carcinoma.? Significant pain from the mass.? Denies nausea or vomiting.? Normal bowel movements.? Denied fevers, chills, chest pain, dyspnea, nausea, vomiting, constipation, or diarrhea.  ?   04/09/2021: MRI lumbar spine without contrast (lumbar plexopathy, traumatic):  -Multifactorial spinal canal stenosis, severe at L3-L5, moderate to severe at L2-L3, and moderate at L1-L2; multilevel neural foraminal stenosis, severe on the right at L5-S1  ?   04/09/2021: CT head without contrast (TIA):  -No acute intracranial  findings  ?  05/19/2021: CT A/P with contrast (rectal mass) (comparison: CT A/P 08/10/2015):  -Trace free fluid in the pelvis; borderline mesorectal lymph node, nonspecific (0.8 cm)  ?  05/19/2021: Anal verge mass, biopsy:  -Superficial squamous mucosa and detached fragments of markedly atypical squamous epithelium; a more severe lesion cannot be ruled out; recommend the deeper biopsy for further diagnostic evaluation  ?  06/16/2021: Examination under anesthesia:  -Anal mass extending from the right anterior midline almost to posterior midline into anal canal dentate line  ?  06/16/2021: Anal mass, biopsy:  -Squamous cell carcinoma arising in a background of high-grade squamous intraepithelial lesion  ?  05/19/2021:  -CMP: Essentially unremarkable  -CBC: Essentially unremarkable, hemoglobin 12.7  ?  07/29/2021:  Presents for initial medical oncology consultation.? Pleasant lady.? In some discomfort from anorectal pain.  -States that?anorectal pain started after colonoscopy?which was performed on 08/28/2020.? Pain has been getting worse. ?Currently, pain level is 10/10.? Has been taking tramadol as needed.? Takes Ex-Lax?for constipation;?with Levaquin, bowel moods are good.? Blood on toilet wipes.? No blood in toilet bowl.? Throbbing pain in her rectal area.  -Generalized, constant aches and pains, especially in legs and feet  -Some numbness and tingling in feet  -No significant?weakness, fatigue, malaise, fevers, chills, anorexia,?unintentional weight loss, chest pain, cough, dyspnea,?nausea, vomiting, abdominal distention, etc.? No unusual headaches or focal?neurological symptoms.  ?  ?  Interval history:  ?  02/07/2022:  -Follows up with ID for latent syphilis (antibody + 01/06/2022 at [facility]; no prior treatment; ID planning to treat with Bicillin LA 2,400,000 units weekly x3 weeks)  -01/31/2022: Restaging CT C/A/P with contrast: Soft tissue along the right anal canal is stable to perhaps slightly larger since  10/01/2021 (43 x 31 mm, previously 42 x 23 mm); slight enlargement of the left internal iliac lymph node, now measuring up to 10 mm; multiple other pelvic lymph nodes are stable; no metastasis; trace ascites  -01/31/2022: Whole-body nuclear medicine bone scan: No bone metastasis  -01/21/2022: CMP unremarkable; mild neutrophilic leukocytosis, hemoglobin 11.9  -01/25/2022: Syphilis antibody positive; HIV negative; RPR nonreactive  Presents for a follow-up visit, accompanied by a male .? Complains of rectal pain, bleeding, and foul order.? We discussed all labs and scans in detail.? States that she will receive the last (third) shot of Bicillin, for latent syphilis, tomorrow.? No nausea or vomiting.? No abdominal bloating.? Fair appetite.? ECOG 1.? No fevers or chills.  ?  ?  Review of systems:  All systems reviewed, and found to be negative except for the symptoms detailed above.  ?  04/28/2022:  -Chemotherapy started 03/02/2022  -Day?#29 mitomycin C on 03/31/2022?(chemotherapy finished)  -Follows up with ID for history of latent syphilis; s/p Bicillin LA 2,400,000 units weekly x3 (01/25/2022, 02/01/2022, 02/08/2022)  -04/28/2022: Labs reviewed; CMP unremarkable, albumin 2.9; hemoglobin 9.4, MCV 89.1; WBC, differential, platelets normal  Presents for follow-up visit, accompanied by a male family member.? Her chemotherapy is over.? Says that radiation therapy will be done next week, Wednesday.? Says that she has done very well with chemoradiation therapy.? Says that the anal tumor is shrinking.? However, pain persists.? Complains of some radiation dermatitis in the anorectal area, as well as some blisters.? Applying Aquaphor ointment.? Says that she did not experience any significant Side effects with chemotherapy.? Reports good appetite.? No hematochezia.? Bowel movements are normal.? Continue to smoke a pack of cigarettes daily.  Chronic stable symptoms including fatigue, some blurry vision at times,  swelling in the legs/ankles, some exertional dyspnea, cough, burning with urination, numbness and tingling in hands and feet which is not severe, and pain in the buttocks, 8/10 in severity.  ?  ?  Physical examination:  VITAL SIGNS:? Reviewed.? ?  GENERAL:? In no apparent distress.?  HEAD:? No signs of head trauma.  EYES:? Pupils are equal.? Extraocular motions intact.?  EARS:? Hearing grossly intact.  MOUTH:? Oropharynx is normal.  NECK:? No adenopathy, no JVD.? ?  CHEST:? Chest with clear breath sounds bilaterally.? No wheezes, rales, or rhonchi.?  CARDIAC:? Regular rate and rhythm.? S1 and S2, without murmurs, gallops, or rubs.  VASCULAR:? No Edema.? Peripheral pulses normal and equal in all extremities.  ABDOMEN:? Soft, without detectable tenderness.? No sign of distention.? No? ?rebound or guarding, and no masses palpated.? ?Bowel Sounds normal.  MUSCULOSKELETAL:? Good range of motion of all major joints. Extremities without clubbing, cyanosis or edema.?  NEUROLOGIC EXAM:? Alert and oriented x 3.? No focal sensory or strength deficits.? ?Speech normal.? Follows commands.  PSYCHIATRIC:? Mood normal.  SKIN:? No rash or lesions.  07/29/2021:?I did not perform?anorectal examination.  ?  ?  Assessment:  #Squamous cell carcinoma of anus:  -Presentation (05/2021): Enlarging anal lump for 1 year; anal pain and intermittent bleeding for 3 weeks  -Fungating anal mass on the right anal verge, approximately 3 cm?(05/19/2021)  -On EUA,?anal mass extending from?right anterior midline almost to posterior midline into anal canal dentate line  -Borderline mesorectal lymph node, 0.8 cm on CT A/P (05/19/2021)  -Anal mass?not visualized on CT A/P  -EUA and biopsy (06/16/2021)  -08/12/2021: CT chest with contrast (staging): No acute abnormality  -08/24/2021: PET/CT (comparison: CT chest 08/12/2021; CT A/P 05/19/2021): FDG uptake around the anal canal likely corresponding to known malignancy (maximum SUV 20.8); stable size of small  mildly hypermetabolic right mesorectal, pelvic sidewall, and inguinal lymph nodes, nonspecific  -10/01/2021: MRI pelvis with and without contrast (comparison: PET/CT 08/24/2021): Mass along the right anal canal measuring up to 55 mm; suspected area of soft tissue extension into the posterior lower vagina/introitus; several suspicious perirectal lymph nodes; nonspecific borderline enlarged bilateral iliac and inguinal lymph nodes (mass 42 x 17 x 55 mm, extension anal margin, most likely extends through the external sphincter; several prominent perirectal lymph nodes measuring up to 8 mm in short axis; bilateral external iliac lymph nodes also measure up to 8 mm in short axis; bilateral inguinal lymph nodes measure up to 10 mm)  -10/13/2021: Mediport placed  -11/17/2021: Screening mammogram (comparison: 09/25/2020 mammogram): Right breast benign (BI-RADS 2); left breast negative (BI-RADS 1)  -12/09/2021: She called our office stating that she wants to start chemoradiation therapy ASAP and that she is passing blood clots and that tumor in the anus is bleeding a lot  -01/05/2022: Presented to ED with worsening depression for 3 days; suicidal ideation; feeling overwhelmed; [facility]  -Follows up with ID for latent syphilis (antibody + 01/06/2022 at [facility]; no prior treatment; ID planning to treat with Bicillin LA 2,400,000 units weekly x3 weeks)  -01/31/2022: Restaging CT C/A/P with contrast: Soft tissue along the right anal canal is stable to perhaps slightly larger since 10/01/2021 (43 x 31 mm, previously 42 x 23 mm); slight enlargement of the left internal iliac lymph node, now measuring up to 10 mm; multiple other pelvic lymph nodes are stable; no metastasis; trace ascites  -01/31/2022: Whole-body nuclear medicine bone scan: No bone metastasis  -01/21/2022: CMP unremarkable; mild neutrophilic leukocytosis, hemoglobin 11.9  -01/25/2022: Syphilis antibody positive; HIV  negative; RPR nonreactive  -Chemotherapy started 03/02/2022  -Day 29 mitomycin C on 03/31/2022  -Follows up with ID for history of latent syphilis; s/p Bicillin LA 2,400,000 units weekly x3 (01/25/2022, 02/01/2022, 02/08/2022)  To summarize:  -Squamous cell carcinoma of the anus  -Presentation (05/2021): Enlarging anal lump for 1 year; anal pain and intermittent bleeding for 3 weeks  -Fungating anal mass right anal verge  -S/p EUA (06/16/2021)  -Mass 3 cm on physical exam (05/19/2021); mass not visualized on CT A/P; mass 55 mm on MRI; mass extending into lower vagina/introitus on MRI  -Several suspicious perirectal and nonspecific borderline enlarged bilateral iliac, mesorectal,?and inguinal lymph nodes on MRI and PET/CT  >>>?01/21/2022:?cT4 cN1c M0, at least stage IIIC  -Noncompliant with follow-up  -Restaging CTs and bone scan?(01/31/2022):?43 x 31 mm?right?anal canal?soft tissue (anal cancer);?left internal iliac lymph node 10 mm, slightly enlarged;?other pelvic lymph nodes stable; no metastasis  -Of note,?her restaging PET/CT?and pelvic MRI scan was denied by her insurance.  -S/p chemotherapy 03/02/2022-03/31/2022  ?  ?  #Tobacco abuse, alcohol abuse  ?  #History of depression, suicidal ideation  ?  #Latent syphilis:?Syphilis antibody positive (11/25/2022);?following up with ID  -S/p?Bicillin LA 2,400,000 units weekly x3 (01/25/2022, 02/01/2022, 02/08/2022)  ?  #Comorbidities: Chronic low back pain, chronic neck pain, obesity, HOLLY, on CPAP, etc.  ?  ?  Plan:  -Squamous cell carcinoma of anus  -S/p chemotherapy?([facility] protocol) 03/02/2022-03/31/2022  -According to her,?radiation therapy is expected to finish?05/04/2022  ?  Chemotherapy:  1.? Capecitabine 825 mg/m? p.o. twice daily, Monday-Friday, on each day that radiation therapy is given, throughout the duration of radiation therapy (typically 28 treatment days);  2.? Mitomycin 10 mg/m? days 1 and 29.  ?  -It is not the standard of care to repeat imaging  studies after completion of chemoradiation therapy.? Imaging studies are recommended only if, on 8-12-week evaluation, there is persistent disease.? Therefore, at this time, we will not order any restaging imaging studies.  ?  For response assessment, see below.  ?  -History of depression and suicidal ideation, ER presentation 01/05/2022, transferred to outside psych facility; as of 01/21/2022,?talking to a counselor via telephone  ?  -01/21/2022:?Started 5 mg every 4 hours?as needed for excruciating?anorectal pain secondary to worsening cancer  ?  -01/21/2022: Still smoking a pack of cigarettes daily;?tobacco cessation emphasized.  ?  -Latent syphilis;?follow-up with ID; s/p?Bicillin LA 2.4 mL IM?weekly x3 (completed 02/08/2022)  ?  Follow-up in 3 months, with CBC and CMP;?to visit with NP  In the interim, follow-up with surgery.  ?  Above discussed at length with the patient. ?All questions answered.  She understands and agrees this plan.  ------------------  ?  ?  Follow-up:  Evaluate in 8-12 weeks?(after completion of chemoradiation therapy) with exam + PERLA:  1.? If complete remission, then: Surveillance;  ?  2.? If persistent disease, then: Reevaluate in 4 weeks, including imaging studies; if progression or no progression on serial exams, then continue observation and reevaluation at 3-month intervals, utilizing imaging studies; if complete remission, then surveillance  (Follow patients who have not achieved a complete clinical response with persistent anal cancer up to 6 months following completion of RT and chemotherapy as long as there is no evidence of progressive disease during this period of follow-up; persistent disease may continue to regress even at 26 weeks from the start of treatment)  ?  3.? If progressive disease, then: If biopsy-proven, then restage, including imaging studies; if locally recurrent, then, APR/groin dissection if positive inguinal lymph nodes, followed by surveillance; if, on  restaging, metastatic disease, then, systemic therapy  (Follow patients who have not achieved a complete clinical response with persistent anal cancer up to 6 months following completion of RT and chemotherapy as long as there is no evidence of progressive disease during this period of follow-up; persistent disease may continue to regress even at 26 weeks from the start of treatment)  ?  ?  Surveillance: (After complete remission):  1.? PERLA every 3-6 months for 5 years?(will defer to surgery)  2.? Inguinal lymph node palpation every 3-6 months for 5 years  3.? Anoscopy every 6-12 months for 3 years?(will defer to surgery)  4.? CT chest/abdomen/pelvis with contrast or chest CT without contrast and abdominal/pelvic MRI with contrast annually for 3 years (stage II-III)  Physical Exam  Vitals & Measurements  T:?37.1? ?C (Oral)? HR:?62(Peripheral)? RR:?20? BP:?113/68?  HT:?167.00?cm? WT:?107.900?kg? BMI:?38.69?   Problem List/Past Medical History  Ongoing  Anal cancer  Anxiety  Anxiety  Chronic lower back pain  Chronic neck and back pain  Chronic pain of lower extremity, bilateral  Claustrophobia  Depression  Depression  HLD (hyperlipidemia)  Hyperlipemia  Hypertriglyceridemia  Obesity  HOLLY on CPAP  Osteoarthritis  Squamous cell carcinoma of anus  Tobacco abuse  Tobacco user  Tumor of anal canal  Uterine fibroids  Vitamin D deficiency  Weakness of both legs  Historical  Bloody stool  Pregnant  Procedure/Surgical History  Catheter Insertion Mediport (None) (10/13/2021)  Insertion of Infusion Device into Superior Vena Cava, Percutaneous Approach (10/13/2021)  Insertion of tunneled centrally inserted central venous access device, with subcutaneous port; age 5 years or older (10/13/2021)  Exam Under Anesthesia Rectal (None) (06/16/2021)  Excision of Anus, Via Natural or Artificial Opening, Diagnostic (06/16/2021)  Unlisted procedure, anus (06/16/2021)  Colonoscopy (None) (08/28/2020)  Colonoscopy, flexible; with removal of  tumor(s), polyp(s), or other lesion(s) by snare technique (08/28/2020)  Excision of Ascending Colon, Via Natural or Artificial Opening Endoscopic, Diagnostic (08/28/2020)  Polypectomy (None) (08/28/2020)  Cystoscopy (None) (03/16/2017)  Hysterectomy Total Laparoscopic (None) (03/16/2017)  Laparoscopy, surgical, with total hysterectomy, for uterus greater than 250 g; with removal of tube(s) and/or ovary(s) (03/16/2017)  Resection of Bilateral Fallopian Tubes, Percutaneous Endoscopic Approach (03/16/2017)  Resection of Cervix, Percutaneous Endoscopic Approach (03/16/2017)  Resection of Uterus, Percutaneous Endoscopic Approach (03/16/2017)  Salpingectomy (Bilateral) (03/16/2017)  FEMUR (04/03/2003)  Hip joint operations (04/03/2003)  left knee sx  left thigh sx   Medications  acetaminophen-oxycodone 325 mg-10 mg oral tablet, 1 tab(s), Oral, q4hr  acetaminophen-oxycodone 325 mg-5 mg oral tablet, 1 tab(s), Oral, QID  acetaminophen-oxycodone 325 mg-5 mg oral tablet, 1 tab(s), Oral, QID, PRN  amitriptyline 50 mg oral tablet, 50 mg= 1 tab(s), Oral, Once a day (at bedtime), 3 refills  atorvastatin 40 mg oral tablet, 40 mg= 1 tab(s), Oral, Daily, 3 refills  escitalopram 10 mg oral tablet, 10 mg= 1 tab(s), Oral, Daily  Hemorrhoid seating cushion, See Instructions,? ?Not taking: Last Dose Date/Time Unknown  lidocaine 2% topical gel with applicator, 10 mL, TOP, Once,? ?Not taking: ran out Last Dose Date/Time Unknown  lidocaine 5% topical ointment, TOP, TID  LORazepam 1 mg oral tablet, 1 mg= 1 tab(s), Oral, Daily  morphine 30 mg/8 to 12 hr oral tablet, extended release, 30 mg= 1 tab(s), Oral, BID  Omega-3 1000 mg oral capsule, 1000 mg= 1 cap(s), Oral, BID, 4 refills  pregabalin 50 mg oral capsule, 50 mg= 1 cap(s), Oral, BID, 1 refills  Small base quad cane, See Instructions,? ?Not taking: Last Dose Date/Time Unknown  Vistaril 50 mg oral capsule, 50 mg= 1 cap(s), Oral, QID, PRN, 3 refills  Vitamin D3 2000 intl units (50 mcg) oral  capsule, 50 mcg= 1 cap(s), Oral, Daily, 5 refills  Vitamin D3 50,000 intl units (1250 mcg) oral capsule, 1250 mcg= 1 cap(s), Oral, qWeek  Xeloda 500 mg oral tablet, See Instructions  Allergies  No Known Allergies  Social History  Abuse/Neglect  No, No, Yes, 04/25/2022  Alcohol - Low Risk, 09/22/2015  Current, Beer, Wine, wine cooler, 1-2 times per week, Alcohol use interferes with work or home: No. Others hurt by drinking: No. Household alcohol concerns: No., 03/31/2022  Employment/School  Unemployed, Work/School description: applied for disability. Activity level: Heavy physical work. Workplace hazards: Heavy lifting/twisting, Repetitive motion., 01/03/2019  Exercise - Does not exercise, 08/05/2015  Home/Environment  Lives with Significant other. Living situation: Home/Independent. Home equipment: CPAP/BiPAP, Walker/Cane. Alcohol abuse in household: No. Substance abuse in household: No. Smoker in household: Yes. Injuries/Abuse/Neglect in household: No. Feels unsafe at home: No. Safe place to go: Yes. Family/Friends available for support: Yes., 01/03/2019  Nutrition/Health  Regular, Good, 11/08/2019  Sexual  Sexual orientation: Straight or heterosexual. Gender Identity Identifies as female., 04/15/2019  Spiritual/Cultural  Buddhism, 08/02/2021  Substance Use - Denies Substance Abuse, 04/09/2013  Never, 03/31/2022  Tobacco - High Risk, 03/10/2014  10 or more cigarettes (1/2 pack or more)/day in last 30 days, No, 04/25/2022  Family History  Diabetes: Mother.  Diabetes mellitus type 2: Mother.  Dialysis: Mother.  Heart disease: Mother.  Hypertension.: Mother, Mother and Sister.  Kidney failure: Mother.  Primary malignant neoplasm of lung: Grandmother.  Renal failure syndrome.: Mother.  Seizure: Sister.  Stroke: Mother.  Immunizations  Vaccine Date Status Comments   COVID-19 mRNA, LNP-S, PF - Moderna 06/01/2021 Recorded 2nd dose   COVID-19 mRNA, LNP-S, PF - Moderna 05/04/2021 Recorded 1st dose    tetanus/diphtheria/pertussis, acel(Tdap) 09/14/2017 Given

## 2022-05-20 NOTE — HISTORICAL [facility]
This is a historical note converted from Max. Formatting and pictures may have been removed.  Please reference Max for original formatting and attached multimedia. Chief Complaint  carcinoma of anus  History of Present Illness  Problem List:  -Squamous cell carcinoma of anus  -Tobacco abuse, alcohol abuse  ?   Current Treatment:  1.? Capecitabine 825 mg/m? p.o. twice daily, Monday-Friday, on each day that radiation therapy is given, throughout the duration of radiation therapy (typically 28 treatment days);  2.? Mitomycin 10 mg/m? days 1 and 29.  Definitive CRT  ?   Start: 3/2/2022  ?   Treatment History:  ?   Past medical history: Chronic low back pain.? Chronic neck pain.? Dyslipidemia.? Obesity.? HOLLY, on CPAP.? Osteoarthritis.? Tobacco abuse.? Alcohol abuse.? Uterine fibroids.? Vitamin D deficiency.? Decreased hearing left ear.  Procedure/surgical history: Left eye surgery.? Left knee surgery.? Hip joint surgery (04/03/2003).? Total laparoscopic hysterectomy and bilateral salpingectomy for abnormal uterine bleeding, uterine fibroids, uteromegaly (03/16/2017).? Colon polypectomy (08/28/2020).? Uterine artery embolization (08/17/2016) for abnormal uterine bleeding secondary to enlarged uterus with multiple fibroids.  Social history: Single. ?Lives in [place], [place]. ?Has 1 child. ?Does not work. ?Has been smoking a pack of cigarettes daily for 18 years. ?Has been drinking 24 beers over the weekend for 16 years. ?Denies illicit drug abuse.??  Family history: Maternal grandmother experienced lung cancer in her 80s; used to smoke.  Health maintenance:  -07/20/2014: ThinPrep cervical Pap smear: NIL  -08/28/2020: Screening colonoscopy (positive FIT): 5 mm flat polyp ascending colon, removed (pathology: Ascending colon polyp, polypectomy: Adenomatous polyp; no evidence of malignancy) (rescope in 5 years)  -09/25/2020: Screening mammogram (comparison: 05/15/2019): Bilateral breast negative (BI-RADS  1)  Menstrual and OB/GYN history:?S/p?laparoscopic hysterectomy and bilateral salpingectomy?for abnormal uterine bleeding, uterine fibroids,?and uteromegaly (03/16/2017)  ?  ?   History of Present Illness:  50-year-old female referred by surgery, for evaluation and management of anal cancer.  Refer to  note dated 2/7/2022?for detailed HPI.  ?   Interval History 3/31/2022: Patient presents today for scheduled follow up for Squamous Cell Carcinoma of Anus. She is currently receiving CRT with Xeloda/Mitomycin she is due to receive cycle 1 day 29 . She reports tolerating well without any side effects. She continues to report perianal pain and worse with bowel movements. She says that medication prescribed by radiation has not been effective. Patient advised to coat area with boudreauxs butte paste especially before bowel movements to avoid irritating area and to also allow area to heal and keep dry.Lab work reviewed with patient, stable. Medications reviewed, no refills needed at this time.  Review of Systems  A complete 12-point ROS was performed in full with pertinent positives as described in interval history. Remainder of ROS done in full and are negative.  ?  Physical Exam  Vitals & Measurements  T:?37.2? ?C (Oral)? HR:?92(Peripheral)? RR:?18? BP:?132/88? SpO2:?100%?  HT:?167.64?cm? WT:?108.680?kg? BMI:?38.67?  Physical Exam:  General: Alert and oriented, No acute distress.?  Appearance: Well-groomed wearing mask  HEENT: Normocephalic, Oral mucosa is moist. Pupils are equal, round and reactive to light, Extraocular movements are intact, Normal conjunctiva.?  Neck: Supple, Non-tender, No lymphadenopathy, No thyromegaly.?  Respiratory: Lungs are clear to auscultation, Respirations are non-labored, Breath sounds are equal, Symmetrical chest wall expansion.?  Cardiovascular: Normal rate, Regular rhythm, No edema.?  Breast: Breast exam not performed on todays visit.?  Gastrointestinal: Rounded, Soft,  Non-tender, Non-distended, Normal bowel sounds.?  Musculoskeletal: Normal strength. Ambulates without assistance  Integumentary: Warm, dry, intact.?  Neurologic: Alert, Oriented, No focal deficits, Cranial Nerves II-XII are grossly intact.?  Cognition and Speech: Oriented, Speech clear and coherent.?  Psychiatric: Cooperative, Appropriate mood & affect.?  ECOG Performance Scale: 0- Capable of all self-care no restrictions  Assessment/Plan  1.?Anal cancer?C21.0  #Squamous cell carcinoma of anus:  To summarize:  -Squamous cell carcinoma of the anus  -Presentation (05/2021): Enlarging anal lump for 1 year; anal pain and intermittent bleeding for 3 weeks  -Fungating anal mass right anal verge  -S/p EUA (06/16/2021)  -Mass 3 cm on physical exam (05/19/2021); mass not visualized on CT A/P; mass 55 mm on MRI; mass extending into lower vagina/introitus on MRI  -Several suspicious perirectal and nonspecific borderline enlarged bilateral iliac, mesorectal,?and inguinal lymph nodes on MRI and PET/CT  >>> ?01/21/2022:?cT4 cN1c M0, at least stage IIIC  -Noncompliant with follow-up  -Restaging CTs and bone scan?(01/31/2022):?43 x 31 mm?right?anal canal?soft tissue (anal cancer);?left internal iliac lymph node 10 mm, slightly enlarged;?other pelvic lymph nodes stable; no metastasis  ?   #Tobacco abuse, alcohol abuse  ?   #History of depression, suicidal ideation  ?   #Latent syphilis:?Syphilis antibody positive (11/25/2022);?following up with ID,?Bicillin x3 doses plan  ?   #Comorbidities: Chronic low back pain, chronic neck pain, obesity, HOLLY, on CPAP, etc.  >>>>>>>  ?  -History of depression and suicidal ideation, ER presentation 01/05/2022, transferred to outside psych facility; as of 01/21/2022,?talking to a counselor via telephone  ?  Plan:  ?   Chemotherapy:  1.? Capecitabine 825 mg/m? p.o. twice daily, Monday-Friday, on each day that radiation therapy is given, throughout the duration of radiation therapy (typically 28  treatment days);  2.? Mitomycin 10 mg/m? days 1 and 29.  ?  During chemoradiation therapy,?check CBC and CMP weekly  ?   Follow up w/ in?4 weeks on 4/28/2022 with CBC,CMP,MG CEA,? completion of CRT.  ?   Expected XRT completion 4/25/2022  ?  -01/21/2022:?Started norco 5 mg every 4 hours?as needed for excruciating?anorectal pain secondary to worsening cancer  ?   -01/21/2022: Still smoking a pack of cigarettes daily;?tobacco cessation emphasized.  ?   -Latent syphilis;?follow-up with ID for Bicillin?weekly x3 doses  Potential side effects of Xeloda include but are not limited to coagulopathy, diarrhea, cardiotoxicity, increased risk of severe or fatal adverse reactions in the presence of deficiency of dihydropyridine dehydrogenase activity, dehydration and renal failure, mucocutaneous and dermatologic toxicity in the form of Cabrera-Servando syndrome, toxic epidermal necrolysis, hand-foot syndrome, hyperbilirubinemia, thrombocytopenia, neutropenia, nausea/vomiting/abdominal pain/weakness/fatigue, etc.  ?   Mitomycin-C:  Warnings/precautions:  1.? Bladder fibrosis/contraction with intravesical administration;  2.? Bone marrow suppression: May be severe.? WBC and platelet bharath usually occur at 4 weeks, although may occur at up to 8 weeks; recovery occurs within 10 weeks;  3.? Extravasation: Mitomycin is important vesicant;  4.? Heart failure: May either cause reversible direct myocardial toxicity or exacerbate underlying myocardial dysfunction;  5.? Hemolytic uremic syndrome: Involves microangiopathic hemolytic anemia; hematocrit anytime; may be exacerbated by blood transfusion;  6.? Pulmonary toxicity: Acute respiratory distress syndrome in patients receiving mitomycin in combination with other chemotherapy who are maintained at FiO2 concentrations greater than 50% perioperatively.  Adverse reactions:  1.? In greater than 10%, anorexia, nausea, vomiting, bone marrow suppression, hemolytic uremic syndrome,  TTP, fever;  2.? In 1% - 10%: Alopecia, mucous membrane toxicity, stomatitis, renal dysfunction, etc.  ?  ?  Above discussed at length with the patient?and her boyfriend. ?All questions answered.  Discussed plan of management going forward.? Compliance emphasized.  Discussed potential side effects of?chemotherapy.?Formal chemotherapy teaching with nursing staff to follow.  Clearly explained to her that long-term prognosis is guarded.  She understands and agrees this plan.  ------------------  For response assessment, see below.  Follow-up:  Evaluate in 8-12 weeks?(after completion of chemoradiation therapy) with exam + PERLA:  1.? If complete remission, then: Surveillance;  ?  2.? If persistent disease, then: Reevaluate in 4 weeks, including imaging studies; if progression or no progression on serial exams, then continue observation and reevaluation at 3-month intervals, utilizing imaging studies; if complete remission, then surveillance  (Follow patients who have not achieved a complete clinical response with persistent anal cancer up to 6 months following completion of RT and chemotherapy as long as there is no evidence of progressive disease during this period of follow-up; persistent disease may continue to regress even at 26 weeks from the start of treatment)  ?  3.? If progressive disease, then: If biopsy-proven, then restage, including imaging studies; if locally recurrent, then, APR/groin dissection if positive inguinal lymph nodes, followed by surveillance; if, on restaging, metastatic disease, then, systemic therapy  (Follow patients who have not achieved a complete clinical response with persistent anal cancer up to 6 months following completion of RT and chemotherapy as long as there is no evidence of progressive disease during this period of follow-up; persistent disease may continue to regress even at 26 weeks from the start of treatment)  Ordered:  ?   Problem List/Past Medical History  Ongoing  Anal  cancer  Anxiety  Anxiety  Chronic lower back pain  Chronic neck and back pain  Chronic pain of lower extremity, bilateral  Claustrophobia  Depression  Depression  Hyperlipemia  Hypertriglyceridemia  Obesity  HOLLY on CPAP  Osteoarthritis  Squamous cell carcinoma of anus  Tobacco abuse  Tobacco user  Tumor of anal canal  Uterine fibroids  Vitamin D deficiency  Weakness of both legs  Historical  Bloody stool  Pregnant  Procedure/Surgical History  Catheter Insertion Mediport (None) (10/13/2021)  Insertion of Infusion Device into Superior Vena Cava, Percutaneous Approach (10/13/2021)  Insertion of tunneled centrally inserted central venous access device, with subcutaneous port; age 5 years or older (10/13/2021)  Exam Under Anesthesia Rectal (None) (06/16/2021)  Excision of Anus, Via Natural or Artificial Opening, Diagnostic (06/16/2021)  Unlisted procedure, anus (06/16/2021)  Colonoscopy (None) (08/28/2020)  Colonoscopy, flexible; with removal of tumor(s), polyp(s), or other lesion(s) by snare technique (08/28/2020)  Excision of Ascending Colon, Via Natural or Artificial Opening Endoscopic, Diagnostic (08/28/2020)  Polypectomy (None) (08/28/2020)  Cystoscopy (None) (03/16/2017)  Hysterectomy Total Laparoscopic (None) (03/16/2017)  Laparoscopy, surgical, with total hysterectomy, for uterus greater than 250 g; with removal of tube(s) and/or ovary(s) (03/16/2017)  Resection of Bilateral Fallopian Tubes, Percutaneous Endoscopic Approach (03/16/2017)  Resection of Cervix, Percutaneous Endoscopic Approach (03/16/2017)  Resection of Uterus, Percutaneous Endoscopic Approach (03/16/2017)  Salpingectomy (Bilateral) (03/16/2017)  FEMUR (04/03/2003)  Hip joint operations (04/03/2003)  left knee sx  left thigh sx   Medications  acetaminophen-oxycodone 325 mg-5 mg oral tablet, 1 tab(s), Oral, QID  acetaminophen-oxycodone 325 mg-5 mg oral tablet, 1 tab(s), Oral, QID, PRN  amitriptyline 50 mg oral tablet, 50 mg= 1 tab(s), Oral, Once a  day (at bedtime), 3 refills  atorvastatin 40 mg oral tablet, 40 mg= 1 tab(s), Oral, Daily, 3 refills  escitalopram 10 mg oral tablet, 10 mg= 1 tab(s), Oral, Daily  Hemorrhoid seating cushion, See Instructions,? ?Not taking  lidocaine 5% topical ointment, TOP, TID  LORazepam 1 mg oral tablet, 1 mg= 1 tab(s), Oral, Daily  pregabalin 50 mg oral capsule, 50 mg= 1 cap(s), Oral, BID, 1 refills  Small base quad cane, See Instructions,? ?Not taking  Vistaril 50 mg oral capsule, 50 mg= 1 cap(s), Oral, QID, PRN, 3 refills  Vitamin D3 2000 intl units (50 mcg) oral capsule, 50 mcg= 1 cap(s), Oral, Daily, 5 refills  Xanax 0.5 mg oral tablet, See Instructions,? ?Not taking  Xeloda 500 mg oral tablet, See Instructions  Allergies  No Known Allergies  Social History  Abuse/Neglect  No, No, Yes, 03/31/2022  Alcohol - Low Risk, 09/22/2015  Current, Beer, Wine, wine cooler, 1-2 times per week, Alcohol use interferes with work or home: No. Others hurt by drinking: No. Household alcohol concerns: No., 03/31/2022  Employment/School  Unemployed, Work/School description: applied for disability. Activity level: Heavy physical work. Workplace hazards: Heavy lifting/twisting, Repetitive motion., 01/03/2019  Exercise - Does not exercise, 08/05/2015  Home/Environment  Lives with Significant other. Living situation: Home/Independent. Home equipment: CPAP/BiPAP, Walker/Cane. Alcohol abuse in household: No. Substance abuse in household: No. Smoker in household: Yes. Injuries/Abuse/Neglect in household: No. Feels unsafe at home: No. Safe place to go: Yes. Family/Friends available for support: Yes., 01/03/2019  Nutrition/Health  Regular, Good, 11/08/2019  Sexual  Sexual orientation: Straight or heterosexual. Gender Identity Identifies as female., 04/15/2019  Spiritual/Cultural  Mormonism, 08/02/2021  Substance Use - Denies Substance Abuse, 04/09/2013  Never, 03/31/2022  Tobacco - High Risk, 03/10/2014  10 or more cigarettes (1/2 pack or more)/day in  last 30 days, No, Cigarettes, 31 year(s). Household tobacco concerns: No., 03/31/2022  Family History  Diabetes: Mother.  Diabetes mellitus type 2: Mother.  Dialysis: Mother.  Heart disease: Mother.  Hypertension.: Mother, Mother and Sister.  Kidney failure: Mother.  Primary malignant neoplasm of lung: Grandmother.  Renal failure syndrome.: Mother.  Seizure: Sister.  Stroke: Mother.  Immunizations  Vaccine Date Status Comments   COVID-19 mRNA, LNP-S, PF - Moderna 06/01/2021 Recorded 2nd dose   COVID-19 mRNA, LNP-S, PF - Moderna 05/04/2021 Recorded 1st dose   tetanus/diphtheria/pertussis, acel(Tdap) 09/14/2017 Given    Health Maintenance  Health Maintenance  ???Pending?(in the next year)  ??? ??OverDue  ??? ? ? ?Influenza Vaccine due??10/01/21??and every 1??day(s)  ??? ? ? ?Alcohol Misuse Screening due??01/02/22??and every 1??year(s)  ??? ??Due In Future?  ??? ? ? ?ADL Screening not due until??11/24/22??and every 1??year(s)  ??? ? ? ?Obesity Screening not due until??01/01/23??and every 1??year(s)  ??? ? ? ?Smoking Cessation not due until??01/01/23??and every 1??year(s)  ???Satisfied?(in the past 1 year)  ??? ??Satisfied?  ??? ? ? ?ADL Screening on??11/24/21.??Satisfied by Cherie Pagan  ??? ? ? ?Alcohol Misuse Screening on??07/12/21.??Satisfied by Marin Monte MD  ??? ? ? ?Blood Pressure Screening on??03/31/22.??Satisfied by MAXIMINO Mejias Ericka  ??? ? ? ?Body Mass Index Check on??03/31/22.??Satisfied by MAXIMINO Mejias Ericka  ??? ? ? ?Breast Cancer Screening on??11/17/21.??Satisfied by Antionette Barron  ??? ? ? ?Coronary Artery Disease Maintenance-Lipid Lowering Therapy on??11/04/21.??Satisfied by Monica Velez MD  ??? ? ? ?Depression Screening on??03/31/22.??Satisfied by Sisi Almendarez  ??? ? ? ?Diabetes Screening on??03/31/22.??Satisfied by Ava Nobles MT.  ??? ? ? ?Influenza Vaccine on??03/31/22.??Satisfied by Sisi Almendarez  ??? ? ? ?Obesity Screening on??03/31/22.??Satisfied by MAXIMINO Mejias,  Huong  ??? ? ? ?Smoking Cessation on??01/25/22.??Satisfied by Maxi Tovar LPN??Reason: Expectation Satisfied Elsewhere  ?  Lab Results  Test Name Test Result Date/Time   Sodium Lvl 138 mmol/L 03/31/2022 09:02 CDT   Potassium Lvl 4.0 mmol/L 03/31/2022 09:02 CDT   Chloride 107 mmol/L 03/31/2022 09:02 CDT   CO2 25 mmol/L 03/31/2022 09:02 CDT   Calcium Lvl 9.0 mg/dL 03/31/2022 09:02 CDT   Magnesium Lvl 2.10 mg/dL 03/31/2022 09:02 CDT   Glucose Lvl 150 mg/dL (High) 03/31/2022 09:02 CDT   BUN 11.7 mg/dL 03/31/2022 09:02 CDT   Creatinine 0.71 mg/dL 03/31/2022 09:02 CDT   Est Creat Clearance Ser 188.32 mL/min 03/31/2022 10:23 CDT   eGFR-AA >105 03/31/2022 09:02 CDT   eGFR-DURAN 93 mL/min/1.73 m2 03/31/2022 09:02 CDT   Bili Total 0.3 mg/dL 03/31/2022 09:02 CDT   Bili Direct 0.1 mg/dL 03/31/2022 09:02 CDT   Bili Indirect 0.20 mg/dL 03/31/2022 09:02 CDT   AST 13 unit/L 03/31/2022 09:02 CDT   ALT 24 unit/L 03/31/2022 09:02 CDT   Alk Phos 68 unit/L 03/31/2022 09:02 CDT   Total Protein 6.9 gm/dL 03/31/2022 09:02 CDT   Albumin Lvl 3.2 gm/dL (Low) 03/31/2022 09:02 CDT   Globulin 3.7 gm/dL (High) 03/31/2022 09:02 CDT   A/G Ratio 0.9 ratio (Low) 03/31/2022 09:02 CDT   WBC 5.8 x10(3)/mcL 03/31/2022 09:02 CDT   RBC 3.74 x10(6)/mcL (Low) 03/31/2022 09:02 CDT   Hgb 10.1 gm/dL (Low) 03/31/2022 09:02 CDT   Hct 31.8 % (Low) 03/31/2022 09:02 CDT   Platelet 188 x10(3)/mcL 03/31/2022 09:02 CDT   MCV 85.0 fL 03/31/2022 09:02 CDT   MCH 27.0 pg 03/31/2022 09:02 CDT   MCHC 31.8 gm/dL 03/31/2022 09:02 CDT   RDW 17.7 % (High) 03/31/2022 09:02 CDT   MPV 8.7 fL 03/31/2022 09:02 CDT   Abs Neut 4.46 x10(3)/mcL 03/31/2022 09:02 CDT   Neutro Auto 78 % 03/31/2022 09:02 CDT   Lymph Auto 6 % 03/31/2022 09:02 CDT   Mono Auto 6 % 03/31/2022 09:02 CDT   Eos Auto 9 % 03/31/2022 09:02 CDT   Abs Eos 0.5 x10(3)/mcL 03/31/2022 09:02 CDT   Basophil Auto 0 % 03/31/2022 09:02 CDT   Abs Neutro 4.46 x10(3)/mcL 03/31/2022 09:02 CDT   Abs Lymph 0.4 x10(3)/mcL (Low)  03/31/2022 09:02 CDT   Abs Mono 0.4 x10(3)/mcL 03/31/2022 09:02 CDT   Abs Baso 0.0 x10(3)/mcL 03/31/2022 09:02 CDT   NRBC% 0.0 % 03/31/2022 09:02 CDT   IG% 0 % 03/31/2022 09:02 CDT   IG# 0.020 03/31/2022 09:02 CDT

## 2022-05-25 ENCOUNTER — HOSPITAL ENCOUNTER (OUTPATIENT)
Dept: WOUND CARE | Facility: HOSPITAL | Age: 52
Discharge: HOME | End: 2022-05-25
Payer: MEDICAID

## 2022-05-25 VITALS
HEIGHT: 66 IN | SYSTOLIC BLOOD PRESSURE: 124 MMHG | WEIGHT: 230 LBS | RESPIRATION RATE: 18 BRPM | TEMPERATURE: 99 F | HEART RATE: 118 BPM | DIASTOLIC BLOOD PRESSURE: 76 MMHG | BODY MASS INDEX: 36.96 KG/M2

## 2022-05-25 DIAGNOSIS — M19.90 OSTEOARTHRITIS, UNSPECIFIED OSTEOARTHRITIS TYPE, UNSPECIFIED SITE: ICD-10-CM

## 2022-05-25 DIAGNOSIS — F41.9 ANXIETY DISORDER, UNSPECIFIED TYPE: ICD-10-CM

## 2022-05-25 DIAGNOSIS — S31.809A OPEN WOUND OF BUTTOCK WITH COMPLICATION, UNSPECIFIED LATERALITY, INITIAL ENCOUNTER: Primary | ICD-10-CM

## 2022-05-25 DIAGNOSIS — M79.605 PAIN IN BOTH LOWER EXTREMITIES: ICD-10-CM

## 2022-05-25 DIAGNOSIS — M79.604 PAIN IN BOTH LOWER EXTREMITIES: ICD-10-CM

## 2022-05-25 DIAGNOSIS — L30.8 DERMATITIS ASSOCIATED WITH MOISTURE: ICD-10-CM

## 2022-05-25 DIAGNOSIS — Z85.048 HISTORY OF RECTAL OR ANAL CANCER: ICD-10-CM

## 2022-05-25 DIAGNOSIS — E66.9 OBESITY, UNSPECIFIED CLASSIFICATION, UNSPECIFIED OBESITY TYPE, UNSPECIFIED WHETHER SERIOUS COMORBIDITY PRESENT: ICD-10-CM

## 2022-05-25 DIAGNOSIS — G47.33 OBSTRUCTIVE SLEEP APNEA SYNDROME: ICD-10-CM

## 2022-05-25 DIAGNOSIS — C21.0 MALIGNANT NEOPLASM OF ANUS: ICD-10-CM

## 2022-05-25 PROCEDURE — 89240 UNLISTED MISC PATH TEST: CPT

## 2022-05-25 PROCEDURE — 97597 DBRDMT OPN WND 1ST 20 CM/<: CPT | Performed by: NURSE PRACTITIONER

## 2022-05-25 NOTE — PATIENT INSTRUCTIONS
Pt seen today by: Dr. Gabby Hollins, DO      Ask the infusion nurse if she can find out if the doctor is planning to do a repeat MRI of your pelvis,  if not they we will proceed with ordering you one.  Call us at [phone] and let us know      Self care DRESSING INSTRUCTIONS:       Wound location: coccyx and right and left posterior inner/upper thighs    Cleanse wound with wound cleanser or saline  Apply sensicare to the skin around the wound if needed  Apply silver alginate to the wound bed   Cover with mepilex transfer  Change dressing daily    Wound Location: Bilateral groin    Cleanse wound with wound cleanser or saline  Apply sensicare  Cover with ultrasorb (tucked into folds)    Nutrition:  The current daily value (%DV) for protein is 50 grams per day and is meant as a general goal for most people. Further increasing your dietary protein intake is very important for wound healing. Typically one needs over 100g of protein per day to help with wound healing needs.  If you are a dialysis patient or have problems with your kidneys, talk to your Nephrologist about how much protein you can take in with your condition.  Examples of high protein items that can be added to your diet include: eggs, chicken, red meats, almonds, cottage cheese, Greek yogurt, beans, and peanut butter.  Fortified protein bars, shakes and drinks can add 15-30 additional grams of protein per serving.   Also add:   1 daily general multivitamin   Davin : 1 packet twice daily   Vitamin C : 500mg twice daily   Zinc 220 mg daily  Vit D : once daily  Call our [facility] wound clinic for questions/concerns [address] [phone] .

## 2022-05-26 ENCOUNTER — INFUSION (OUTPATIENT)
Dept: INFUSION THERAPY | Facility: HOSPITAL | Age: 52
End: 2022-05-26
Attending: INTERNAL MEDICINE
Payer: MEDICAID

## 2022-05-26 VITALS
TEMPERATURE: 99 F | SYSTOLIC BLOOD PRESSURE: 123 MMHG | WEIGHT: 218.06 LBS | HEIGHT: 66 IN | BODY MASS INDEX: 35.04 KG/M2 | RESPIRATION RATE: 24 BRPM | HEART RATE: 105 BPM | DIASTOLIC BLOOD PRESSURE: 75 MMHG

## 2022-05-26 DIAGNOSIS — Z85.048 HISTORY OF RECTAL OR ANAL CANCER: Primary | ICD-10-CM

## 2022-05-26 PROCEDURE — 96523 IRRIG DRUG DELIVERY DEVICE: CPT

## 2022-05-26 PROCEDURE — 25000003 PHARM REV CODE 250: Performed by: INTERNAL MEDICINE

## 2022-05-26 PROCEDURE — A4216 STERILE WATER/SALINE, 10 ML: HCPCS | Performed by: INTERNAL MEDICINE

## 2022-05-26 PROCEDURE — 63600175 PHARM REV CODE 636 W HCPCS: Performed by: INTERNAL MEDICINE

## 2022-05-26 RX ORDER — SODIUM CHLORIDE 0.9 % (FLUSH) 0.9 %
10 SYRINGE (ML) INJECTION
Status: DISCONTINUED | OUTPATIENT
Start: 2022-05-26 | End: 2022-05-26 | Stop reason: HOSPADM

## 2022-05-26 RX ORDER — HEPARIN 100 UNIT/ML
500 SYRINGE INTRAVENOUS
Status: CANCELLED | OUTPATIENT
Start: 2022-05-26

## 2022-05-26 RX ORDER — HEPARIN SODIUM (PORCINE) LOCK FLUSH IV SOLN 100 UNIT/ML 100 UNIT/ML
100 SOLUTION INTRAVENOUS
Status: DISCONTINUED | OUTPATIENT
Start: 2022-05-26 | End: 2022-05-26 | Stop reason: HOSPADM

## 2022-05-26 RX ORDER — SODIUM CHLORIDE 0.9 % (FLUSH) 0.9 %
10 SYRINGE (ML) INJECTION
Status: CANCELLED | OUTPATIENT
Start: 2022-05-26

## 2022-05-26 RX ADMIN — HEPARIN SODIUM (PORCINE) LOCK FLUSH IV SOLN 100 UNIT/ML 100 UNITS: 100 SOLUTION at 02:05

## 2022-05-26 RX ADMIN — SODIUM CHLORIDE, PRESERVATIVE FREE 10 ML: 5 INJECTION INTRAVENOUS at 02:05

## 2022-05-26 NOTE — NURSING
Pt arrived for MP flush in WC accomp by family member.  Reports having blisters to Left buttock and rates LOP 6.  Pt very anxious to flush MP.  Pt used ice pack to numb site.  Pt does report coughing and SOB and smokes a pack a day.

## 2022-05-26 NOTE — PROCEDURES
"Debridement    Date/Time: 5/25/2022 9:59 AM  Performed by: Gabby Hollins MD  Authorized by: Gabby Hollins MD     Time out: Immediately prior to procedure a "time out" was called to verify the correct patient, procedure, equipment, support staff and site/side marked as required.    Consent Done?:  Yes (Verbal)    Preparation: Patient was prepped and draped in usual sterile fashion    Local anesthesia used?: Yes    Local anesthetic:  Topical anesthetic    Debridement - 1st Wound - General Location: left upper thigh.    Type of Debridement:  Non-excisional       Length (cm):  2       Area (sq cm):  5       Width (cm):  2.5       Percent Debrided (%):  100       Depth (cm):  0.1       Total Area Debrided (sq cm):  5    Depth of debridement:  Epidermis/Dermis    Tissue debrided:  Epidermis and Dermis    Devitalized tissue debrided:  Biofilm and Slough    Instruments:  Blade    2nd Wound Details:     Debridement - 2nd Wound - General Location: inner buttock cheeks.    Type of Debridement:  Non-excisional       Length (cm):  2       Area (sq cm):  5       Width (cm):  2.5       Percent Debrided (%):  100       Depth (cm):  0.1       Total Area Debrided (sq cm):  5    Depth of debridement:  Epidermis/Dermis    Tissue debrided:  Epidermis and Dermis    Devitalized tissue debrided:  Biofilm and Slough    Instruments:  Blade    Bleeding:  Minimal  Hemostasis Achieved: Yes    Method Used:  Pressure  Patient tolerance:  Patient tolerated the procedure well with no immediate complications     Jumped every time I touched her but did let me debride wounds      "

## 2022-05-26 NOTE — PROGRESS NOTES
Subjective:       Patient ID: Antoine Banegas is a 51 y.o. female.    Chief Complaint: Open wound of buttock with complication, unspecified latera (Right and left trochanter and coccyx)  No chief complaint on file.     51yr female referred to our clinic for wound care to non-healing wounds to her inner groin region as well as buttock cheeks and inner gluteal folds. 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 She had been having rectal pain and bleeding. A MRI done on 10/1/21 demonstrated a 5.5 cm mass along the right aspect of the anal canal extending to the anal margin and through the external sphincter. She had b/l external lumph nodes measurin gup to 8mm as well as prominent perirectal lymph nodes.   A bone scan done 1/31/22 was negative for metastatic lesions. However,  A CT of chest, abdomen, pelvis done 1/31/22 showed increase in soft tissue along right side of anal canal.   Patient underwent chemotherapy with Mitomycin-C and is on oral Xeloda - she then began radiotherapy on 3/2/22 and has subsequently completed it. However, during this time she has complained of persistant rectal pain as well as weakness in her legs (at thigh region/groin) with burning pain that began during /after radiation treatments. She has difficulty walking without a walker and must do so with legs stiff b/c hurts to flex/externally rotate her hips due to pain at groin and across pubic region. She is a very poor historian but seems very authentic in her pain. She is on chronic pain meds by her PCP.  She is also treated for anxiety and when I review her old chart she has also been treated for Depression since falling ill.   She often voids in the shower bc it is so painful to sit and pee due to skin irritation at labia and groin.   We tried to get a MRI at last visit but she was unable to get it arranged. Then today, she told me she was due to go to  9th floor and get a chemo infusion from her oncologist so I have decided to wait on repeat CT as maybe she has one scheduled. She is a very poor historian.  On today's visit the open rash in her groin is all healed and her buttock splatter of wounds look better. She no longer has some of the odor noted at first visit..   HPI  Review of Systems   Musculoskeletal: Positive for arthralgias and gait problem.   Skin: Positive for wound.   Neurological: Positive for weakness.   All other systems reviewed and are negative.      Objective:      Physical Exam  Vitals and nursing note reviewed.   Constitutional:       Appearance: Normal appearance. She is obese.   HENT:      Head: Normocephalic and atraumatic.      Nose: Nose normal.      Mouth/Throat:      Mouth: Mucous membranes are moist.   Eyes:      Pupils: Pupils are equal, round, and reactive to light.   Cardiovascular:      Rate and Rhythm: Regular rhythm. Tachycardia present.      Pulses: Normal pulses.      Heart sounds: Normal heart sounds.   Pulmonary:      Effort: Pulmonary effort is normal.      Breath sounds: Normal breath sounds.   Abdominal:      General: Bowel sounds are normal.      Palpations: Abdomen is soft.   Genitourinary:     General: Normal vulva.   Musculoskeletal:         General: Swelling present.      Cervical back: Normal range of motion.   Skin:     Capillary Refill: Capillary refill takes 2 to 3 seconds.      Findings: Lesion (buttock with small splatter of areas of skin breakdown from moisture / no drainage or odor.biofilm and slough) present.   Neurological:      General: No focal deficit present.      Mental Status: She is alert and oriented to person, place, and time. Mental status is at baseline.   Psychiatric:         Mood and Affect: Mood normal.         Assessment:       1. Open wound of buttock with complication, unspecified laterality, initial encounter    2. Dermatitis associated with moisture    3. Anxiety disorder, unspecified  type    4. Pain in both lower extremities    5. History of rectal or anal cancer    6. Malignant neoplasm of anus    7. Obesity, unspecified classification, unspecified obesity type, unspecified whether serious comorbidity present    8. Osteoarthritis, unspecified osteoarthritis type, unspecified site    9. Obstructive sleep apnea syndrome           Wound 05/18/22 1210 Radiation Right posterior;upper Thigh #1 (Active)   05/18/22 1210    Pre-existing: Yes   Primary Wound Type: Radiation   Side: Right   Orientation: posterior;upper   Location: Thigh   Wound Number: #1   Ankle-Brachial Index:    Pulses:    Removal Indication and Assessment:    Wound Outcome:    (Retired) Wound Type:    (Retired) Wound Length (cm):    (Retired) Wound Width (cm):    (Retired) Depth (cm):    Wound Description (Comments):    Removal Indications:    Wound Image   05/25/22 1044   Dressing Appearance Intact;Moist drainage 05/25/22 1044   Drainage Amount Moderate 05/25/22 1044   Drainage Characteristics/Odor Yellow;No odor 05/25/22 1044   Appearance Yellow;Pink 05/25/22 1044   Tissue loss description Full thickness 05/25/22 1044   Black (%), Wound Tissue Color 0 % 05/25/22 1044   Red (%), Wound Tissue Color 10 % 05/25/22 1044   Yellow (%), Wound Tissue Color 90 % 05/25/22 1044   Periwound Area Dry;Intact 05/25/22 1044   Wound Edges Defined 05/25/22 1044   Wound Length (cm) 1.9 cm 05/25/22 1044   Wound Width (cm) 1.5 cm 05/25/22 1044   Wound Depth (cm) 0.1 cm 05/25/22 1044   Wound Volume (cm^3) 0.285 cm^3 05/25/22 1044   Wound Surface Area (cm^2) 2.85 cm^2 05/25/22 1044   Care Cleansed with:;Wound cleanser;Applied: 05/25/22 1044   Dressing Applied;Calcium alginate;Silver;Other (comment) 05/25/22 1100   Periwound Care Moisture barrier applied 05/25/22 1100   Dressing Change Due 05/26/22 05/25/22 1100            Wound 05/18/22 1210 Radiation Left posterior;upper Thigh #2 (Active)   05/18/22 1210    Pre-existing: Yes   Primary Wound Type:  Radiation   Side: Left   Orientation: posterior;upper   Location: Thigh   Wound Number: #2   Ankle-Brachial Index:    Pulses:    Removal Indication and Assessment:    Wound Outcome:    (Retired) Wound Type:    (Retired) Wound Length (cm):    (Retired) Wound Width (cm):    (Retired) Depth (cm):    Wound Description (Comments):    Removal Indications:    Wound Image   05/25/22 1043   Dressing Appearance Intact;Moist drainage 05/25/22 1044   Drainage Amount Moderate 05/25/22 1044   Drainage Characteristics/Odor Yellow;No odor 05/25/22 1044   Appearance Yellow;Pink 05/25/22 1044   Tissue loss description Full thickness 05/25/22 1044   Black (%), Wound Tissue Color 0 % 05/25/22 1044   Red (%), Wound Tissue Color 10 % 05/25/22 1044   Yellow (%), Wound Tissue Color 90 % 05/25/22 1044   Periwound Area Dry;Intact 05/25/22 1044   Wound Edges Defined 05/25/22 1044   Wound Length (cm) 2 cm 05/25/22 1044   Wound Width (cm) 2.5 cm 05/25/22 1044   Wound Depth (cm) 0.1 cm 05/25/22 1044   Wound Volume (cm^3) 0.5 cm^3 05/25/22 1044   Wound Surface Area (cm^2) 5 cm^2 05/25/22 1044   Care Cleansed with:;Wound cleanser;Applied: 05/25/22 1044   Dressing Applied;Calcium alginate;Silver 05/25/22 1100   Periwound Care Moisture barrier applied 05/25/22 1100   Dressing Change Due 05/26/22 05/25/22 1100            Wound 05/18/22 1212 Radiation Coccyx #3 (Active)   05/18/22 1212    Pre-existing: Yes   Primary Wound Type: Radiation   Side:    Orientation:    Location: Coccyx   Wound Number: #3   Ankle-Brachial Index:    Pulses:    Removal Indication and Assessment:    Wound Outcome:    (Retired) Wound Type:    (Retired) Wound Length (cm):    (Retired) Wound Width (cm):    (Retired) Depth (cm):    Wound Description (Comments):    Removal Indications:    Wound Image   05/25/22 1043   Dressing Appearance Intact;Moist drainage 05/25/22 1044   Drainage Amount Moderate 05/25/22 1044   Drainage Characteristics/Odor Yellow;No odor 05/25/22 1047    Appearance Yellow;Pink 05/25/22 1044   Tissue loss description Full thickness 05/25/22 1044   Black (%), Wound Tissue Color 0 % 05/25/22 1044   Red (%), Wound Tissue Color 10 % 05/25/22 1044   Yellow (%), Wound Tissue Color 90 % 05/25/22 1044   Periwound Area Intact;Dry 05/25/22 1044   Wound Edges Defined 05/25/22 1044   Wound Length (cm) 2 cm 05/25/22 1044   Wound Width (cm) 2.5 cm 05/25/22 1044   Wound Depth (cm) 0.1 cm 05/25/22 1044   Wound Volume (cm^3) 0.5 cm^3 05/25/22 1044   Wound Surface Area (cm^2) 5 cm^2 05/25/22 1044   Care Cleansed with:;Wound cleanser;Applied: 05/25/22 1044   Dressing Applied;Calcium alginate;Silver 05/25/22 1100   Periwound Care Moisture barrier applied 05/25/22 1100   Dressing Change Due 05/26/22 05/25/22 1100           Plan:                1. Patient first seen last week. Her wounds look better today and she seems to be moving better with less stiffness and pain in upper thighs. I have elected to hold off on repeat CT (last 5/21) since I now know she has an oncologist even if she can not give me any name or other information.  2. Will continue with same and bring her back in 2 weeks with hopes she is healed.   3. Reminded them about hygiene / protein intake and movement.

## 2022-05-31 ENCOUNTER — HOSPITAL ENCOUNTER (OUTPATIENT)
Dept: RADIOLOGY | Facility: HOSPITAL | Age: 52
Discharge: HOME | End: 2022-05-31
Attending: EMERGENCY MEDICINE
Payer: MEDICAID

## 2022-05-31 DIAGNOSIS — Z85.048 HISTORY OF RECTAL OR ANAL CANCER: ICD-10-CM

## 2022-05-31 DIAGNOSIS — M54.50 CHRONIC BILATERAL LOW BACK PAIN, UNSPECIFIED WHETHER SCIATICA PRESENT: ICD-10-CM

## 2022-05-31 DIAGNOSIS — M19.90 OSTEOARTHRITIS, UNSPECIFIED OSTEOARTHRITIS TYPE, UNSPECIFIED SITE: ICD-10-CM

## 2022-05-31 DIAGNOSIS — S31.809A OPEN WOUND OF BUTTOCK WITH COMPLICATION, UNSPECIFIED LATERALITY, INITIAL ENCOUNTER: ICD-10-CM

## 2022-05-31 DIAGNOSIS — G89.29 CHRONIC BILATERAL LOW BACK PAIN, UNSPECIFIED WHETHER SCIATICA PRESENT: ICD-10-CM

## 2022-05-31 DIAGNOSIS — C21.0 MALIGNANT NEOPLASM OF ANUS: ICD-10-CM

## 2022-05-31 PROCEDURE — 25500020 PHARM REV CODE 255

## 2022-05-31 PROCEDURE — A9577 INJ MULTIHANCE: HCPCS

## 2022-05-31 PROCEDURE — 72197 MRI PELVIS W/O & W/DYE: CPT | Mod: TC

## 2022-05-31 RX ADMIN — GADOBENATE DIMEGLUMINE 20 ML: 529 INJECTION, SOLUTION INTRAVENOUS at 08:05

## 2022-06-08 ENCOUNTER — HOSPITAL ENCOUNTER (OUTPATIENT)
Dept: WOUND CARE | Facility: HOSPITAL | Age: 52
Discharge: HOME | End: 2022-06-08
Attending: EMERGENCY MEDICINE
Payer: MEDICAID

## 2022-06-08 DIAGNOSIS — T66.XXXA RADIATION INJURY, INITIAL ENCOUNTER: ICD-10-CM

## 2022-06-08 DIAGNOSIS — L30.9 DERMATITIS: Primary | ICD-10-CM

## 2022-06-08 PROCEDURE — 89240 UNLISTED MISC PATH TEST: CPT

## 2022-06-08 PROCEDURE — 99213 OFFICE O/P EST LOW 20 MIN: CPT

## 2022-06-08 NOTE — PROGRESS NOTES
Subjective:       Patient ID: Antoine Banegas is a 51 y.o. female.    Chief Complaint: Non-healing Wound       51-year-old BF diagnosed with anal- rectal cell carcinoma in the summer of 2021 although actual treatment of the cancer just started in the spring of 2022. She is receiving  chemotherapy and also had radiation treatments from March 10th - May 4th complicated by ongoing severe rectal pain along with diffuse desquamation of her perianal and bilateral gluteal folds.   Radiation oncology treated with Neosporin and Vaseline and I believe then referred to this wound care clinic where she was seen by Dr. Hollins twice on 05/18/22 and on  05/25/22. I am meeting her today for first time: pt reports much better and feels much better. Buttock portion of wounds healed. Still with proximal posterior thigh wounds but much better.          Review of Systems   Constitutional: Negative for chills and fever.   Skin: Wound: see hpi.         Objective:      Physical Exam  Skin:     Capillary Refill: Capillary refill takes less than 2 seconds.      Comments: Buttock wounds healed; still with wounds/ulcers on proximal medial thighs but seem superficial; no signs of infection   Neurological:      General: No focal deficit present.      Mental Status: She is alert.              Wound 05/18/22 1210 Radiation Right posterior;upper Thigh #1 (Active)   05/18/22 1210    Pre-existing: Yes   Primary Wound Type: Radiation   Side: Right   Orientation: posterior;upper   Location: Thigh   Wound Number: #1   Ankle-Brachial Index:    Pulses:    Removal Indication and Assessment:    Wound Outcome:    (Retired) Wound Type:    (Retired) Wound Length (cm):    (Retired) Wound Width (cm):    (Retired) Depth (cm):    Wound Description (Comments):    Removal Indications:    Wound Image   06/08/22 1031   Dressing Appearance Intact;Moist drainage 06/08/22 1034   Drainage Amount Moderate 06/08/22 1034   Drainage Characteristics/Odor Yellow;No odor  06/08/22 1034   Appearance Yellow;Pink 06/08/22 1034   Tissue loss description Full thickness 06/08/22 1034   Black (%), Wound Tissue Color 0 % 06/08/22 1034   Red (%), Wound Tissue Color 10 % 06/08/22 1034   Yellow (%), Wound Tissue Color 90 % 06/08/22 1034   Periwound Area Intact;Dry 06/08/22 1034   Wound Edges Defined 06/08/22 1034   Wound Length (cm) 1.5 cm 06/08/22 1034   Wound Width (cm) 1.5 cm 06/08/22 1034   Wound Depth (cm) 0.1 cm 06/08/22 1034   Wound Volume (cm^3) 0.225 cm^3 06/08/22 1034   Wound Surface Area (cm^2) 2.25 cm^2 06/08/22 1034   Care Cleansed with:;Wound cleanser 06/08/22 1034   Dressing Applied;Honey;Island/border 06/08/22 1109            Wound 05/18/22 1210 Radiation Left posterior;upper Thigh #2 (Active)   05/18/22 1210    Pre-existing: Yes   Primary Wound Type: Radiation   Side: Left   Orientation: posterior;upper   Location: Thigh   Wound Number: #2   Ankle-Brachial Index:    Pulses:    Removal Indication and Assessment:    Wound Outcome:    (Retired) Wound Type:    (Retired) Wound Length (cm):    (Retired) Wound Width (cm):    (Retired) Depth (cm):    Wound Description (Comments):    Removal Indications:    Wound Image   06/08/22 1034   Dressing Appearance Intact;Moist drainage 06/08/22 1034   Drainage Amount Moderate 06/08/22 1034   Drainage Characteristics/Odor Yellow;No odor 06/08/22 1034   Appearance Yellow;Pink 06/08/22 1034   Tissue loss description Full thickness 06/08/22 1034   Black (%), Wound Tissue Color 0 % 06/08/22 1034   Red (%), Wound Tissue Color 90 % 06/08/22 1034   Yellow (%), Wound Tissue Color 10 % 06/08/22 1034   Periwound Area Intact;Dry 06/08/22 1034   Wound Edges Defined 06/08/22 1034   Wound Length (cm) 2.5 cm 06/08/22 1034   Wound Width (cm) 1 cm 06/08/22 1034   Wound Depth (cm) 0.1 cm 06/08/22 1034   Wound Volume (cm^3) 0.25 cm^3 06/08/22 1034   Wound Surface Area (cm^2) 2.5 cm^2 06/08/22 1034   Care Cleansed with:;Soap and water;Applied: 06/08/22 1034    Dressing Applied;Honey;Island/border 06/08/22 1109       [REMOVED]      Wound 05/18/22 1210 Radiation Coccyx #3 (Removed)   05/18/22 1210    Pre-existing: Yes   Primary Wound Type: Radiation   Side:    Orientation:    Location: Coccyx   Wound Number: #3   Ankle-Brachial Index:    Pulses:    Removal Indication and Assessment:    Wound Outcome: Healed   (Retired) Wound Type:    (Retired) Wound Length (cm):    (Retired) Wound Width (cm):    (Retired) Depth (cm):    Wound Description (Comments):    Removal Indications:    Removed 06/08/22    Wound Image   06/08/22 1034   Dressing Appearance Intact;Moist drainage 06/08/22 1034   Drainage Amount Moderate 06/08/22 1034   Drainage Characteristics/Odor Yellow;No odor 06/08/22 1034   Appearance White;Pink 06/08/22 1034   Tissue loss description Full thickness 06/08/22 1034   Black (%), Wound Tissue Color 0 % 06/08/22 1034   Red (%), Wound Tissue Color 10 % 06/08/22 1034   Yellow (%), Wound Tissue Color 90 % 06/08/22 1034   Periwound Area Intact;Dry 06/08/22 1034   Wound Edges Defined 06/08/22 1034   Wound Length (cm) 2 cm 06/08/22 1034   Wound Width (cm) 2 cm 06/08/22 1034   Wound Depth (cm) 0.1 cm 06/08/22 1034   Wound Volume (cm^3) 0.4 cm^3 06/08/22 1034   Wound Surface Area (cm^2) 4 cm^2 06/08/22 1034   Care Cleansed with:;Wound cleanser;Applied: 06/08/22 1034           Assessment:       1. Dermatitis    2. Radiation injury, initial encounter              Lab Results   Component Value Date    WBC 6.4 05/05/2022    HGB 10.0 (L) 05/05/2022    HCT 33.0 (L) 05/05/2022    MCV 90.2 05/05/2022     05/05/2022         CMP  Sodium Level   Date Value Ref Range Status   05/05/2022 139 136 - 145 mmol/L Final     Potassium Level   Date Value Ref Range Status   05/05/2022 3.7 3.5 - 5.1 mmol/L Final     Carbon Dioxide   Date Value Ref Range Status   05/05/2022 22 22 - 29 mmol/L Final     Blood Urea Nitrogen   Date Value Ref Range Status   05/05/2022 8.9 7.0 - 18.7 mg/dL Final      Creatinine   Date Value Ref Range Status   05/05/2022 0.67 0.55 - 1.02 mg/dL Final     Calcium Level Total   Date Value Ref Range Status   05/05/2022 9.5 8.4 - 10.2 mg/dL Final     Albumin Level   Date Value Ref Range Status   05/05/2022 3.2 (L) 3.5 - 5.0 gm/dL Final     Bilirubin Total   Date Value Ref Range Status   05/05/2022 0.5 <=1.5 mg/dL Final     Alkaline Phosphatase   Date Value Ref Range Status   05/05/2022 56 40 - 150 unit/L Final     Aspartate Aminotransferase   Date Value Ref Range Status   05/05/2022 16 5 - 34 unit/L Final     Alanine Aminotransferase   Date Value Ref Range Status   05/05/2022 17 0 - 55 unit/L Final     Estimated GFR-Non    Date Value Ref Range Status   04/28/2022 101 >=90      Lab Results   Component Value Date    HGBA1C 6.0 04/21/2022     Lab Results   Component Value Date    SEDRATE 16 03/31/2021     Lab Results   Component Value Date    CRP 1.43 (H) 03/31/2021       Plan:         Plan of Care:    1. Wounds were assessed  2. Wound Care Orders: dressings of  medihoney gel on open areas covered by foam dressing  3. Offloading: must offload the wound to potentiate wound healing: use darco shoe, rolling knee scooter, ROHO/offloading cushion , RITO mattress with frequent turning/standing;  4. Nutrition: Must have a high protein diet to support wound  healing; (if renal disease, see nephrologist for amount allowed):  this should be over 100g protein /day (if no kidney issues); Also rec MVI along with vit C, vit D, zinc and Davin  5. rtc 2 weeks for recheck     The time spent including preparing to see the patient, obtaining patient history and assessment, evaluation of the plan of care, patient/caregiver counseling and education, orders, documentation, coordination of care, and other professional medical management activities for today's encounter was 20 minutes.

## 2022-06-08 NOTE — PATIENT INSTRUCTIONS
Pt seen today by: Dr. Nasrin Macias      Ask the infusion nurse if she can find out if the doctor is planning to do a repeat MRI of your pelvis,  if not they we will proceed with ordering you one.  Call us at [phone] and let us know      Self care DRESSING INSTRUCTIONS:       Wound location: coccyx and right and left posterior inner/upper thighs    Cleanse wound with wound cleanser or saline  Apply sensicare to the skin around the wound if needed  Apply silver alginate to the wound bed   Cover with mepilex bordered foam  Change dressing daily    Wound Location: Bilateral groin    Cleanse wound with wound cleanser or saline  Apply sensicare  Cover with ultrasorb (tucked into folds)    Nutrition:  The current daily value (%DV) for protein is 50 grams per day and is meant as a general goal for most people. Further increasing your dietary protein intake is very important for wound healing. Typically one needs over 100g of protein per day to help with wound healing needs.  If you are a dialysis patient or have problems with your kidneys, talk to your Nephrologist about how much protein you can take in with your condition.  Examples of high protein items that can be added to your diet include: eggs, chicken, red meats, almonds, cottage cheese, Greek yogurt, beans, and peanut butter.  Fortified protein bars, shakes and drinks can add 15-30 additional grams of protein per serving.   Also add:   1 daily general multivitamin   Davin : 1 packet twice daily   Vitamin C : 500mg twice daily   Zinc 220 mg daily  Vit D : once daily  Call our [facility] wound clinic for questions/concerns aditya [phone] .

## 2022-06-22 ENCOUNTER — HOSPITAL ENCOUNTER (OUTPATIENT)
Dept: WOUND CARE | Facility: HOSPITAL | Age: 52
Discharge: HOME | End: 2022-06-22
Attending: NURSE PRACTITIONER
Payer: MEDICAID

## 2022-06-22 VITALS
BODY MASS INDEX: 33.59 KG/M2 | SYSTOLIC BLOOD PRESSURE: 107 MMHG | DIASTOLIC BLOOD PRESSURE: 67 MMHG | HEIGHT: 66 IN | TEMPERATURE: 64 F | WEIGHT: 209 LBS | HEART RATE: 97 BPM | RESPIRATION RATE: 18 BRPM

## 2022-06-22 DIAGNOSIS — E66.9 OBESITY, UNSPECIFIED CLASSIFICATION, UNSPECIFIED OBESITY TYPE, UNSPECIFIED WHETHER SERIOUS COMORBIDITY PRESENT: ICD-10-CM

## 2022-06-22 DIAGNOSIS — S31.809A OPEN WOUND OF BUTTOCK WITH COMPLICATION, UNSPECIFIED LATERALITY, INITIAL ENCOUNTER: ICD-10-CM

## 2022-06-22 DIAGNOSIS — E55.9 VITAMIN D DEFICIENCY: ICD-10-CM

## 2022-06-22 DIAGNOSIS — L30.8 DERMATITIS ASSOCIATED WITH MOISTURE: ICD-10-CM

## 2022-06-22 DIAGNOSIS — Z72.0 TOBACCO USER: ICD-10-CM

## 2022-06-22 DIAGNOSIS — F32.A DEPRESSIVE DISORDER: ICD-10-CM

## 2022-06-22 DIAGNOSIS — G82.20 PARAPARESIS: ICD-10-CM

## 2022-06-22 DIAGNOSIS — Z85.048 HISTORY OF RECTAL OR ANAL CANCER: ICD-10-CM

## 2022-06-22 PROCEDURE — 27000999 HC MEDICAL RECORD PHOTO DOCUMENTATION: Performed by: NURSE PRACTITIONER

## 2022-06-22 PROCEDURE — 11042 DBRDMT SUBQ TIS 1ST 20SQCM/<: CPT | Performed by: NURSE PRACTITIONER

## 2022-06-22 NOTE — PATIENT INSTRUCTIONS
Return to clinic on Wednesday, July 6, 2022 at       Self care DRESSING INSTRUCTIONS:    We will order you some supplies, if you don't get them by next Monday call us       Wound location: coccyx and right and left posterior inner/upper thighs    Cleanse wound with wound cleanser or saline  Apply sensicare to the skin around the wound if needed  Apply therahoney sheet to the wound bed   Cover with mepilex bordered foam  Change dressing daily    Wound Location: Bilateral groin    Cleanse wound with wound cleanser or saline  Apply sensicare  Cover with ultrasorb (tucked into folds)    Nutrition:  The current daily value (%DV) for protein is 50 grams per day and is meant as a general goal for most people. Further increasing your dietary protein intake is very important for wound healing. Typically one needs over 100g of protein per day to help with wound healing needs.  If you are a dialysis patient or have problems with your kidneys, talk to your Nephrologist about how much protein you can take in with your condition.  Examples of high protein items that can be added to your diet include: eggs, chicken, red meats, almonds, cottage cheese, Greek yogurt, beans, and peanut butter.  Fortified protein bars, shakes and drinks can add 15-30 additional grams of protein per serving.   Also add:   1 daily general multivitamin   Davin : 1 packet twice daily   Vitamin C : 500mg twice daily   Zinc 220 mg daily  Vit D : once daily  Call our [facility] wound clinic for questions/concerns a [phone] .

## 2022-06-22 NOTE — PROGRESS NOTES
Subjective:       Patient ID: Antoine Banegas is a 51 y.o. female.    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 She is receiving chemotherapy and also had radiation treatments from March 10th - May 4th complicated by ongoing severe rectal pain along with diffuse desquamation of her perianal and bilateral gluteal folds. Radiation oncology treated with Neosporin and Vaseline and we believe then referred to this wound care clinic where she was seen by Dr. Hollins on 05/18/22 and on 05/25/22. Since then, buttock portion of wounds healed. Still with proximal posterior thigh wounds but much better.    Today: presents to clinic for continued wound care with  present. Denies fever, chills, increased wound pain or malodorous drainage. Tolerating current wound care orders but states that she needs more wound supplies. No new wounds are reported. No other complaints or concerns at this time.    Review of Systems    Constitutional: Negative for chills and fever.   Skin: Wound: see HPI.    Objective:      Physical Exam  Vitals reviewed.   Constitutional:       General: She is not in acute distress.     Appearance: Normal appearance. She is obese.   HENT:      Head: Normocephalic and atraumatic.      Right Ear: External ear normal.      Left Ear: External ear normal.      Nose: Nose normal. No congestion.      Mouth/Throat:      Mouth: Mucous membranes are moist.      Pharynx: Oropharynx is clear.   Eyes:      General: No scleral icterus.     Extraocular Movements: Extraocular movements intact.      Conjunctiva/sclera: Conjunctivae normal.      Pupils: Pupils are equal, round, and reactive to light.   Cardiovascular:      Rate and Rhythm: Normal rate and regular rhythm.      Pulses: Normal pulses.   Pulmonary:      Effort: Pulmonary effort is  normal. No respiratory distress.      Breath sounds: No wheezing.   Chest:      Chest wall: No tenderness.   Abdominal:      General: There is no distension.      Palpations: Abdomen is soft.      Tenderness: There is no abdominal tenderness.   Musculoskeletal:         General: No deformity. Normal range of motion.      Cervical back: Normal range of motion and neck supple.      Right lower leg: No edema.      Left lower leg: No edema.   Skin:     General: Skin is warm and dry.      Capillary Refill: Capillary refill takes less than 2 seconds.      Findings: Wound present. No rash.          Neurological:      General: No focal deficit present.      Mental Status: She is alert and oriented to person, place, and time. Mental status is at baseline.   Psychiatric:         Mood and Affect: Mood normal.         Behavior: Behavior normal.                Wound 06/22/22 0920 Radiation Right posterior;upper Thigh #1 (Active)   06/22/22 0920    Pre-existing: Yes   Primary Wound Type: Radiation   Side: Right   Orientation: posterior;upper   Location: Thigh   Wound Number: #1   Ankle-Brachial Index:    Pulses:    Removal Indication and Assessment:    Wound Outcome:    (Retired) Wound Type:    (Retired) Wound Length (cm):    (Retired) Wound Width (cm):    (Retired) Depth (cm):    Wound Description (Comments):    Removal Indications:    Wound Image   06/22/22 0913   Dressing Appearance Intact;Moist drainage 06/22/22 0915   Drainage Amount Moderate 06/22/22 0915   Drainage Characteristics/Odor Yellow;No odor 06/22/22 0915   Appearance Yellow 06/22/22 0915   Tissue loss description Not applicable 06/22/22 0915   Black (%), Wound Tissue Color 0 % 06/22/22 0915   Red (%), Wound Tissue Color 0 % 06/22/22 0915   Yellow (%), Wound Tissue Color 100 % 06/22/22 0915   Periwound Area Intact;Dry 06/22/22 0915   Wound Edges Defined 06/22/22 0915   Wound Length (cm) 1.5 cm 06/22/22 0915   Wound Width (cm) 1.5 cm 06/22/22 0915   Wound Depth (cm)  0.1 cm 06/22/22 0915   Wound Volume (cm^3) 0.225 cm^3 06/22/22 0915   Wound Surface Area (cm^2) 2.25 cm^2 06/22/22 0915   Care Cleansed with:;Wound cleanser;Applied: 06/22/22 0915            Wound 06/22/22 0920 Radiation Left posterior;upper Thigh #2 (Active)   06/22/22 0920    Pre-existing: Yes   Primary Wound Type: Radiation   Side: Left   Orientation: posterior;upper   Location: Thigh   Wound Number: #2   Ankle-Brachial Index:    Pulses:    Removal Indication and Assessment:    Wound Outcome:    (Retired) Wound Type:    (Retired) Wound Length (cm):    (Retired) Wound Width (cm):    (Retired) Depth (cm):    Wound Description (Comments):    Removal Indications:    Wound Image   06/22/22 0915   Dressing Appearance Intact;Moist drainage 06/22/22 0915   Drainage Amount Moderate 06/22/22 0915   Drainage Characteristics/Odor Yellow;No odor 06/22/22 0915   Appearance Yellow 06/22/22 0915   Tissue loss description Not applicable 06/22/22 0915   Black (%), Wound Tissue Color 0 % 06/22/22 0915   Red (%), Wound Tissue Color 0 % 06/22/22 0915   Yellow (%), Wound Tissue Color 100 % 06/22/22 0915   Periwound Area Intact;Dry 06/22/22 0915   Wound Length (cm) 2 cm 06/22/22 0915   Wound Width (cm) 0.6 cm 06/22/22 0915   Wound Depth (cm) 0.1 cm 06/22/22 0915   Wound Volume (cm^3) 0.12 cm^3 06/22/22 0915   Wound Surface Area (cm^2) 1.2 cm^2 06/22/22 0915   Care Cleansed with:;Wound cleanser;Applied: 06/22/22 0915     Assessment:       1. Open wound of buttock with complication, unspecified laterality, initial encounter    2. Dermatitis associated with moisture    3. Paraparesis    4. Obesity, unspecified classification, unspecified obesity type, unspecified whether serious comorbidity present    5. History of rectal or anal cancer    6. Depressive disorder    7. Vitamin D deficiency    8. Tobacco user              Plan:                1. Debrided the right posterior thigh wound today per procedure note. Hemostasis achieved prior to  leaving the room.  2. Wound Care Orders: honey dressings on open areas covered by foam dressing.  3. Monitor for S&S of infection/deterioration.  4. Offloading: must offload the wound to potentiate wound healing; avoid prolonged sitting and turn q2h when laying down.  5. Nutrition: Must have a high protein diet to support wound healing; (if renal disease, see nephrologist for amount allowed): this should be over 100g protein /day (if no kidney issues); Also rec MVI along with vit C, vit D, zinc and Davin.  6. Plan of care discussed with patient and she verbalized understanding.  7. RTC in 2 weeks for recheck.

## 2022-06-22 NOTE — PROCEDURES
"Debridement    Date/Time: 6/22/2022 8:46 AM  Performed by: MELVI Donohue  Authorized by: MELVI Donohue     Time out: Immediately prior to procedure a "time out" was called to verify the correct patient, procedure, equipment, support staff and site/side marked as required.    Consent Done?:  Yes (Written)  Local anesthesia used?: Yes    Local anesthetic:  Topical anesthetic    Debridement - 1st Wound - General Location: right posterior thigh.    Type of Debridement:  Excisional       Length (cm):  2       Area (sq cm):  1.2       Width (cm):  0.6       Percent Debrided (%):  100       Depth (cm):  0.1       Total Area Debrided (sq cm):  1.2    Depth of debridement:  Subcutaneous tissue    Tissue debrided:  Subcutaneous    Devitalized tissue debrided:  Biofilm and Exudate    Instruments:  Curette    Bleeding:  Minimal  Hemostasis Achieved: Yes    Method Used:  Pressure  Patient tolerance:  Patient tolerated the procedure well with no immediate complications      "

## 2022-07-06 ENCOUNTER — HOSPITAL ENCOUNTER (OUTPATIENT)
Dept: WOUND CARE | Facility: HOSPITAL | Age: 52
Discharge: HOME | End: 2022-07-06
Attending: EMERGENCY MEDICINE
Payer: MEDICAID

## 2022-07-06 DIAGNOSIS — E66.9 OBESITY, UNSPECIFIED CLASSIFICATION, UNSPECIFIED OBESITY TYPE, UNSPECIFIED WHETHER SERIOUS COMORBIDITY PRESENT: ICD-10-CM

## 2022-07-06 DIAGNOSIS — L30.8 DERMATITIS ASSOCIATED WITH MOISTURE: ICD-10-CM

## 2022-07-06 DIAGNOSIS — T66.XXXA RADIATION INJURY, INITIAL ENCOUNTER: ICD-10-CM

## 2022-07-06 DIAGNOSIS — G82.20 PARAPARESIS: ICD-10-CM

## 2022-07-06 DIAGNOSIS — Z85.048 HISTORY OF RECTAL OR ANAL CANCER: ICD-10-CM

## 2022-07-06 DIAGNOSIS — F41.9 ANXIETY DISORDER, UNSPECIFIED TYPE: ICD-10-CM

## 2022-07-06 DIAGNOSIS — S31.809A OPEN WOUND OF BUTTOCK WITH COMPLICATION, UNSPECIFIED LATERALITY, INITIAL ENCOUNTER: Primary | ICD-10-CM

## 2022-07-06 PROCEDURE — 27000999 HC MEDICAL RECORD PHOTO DOCUMENTATION

## 2022-07-06 PROCEDURE — 11042 DBRDMT SUBQ TIS 1ST 20SQCM/<: CPT

## 2022-07-06 RX ORDER — SILVER SULFADIAZINE 10 G/1000G
CREAM TOPICAL
COMMUNITY
Start: 2022-05-03 | End: 2023-10-30

## 2022-07-06 RX ORDER — HYDROCODONE BITARTRATE AND ACETAMINOPHEN 5; 325 MG/1; MG/1
2 TABLET ORAL EVERY 6 HOURS PRN
COMMUNITY
Start: 2022-01-21 | End: 2023-10-30

## 2022-07-06 NOTE — PATIENT INSTRUCTIONS
Return to clinic on Wednesday, July 13, 2022 at 8 am      Self care DRESSING INSTRUCTIONS:    We will order you some supplies, if you don't get them by next Monday call us       Wound location: coccyx and right and left posterior inner/upper thighs    Cleanse wound with wound cleanser or saline  Apply sensicare to the skin around the wound if needed  Apply therahoney sheet to the wound bed   Cover with mepilex bordered foam  Change dressing daily    Wound Location: Bilateral groin    Cleanse wound with wound cleanser or saline  Apply sensicare  Cover with ultrasorb (tucked into folds)    Nutrition:  The current daily value (%DV) for protein is 50 grams per day and is meant as a general goal for most people. Further increasing your dietary protein intake is very important for wound healing. Typically one needs over 100g of protein per day to help with wound healing needs.  If you are a dialysis patient or have problems with your kidneys, talk to your Nephrologist about how much protein you can take in with your condition.  Examples of high protein items that can be added to your diet include: eggs, chicken, red meats, almonds, cottage cheese, Greek yogurt, beans, and peanut butter.  Fortified protein bars, shakes and drinks can add 15-30 additional grams of protein per serving.   Also add:   1 daily general multivitamin   Davin : 1 packet twice daily   Vitamin C : 500mg twice daily   Zinc 220 mg daily  Vit D : once daily  Call our [facility] wound clinic for questions/concerns a [phone] .

## 2022-07-07 PROBLEM — T66.XXXA EFFECT, RADIATION: Status: ACTIVE | Noted: 2022-07-07

## 2022-07-07 NOTE — PROCEDURES
"Debridement    Date/Time: 7/6/2022 8:30 AM  Performed by: Gabby Hollins DO  Authorized by: Gabby Hollins DO     Time out: Immediately prior to procedure a "time out" was called to verify the correct patient, procedure, equipment, support staff and site/side marked as required.    Consent Done?:  Yes (Verbal)    Preparation: Patient was prepped and draped in usual sterile fashion    Local anesthesia used?: Yes    Local anesthetic:  Topical anesthetic    Wound Details:    Location:  Right leg    Type of Debridement:  Non-excisional       Length (cm):  1.3       Area (sq cm):  1.95       Width (cm):  1.5       Percent Debrided (%):  100       Depth (cm):  0.1       Total Area Debrided (sq cm):  1.95    Depth of debridement:  Epidermis/Dermis    Devitalized tissue debrided:  Biofilm and Slough    Instruments:  Blade    2nd Wound Details:     Location:  Left leg    Type of Debridement:  Non-excisional       Length (cm):  2       Area (sq cm):  1.2       Width (cm):  0.6       Percent Debrided (%):  100       Depth (cm):  0.1       Total Area Debrided (sq cm):  1.2    Depth of debridement:  Epidermis/Dermis    Tissue debrided:  Epidermis and Dermis    Devitalized tissue debrided:  Slough and Biofilm    Instruments:  Blade    Bleeding:  Minimal  Hemostasis Achieved: Yes    Method Used:  Pressure  Patient tolerance:  Patient tolerated the procedure well with no immediate complications     Patient jumps to touch skin /no drainage      "

## 2022-07-07 NOTE — PROGRESS NOTES
Subjective:       Patient ID: Antoine Banegas is a 51 y.o. female.    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 She is receiving chemotherapy and also had radiation treatments from March 10th - May 4th complicated by ongoing severe rectal pain along with diffuse desquamation of her perianal and bilateral gluteal folds. Radiation oncology treated with Neosporin and Vaseline. She was first seen in our clinc on 5/18/22 by me and at that time she had  more open wounds along  Inner groin skin folds as well as inner buttock cheeks and back of thighs. Since then, buttock portion of wounds and inner thigh ulcerations have healed. Still with proximal posterior thigh wounds but much better. She is very stiff in this area -maybe bc of prior radiation treatments leaving scar tissue. She tells me she is not getting chemo currently, just having port flushed.     Today: presents to clinic for continued wound care with  present. Denies fever, chills, increased wound pain or malodorous drainage. She was seen by my colleagues over last month and I am seeing her again for the first time since 5/25/22.  No new wounds are reported. No other complaints or concerns at this time.    Review of Systems   HENT: Negative.    Eyes: Negative.    Respiratory: Negative.    Cardiovascular: Negative.    Gastrointestinal: Negative.    Endocrine: Negative.    Musculoskeletal: Positive for arthralgias and myalgias.   Skin: Positive for wound (full thickness wounds to inner posterior upper thigh regions).   Neurological: Positive for weakness.   Psychiatric/Behavioral: Negative.        Constitutional: Negative for chills and fever.   Skin: Wound: see HPI.    Objective:      Physical Exam  Vitals reviewed.   Constitutional:       General: She is not in acute  distress.     Appearance: Normal appearance. She is obese.   HENT:      Head: Normocephalic and atraumatic.      Right Ear: External ear normal.      Left Ear: External ear normal.      Nose: Nose normal. No congestion.      Mouth/Throat:      Mouth: Mucous membranes are moist.      Pharynx: Oropharynx is clear.   Eyes:      General: No scleral icterus.     Extraocular Movements: Extraocular movements intact.      Conjunctiva/sclera: Conjunctivae normal.      Pupils: Pupils are equal, round, and reactive to light.   Cardiovascular:      Rate and Rhythm: Normal rate and regular rhythm.      Pulses: Normal pulses.   Pulmonary:      Effort: Pulmonary effort is normal. No respiratory distress.      Breath sounds: No wheezing.   Chest:      Chest wall: No tenderness.   Abdominal:      General: There is no distension.      Palpations: Abdomen is soft.      Tenderness: There is no abdominal tenderness.   Musculoskeletal:         General: No deformity. Normal range of motion.      Cervical back: Normal range of motion and neck supple.      Right lower leg: No edema.      Left lower leg: No edema.   Skin:     General: Skin is warm and dry.      Capillary Refill: Capillary refill takes less than 2 seconds.      Findings: Lesion and wound present. No rash.          Neurological:      General: No focal deficit present.      Mental Status: She is alert and oriented to person, place, and time. Mental status is at baseline.   Psychiatric:         Mood and Affect: Mood normal.         Behavior: Behavior normal.                Wound 06/22/22 0920 Radiation Right posterior;upper Thigh #1 (Active)   06/22/22 0920    Pre-existing: Yes   Primary Wound Type: Radiation   Side: Right   Orientation: posterior;upper   Location: Thigh   Wound Number: #1   Ankle-Brachial Index:    Pulses:    Removal Indication and Assessment:    Wound Outcome:    (Retired) Wound Type:    (Retired) Wound Length (cm):    (Retired) Wound Width (cm):    (Retired)  Depth (cm):    Wound Description (Comments):    Removal Indications:    Wound Image   06/22/22 0913   Dressing Appearance Intact;Moist drainage 06/22/22 0915   Drainage Amount Moderate 06/22/22 0915   Drainage Characteristics/Odor Yellow;No odor 06/22/22 0915   Appearance Yellow 06/22/22 0915   Tissue loss description Not applicable 06/22/22 0915   Black (%), Wound Tissue Color 0 % 06/22/22 0915   Red (%), Wound Tissue Color 0 % 06/22/22 0915   Yellow (%), Wound Tissue Color 100 % 06/22/22 0915   Periwound Area Intact;Dry 06/22/22 0915   Wound Edges Defined 06/22/22 0915   Wound Length (cm) 1.5 cm 06/22/22 0915   Wound Width (cm) 1.5 cm 06/22/22 0915   Wound Depth (cm) 0.1 cm 06/22/22 0915   Wound Volume (cm^3) 0.225 cm^3 06/22/22 0915   Wound Surface Area (cm^2) 2.25 cm^2 06/22/22 0915   Care Cleansed with:;Wound cleanser;Applied: 06/22/22 0915            Wound 06/22/22 0920 Radiation Left posterior;upper Thigh #2 (Active)   06/22/22 0920    Pre-existing: Yes   Primary Wound Type: Radiation   Side: Left   Orientation: posterior;upper   Location: Thigh   Wound Number: #2   Ankle-Brachial Index:    Pulses:    Removal Indication and Assessment:    Wound Outcome:    (Retired) Wound Type:    (Retired) Wound Length (cm):    (Retired) Wound Width (cm):    (Retired) Depth (cm):    Wound Description (Comments):    Removal Indications:    Wound Image   06/22/22 0915   Dressing Appearance Intact;Moist drainage 06/22/22 0915   Drainage Amount Moderate 06/22/22 0915   Drainage Characteristics/Odor Yellow;No odor 06/22/22 0915   Appearance Yellow 06/22/22 0915   Tissue loss description Not applicable 06/22/22 0915   Black (%), Wound Tissue Color 0 % 06/22/22 0915   Red (%), Wound Tissue Color 0 % 06/22/22 0915   Yellow (%), Wound Tissue Color 100 % 06/22/22 0915   Periwound Area Intact;Dry 06/22/22 0915   Wound Length (cm) 2 cm 06/22/22 0915   Wound Width (cm) 0.6 cm 06/22/22 0915   Wound Depth (cm) 0.1 cm 06/22/22 0915   Wound  Volume (cm^3) 0.12 cm^3 06/22/22 0915   Wound Surface Area (cm^2) 1.2 cm^2 06/22/22 0915   Care Cleansed with:;Wound cleanser;Applied: 06/22/22 0915     Assessment:       1. Open wound of buttock with complication, unspecified laterality, initial encounter    2. Radiation injury, initial encounter    3. Dermatitis associated with moisture    4. Paraparesis    5. History of rectal or anal cancer    6. Anxiety disorder, unspecified type    7. Obesity, unspecified classification, unspecified obesity type, unspecified whether serious comorbidity present              Plan:                1. Debrided the right posterior thigh wound today per procedure note. Hemostasis achieved prior to leaving the room.  2. Wound Care Orders: honey dressings on open areas covered by foam dressing.  3. Monitor for S&S of infection/deterioration.  4. Offloading: must offload the wound to potentiate wound healing; avoid prolonged sitting and turn q2h when laying down. I encouraged her to get up and walk around house and move more.  5. Nutrition: Must have a high protein diet to support wound healing; (if renal disease, see nephrologist for amount allowed): this should be over 100g protein /day (if no kidney issues); Also rec MVI along with vit C, vit D, zinc and Davin.  6. Plan of care discussed with patient and she verbalized understanding.  7. RTC in 2 weeks for recheck.

## 2022-07-13 ENCOUNTER — HOSPITAL ENCOUNTER (OUTPATIENT)
Dept: WOUND CARE | Facility: HOSPITAL | Age: 52
Discharge: HOME | End: 2022-07-13
Payer: MEDICAID

## 2022-07-13 VITALS
BODY MASS INDEX: 35.2 KG/M2 | HEART RATE: 90 BPM | TEMPERATURE: 97 F | WEIGHT: 219 LBS | DIASTOLIC BLOOD PRESSURE: 85 MMHG | SYSTOLIC BLOOD PRESSURE: 120 MMHG | HEIGHT: 66 IN | RESPIRATION RATE: 18 BRPM

## 2022-07-13 DIAGNOSIS — G82.20 PARAPARESIS: ICD-10-CM

## 2022-07-13 DIAGNOSIS — S31.809A OPEN WOUND OF BUTTOCK WITH COMPLICATION, UNSPECIFIED LATERALITY, INITIAL ENCOUNTER: Primary | ICD-10-CM

## 2022-07-13 DIAGNOSIS — T66.XXXA RADIATION INJURY, INITIAL ENCOUNTER: ICD-10-CM

## 2022-07-13 DIAGNOSIS — L30.8 DERMATITIS ASSOCIATED WITH MOISTURE: ICD-10-CM

## 2022-07-13 DIAGNOSIS — Z85.048 HISTORY OF RECTAL OR ANAL CANCER: ICD-10-CM

## 2022-07-13 PROCEDURE — 27000999 HC MEDICAL RECORD PHOTO DOCUMENTATION

## 2022-07-13 PROCEDURE — 99213 OFFICE O/P EST LOW 20 MIN: CPT

## 2022-07-13 RX ORDER — OMEGA-3S/DHA/EPA/FISH OIL 300-1000MG
1000 CAPSULE ORAL
COMMUNITY
Start: 2022-04-22

## 2022-07-13 RX ORDER — ACETAMINOPHEN 500 MG
50 TABLET ORAL
COMMUNITY
Start: 2021-11-05 | End: 2022-08-11 | Stop reason: SDUPTHER

## 2022-07-13 NOTE — PATIENT INSTRUCTIONS
Return to clinic on Wednesday, July 27, 2022 at 8 am      Self care DRESSING INSTRUCTIONS:         Wound location: right and left posterior inner/upper thighs      Apply sensicare to the skin around the newly healed wound if needed  Apply mepilex bordered foam to newly healed area   Change dressing daily    Wound Location: Bilateral groin    Cleanse wound with wound cleanser or saline  Apply sensicare  Cover with ultrasorb (tucked into folds)    Nutrition:  The current daily value (%DV) for protein is 50 grams per day and is meant as a general goal for most people. Further increasing your dietary protein intake is very important for wound healing. Typically one needs over 100g of protein per day to help with wound healing needs.  If you are a dialysis patient or have problems with your kidneys, talk to your Nephrologist about how much protein you can take in with your condition.  Examples of high protein items that can be added to your diet include: eggs, chicken, red meats, almonds, cottage cheese, Greek yogurt, beans, and peanut butter.  Fortified protein bars, shakes and drinks can add 15-30 additional grams of protein per serving.   Also add:   1 daily general multivitamin   Davin : 1 packet twice daily   Vitamin C : 500mg twice daily   Zinc 220 mg daily  Vit D : once daily  Call our [facility] wound clinic for questions/concerns a [phone] .

## 2022-07-13 NOTE — PROCEDURES
"Debridement    Date/Time: 7/13/2022 8:00 AM  Performed by: Gabby Hollins DO  Authorized by: Gabby Hollins DO     Time out: Immediately prior to procedure a "time out" was called to verify the correct patient, procedure, equipment, support staff and site/side marked as required.    Consent Done?:  Yes (Verbal)    Preparation: Patient was prepped and draped in usual sterile fashion    Local anesthesia used?: Yes    Local anesthetic:  Topical anesthetic    Debridement - 1st Wound - General Location: left inner thigh.    Type of Debridement:  Non-excisional       Length (cm):  0.3       Area (sq cm):  0.09       Width (cm):  0.3       Percent Debrided (%):  100       Depth (cm):  0       Total Area Debrided (sq cm):  0.09    Depth of debridement:  Epidermis/Dermis    Tissue debrided:  Dermis    Devitalized tissue debrided:  Biofilm    Instruments:  Blade    Bleeding:  None      "

## 2022-07-13 NOTE — PROGRESS NOTES
Subjective:       Patient ID: Antoine Banegas is a 51 y.o. female.    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 She is receiving chemotherapy and also had radiation treatments from March 10th - May 4th complicated by ongoing severe rectal pain along with diffuse desquamation of her perianal and bilateral gluteal folds. Radiation oncology treated with Neosporin and Vaseline. She was first seen in our clinc on 5/18/22 by me and at that time she had  more open wounds along  Inner groin skin folds as well as inner buttock cheeks and back of thighs. Since then, buttock portion of wounds and inner thigh ulcerations have healed. Still with proximal posterior thigh wounds but much better. She is very stiff in this area -maybe bc of prior radiation treatments leaving scar tissue. She tells me she is not getting chemo currently, just having port flushed.     Today: presents to clinic for continued wound care with  present. Denies fever, chills, increased wound pain or malodorous drainage. On today's visit her wounds are essentially healed. There is pink epithelial tissue noted within inner thighs. She does compliant of a lot of itching to pubic region and groin where he have previously healed ulcers as well. Nothing new is open but it is where she sustained radiation treatment.    Review of Systems   HENT: Negative.    Eyes: Negative.    Respiratory: Negative.    Cardiovascular: Negative.    Gastrointestinal: Negative.    Endocrine: Negative.    Musculoskeletal: Positive for arthralgias and myalgias.   Skin: Wound: inner thighs with pink devon-epithelial tissue noted. scars on buttock.   Neurological: Positive for weakness.   Psychiatric/Behavioral: Negative.        Constitutional: Negative for chills and fever.   Skin: Wound: see  HPI.    Objective:      Physical Exam  Vitals reviewed.   Constitutional:       General: She is not in acute distress.     Appearance: Normal appearance. She is obese.   HENT:      Head: Normocephalic and atraumatic.      Right Ear: External ear normal.      Left Ear: External ear normal.      Nose: Nose normal. No congestion.      Mouth/Throat:      Mouth: Mucous membranes are moist.      Pharynx: Oropharynx is clear.   Eyes:      General: No scleral icterus.     Extraocular Movements: Extraocular movements intact.      Conjunctiva/sclera: Conjunctivae normal.      Pupils: Pupils are equal, round, and reactive to light.   Cardiovascular:      Rate and Rhythm: Normal rate and regular rhythm.      Pulses: Normal pulses.   Pulmonary:      Effort: Pulmonary effort is normal. No respiratory distress.      Breath sounds: No wheezing.   Chest:      Chest wall: No tenderness.   Abdominal:      General: There is no distension.      Palpations: Abdomen is soft.      Tenderness: There is no abdominal tenderness.   Musculoskeletal:         General: No deformity. Normal range of motion.      Cervical back: Normal range of motion and neck supple.      Right lower leg: No edema.      Left lower leg: No edema.   Skin:     General: Skin is warm and dry.      Capillary Refill: Capillary refill takes less than 2 seconds.      Findings: Lesion and wound present. No rash.          Neurological:      General: No focal deficit present.      Mental Status: She is alert and oriented to person, place, and time. Mental status is at baseline.   Psychiatric:         Mood and Affect: Mood normal.         Behavior: Behavior normal.                Wound 06/22/22 0920 Radiation Right posterior;upper Thigh #1 (Active)   06/22/22 0920    Pre-existing: Yes   Primary Wound Type: Radiation   Side: Right   Orientation: posterior;upper   Location: Thigh   Wound Number: #1   Ankle-Brachial Index:    Pulses:    Removal Indication and Assessment:    Wound  Outcome:    (Retired) Wound Type:    (Retired) Wound Length (cm):    (Retired) Wound Width (cm):    (Retired) Depth (cm):    Wound Description (Comments):    Removal Indications:    Wound Image   06/22/22 0913   Dressing Appearance Intact;Moist drainage 06/22/22 0915   Drainage Amount Moderate 06/22/22 0915   Drainage Characteristics/Odor Yellow;No odor 06/22/22 0915   Appearance Yellow 06/22/22 0915   Tissue loss description Not applicable 06/22/22 0915   Black (%), Wound Tissue Color 0 % 06/22/22 0915   Red (%), Wound Tissue Color 0 % 06/22/22 0915   Yellow (%), Wound Tissue Color 100 % 06/22/22 0915   Periwound Area Intact;Dry 06/22/22 0915   Wound Edges Defined 06/22/22 0915   Wound Length (cm) 1.5 cm 06/22/22 0915   Wound Width (cm) 1.5 cm 06/22/22 0915   Wound Depth (cm) 0.1 cm 06/22/22 0915   Wound Volume (cm^3) 0.225 cm^3 06/22/22 0915   Wound Surface Area (cm^2) 2.25 cm^2 06/22/22 0915   Care Cleansed with:;Wound cleanser;Applied: 06/22/22 0915            Wound 06/22/22 0920 Radiation Left posterior;upper Thigh #2 (Active)   06/22/22 0920    Pre-existing: Yes   Primary Wound Type: Radiation   Side: Left   Orientation: posterior;upper   Location: Thigh   Wound Number: #2   Ankle-Brachial Index:    Pulses:    Removal Indication and Assessment:    Wound Outcome:    (Retired) Wound Type:    (Retired) Wound Length (cm):    (Retired) Wound Width (cm):    (Retired) Depth (cm):    Wound Description (Comments):    Removal Indications:    Wound Image   06/22/22 0915   Dressing Appearance Intact;Moist drainage 06/22/22 0915   Drainage Amount Moderate 06/22/22 0915   Drainage Characteristics/Odor Yellow;No odor 06/22/22 0915   Appearance Yellow 06/22/22 0915   Tissue loss description Not applicable 06/22/22 0915   Black (%), Wound Tissue Color 0 % 06/22/22 0915   Red (%), Wound Tissue Color 0 % 06/22/22 0915   Yellow (%), Wound Tissue Color 100 % 06/22/22 0915   Periwound Area Intact;Dry 06/22/22 0915   Wound Length  (cm) 2 cm 06/22/22 0915   Wound Width (cm) 0.6 cm 06/22/22 0915   Wound Depth (cm) 0.1 cm 06/22/22 0915   Wound Volume (cm^3) 0.12 cm^3 06/22/22 0915   Wound Surface Area (cm^2) 1.2 cm^2 06/22/22 0915   Care Cleansed with:;Wound cleanser;Applied: 06/22/22 0915     Assessment:       1. Open wound of buttock with complication, unspecified laterality, initial encounter    2. Dermatitis associated with moisture    3. Radiation injury, initial encounter    4. History of rectal or anal cancer    5. Paraparesis              Plan:                1. Attempted to debride  the right posterior thigh wounds but they are healed. I do have concerns about excess moisture building up but think if she is aware and uses Bordered foam for another week as well as ultrasorb she will be ok..  2. Wound Care Orders: foam dressing. Due to concern about itching I wrote her for Calazime paste by [facility] to see if it would hel take away some of the itch. She has no new skin breakdown in groin area.  3. Monitor for S&S of infection/deterioration.  4. Offloading: must offload the wound to potentiate wound healing; avoid prolonged sitting and turn q2h when laying down. I encouraged her to get up and walk around house and move more.  5. Nutrition: Must have a high protein diet to support wound healing; (if renal disease, see nephrologist for amount allowed): this should be over 100g protein /day (if no kidney issues); Also rec MVI along with vit C, vit D, zinc and Davin.  6. Plan of care discussed with patient and she verbalized understanding.  7. RTC in 2 weeks for recheck.

## 2022-07-27 ENCOUNTER — HOSPITAL ENCOUNTER (OUTPATIENT)
Dept: WOUND CARE | Facility: HOSPITAL | Age: 52
Discharge: HOME | End: 2022-07-27
Attending: EMERGENCY MEDICINE
Payer: MEDICAID

## 2022-07-27 VITALS
SYSTOLIC BLOOD PRESSURE: 124 MMHG | HEIGHT: 66 IN | WEIGHT: 219 LBS | HEART RATE: 81 BPM | RESPIRATION RATE: 18 BRPM | DIASTOLIC BLOOD PRESSURE: 85 MMHG | TEMPERATURE: 98 F | BODY MASS INDEX: 35.2 KG/M2

## 2022-07-27 DIAGNOSIS — S31.809A OPEN WOUND OF BUTTOCK WITH COMPLICATION, UNSPECIFIED LATERALITY, INITIAL ENCOUNTER: ICD-10-CM

## 2022-07-27 DIAGNOSIS — T66.XXXA RADIATION INJURY, INITIAL ENCOUNTER: ICD-10-CM

## 2022-07-27 DIAGNOSIS — L30.8 DERMATITIS ASSOCIATED WITH MOISTURE: Primary | ICD-10-CM

## 2022-07-27 DIAGNOSIS — Z85.048 HISTORY OF RECTAL OR ANAL CANCER: ICD-10-CM

## 2022-07-27 DIAGNOSIS — G82.20 PARAPARESIS: ICD-10-CM

## 2022-07-27 PROCEDURE — 99213 OFFICE O/P EST LOW 20 MIN: CPT

## 2022-07-27 PROCEDURE — 27000999 HC MEDICAL RECORD PHOTO DOCUMENTATION

## 2022-07-27 NOTE — PROGRESS NOTES
Subjective:       Patient ID: Antoine Banegas is a 51 y.o. female.    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 She is receiving chemotherapy and also had radiation treatments from March 10th - May 4th complicated by ongoing severe rectal pain along with diffuse desquamation of her perianal and bilateral gluteal folds. Radiation oncology treated with Neosporin and Vaseline. She was first seen in our clinc on 5/18/22 and at that time she had  more open wounds along  Inner groin skin folds as well as inner buttock cheeks and back of thighs. Since then, buttock portion of wounds and inner thigh ulcerations have healed, as have her  proximal posterior thigh wounds.  She is very full and slightly firm in this area. When I review her MRI done in 5/22 it shows swollen lymph nodes in groin which may impact why her thighs swell due to difficulty with  lymphatic drainage.      Today: presents to clinic for continued wound care with  present. Denies fever, chills, increased wound pain or malodorous drainage. On today's visit her wounds are essentially healed. There is pink epithelial tissue noted within inner thighs.   Review of Systems   HENT: Negative.    Eyes: Negative.    Respiratory: Negative.    Cardiovascular: Negative.    Gastrointestinal: Negative.    Endocrine: Negative.    Musculoskeletal: Positive for arthralgias and myalgias.   Skin: Wound: inner thighs with pink devon-epithelial tissue noted. scars on buttock.   Neurological: Positive for weakness.   Psychiatric/Behavioral: Negative.        Constitutional: Negative for chills and fever.   Skin: Wound: see HPI.    Objective:      Physical Exam  Vitals reviewed.   Constitutional:       General: She is not in acute distress.     Appearance: Normal appearance. She is obese.   HENT:      Head:  Normocephalic and atraumatic.      Right Ear: External ear normal.      Left Ear: External ear normal.      Nose: Nose normal. No congestion.      Mouth/Throat:      Mouth: Mucous membranes are moist.      Pharynx: Oropharynx is clear.   Eyes:      General: No scleral icterus.     Extraocular Movements: Extraocular movements intact.      Conjunctiva/sclera: Conjunctivae normal.      Pupils: Pupils are equal, round, and reactive to light.   Cardiovascular:      Rate and Rhythm: Normal rate and regular rhythm.      Pulses: Normal pulses.   Pulmonary:      Effort: Pulmonary effort is normal. No respiratory distress.      Breath sounds: No wheezing.   Chest:      Chest wall: No tenderness.   Abdominal:      General: There is no distension.      Palpations: Abdomen is soft.      Tenderness: There is no abdominal tenderness.   Musculoskeletal:         General: No deformity. Normal range of motion.      Cervical back: Normal range of motion and neck supple.      Right lower leg: No edema.      Left lower leg: No edema.   Skin:     General: Skin is warm and dry.      Capillary Refill: Capillary refill takes less than 2 seconds.      Findings: pink scar tissue present. No rash.          Neurological:      General: No focal deficit present.      Mental Status: She is alert and oriented to person, place, and time. Mental status is at baseline.   Psychiatric:         Mood and Affect: Mood normal.         Behavior: Behavior normal.         Assessment:  Inner thigh dermatitis  Buttock ulcer  Radiation tissue damage  Chronic swollen lymph nodes groin  Hx rectal /anal cancer              Assessment:       1. Dermatitis associated with moisture    2. Radiation injury, initial encounter    3. Open wound of buttock with complication, unspecified laterality, initial encounter    4. Paraparesis    5. History of rectal or anal cancer           Wound 06/22/22 0920 Radiation Right posterior;upper Thigh #1 (Active)   06/22/22 0920     Pre-existing: Yes   Primary Wound Type: Radiation   Side: Right   Orientation: posterior;upper   Location: Thigh   Wound Number: #1   Ankle-Brachial Index:    Pulses:    Removal Indication and Assessment:    Wound Outcome:    (Retired) Wound Type:    (Retired) Wound Length (cm):    (Retired) Wound Width (cm):    (Retired) Depth (cm):    Wound Description (Comments):    Removal Indications:    Wound Image   07/27/22 0817   Dressing Appearance Dry;Open to air 07/27/22 0814   Drainage Amount None 07/27/22 0814   Appearance Epithelialization 07/27/22 0814   Tissue loss description Full thickness 07/27/22 0814   Black (%), Wound Tissue Color 0 % 07/27/22 0814   Red (%), Wound Tissue Color 0 % 07/27/22 0814   Yellow (%), Wound Tissue Color 0 % 07/27/22 0814   Periwound Area Intact 07/27/22 0814   Wound Edges Approximated 07/27/22 0814   Wound Length (cm) 0 cm 07/27/22 0814   Wound Width (cm) 0 cm 07/27/22 0814   Wound Depth (cm) 0 cm 07/27/22 0814   Wound Volume (cm^3) 0 cm^3 07/27/22 0814   Wound Surface Area (cm^2) 0 cm^2 07/27/22 0814   Care Cleansed with: 07/27/22 0814            Wound 06/22/22 0920 Radiation Left posterior;upper Thigh #2 (Active)   06/22/22 0920    Pre-existing: Yes   Primary Wound Type: Radiation   Side: Left   Orientation: posterior;upper   Location: Thigh   Wound Number: #2   Ankle-Brachial Index:    Pulses:    Removal Indication and Assessment:    Wound Outcome:    (Retired) Wound Type:    (Retired) Wound Length (cm):    (Retired) Wound Width (cm):    (Retired) Depth (cm):    Wound Description (Comments):    Removal Indications:    Wound Image   07/27/22 0819   Dressing Appearance Open to air;Dry 07/27/22 0814   Drainage Amount None 07/27/22 0814   Appearance Epithelialization 07/27/22 0814   Tissue loss description Full thickness 07/27/22 0814   Black (%), Wound Tissue Color 0 % 07/27/22 0814   Red (%), Wound Tissue Color 0 % 07/27/22 0814   Yellow (%), Wound Tissue Color 0 % 07/27/22 0814    Periwound Area Intact 07/27/22 0814   Wound Edges Approximated 07/27/22 0814   Wound Length (cm) 0 cm 07/27/22 0814   Wound Width (cm) 0 cm 07/27/22 0814   Wound Depth (cm) 0 cm 07/27/22 0814   Wound Volume (cm^3) 0 cm^3 07/27/22 0814   Wound Surface Area (cm^2) 0 cm^2 07/27/22 0814   Care Cleansed with:;Wound cleanser 07/27/22 0814           Plan:            1. I debrided her wounds today but there is nothing open at this point. However, due to her immobility and chronic swelling it is possible that she will reopen these as she sweats in these areas. I have recommended to use Ultrasorb in these areas.  2. Continue to practice good hygiene and monitor areas closely  3. Can use cornstarch sprinkled in areas as well.  4. I offered to discharge her from clinic but she would like to return one more time for recheck.

## 2022-07-27 NOTE — PATIENT INSTRUCTIONS
Return to clinic on Wednesday, August 3, 2022 at 8:30 am      Self care DRESSING INSTRUCTIONS:         Wound location: right and left posterior inner/upper thighs      Apply sensicare to the skin around the newly healed wound if needed  Apply mepilex bordered foam to newly healed area   Change dressing daily    Wound Location: Bilateral groin    Cleanse wound with wound cleanser or saline  Apply sensicare  Cover with ultrasorb (tucked into folds)    Nutrition:  The current daily value (%DV) for protein is 50 grams per day and is meant as a general goal for most people. Further increasing your dietary protein intake is very important for wound healing. Typically one needs over 100g of protein per day to help with wound healing needs.  If you are a dialysis patient or have problems with your kidneys, talk to your Nephrologist about how much protein you can take in with your condition.  Examples of high protein items that can be added to your diet include: eggs, chicken, red meats, almonds, cottage cheese, Greek yogurt, beans, and peanut butter.  Fortified protein bars, shakes and drinks can add 15-30 additional grams of protein per serving.   Also add:   1 daily general multivitamin   Davin : 1 packet twice daily   Vitamin C : 500mg twice daily   Zinc 220 mg daily  Vit D : once daily  Call our [facility] wound clinic for questions/concerns a [phone] .

## 2022-07-28 ENCOUNTER — OFFICE VISIT (OUTPATIENT)
Dept: HEMATOLOGY/ONCOLOGY | Facility: CLINIC | Age: 52
End: 2022-07-28
Payer: MEDICAID

## 2022-07-28 ENCOUNTER — CLINICAL SUPPORT (OUTPATIENT)
Dept: HEMATOLOGY/ONCOLOGY | Facility: CLINIC | Age: 52
End: 2022-07-28
Payer: MEDICAID

## 2022-07-28 ENCOUNTER — INFUSION (OUTPATIENT)
Dept: INFUSION THERAPY | Facility: HOSPITAL | Age: 52
End: 2022-07-28
Attending: INTERNAL MEDICINE
Payer: MEDICAID

## 2022-07-28 VITALS
HEIGHT: 66 IN | OXYGEN SATURATION: 100 % | DIASTOLIC BLOOD PRESSURE: 79 MMHG | BODY MASS INDEX: 34.87 KG/M2 | SYSTOLIC BLOOD PRESSURE: 120 MMHG | RESPIRATION RATE: 18 BRPM | TEMPERATURE: 98 F | WEIGHT: 217 LBS | HEART RATE: 82 BPM

## 2022-07-28 DIAGNOSIS — C21.0 MALIGNANT NEOPLASM OF ANUS: ICD-10-CM

## 2022-07-28 DIAGNOSIS — Z08 ENCOUNTER FOR FOLLOW-UP SURVEILLANCE OF ANAL CANCER: ICD-10-CM

## 2022-07-28 DIAGNOSIS — Z78.9 CENTRAL VENOUS CATHETER IN PLACE: ICD-10-CM

## 2022-07-28 DIAGNOSIS — Z85.048 HISTORY OF RECTAL OR ANAL CANCER: ICD-10-CM

## 2022-07-28 DIAGNOSIS — Z85.048 HISTORY OF RECTAL OR ANAL CANCER: Primary | ICD-10-CM

## 2022-07-28 DIAGNOSIS — Z85.048 ENCOUNTER FOR FOLLOW-UP SURVEILLANCE OF ANAL CANCER: ICD-10-CM

## 2022-07-28 DIAGNOSIS — F41.9 ANXIETY: Primary | ICD-10-CM

## 2022-07-28 LAB
ALBUMIN SERPL-MCNC: 3.6 GM/DL (ref 3.5–5)
ALBUMIN/GLOB SERPL: 0.9 RATIO (ref 1.1–2)
ALP SERPL-CCNC: 58 UNIT/L (ref 40–150)
ALT SERPL-CCNC: 28 UNIT/L (ref 0–55)
AST SERPL-CCNC: 18 UNIT/L (ref 5–34)
BASOPHILS # BLD AUTO: 0.03 X10(3)/MCL (ref 0–0.2)
BASOPHILS NFR BLD AUTO: 0.4 %
BILIRUBIN DIRECT+TOT PNL SERPL-MCNC: 0.5 MG/DL
BUN SERPL-MCNC: 11 MG/DL (ref 7–18.7)
CALCIUM SERPL-MCNC: 9.7 MG/DL (ref 8.4–10.2)
CHLORIDE SERPL-SCNC: 106 MMOL/L (ref 98–107)
CO2 SERPL-SCNC: 27 MMOL/L (ref 22–29)
CREAT SERPL-MCNC: 0.79 MG/DL (ref 0.55–1.02)
EOSINOPHIL # BLD AUTO: 0.11 X10(3)/MCL (ref 0–0.9)
EOSINOPHIL NFR BLD AUTO: 1.6 %
ERYTHROCYTE [DISTWIDTH] IN BLOOD BY AUTOMATED COUNT: 15.1 % (ref 11.5–17)
GLOBULIN SER-MCNC: 3.9 GM/DL (ref 2.4–3.5)
GLUCOSE SERPL-MCNC: 114 MG/DL (ref 74–100)
HCT VFR BLD AUTO: 41.4 % (ref 37–47)
HGB BLD-MCNC: 13 GM/DL (ref 12–16)
IMM GRANULOCYTES # BLD AUTO: 0.04 X10(3)/MCL (ref 0–0.04)
IMM GRANULOCYTES NFR BLD AUTO: 0.6 %
LYMPHOCYTES # BLD AUTO: 0.64 X10(3)/MCL (ref 0.6–4.6)
LYMPHOCYTES NFR BLD AUTO: 9.6 %
MAGNESIUM SERPL-MCNC: 2.2 MG/DL (ref 1.6–2.6)
MCH RBC QN AUTO: 27.6 PG (ref 27–31)
MCHC RBC AUTO-ENTMCNC: 31.4 MG/DL (ref 33–36)
MCV RBC AUTO: 87.9 FL (ref 80–94)
MONOCYTES # BLD AUTO: 0.44 X10(3)/MCL (ref 0.1–1.3)
MONOCYTES NFR BLD AUTO: 6.6 %
NEUTROPHILS # BLD AUTO: 5.4 X10(3)/MCL (ref 2.1–9.2)
NEUTROPHILS NFR BLD AUTO: 81.2 %
NRBC BLD AUTO-RTO: 0 %
PLATELET # BLD AUTO: 306 X10(3)/MCL (ref 130–400)
PMV BLD AUTO: 9.9 FL (ref 7.4–10.4)
POTASSIUM SERPL-SCNC: 3.4 MMOL/L (ref 3.5–5.1)
PROT SERPL-MCNC: 7.5 GM/DL (ref 6.4–8.3)
RBC # BLD AUTO: 4.71 X10(6)/MCL (ref 4.2–5.4)
SODIUM SERPL-SCNC: 141 MMOL/L (ref 136–145)
TSH SERPL-ACNC: 1.78 UIU/ML (ref 0.35–4.94)
WBC # SPEC AUTO: 6.7 X10(3)/MCL (ref 4.5–11.5)

## 2022-07-28 PROCEDURE — 3074F PR MOST RECENT SYSTOLIC BLOOD PRESSURE < 130 MM HG: ICD-10-PCS | Mod: CPTII,,, | Performed by: NURSE PRACTITIONER

## 2022-07-28 PROCEDURE — A4216 STERILE WATER/SALINE, 10 ML: HCPCS | Performed by: INTERNAL MEDICINE

## 2022-07-28 PROCEDURE — 83735 ASSAY OF MAGNESIUM: CPT

## 2022-07-28 PROCEDURE — 3008F BODY MASS INDEX DOCD: CPT | Mod: CPTII,,, | Performed by: NURSE PRACTITIONER

## 2022-07-28 PROCEDURE — 99214 PR OFFICE/OUTPT VISIT, EST, LEVL IV, 30-39 MIN: ICD-10-PCS | Mod: S$PBB,,, | Performed by: NURSE PRACTITIONER

## 2022-07-28 PROCEDURE — 63600175 PHARM REV CODE 636 W HCPCS: Performed by: INTERNAL MEDICINE

## 2022-07-28 PROCEDURE — 1159F PR MEDICATION LIST DOCUMENTED IN MEDICAL RECORD: ICD-10-PCS | Mod: CPTII,,, | Performed by: NURSE PRACTITIONER

## 2022-07-28 PROCEDURE — 84443 ASSAY THYROID STIM HORMONE: CPT

## 2022-07-28 PROCEDURE — 3078F PR MOST RECENT DIASTOLIC BLOOD PRESSURE < 80 MM HG: ICD-10-PCS | Mod: CPTII,,, | Performed by: NURSE PRACTITIONER

## 2022-07-28 PROCEDURE — 99214 OFFICE O/P EST MOD 30 MIN: CPT | Mod: PBBFAC,25 | Performed by: NURSE PRACTITIONER

## 2022-07-28 PROCEDURE — 1159F MED LIST DOCD IN RCRD: CPT | Mod: CPTII,,, | Performed by: NURSE PRACTITIONER

## 2022-07-28 PROCEDURE — 3074F SYST BP LT 130 MM HG: CPT | Mod: CPTII,,, | Performed by: NURSE PRACTITIONER

## 2022-07-28 PROCEDURE — 3078F DIAST BP <80 MM HG: CPT | Mod: CPTII,,, | Performed by: NURSE PRACTITIONER

## 2022-07-28 PROCEDURE — 36415 COLL VENOUS BLD VENIPUNCTURE: CPT

## 2022-07-28 PROCEDURE — 96523 IRRIG DRUG DELIVERY DEVICE: CPT

## 2022-07-28 PROCEDURE — 99214 OFFICE O/P EST MOD 30 MIN: CPT | Mod: S$PBB,,, | Performed by: NURSE PRACTITIONER

## 2022-07-28 PROCEDURE — 80053 COMPREHEN METABOLIC PANEL: CPT

## 2022-07-28 PROCEDURE — 25000003 PHARM REV CODE 250: Performed by: INTERNAL MEDICINE

## 2022-07-28 PROCEDURE — 3008F PR BODY MASS INDEX (BMI) DOCUMENTED: ICD-10-PCS | Mod: CPTII,,, | Performed by: NURSE PRACTITIONER

## 2022-07-28 RX ORDER — HEPARIN 100 UNIT/ML
500 SYRINGE INTRAVENOUS
Status: CANCELLED | OUTPATIENT
Start: 2022-07-28

## 2022-07-28 RX ORDER — LIDOCAINE AND PRILOCAINE 25; 25 MG/G; MG/G
CREAM TOPICAL
Qty: 30 G | Refills: 0 | Status: SHIPPED | OUTPATIENT
Start: 2022-07-28 | End: 2023-10-30

## 2022-07-28 RX ORDER — SODIUM CHLORIDE 0.9 % (FLUSH) 0.9 %
10 SYRINGE (ML) INJECTION
Status: CANCELLED | OUTPATIENT
Start: 2022-07-28

## 2022-07-28 RX ORDER — HEPARIN 100 UNIT/ML
500 SYRINGE INTRAVENOUS
Status: DISCONTINUED | OUTPATIENT
Start: 2022-07-28 | End: 2022-07-28 | Stop reason: HOSPADM

## 2022-07-28 RX ORDER — SODIUM CHLORIDE 0.9 % (FLUSH) 0.9 %
10 SYRINGE (ML) INJECTION
Status: DISCONTINUED | OUTPATIENT
Start: 2022-07-28 | End: 2022-07-28 | Stop reason: HOSPADM

## 2022-07-28 RX ADMIN — Medication 500 UNITS: at 09:07

## 2022-07-28 RX ADMIN — SODIUM CHLORIDE, PRESERVATIVE FREE 10 ML: 5 INJECTION INTRAVENOUS at 09:07

## 2022-07-28 NOTE — PROGRESS NOTES
Chief complaint:  Anal carcinoma; follow up     Reason for follow-up:  -Squamous cell carcinoma of anus  -Tobacco abuse, alcohol abuse    Treatment history:  1. Capecitabine 825 mg/m² p.o. twice daily, Monday-Friday, on each day that radiation therapy is given, throughout the duration of radiation therapy (typically 28 treatment days);  2. Mitomycin 10 mg/m² days 1 and 29. Chemotherapy 03/02/2022-03/31/2022  Definitive CRT; completed 5/4/2022    Social history: Single. Lives in [place], [place]. Has 1 child. Does not work. Has been smoking a pack of cigarettes daily for 18 years. Has been drinking 24 beers over the weekend for 16 years. Denies illicit drug abuse.   Family history: Maternal grandmother experienced lung cancer in her 80s; used to smoke.  Health maintenance:  -07/20/2014: ThinPrep cervical Pap smear: NIL  -08/28/2020: Screening colonoscopy (positive FIT): 5 mm flat polyp ascending colon, removed (pathology: Ascending colon polyp, polypectomy: Adenomatous polyp; no evidence of malignancy) (rescope in 5 years)  -09/25/2020: Screening mammogram (comparison: 05/15/2019): Bilateral breast negative (BI-RADS 1)  Menstrual and OB/GYN history: S/p laparoscopic hysterectomy and bilateral salpingectomy for abnormal uterine bleeding, uterine fibroids, and uteromegaly (03/16/2017)    History of present illness:  51 year-old female referred by surgery, for evaluation and management of anal cancer.    She was evaluated by surgery on 05/19/2021 when she presented to emergency department with 3-week history of anal pain and intermittent bleeding. She noticed a large lump that was painful and intermittently bleeding. She first noticed this very small lump a few months/1 year prior and it was not painful nor bleeding at the time, therefore, she did not seek attention. About 3 days prior to presentation, she noticed increasing pain in the anus with intermittent bleeding and then the pain became worse, then, she  presented to the emergency department for evaluation. Denied fevers, chills, nausea, vomiting, abdominal pain, changes in stool color or caliber, melena, dysuria, anal pruritus, vaginal discharge. Denied being sexually active or history of STIs. Reported smoking 1 pack of cigarettes daily, and drinking 12 beers over the weekend. Denies illicit drug abuse. Most recent colonoscopy 08/28/2020 showed a 5 mm adenomatous polyp which was removed. No family history of cancers of colon, rectum, illness, etc. Physical examination revealed a fungating anal mass on the right anal verge, approximately 3 cm size, without evidence of bleeding. Biopsy at bedside was attempted but patient did not tolerate well, resulting in nondiagnostic pathology. EUA on 06/16/2021 found a mass extending from the anal verge from the right anterior midline almost to posterior midline into the anal canal to the dentate line. Pathology returned as squamous cell carcinoma. Significant pain from the mass. Denies nausea or vomiting. Normal bowel movements. Denied fevers, chills, chest pain, dyspnea, nausea, vomiting, constipation, or diarrhea.    04/09/2021: MRI lumbar spine without contrast (lumbar plexopathy, traumatic):  -Multifactorial spinal canal stenosis, severe at L3-L5, moderate to severe at L2-L3, and moderate at L1-L2; multilevel neural foraminal stenosis, severe on the right at L5-S1    04/09/2021: CT head without contrast (TIA):  -No acute intracranial findings    05/19/2021: CT A/P with contrast (rectal mass) (comparison: CT A/P 08/10/2015):  -Trace free fluid in the pelvis; borderline mesorectal lymph node, nonspecific (0.8 cm)    05/19/2021: Anal verge mass, biopsy:  -Superficial squamous mucosa and detached fragments of markedly atypical squamous epithelium; a more severe lesion cannot be ruled out; recommend the deeper biopsy for further diagnostic evaluation    06/16/2021: Examination under anesthesia:  -Anal mass extending from the  right anterior midline almost to posterior midline into anal canal dentate line    06/16/2021: Anal mass, biopsy:  -Squamous cell carcinoma arising in a background of high-grade squamous intraepithelial lesion    07/29/2021:  Presents for initial medical oncology consultation.     -01/31/2022: Restaging CT C/A/P with contrast: Soft tissue along the right anal canal is stable to perhaps slightly larger since 10/01/2021 (43 x 31 mm, previously 42 x 23 mm); slight enlargement of the left internal iliac lymph node, now measuring up to 10 mm; multiple other pelvic lymph nodes are stable; no metastasis; trace ascites  -01/31/2022: Whole-body nuclear medicine bone scan: No bone metastasis  -01/21/2022: CMP unremarkable; mild neutrophilic leukocytosis, hemoglobin 11.9  -01/25/2022: Syphilis antibody positive; HIV negative; RPR nonreactive    Interval history   Seen in follow up today, 7/28/22; accompanied by her significant other; no new significant symptoms or concerns per her account.     Physical Exam  Vitals:    07/28/22 0758   BP: 120/79   Pulse: 82   Resp: 18   Temp: 97.8 °F (36.6 °C)     General: Alert and oriented. No acute distress  Eye: Pupils are equal, round and reactive to light, Extraocular movements are intact. Normal conjunctiva  HENT: Normocephalic. Oropharynx exam deferred; mask in place due to coronavirus  Neck: Supple, Non-tender  Respiratory: Respirations are non-labored, Symmetrical chest wall expansion. Breath sounds CTA bilaterally  Cardiovascular: Regular rate, rhythm, Normal peripheral perfusion, No bilateral lower extremity edema  Breast: Exam deferred  Gastrointestinal: Non-distended, Present bowel sounds   GYN: Exam deferred  Genitourinary: Exam deferred  Lymphatics: No lymphadenopathy appreciated  Musculoskeletal: Moves all extremities  Integumentary: Intact. Warm, dry. No rashes, or lesions to visible skin  Neurologic: No focal deficits  Psychiatric: Cooperative. Appropriate mood and affect      Lab Results   Component Value Date    WBC 6.7 07/28/2022    RBC 4.71 07/28/2022    HGB 13.0 07/28/2022    HCT 41.4 07/28/2022    MCV 87.9 07/28/2022    MCH 27.6 07/28/2022    MCHC 31.4 (L) 07/28/2022    RDW 15.1 07/28/2022     07/28/2022    MPV 9.9 07/28/2022     CMP  Sodium Level   Date Value Ref Range Status   07/28/2022 141 136 - 145 mmol/L Final     Potassium Level   Date Value Ref Range Status   07/28/2022 3.4 (L) 3.5 - 5.1 mmol/L Final     Carbon Dioxide   Date Value Ref Range Status   07/28/2022 27 22 - 29 mmol/L Final     Blood Urea Nitrogen   Date Value Ref Range Status   07/28/2022 11.0 7.0 - 18.7 mg/dL Final     Creatinine   Date Value Ref Range Status   07/28/2022 0.79 0.55 - 1.02 mg/dL Final     Calcium Level Total   Date Value Ref Range Status   07/28/2022 9.7 8.4 - 10.2 mg/dL Final     Albumin Level   Date Value Ref Range Status   07/28/2022 3.6 3.5 - 5.0 gm/dL Final     Bilirubin Total   Date Value Ref Range Status   07/28/2022 0.5 <=1.5 mg/dL Final     Alkaline Phosphatase   Date Value Ref Range Status   07/28/2022 58 40 - 150 unit/L Final     Aspartate Aminotransferase   Date Value Ref Range Status   07/28/2022 18 5 - 34 unit/L Final     Alanine Aminotransferase   Date Value Ref Range Status   07/28/2022 28 0 - 55 unit/L Final     Estimated GFR-Non    Date Value Ref Range Status   04/28/2022 101 >=90        Assessment:  1.) Squamous cell carcinoma of anus:  -Presentation (05/2021): Enlarging anal lump for 1 year; anal pain and intermittent bleeding for 3 weeks  -Fungating anal mass on the right anal verge, approximately 3 cm (05/19/2021)  -On EUA, anal mass extending from right anterior midline almost to posterior midline into anal canal dentate line  -Borderline mesorectal lymph node, 0.8 cm on CT A/P (05/19/2021)  -Anal mass not visualized on CT A/P  -EUA and biopsy (06/16/2021)  -08/12/2021: CT chest with contrast (staging): No acute abnormality  -08/24/2021: PET/CT  (comparison: CT chest 08/12/2021; CT A/P 05/19/2021): FDG uptake around the anal canal likely corresponding to known malignancy (maximum SUV 20.8); stable size of small mildly hypermetabolic right mesorectal, pelvic sidewall, and inguinal lymph nodes, nonspecific  -10/01/2021: MRI pelvis with and without contrast (comparison: PET/CT 08/24/2021): Mass along the right anal canal measuring up to 55 mm; suspected area of soft tissue extension into the posterior lower vagina/introitus; several suspicious perirectal lymph nodes; nonspecific borderline enlarged bilateral iliac and inguinal lymph nodes (mass 42 x 17 x 55 mm, extension anal margin, most likely extends through the external sphincter; several prominent perirectal lymph nodes measuring up to 8 mm in short axis; bilateral external iliac lymph nodes also measure up to 8 mm in short axis; bilateral inguinal lymph nodes measure up to 10 mm)  -10/13/2021: Mediport placed  -11/17/2021: Screening mammogram (comparison: 09/25/2020 mammogram): Right breast benign (BI-RADS 2); left breast negative (BI-RADS 1)  -12/09/2021: She called our office stating that she wants to start chemoradiation therapy ASAP and that she is passing blood clots and that tumor in the anus is bleeding a lot  -01/05/2022: Presented to ED with worsening depression for 3 days; suicidal ideation; feeling overwhelmed; transferred to a psych facility ([facility] [place])  -Follows up with ID for latent syphilis (antibody + 01/06/2022 at [facility]; no prior treatment; ID planning to treat with Bicillin LA 2,400,000 units weekly x3 weeks)  -01/31/2022: Restaging CT C/A/P with contrast: Soft tissue along the right anal canal is stable to perhaps slightly larger since 10/01/2021 (43 x 31 mm, previously 42 x 23 mm); slight enlargement of the left internal iliac lymph node, now measuring up to 10 mm; multiple other pelvic lymph nodes are stable; no metastasis; trace ascites  -01/31/2022: Whole-body nuclear  medicine bone scan: No bone metastasis  -01/21/2022: CMP unremarkable; mild neutrophilic leukocytosis, hemoglobin 11.9  -01/25/2022: Syphilis antibody positive; HIV negative; RPR nonreactive  -Chemotherapy started 03/02/2022; Day 29 mitomycin C on 03/31/2022  -Follows up with ID for history of latent syphilis; s/p Bicillin LA 2,400,000 units weekly x3 (01/25/2022, 02/01/2022, 02/08/2022)    To summarize:  -Squamous cell carcinoma of the anus  -Presentation (05/2021): Enlarging anal lump for 1 year; anal pain and intermittent bleeding for 3 weeks  -Fungating anal mass right anal verge  -s/p EUA (06/16/2021)  -Mass 3 cm on physical exam (05/19/2021); mass not visualized on CT A/P; mass 55 mm on MRI; mass extending into lower vagina/introitus on MRI  -Several suspicious perirectal and nonspecific borderline enlarged bilateral iliac, mesorectal, and inguinal lymph nodes on MRI and PET/CT  >>> 01/21/2022: cT4 cN1c M0, at least stage IIIC  -Noncompliant with follow-up  -Restaging CTs and bone scan (01/31/2022): 43 x 31 mm right anal canal soft tissue (anal cancer); left internal iliac lymph node 10 mm, slightly enlarged; other pelvic lymph nodes stable; no metastasis  -Of note, her restaging PET/CT and pelvic MRI scan was denied by her insurance.    -s/p chemotherapy 03/02/2022-03/31/2022    #Tobacco abuse, alcohol abuse    #History of depression, suicidal ideation    #Latent syphilis: Syphilis antibody positive (11/25/2022); following up with ID  -S/p Bicillin LA 2,400,000 units weekly x3 (01/25/2022, 02/01/2022, 02/08/2022)    #Comorbidities: Chronic low back pain, chronic neck pain, obesity, HOLLY, on CPAP, etc.    Plan:  -Squamous cell carcinoma of anus  -s/p chemotherapy ([facility] protocol) 03/02/2022-03/31/2022  -According to her, radiation therapy finished 05/04/2022    Chemotherapy:  1. Capecitabine 825 mg/m² p.o. twice daily, Monday-Friday, on each day that radiation therapy is given, throughout the duration of  radiation therapy (typically 28 treatment days);  2. Mitomycin 10 mg/m² days 1 and 29.    -It is not the standard of care to repeat imaging studies after completion of chemoradiation therapy. Imaging studies are recommended only if, on 8-12-week evaluation, there is persistent disease. Therefore, at this time, we will not order any restaging imaging studies.    For response assessment, see below.    -History of depression and suicidal ideation, ER presentation 01/05/2022, transferred to outside psych facility; as of 01/21/2022, talking to a counselor via telephone    -01/21/2022: Started Norco 5 mg every 4 hours as needed for excruciating anorectal pain secondary to worsening cancer    -01/21/2022: Still smoking a pack of cigarettes daily; tobacco cessation emphasized.    -Latent syphilis; follow-up with ID; s/p Bicillin LA 2.4 mL IM weekly x3 (completed 02/08/2022)    Reviewed /discussed labs. Increase dietary sources of potassium for mild hypokalemia   Follow-up in 3 months for labs, visit with NP  In the interim, follow-up with surgery    Above discussed at length with the patient. All questions answered.  She understands and agrees this plan.      Follow-up:  Evaluate in 8-12 weeks (after completion of chemoradiation therapy) with exam + PERLA:  1. If complete remission, then: Surveillance;    2. If persistent disease, then: Reevaluate in 4 weeks, including imaging studies; if progression or no progression on serial exams, then continue observation and reevaluation at 3-month intervals, utilizing imaging studies; if complete remission, then surveillance  (Follow patients who have not achieved a complete clinical response with persistent anal cancer up to 6 months following completion of RT and chemotherapy as long as there is no evidence of progressive disease during this period of follow-up; persistent disease may continue to regress even at 26 weeks from the start of treatment)    3. If progressive disease,  then: If biopsy-proven, then restage, including imaging studies; if locally recurrent, then, APR/groin dissection if positive inguinal lymph nodes, followed by surveillance; if, on restaging, metastatic disease, then, systemic therapy  (Follow patients who have not achieved a complete clinical response with persistent anal cancer up to 6 months following completion of RT and chemotherapy as long as there is no evidence of progressive disease during this period of follow-up; persistent disease may continue to regress even at 26 weeks from the start of treatment)      Surveillance: (After complete remission):  1. PERLA every 3-6 months for 5 years (will defer to surgery)  2. Inguinal lymph node palpation every 3-6 months for 5 years  3. Anoscopy every 6-12 months for 3 years (will defer to surgery)  4. CT chest/abdomen/pelvis with contrast or chest CT without contrast and abdominal/pelvic MRI with contrast annually for 3 years (stage II-III)

## 2022-07-28 NOTE — NURSING
Pt did labs, provider visit and now MP flush.  Pt accomp by male.  Rates LOP to rt leg 9/10.  Reports some SOB.  Pt walks with assistance of cane.  States has a radiation follow up appt today.  Pt using ice to numb MP site as she ran out of numbing cream.  We did happen to have some tubes on the unit and she was given one for next time.  Pt did not feel needle insertion as it was adequately numb from ice.

## 2022-08-05 DIAGNOSIS — C44.520 SQUAMOUS CELL CARCINOMA OF PERIANAL REGION: Primary | ICD-10-CM

## 2022-08-10 ENCOUNTER — HOSPITAL ENCOUNTER (OUTPATIENT)
Dept: WOUND CARE | Facility: HOSPITAL | Age: 52
Discharge: HOME | End: 2022-08-10
Attending: EMERGENCY MEDICINE
Payer: MEDICAID

## 2022-08-10 VITALS
SYSTOLIC BLOOD PRESSURE: 120 MMHG | RESPIRATION RATE: 16 BRPM | HEART RATE: 94 BPM | WEIGHT: 217 LBS | BODY MASS INDEX: 36.15 KG/M2 | TEMPERATURE: 98 F | HEIGHT: 65 IN | DIASTOLIC BLOOD PRESSURE: 74 MMHG

## 2022-08-10 DIAGNOSIS — L30.8 DERMATITIS ASSOCIATED WITH MOISTURE: Primary | ICD-10-CM

## 2022-08-10 DIAGNOSIS — G82.20 PARAPARESIS: ICD-10-CM

## 2022-08-10 DIAGNOSIS — T66.XXXA RADIATION INJURY, INITIAL ENCOUNTER: ICD-10-CM

## 2022-08-10 DIAGNOSIS — S31.809A OPEN WOUND OF BUTTOCK WITH COMPLICATION, UNSPECIFIED LATERALITY, INITIAL ENCOUNTER: ICD-10-CM

## 2022-08-10 PROCEDURE — 27000999 HC MEDICAL RECORD PHOTO DOCUMENTATION

## 2022-08-10 PROCEDURE — 99213 OFFICE O/P EST LOW 20 MIN: CPT

## 2022-08-10 NOTE — PATIENT INSTRUCTIONS
Return to clinic on Wednesday, August 3, 2022 at 8:30 am      Self care DRESSING INSTRUCTIONS:         Wound location: right and left posterior inner/upper thighs--healed      No return clinic appointment needed at this time; may call and reschedule if any wounds appear.     Nutrition:  The current daily value (%DV) for protein is 50 grams per day and is meant as a general goal for most people. Further increasing your dietary protein intake is very important for wound healing. Typically one needs over 100g of protein per day to help with wound healing needs.  If you are a dialysis patient or have problems with your kidneys, talk to your Nephrologist about how much protein you can take in with your condition.  Examples of high protein items that can be added to your diet include: eggs, chicken, red meats, almonds, cottage cheese, Greek yogurt, beans, and peanut butter.  Fortified protein bars, shakes and drinks can add 15-30 additional grams of protein per serving.   Also add:   1 daily general multivitamin   Davin : 1 packet twice daily   Vitamin C : 500mg twice daily   Zinc 220 mg daily  Vit D : once daily  Call our [facility] wound clinic for questions/concerns a [phone] .

## 2022-08-11 ENCOUNTER — OFFICE VISIT (OUTPATIENT)
Dept: INTERNAL MEDICINE | Facility: CLINIC | Age: 52
End: 2022-08-11
Payer: MEDICAID

## 2022-08-11 VITALS
HEART RATE: 81 BPM | WEIGHT: 222.44 LBS | DIASTOLIC BLOOD PRESSURE: 84 MMHG | TEMPERATURE: 99 F | RESPIRATION RATE: 20 BRPM | BODY MASS INDEX: 35.75 KG/M2 | SYSTOLIC BLOOD PRESSURE: 121 MMHG | HEIGHT: 66 IN

## 2022-08-11 DIAGNOSIS — E55.9 VITAMIN D DEFICIENCY: ICD-10-CM

## 2022-08-11 DIAGNOSIS — F32.A DEPRESSIVE DISORDER: ICD-10-CM

## 2022-08-11 DIAGNOSIS — M54.50 CHRONIC BILATERAL LOW BACK PAIN, UNSPECIFIED WHETHER SCIATICA PRESENT: ICD-10-CM

## 2022-08-11 DIAGNOSIS — H93.12 TINNITUS, LEFT EAR: ICD-10-CM

## 2022-08-11 DIAGNOSIS — Z72.0 TOBACCO USER: ICD-10-CM

## 2022-08-11 DIAGNOSIS — E78.5 HYPERLIPIDEMIA, UNSPECIFIED HYPERLIPIDEMIA TYPE: ICD-10-CM

## 2022-08-11 DIAGNOSIS — M19.90 OSTEOARTHRITIS, UNSPECIFIED OSTEOARTHRITIS TYPE, UNSPECIFIED SITE: ICD-10-CM

## 2022-08-11 DIAGNOSIS — M48.062 LUMBAR STENOSIS WITH NEUROGENIC CLAUDICATION: ICD-10-CM

## 2022-08-11 DIAGNOSIS — G89.29 CHRONIC BILATERAL LOW BACK PAIN, UNSPECIFIED WHETHER SCIATICA PRESENT: ICD-10-CM

## 2022-08-11 DIAGNOSIS — A53.0 LATENT SYPHILIS: ICD-10-CM

## 2022-08-11 DIAGNOSIS — F41.9 ANXIETY DISORDER, UNSPECIFIED TYPE: ICD-10-CM

## 2022-08-11 DIAGNOSIS — Z85.048 HISTORY OF RECTAL OR ANAL CANCER: Primary | ICD-10-CM

## 2022-08-11 DIAGNOSIS — G47.33 OBSTRUCTIVE SLEEP APNEA SYNDROME: ICD-10-CM

## 2022-08-11 DIAGNOSIS — Z00.00 HEALTHCARE MAINTENANCE: ICD-10-CM

## 2022-08-11 DIAGNOSIS — C21.0 MALIGNANT NEOPLASM OF ANUS: ICD-10-CM

## 2022-08-11 PROCEDURE — 99215 OFFICE O/P EST HI 40 MIN: CPT | Mod: PBBFAC

## 2022-08-11 RX ORDER — PREGABALIN 50 MG/1
50 CAPSULE ORAL 2 TIMES DAILY
Qty: 60 CAPSULE | Refills: 11 | Status: SHIPPED | OUTPATIENT
Start: 2022-08-11 | End: 2023-07-10 | Stop reason: SDUPTHER

## 2022-08-11 RX ORDER — ATORVASTATIN CALCIUM 40 MG/1
40 TABLET, FILM COATED ORAL NIGHTLY
Qty: 90 TABLET | Refills: 3 | Status: SHIPPED | OUTPATIENT
Start: 2022-08-11 | End: 2023-04-18 | Stop reason: SDUPTHER

## 2022-08-11 RX ORDER — ACETAMINOPHEN 500 MG
2000 TABLET ORAL DAILY
Qty: 30 CAPSULE | Refills: 3 | Status: SHIPPED | OUTPATIENT
Start: 2022-08-11 | End: 2022-12-29
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
## 2022-08-11 NOTE — PROGRESS NOTES
Attending Physician Addendum  Pt seen and discussed with medicine resident in clinic  Care provided is appropriate  Reviewed MRI results  Agree with above ASSESSMENT AND PLAN

## 2022-08-13 NOTE — PROGRESS NOTES
Subjective:       Patient ID: Antoine Banegas is a 51 y.o. female.    Chief Complaint: Non-healing Wound Follow Up    HPI  Patient has been being followed for a dermatitis to her inner thighs as well as buttock region where she underwent radiation treatment for rectal cancer. She has had moist type rash wax and wane over the last few months. We have taught her what to do when she feels it coming on as well as encouraged her to move and not sit all day. She comes in today with areas looking completely healed. She is in good spirits.  She does  Have episodes where her thigh soft tissue seems to swell more than usual. I reviewed MRI done in June which addressed this and it seems to be due to lymphadenopathy. I suspect maybe radiation tissue changes as well.   Review of Systems    No fever or chills / some pain around inner thigh area where she has soft tissue edema felt to be due to radiation tissue changes.  Objective:      Physical Exam    Skin-pink scarring inner thighs and ihnner buttock region, no open wound, no yeast rash noted, some scarrring inner groin, labia region from prior wounds.  Upper thigh soft issue seems full to palpate(right more left ) but no firmness or redness or warmth  Assessment:       1. Dermatitis associated with moisture    2. Radiation injury, initial encounter    3. Open wound of buttock with complication, unspecified laterality, initial encounter    4. Paraparesis      [REMOVED]      Wound 06/22/22 0920 Radiation Right posterior;upper Thigh #1 (Removed)   06/22/22 0920    Pre-existing: Yes   Primary Wound Type: Radiation   Side: Right   Orientation: posterior;upper   Location: Thigh   Wound Number: #1   Ankle-Brachial Index:    Pulses:    Removal Indication and Assessment:    Wound Outcome:    (Retired) Wound Type:    (Retired) Wound Length (cm):    (Retired) Wound Width (cm):    (Retired) Depth (cm):    Wound Description (Comments):    Removal Indications:    Removed 08/10/22     Wound Image   08/10/22 0848   Dressing Appearance Dry 08/10/22 0848   Drainage Amount None 08/10/22 0848   Appearance Intact 08/10/22 0848   Black (%), Wound Tissue Color 0 % 08/10/22 0848   Red (%), Wound Tissue Color 0 % 08/10/22 0848   Yellow (%), Wound Tissue Color 0 % 08/10/22 0848   Periwound Area Intact 08/10/22 0848   Wound Edges Approximated 08/10/22 0848   Wound Length (cm) 0 cm 08/10/22 0848   Wound Width (cm) 0 cm 08/10/22 0848   Wound Depth (cm) 0 cm 08/10/22 0848   Wound Volume (cm^3) 0 cm^3 08/10/22 0848   Wound Surface Area (cm^2) 0 cm^2 08/10/22 0848   Care Cleansed with:;Wound cleanser 08/10/22 0848       [REMOVED]      Wound 06/22/22 0920 Radiation Left posterior;upper Thigh #2 (Removed)   06/22/22 0920    Pre-existing: Yes   Primary Wound Type: Radiation   Side: Left   Orientation: posterior;upper   Location: Thigh   Wound Number: #2   Ankle-Brachial Index:    Pulses:    Removal Indication and Assessment:    Wound Outcome:    (Retired) Wound Type:    (Retired) Wound Length (cm):    (Retired) Wound Width (cm):    (Retired) Depth (cm):    Wound Description (Comments):    Removal Indications:    Removed 08/10/22    Wound Image   08/10/22 0849   Dressing Appearance Dry 08/10/22 0849   Drainage Amount None 08/10/22 0849   Appearance Intact 08/10/22 0849   Black (%), Wound Tissue Color 0 % 08/10/22 0849   Red (%), Wound Tissue Color 0 % 08/10/22 0849   Yellow (%), Wound Tissue Color 0 % 08/10/22 0849   Periwound Area Intact 08/10/22 0849   Wound Edges Approximated 08/10/22 0849   Wound Length (cm) 0 cm 08/10/22 0849   Wound Width (cm) 0 cm 08/10/22 0849   Wound Depth (cm) 0 cm 08/10/22 0849   Wound Volume (cm^3) 0 cm^3 08/10/22 0849   Wound Surface Area (cm^2) 0 cm^2 08/10/22 0849   Care Cleansed with:;Wound cleanser 08/10/22 0849           Plan:       1. Patients wounds are healed. We have given her tools and resources on what to do if they reoccur.  2. I did suggest to try seeing if wearing a pair  of tights with lyrica helps with the swelling in thighs. She has a pair of knee high stockings but I do not think they will do the job to help with tissue swelling in uppper thigh. She has had ultrasounds before so I think this is just tissue changes as during our course of treatment I have seen it wax and wane.  3. I am discharging her and instructed to return at any time.  4. I encouraged her to continue to eat well, take multivitamins and exercise.

## 2022-08-23 ENCOUNTER — OFFICE VISIT (OUTPATIENT)
Dept: SURGERY | Facility: CLINIC | Age: 52
End: 2022-08-23
Payer: MEDICAID

## 2022-08-23 VITALS
DIASTOLIC BLOOD PRESSURE: 78 MMHG | WEIGHT: 218.5 LBS | BODY MASS INDEX: 35.12 KG/M2 | TEMPERATURE: 98 F | SYSTOLIC BLOOD PRESSURE: 112 MMHG | HEIGHT: 66 IN | RESPIRATION RATE: 20 BRPM | OXYGEN SATURATION: 100 % | HEART RATE: 83 BPM

## 2022-08-23 DIAGNOSIS — C44.520 SQUAMOUS CELL CARCINOMA OF PERIANAL REGION: ICD-10-CM

## 2022-08-23 PROCEDURE — 99214 OFFICE O/P EST MOD 30 MIN: CPT | Mod: PBBFAC

## 2022-08-23 NOTE — PROGRESS NOTES
Chief complaint:  had concerns including perianal ca (Here for F/U).     HPI:   Antoine Banegas is a 51 y.o. female presents with a PMHx significant for anal squamous cell carcinoma (biopsy on 6/16/2021) on the right anal verge. Patient states that she completed her chemotherapy in March 2022 and her radiation about 3 months ago. She is doing well, but she does complain of some itching and burning in the groin area, which she attributes to the radiation. Patient states that she has had regular bowel movements. Patient denies any nausea, vomiting, abdominal pain, or hematochezia.     ROS:  As stated above      PMHx:  has a past medical history of Anxiety and Circulatory anomaly.   Patient Active Problem List   Diagnosis    Anxiety disorder    History of rectal or anal cancer    Chronic low back pain    Depressive disorder    Hyperlipidemia    Malignant neoplasm of anus    Obstructive sleep apnea syndrome    Obesity    Osteoarthritis    Pain of lower extremity    Paraparesis    Tobacco user    Vitamin D deficiency    Dermatitis associated with moisture    Open wound of buttock with complication    Dermatitis    Effect, radiation    Lumbar stenosis with neurogenic claudication    Tinnitus, left ear    Latent syphilis    Healthcare maintenance     PSHx:  has a past surgical history that includes Insertion of subcutaneous port and perianal surgery.   Past Surgical History:   Procedure Laterality Date    INSERTION OF SUBCUTANEOUS PORT      perianal surgery       FamHx: family history includes Diabetes in her mother; Hypertension in her mother; No Known Problems in her maternal aunt, maternal grandfather, maternal grandmother, maternal uncle, paternal aunt, paternal grandfather, paternal grandmother, paternal uncle, and another family member; Seizures in her mother.  SocHx:  reports that she has been smoking cigarettes. She started smoking about 31 years ago. She has been smoking about 1.00 pack  "per day. She has never used smokeless tobacco. She reports current alcohol use of about 1.0 standard drink of alcohol per week. She reports that she does not use drugs.    Physical Exam:  Vitals: Blood pressure 112/78, pulse 83, temperature 98.2 °F (36.8 °C), temperature source Oral, resp. rate 20, height 5' 6.14" (1.68 m), weight 99.1 kg (218 lb 7.6 oz), SpO2 100 %.  General: awake, alert, cooperative, in no acute distress. Pt oriented x3.  Mood/affect normal.   Lungs: stable on room air and non-labored breathing  Anus: No palpable masses noted on a digital rectal exam. Radiation changes and excisional wound noted on R side of anal verge.  Neuro: AAO x 3, follows commands    Assessment and Plan:  51F w/ SCC of R anal verge s/p radiotherapy completion    - No masses palpated on PERLA, radiation changes and excisional site appreciated. Continue w wound care for these.  -  Return to clinic in 3 months to schedule surveillance EUA and anoscopy.  - Continue to follow up with woundcare and Hem/onc    Return to clinic in 3 months    Woodrow Sánchez MD    "

## 2022-08-23 NOTE — PROGRESS NOTES
Placed in room. Seen by med studentSaadia and Dr. Garces and Dr. Sánchez. Spoke with patient. RTC IN 3 months.

## 2022-08-23 NOTE — PROGRESS NOTES
I have reviewed the notes, assessments, and/or procedures performed by the resident, I concur with her/his documentation of Antoine Banegas.     Alejandra Ascencio MD

## 2022-08-23 NOTE — PROGRESS NOTES
[facility] General Surgery Clinic Note    CC:   Antoine Banegas is a 51 y.o. female presenting for continued surveillance of anal SCC.    HPI:    50F w/ anal SCC, first dx'd in 6/2021 s/p radiation completion. Pt referred to us for PERLA cancer surveillance     PMH:   Past Medical History:   Diagnosis Date    Anxiety     Circulatory anomaly        PSH:   Past Surgical History:   Procedure Laterality Date    INSERTION OF SUBCUTANEOUS PORT      perianal surgery         FamHx:   Family History   Problem Relation Age of Onset    Seizures Mother     Hypertension Mother     Diabetes Mother     No Known Problems Maternal Aunt     No Known Problems Maternal Uncle     No Known Problems Paternal Aunt     No Known Problems Paternal Uncle     No Known Problems Paternal Grandmother     No Known Problems Paternal Grandfather     No Known Problems Maternal Grandmother     No Known Problems Maternal Grandfather     No Known Problems Other        SocHx:  Social History     Socioeconomic History    Marital status: Single   Tobacco Use    Smoking status: Current Every Day Smoker     Packs/day: 1.00     Types: Cigarettes     Start date: 8/11/1991    Smokeless tobacco: Never Used   Substance and Sexual Activity    Alcohol use: Yes     Alcohol/week: 1.0 standard drink     Types: 1 Cans of beer per week     Comment: on occassion    Drug use: Never    Sexual activity: Yes     Partners: Male       Allergies:   Review of patient's allergies indicates:  No Known Allergies    Medications:  Current Outpatient Medications on File Prior to Visit   Medication Sig Dispense Refill    ALPRAZolam (XANAX) 0.5 MG tablet   See Instructions, Take 1 tab by mouth 30 mins prior to MRI and 1 tab 30 mins before PET scan., # 2 tab(s), 0 Refill(s), Pharmacy: [facility], 168, cm, Height/Length Dosing, 01/25/22 12:51:00 CST, 108.6, kg, Weight Dosing, 01/25/...      amitriptyline (ELAVIL) 50 MG tablet Take 50 mg by mouth.       atorvastatin (LIPITOR) 40 MG tablet Take 1 tablet (40 mg total) by mouth every evening. 90 tablet 3    cholecalciferol, vitamin D3, (VITAMIN D3) 50 mcg (2,000 unit) Cap capsule Take 1 capsule (2,000 Units total) by mouth once daily. 30 capsule 3    EScitalopram oxalate (LEXAPRO) 10 MG tablet       HYDROcodone-acetaminophen (NORCO) 5-325 mg per tablet       LIDOcaine-prilocaine (EMLA) cream Apply a dime size amount to right chest port area 45 min to an hour prior to port being accessed 30 g 0    morphine (MS CONTIN) 30 MG 12 hr tablet Take 30 mg by mouth.      omega-3s-dha-epa-fish oil (OMEGA-3 FISH OIL) 300-1,000 mg Cap Take 1,000 mg by mouth.      oxyCODONE-acetaminophen (PERCOCET)  mg per tablet Take by mouth.      pregabalin (LYRICA) 50 MG capsule Take 1 capsule (50 mg total) by mouth 2 (two) times daily. 60 capsule 11    silver sulfADIAZINE 1% (SILVADENE) 1 % cream        No current facility-administered medications on file prior to visit.         [unfilled]    Objective:  Physical Exam:  [unfilled]    Results  @LABRCNTIP(WBC:4,HGB:4,HCT:4,PLT:4,INR:4,NA:4,pot:4,Chloride:4,co2:4,bun:4,creatinine:4,glucose:4,calcium:4,m,phos:4,tbili:4,SGPTALT:4,SGOTAST:4,ALKPHOS:4,INR:4,AMYLASE:4,LIPASE:4,LA:4)@    Imaging:  ***    A/P:   51 y.o. female ***    -Anoscopy    Magaly Pastrana MD  [employer] General Surgery, PGY-3

## 2022-08-24 ENCOUNTER — HOSPITAL ENCOUNTER (OUTPATIENT)
Dept: WOUND CARE | Facility: HOSPITAL | Age: 52
Discharge: HOME | End: 2022-08-24
Attending: EMERGENCY MEDICINE
Payer: MEDICAID

## 2022-08-24 VITALS
BODY MASS INDEX: 35.03 KG/M2 | HEIGHT: 66 IN | RESPIRATION RATE: 18 BRPM | DIASTOLIC BLOOD PRESSURE: 74 MMHG | SYSTOLIC BLOOD PRESSURE: 109 MMHG | HEART RATE: 84 BPM | TEMPERATURE: 98 F | WEIGHT: 218 LBS

## 2022-08-24 DIAGNOSIS — L59.8 OTHER SPECIFIED DISORDERS OF THE SKIN AND SUBCUTANEOUS TISSUE RELATED TO RADIATION: ICD-10-CM

## 2022-08-24 DIAGNOSIS — F32.A DEPRESSIVE DISORDER: ICD-10-CM

## 2022-08-24 DIAGNOSIS — Z85.048 HISTORY OF RECTAL OR ANAL CANCER: ICD-10-CM

## 2022-08-24 DIAGNOSIS — L58.9 RADIATION DERMATITIS: ICD-10-CM

## 2022-08-24 DIAGNOSIS — T66.XXXA RADIATION INJURY, INITIAL ENCOUNTER: ICD-10-CM

## 2022-08-24 DIAGNOSIS — E55.9 VITAMIN D DEFICIENCY: ICD-10-CM

## 2022-08-24 DIAGNOSIS — Z72.0 TOBACCO USER: ICD-10-CM

## 2022-08-24 DIAGNOSIS — F41.9 ANXIETY DISORDER, UNSPECIFIED TYPE: ICD-10-CM

## 2022-08-24 PROCEDURE — 99202 OFFICE O/P NEW SF 15 MIN: CPT

## 2022-08-24 RX ORDER — BALSAM PERU/CASTOR OIL
OINTMENT (GRAM) TOPICAL DAILY
Status: DISCONTINUED | OUTPATIENT
Start: 2022-08-25 | End: 2023-10-30

## 2022-08-24 NOTE — PROGRESS NOTES
Subjective:       Patient ID: Antoine Banegas is a 51 y.o. female.    Chief Complaint: No chief complaint on file.    51-year-old black female  [facility] and saw Dr. Hollins since May of 2022 for dermatitis and other radiation skin issues to her perianal and buttock area from radiation treatments.  Patient has anal/ rectal cell carcinoma that was diagnosed in the summer of 2021.  She was treated with chemotherapy and radiation in the spring of 2022. Referred by radiation oncologist. She saw Dr Hollins a few times in August and discharged on 8/13/22 as the small skin wounds/lesions has closed.  She is here today on 8/24/22. I am covering for Dr Hollins. Pt is c/o burning skin pain which has been chronic but no open wounds. She says she didn't like the sensicare cream but can't tell me what worked that she liked.  Here with .        PMHx:  Anxiety disorder   History of rectal or anal cancer   Chronic low back pain   Depressive disorder   Hyperlipidemia   Malignant neoplasm of anus   Obstructive sleep apnea syndrome   Obesity   Osteoarthritis   Pain of lower extremity   Paraparesis   Tobacco user   Vitamin D deficiency   Dermatitis associated with moisture   Open wound of buttock with complication   Dermatitis   Effect, radiation   Lumbar stenosis with neurogenic claudication   Tinnitus, left ear   Latent syphilis   Healthcare maintenance     PSHx:  : INSERTION OF SUBCUTANEOUS PORT, perianal surgery         FamHx: Diabetes and hypertension  in her mother;  SH: +cig      Review of Systems   Constitutional: Negative.    HENT: Negative.    Respiratory: Negative.    Cardiovascular: Negative.    Gastrointestinal: Negative.    Musculoskeletal: Positive for back pain.   Skin: Negative for wound.   Neurological: Negative.    Psychiatric/Behavioral: Positive for dysphoric mood.         Objective:      Vitals:    08/24/22 1000   BP: 109/74   Pulse: 84   Resp: 18   Temp: 98.2 °F  (36.8 °C)     @poctglucose@  No results for input(s): POCTGLUCOSE in the last 24 hours.  Physical Exam  Vitals reviewed.   Constitutional:       Appearance: Normal appearance. She is obese.   HENT:      Head: Normocephalic and atraumatic.      Mouth/Throat:      Pharynx: Oropharynx is clear.   Eyes:      Conjunctiva/sclera: Conjunctivae normal.   Cardiovascular:      Pulses: Normal pulses.   Pulmonary:      Effort: Pulmonary effort is normal.   Genitourinary:     Comments: No wounds/lesions/rash  Musculoskeletal:         General: No swelling.   Skin:     General: Skin is warm and dry.      Capillary Refill: Capillary refill takes less than 2 seconds.      Findings: No bruising, erythema, lesion or rash.   Neurological:      General: No focal deficit present.      Mental Status: She is alert and oriented to person, place, and time. Mental status is at baseline.                  Assessment:       1. Other specified disorders of the skin and subcutaneous tissue related to radiation    2. Radiation dermatitis    3. Radiation injury, initial encounter    4. History of rectal or anal cancer    5. Tobacco user    6. Depressive disorder    7. Anxiety disorder, unspecified type    8. Vitamin D deficiency         1. Malignant neoplasm of anus s/p chemo and radiation  2. Radiation induced skin changes, inflammation, dermatiits  3. Chronic smoker  4. Vit Ddef  5. Chronic pain  6. Sleep apnea  7. Anxiety          Lab Results   Component Value Date    WBC 6.7 07/28/2022    HGB 13.0 07/28/2022    HCT 41.4 07/28/2022    MCV 87.9 07/28/2022     07/28/2022         CMP  Sodium Level   Date Value Ref Range Status   07/28/2022 141 136 - 145 mmol/L Final     Potassium Level   Date Value Ref Range Status   07/28/2022 3.4 (L) 3.5 - 5.1 mmol/L Final     Carbon Dioxide   Date Value Ref Range Status   07/28/2022 27 22 - 29 mmol/L Final     Blood Urea Nitrogen   Date Value Ref Range Status   07/28/2022 11.0 7.0 - 18.7 mg/dL Final      Creatinine   Date Value Ref Range Status   07/28/2022 0.79 0.55 - 1.02 mg/dL Final     Calcium Level Total   Date Value Ref Range Status   07/28/2022 9.7 8.4 - 10.2 mg/dL Final     Albumin Level   Date Value Ref Range Status   07/28/2022 3.6 3.5 - 5.0 gm/dL Final     Bilirubin Total   Date Value Ref Range Status   07/28/2022 0.5 <=1.5 mg/dL Final     Alkaline Phosphatase   Date Value Ref Range Status   07/28/2022 58 40 - 150 unit/L Final     Aspartate Aminotransferase   Date Value Ref Range Status   07/28/2022 18 5 - 34 unit/L Final     Alanine Aminotransferase   Date Value Ref Range Status   07/28/2022 28 0 - 55 unit/L Final     Estimated GFR-Non    Date Value Ref Range Status   04/28/2022 101 >=90      Lab Results   Component Value Date    HGBA1C 6.0 04/21/2022     Lab Results   Component Value Date    SEDRATE 16 03/31/2021     Lab Results   Component Value Date    CRP 1.43 (H) 03/31/2021       Plan:     Plan of Care:    1. My first time meeting patient  2. No wounds  3. She is complaining of a chronic burning sensation to inside thigh areas  4. I will dispense her venalex ointment from pharmacy and see if this helps her sensation; she has no open wounds or lesions  5.   6. D/c from clinic   The time spent including preparing to see the patient, obtaining patient history and assessment, evaluation of the plan of care, patient/caregiver counseling and education, orders, documentation, coordination of care, and other professional medical management activities for today's encounter was minutes.    Time spent performing procedures during today's encounter was 20 minutes.

## 2022-08-24 NOTE — PATIENT INSTRUCTIONS
Self care DRESSING INSTRUCTIONS:         Wound location: right and left posterior inner/upper thighs--healed  Apply venalex oint 1 or 2 tiems daily    No return clinic appointment needed at this time; may call and reschedule if any wounds appear.     Nutrition:  The current daily value (%DV) for protein is 50 grams per day and is meant as a general goal for most people. Further increasing your dietary protein intake is very important for wound healing. Typically one needs over 100g of protein per day to help with wound healing needs.  If you are a dialysis patient or have problems with your kidneys, talk to your Nephrologist about how much protein you can take in with your condition.  Examples of high protein items that can be added to your diet include: eggs, chicken, red meats, almonds, cottage cheese, Greek yogurt, beans, and peanut butter.  Fortified protein bars, shakes and drinks can add 15-30 additional grams of protein per serving.   Also add:   1 daily general multivitamin   Daivn : 1 packet twice daily   Vitamin C : 500mg twice daily   Zinc 220 mg daily  Vit D : once daily  Call our [facility] wound clinic for questions/concerns a [phone] .

## 2022-08-29 ENCOUNTER — CLINICAL SUPPORT (OUTPATIENT)
Dept: AUDIOLOGY | Facility: HOSPITAL | Age: 52
End: 2022-08-29
Payer: MEDICAID

## 2022-08-29 ENCOUNTER — OFFICE VISIT (OUTPATIENT)
Dept: FAMILY MEDICINE | Facility: CLINIC | Age: 52
End: 2022-08-29
Payer: MEDICAID

## 2022-08-29 VITALS
HEIGHT: 66 IN | BODY MASS INDEX: 34.61 KG/M2 | TEMPERATURE: 98 F | DIASTOLIC BLOOD PRESSURE: 70 MMHG | SYSTOLIC BLOOD PRESSURE: 107 MMHG | RESPIRATION RATE: 18 BRPM | WEIGHT: 215.38 LBS | HEART RATE: 83 BPM

## 2022-08-29 DIAGNOSIS — H93.12 TINNITUS, LEFT EAR: ICD-10-CM

## 2022-08-29 DIAGNOSIS — H90.A32 MIXED CONDUCTIVE AND SENSORINEURAL HEARING LOSS OF LEFT EAR WITH RESTRICTED HEARING OF RIGHT EAR: Primary | ICD-10-CM

## 2022-08-29 DIAGNOSIS — C21.0 ANAL CANCER: ICD-10-CM

## 2022-08-29 DIAGNOSIS — Z12.39 ENCOUNTER FOR SCREENING BREAST EXAMINATION: Primary | ICD-10-CM

## 2022-08-29 PROCEDURE — 1159F MED LIST DOCD IN RCRD: CPT | Mod: CPTII,,, | Performed by: FAMILY MEDICINE

## 2022-08-29 PROCEDURE — 99213 OFFICE O/P EST LOW 20 MIN: CPT | Mod: S$PBB,,, | Performed by: FAMILY MEDICINE

## 2022-08-29 PROCEDURE — 3078F PR MOST RECENT DIASTOLIC BLOOD PRESSURE < 80 MM HG: ICD-10-PCS | Mod: CPTII,,, | Performed by: FAMILY MEDICINE

## 2022-08-29 PROCEDURE — 1159F PR MEDICATION LIST DOCUMENTED IN MEDICAL RECORD: ICD-10-PCS | Mod: CPTII,,, | Performed by: FAMILY MEDICINE

## 2022-08-29 PROCEDURE — 99214 OFFICE O/P EST MOD 30 MIN: CPT | Mod: PBBFAC,25 | Performed by: FAMILY MEDICINE

## 2022-08-29 PROCEDURE — 99213 PR OFFICE/OUTPT VISIT, EST, LEVL III, 20-29 MIN: ICD-10-PCS | Mod: S$PBB,,, | Performed by: FAMILY MEDICINE

## 2022-08-29 PROCEDURE — 92557 COMPREHENSIVE HEARING TEST: CPT | Performed by: AUDIOLOGIST

## 2022-08-29 PROCEDURE — 3078F DIAST BP <80 MM HG: CPT | Mod: CPTII,,, | Performed by: FAMILY MEDICINE

## 2022-08-29 PROCEDURE — 3008F BODY MASS INDEX DOCD: CPT | Mod: CPTII,,, | Performed by: FAMILY MEDICINE

## 2022-08-29 PROCEDURE — 3074F SYST BP LT 130 MM HG: CPT | Mod: CPTII,,, | Performed by: FAMILY MEDICINE

## 2022-08-29 PROCEDURE — 3008F PR BODY MASS INDEX (BMI) DOCUMENTED: ICD-10-PCS | Mod: CPTII,,, | Performed by: FAMILY MEDICINE

## 2022-08-29 PROCEDURE — 92567 TYMPANOMETRY: CPT | Performed by: AUDIOLOGIST

## 2022-08-29 PROCEDURE — 3074F PR MOST RECENT SYSTOLIC BLOOD PRESSURE < 130 MM HG: ICD-10-PCS | Mod: CPTII,,, | Performed by: FAMILY MEDICINE

## 2022-08-29 PROCEDURE — 92551 PURE TONE HEARING TEST AIR: CPT | Performed by: AUDIOLOGIST

## 2022-08-29 NOTE — PROGRESS NOTES
"  Subjective:       Patient ID: Antoine Banegas is a 51 y.o. female.    Chief Complaint:  Annual Exam      History of Present Illness  Patient has PMH hyperlipidemia, obesity, HOLLY on CPAP, tobacco use, low vitamin D, anal carcinoma Followed by PCP in [facility] [place] [place]. No complaints today. Denied breast complaints. Recently completed radiation. Pelvic areas is very sore.    Patient is s/p hysterectomy and bilateral salpingectomy in 2017    Denied History of Abnormal Pap  Mammogram  2021  Denied family history of breast, ovarian, uterine cancer    GYN & OB History  No LMP recorded (lmp unknown). Patient is postmenopausal.   Date of Last Pap: No result found    Review of patient's allergies indicates:  No Known Allergies  Past Medical History:   Diagnosis Date    Anxiety     Circulatory anomaly      OB History   No obstetric history on file.        Review of Systems  Review of Systems    Negative except for pertinent findings for positives per HPI     Objective:    Physical Exam    /70   Pulse 83   Temp 98.1 °F (36.7 °C)   Resp 18   Ht 5' 6" (1.676 m)   Wt 97.7 kg (215 lb 6.2 oz)   LMP  (LMP Unknown)   BMI 34.76 kg/m²   GENERAL: Well-developed female in no acute distress.  NECK: Supple with full range of motion.   SKIN: Normal to inspection, warm and intact.  BREASTS: No masses, lumps, discharge, tenderness.  ABDOMEN: Soft, non tender.  VULVA: post radiation changes; declined speculum exam, bi manual exam  PSYCHIATRIC: Patient is oriented to person, place, and time. Mood and affect are normal.        Assessment:       1. Encounter for screening breast examination    2. Anal cancer           Plan:   Antoine was seen today for annual exam.    Diagnoses and all orders for this visit:    Encounter for screening breast examination    Anal cancer     Breast exam WNL  Mammogram UTD  Cscope   Recommend healthy diet, lifestyle modifications  Recommend dental eye exam  Pelvic exam declined; would like to " wait until healed; will set back up with GYN  Recommend tobacco cessation    No follow-ups on file.

## 2022-08-29 NOTE — PROGRESS NOTES
Hearing Evaluation      Patient History: Ms. Banegas is in for a hearing evaluation reporting decreased hearing (Lt>Rt) and constant tinnitus, left ear only.  She denies vertigo or middle ear issues.  Her last hearing evaluation was on 11/23/20 indicating a functional hearing loss.  She has a history of cancer treated with chemo and radiation. All additional history is unremarkable.      Test Results:       Pure Tone Testing:     Right ear:   Moderate to moderately severe sensorineural hearing loss from 250-8kHz. Speech reception threshold is in agreement with puretone findings.  Discrimination score of 92% is considered excellent.      Left ear: Severe to profound sensorineural hearing loss from 250-8kHz. Speech reception threshold is in agreement with puretone findings.  Discrimination score of 76% is considered good.    Puretone kirk tested again.  Initially, patient was giving positive responses but then gave negative responses confirming asymmetry.         Tympanometry:        Right ear:   Type 'A' tymp, normal middle ear pressure/function    Left ear: Type 'A' tymp, normal middle ear pressure/function       Distortion Product Otoacoustic Emission Testing (DPOAE):         Right ear: Absent emissions from 1k-6kHz      Left ear: Absent emissions from 1k-6kHz         Interpretations:     Behavioral test findings suggest an asymmetric hearing loss (left mixed>right SNHL). Speech reception thresholds obtained at 45dB, AD and 85dB, AS, and are in agreement with puretone findings. Speech discrimination scores of 92%, AD and 76%, AS, are considered excellent/good.  DPOAE findings suggest cochlear site of lesion, bilaterally. Immittance measures indicate normal middle ear pressure/function, bilaterally.     **NOTE: severity of loss is still a little questionable with patient initially giving positive kirk results.**        Recommendations:   Ms. Banegas is being referred to ENT due to left mixed hearing loss  identified today.  Gave information on [place] [employer] for binaural amplification. Recommend repeat testing in one year or sooner if patient notices changes in auditory status.

## 2022-08-31 ENCOUNTER — OFFICE VISIT (OUTPATIENT)
Dept: OTOLARYNGOLOGY | Facility: CLINIC | Age: 52
End: 2022-08-31
Payer: MEDICAID

## 2022-08-31 DIAGNOSIS — H90.A32 MIXED CONDUCTIVE AND SENSORINEURAL HEARING LOSS OF LEFT EAR WITH RESTRICTED HEARING OF RIGHT EAR: ICD-10-CM

## 2022-08-31 DIAGNOSIS — H90.A31 MIXED CONDUCTIVE AND SENSORINEURAL HEARING LOSS OF RIGHT EAR WITH RESTRICTED HEARING OF LEFT EAR: Primary | ICD-10-CM

## 2022-08-31 DIAGNOSIS — H90.A22 SENSORINEURAL HEARING LOSS (SNHL) OF LEFT EAR WITH RESTRICTED HEARING OF RIGHT EAR: ICD-10-CM

## 2022-08-31 PROCEDURE — 99213 OFFICE O/P EST LOW 20 MIN: CPT | Mod: PBBFAC | Performed by: OTOLARYNGOLOGY

## 2022-08-31 NOTE — PROGRESS NOTES
Patient Name: Antoine Banegas   YOB: 1970     Chief Complaint: No chief complaint on file.       History of Present Illness:  August 31, 2022:  51-year-old female presents today for evaluation of hearing loss in her left ear.  The patient is indicated for the past 3 years she is had subjective hearing loss in left ear were it sounds like it is far.  She feels she hears okay out of her right ear.  She does admit to ringing tinnitus in both ears.  She denies any associated ear pain, otorrhea, or dizziness.  She does admit she has difficulty following conversations and will sometimes find herself reading lips and often asking to have people repeat things sent to her.  She believes she may have had recurrent ear infections as a child but is unable to elaborate.  She does not have a history of ear surgery.  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  She does not have any unilateral noise exposure to the left ear.  No history of head trauma or cerebrovascular accidents.  She does have a history of anal cancer for which she would undergone treatment with radiation therapy and chemotherapy completing her treatments 2 months ago.  She does not recall what medication she was treated with.  She denies use of aspirin or any diuretics.  Family history noncontributory his for his hearing loss.  Patient does smoke 1 pack of cigarettes per day and drinks alcohol socially.  Denies illicit drug use.    Patient had an audiogram done on August 29, 2022, which showed asymmetric hearing loss with the left ear being greater than right.  In the left ear she had a severe to profound mixed hearing loss in his right ear moderate to moderately severe sensorineural hearing loss.  Speech reception threshold in the right ear was 45 decibels and 85 decibels on the left ear with 92% discrimination in the right ear and 76% discrimination in the left ear.  Type a  tympanograms bilaterally.  Distortion product otoacoustic emission testing suggested cochlear site of lesion bilaterally.    Past Medical History:  Past Medical History:   Diagnosis Date    Anxiety     Cancer     Circulatory anomaly      Past Surgical History:   Procedure Laterality Date    HYSTERECTOMY      INSERTION OF SUBCUTANEOUS PORT      perianal surgery         Review of Systems:  Unremarkable except as mentioned above.    Current Medications:  Current Outpatient Medications   Medication Sig    ALPRAZolam (XANAX) 0.5 MG tablet   See Instructions, Take 1 tab by mouth 30 mins prior to MRI and 1 tab 30 mins before PET scan., # 2 tab(s), 0 Refill(s), Pharmacy: [facility], 168, cm, Height/Length Dosing, 01/25/22 12:51:00 CST, 108.6, kg, Weight Dosing, 01/25/...    amitriptyline (ELAVIL) 50 MG tablet Take 50 mg by mouth.    atorvastatin (LIPITOR) 40 MG tablet Take 1 tablet (40 mg total) by mouth every evening.    cholecalciferol, vitamin D3, (VITAMIN D3) 50 mcg (2,000 unit) Cap capsule Take 1 capsule (2,000 Units total) by mouth once daily.    EScitalopram oxalate (LEXAPRO) 10 MG tablet     HYDROcodone-acetaminophen (NORCO) 5-325 mg per tablet     LIDOcaine-prilocaine (EMLA) cream Apply a dime size amount to right chest port area 45 min to an hour prior to port being accessed    morphine (MS CONTIN) 30 MG 12 hr tablet Take 30 mg by mouth.    omega-3s-dha-epa-fish oil (OMEGA-3 FISH OIL) 300-1,000 mg Cap Take 1,000 mg by mouth.    oxyCODONE-acetaminophen (PERCOCET)  mg per tablet Take by mouth.    pregabalin (LYRICA) 50 MG capsule Take 1 capsule (50 mg total) by mouth 2 (two) times daily.    silver sulfADIAZINE 1% (SILVADENE) 1 % cream      Current Facility-Administered Medications   Medication    balsam peru-castor oiL Oint        Allergies:  Review of patient's allergies indicates:  No Known Allergies     Physical Exam:  Vital signs: There were no vitals filed for this visit.   General:   Well-developed well-nourished female in no acute distress.  Voice is normal.  Head and face:  Normocephalic.  No facial lesions.  No temporomandibular joint tenderness or click.  Ears:  Right ear-auricle is normally developed.  External auditory canal is clear.  Tympanic membrane is nonerythematous.  No middle ear effusion.  Left ear-auricle is normally developed.  External auditory canal is clear.  Tympanic membrane is nonerythematous.  No middle ear effusion.  512 tuning fork exam: Pruitt testing lateralizes to the right. Rinne testing shows was not done.   Nose:  Nasal dorsum is unremarkable.  No significant septal deviation.  No significant intranasal congestion.  Secretions are clear.  Oral cavity and oropharynx:  Tongue and floor mouth are without lesions.  Mucosa is moist.  No pharyngeal erythema or exudates.  No oropharyngeal masses.  No tonsillar hypertrophy.  Neck:  Supple without adenopathy or thyromegaly.  Trachea is in the midline.  Parotid and submandibular glands are normal to palpation without masses or tenderness.  Eyes:  Extraocular muscles intact.  No nystagmus.  No exophthalmos or enophthalmos.  Pupils are equal round and reactive to light.  Neurologic:  Alert and oriented.  Cranial nerves 2-12 are grossly normal.      Assessment/Plan:   51-year-old female with asymmetric hearing with a mixed hearing loss in the left ear and sensorineural hearing loss in the right ear.  Bone conduction thresholds or asymmetric.      Plan:  Will obtain MRI scan of the temporal bones and CT scan of the temporal bones for further analysis.    Follow-up in 6 weeks to review those results.      Chris Oneal M.D.

## 2022-09-07 ENCOUNTER — HOSPITAL ENCOUNTER (OUTPATIENT)
Dept: RADIOLOGY | Facility: HOSPITAL | Age: 52
Discharge: HOME | End: 2022-09-07
Attending: OTOLARYNGOLOGY
Payer: MEDICAID

## 2022-09-07 DIAGNOSIS — H90.A22 SENSORINEURAL HEARING LOSS (SNHL) OF LEFT EAR WITH RESTRICTED HEARING OF RIGHT EAR: ICD-10-CM

## 2022-09-07 DIAGNOSIS — H90.A32 MIXED CONDUCTIVE AND SENSORINEURAL HEARING LOSS OF LEFT EAR WITH RESTRICTED HEARING OF RIGHT EAR: ICD-10-CM

## 2022-09-07 PROCEDURE — 25500020 PHARM REV CODE 255

## 2022-09-07 PROCEDURE — A9577 INJ MULTIHANCE: HCPCS

## 2022-09-07 PROCEDURE — 70480 CT ORBIT/EAR/FOSSA W/O DYE: CPT | Mod: TC

## 2022-09-07 PROCEDURE — 70553 MRI BRAIN STEM W/O & W/DYE: CPT | Mod: TC

## 2022-09-07 RX ADMIN — GADOBENATE DIMEGLUMINE 20 ML: 529 INJECTION, SOLUTION INTRAVENOUS at 02:09

## 2022-09-22 ENCOUNTER — INFUSION (OUTPATIENT)
Dept: INFUSION THERAPY | Facility: HOSPITAL | Age: 52
End: 2022-09-22
Attending: INTERNAL MEDICINE
Payer: MEDICAID

## 2022-09-22 VITALS
SYSTOLIC BLOOD PRESSURE: 136 MMHG | TEMPERATURE: 98 F | DIASTOLIC BLOOD PRESSURE: 94 MMHG | HEART RATE: 85 BPM | BODY MASS INDEX: 34.44 KG/M2 | OXYGEN SATURATION: 97 % | WEIGHT: 214.31 LBS | RESPIRATION RATE: 18 BRPM | HEIGHT: 66 IN

## 2022-09-22 DIAGNOSIS — Z85.048 HISTORY OF RECTAL OR ANAL CANCER: Primary | ICD-10-CM

## 2022-09-22 PROCEDURE — 63600175 PHARM REV CODE 636 W HCPCS: Performed by: INTERNAL MEDICINE

## 2022-09-22 PROCEDURE — A4216 STERILE WATER/SALINE, 10 ML: HCPCS | Performed by: INTERNAL MEDICINE

## 2022-09-22 PROCEDURE — 96523 IRRIG DRUG DELIVERY DEVICE: CPT

## 2022-09-22 PROCEDURE — 25000003 PHARM REV CODE 250: Performed by: INTERNAL MEDICINE

## 2022-09-22 RX ORDER — SODIUM CHLORIDE 0.9 % (FLUSH) 0.9 %
10 SYRINGE (ML) INJECTION
Status: DISCONTINUED | OUTPATIENT
Start: 2022-09-22 | End: 2022-09-22 | Stop reason: HOSPADM

## 2022-09-22 RX ORDER — HEPARIN 100 UNIT/ML
500 SYRINGE INTRAVENOUS
Status: CANCELLED | OUTPATIENT
Start: 2022-09-22

## 2022-09-22 RX ORDER — SODIUM CHLORIDE 0.9 % (FLUSH) 0.9 %
10 SYRINGE (ML) INJECTION
Status: CANCELLED | OUTPATIENT
Start: 2022-09-22

## 2022-09-22 RX ORDER — HEPARIN 100 UNIT/ML
500 SYRINGE INTRAVENOUS
Status: DISCONTINUED | OUTPATIENT
Start: 2022-09-22 | End: 2022-09-22 | Stop reason: HOSPADM

## 2022-09-22 RX ADMIN — Medication 500 UNITS: at 10:09

## 2022-09-22 RX ADMIN — SODIUM CHLORIDE, PRESERVATIVE FREE 10 ML: 5 INJECTION INTRAVENOUS at 10:09

## 2022-10-12 ENCOUNTER — OFFICE VISIT (OUTPATIENT)
Dept: OTOLARYNGOLOGY | Facility: CLINIC | Age: 52
End: 2022-10-12
Payer: MEDICAID

## 2022-10-12 VITALS — BODY MASS INDEX: 34.94 KG/M2 | WEIGHT: 216.5 LBS

## 2022-10-12 DIAGNOSIS — H90.A22 SENSORINEURAL HEARING LOSS (SNHL) OF LEFT EAR WITH RESTRICTED HEARING OF RIGHT EAR: ICD-10-CM

## 2022-10-12 DIAGNOSIS — H90.A31 MIXED CONDUCTIVE AND SENSORINEURAL HEARING LOSS OF RIGHT EAR WITH RESTRICTED HEARING OF LEFT EAR: Primary | ICD-10-CM

## 2022-10-12 PROBLEM — H93.12 TINNITUS, LEFT EAR: Status: RESOLVED | Noted: 2022-08-11 | Resolved: 2022-10-12

## 2022-10-12 PROCEDURE — 99213 OFFICE O/P EST LOW 20 MIN: CPT | Mod: PBBFAC | Performed by: OTOLARYNGOLOGY

## 2022-10-12 NOTE — PROGRESS NOTES
Patient Name: Antoine Banegas   YOB: 1970     Chief Complaint:   Chief Complaint   Patient presents with    Hearing Loss     results        History of Present Illness:  August 31, 2022:  51-year-old female presents today for evaluation of hearing loss in her left ear.  The patient is indicated for the past 3 years she is had subjective hearing loss in left ear were it sounds like it is far.  She feels she hears okay out of her right ear.  She does admit to ringing tinnitus in both ears.  She denies any associated ear pain, otorrhea, or dizziness.  She does admit she has difficulty following conversations and will sometimes find herself reading lips and often asking to have people repeat things sent to her.  She believes she may have had recurrent ear infections as a child but is unable to elaborate.  She does not have a history of ear surgery.  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  She does not have any unilateral noise exposure to the left ear.  No history of head trauma or cerebrovascular accidents.  She does have a history of anal cancer for which she would undergone treatment with radiation therapy and chemotherapy completing her treatments 2 months ago.  She does not recall what medication she was treated with.  She denies use of aspirin or any diuretics.  Family history noncontributory his for his hearing loss.  Patient does smoke 1 pack of cigarettes per day and drinks alcohol socially.  Denies illicit drug use.    Patient had an audiogram done on August 29, 2022, which showed asymmetric hearing loss with the left ear being greater than right.  In the left ear she had a severe to profound mixed hearing loss in his right ear moderate to moderately severe sensorineural hearing loss.  Speech reception threshold in the right ear was 45 decibels and 85 decibels on the left ear with 92% discrimination in the right ear and 76%  discrimination in the left ear.  Type a tympanograms bilaterally.  Distortion product otoacoustic emission testing suggested cochlear site of lesion bilaterally.    October 12, 2022:   Patient presents today for follow-up of her hearing loss and review of her CT scan of the temporal bones and MRI scan of the internal auditory canals.  Patient has not had any subjective change in her hearing since her last appointment.    CT scan of the temporal bones did not show any abnormalities.  She does not have any middle ear effusions.  Ossicles appeared to be intact.  No evidence of cholesteatoma.  MRI scan of the internal auditory canals did show the presence of a right internal auditory canal vascular loop but there was no evidence of retrocochlear pathology involving either ear.  Results were discussed with the patient.    The patient had placed a call to the [place] handicap commission in regards to assistance for hearing aids.  She is currently on a waiting list.    She has no other new problems today.    Past Medical History:  Past Medical History:   Diagnosis Date    Anxiety     Cancer     Circulatory anomaly      Past Surgical History:   Procedure Laterality Date    HYSTERECTOMY      INSERTION OF SUBCUTANEOUS PORT      perianal surgery         Review of Systems:  Unremarkable except as mentioned above.    Current Medications:  Current Outpatient Medications   Medication Sig    ALPRAZolam (XANAX) 0.5 MG tablet   See Instructions, Take 1 tab by mouth 30 mins prior to MRI and 1 tab 30 mins before PET scan., # 2 tab(s), 0 Refill(s), Pharmacy: [facility], 168, cm, Height/Length Dosing, 01/25/22 12:51:00 CST, 108.6, kg, Weight Dosing, 01/25/...    amitriptyline (ELAVIL) 50 MG tablet Take 50 mg by mouth.    atorvastatin (LIPITOR) 40 MG tablet Take 1 tablet (40 mg total) by mouth every evening.    cholecalciferol, vitamin D3, (VITAMIN D3) 50 mcg (2,000 unit) Cap capsule Take 1 capsule (2,000 Units  total) by mouth once daily.    EScitalopram oxalate (LEXAPRO) 10 MG tablet     HYDROcodone-acetaminophen (NORCO) 5-325 mg per tablet     LIDOcaine-prilocaine (EMLA) cream Apply a dime size amount to right chest port area 45 min to an hour prior to port being accessed    morphine (MS CONTIN) 30 MG 12 hr tablet Take 30 mg by mouth.    omega-3s-dha-epa-fish oil (OMEGA-3 FISH OIL) 300-1,000 mg Cap Take 1,000 mg by mouth.    oxyCODONE-acetaminophen (PERCOCET)  mg per tablet Take by mouth.    pregabalin (LYRICA) 50 MG capsule Take 1 capsule (50 mg total) by mouth 2 (two) times daily.    silver sulfADIAZINE 1% (SILVADENE) 1 % cream      Current Facility-Administered Medications   Medication    balsam peru-castor oiL Oint        Allergies:  Review of patient's allergies indicates:  No Known Allergies     Physical Exam:  Vital signs:   Vitals:    10/12/22 0809   Weight: 98.2 kg (216 lb 8 oz)   General:  Well-developed well-nourished female in no acute distress.  Voice is normal.  Head and face:  Normocephalic.  No facial lesions.  No temporomandibular joint tenderness or click.  Ears:  Right ear-auricle is normally developed.  External auditory canal is clear.  Tympanic membrane is nonerythematous.  No middle ear effusion.  Left ear-auricle is normally developed.  External auditory canal is clear.  Tympanic membrane is nonerythematous.  No middle ear effusion.  Nose:  Nasal dorsum is unremarkable.  No significant septal deviation.  No significant intranasal congestion.  Secretions are clear.  Oral cavity and oropharynx:  Tongue and floor mouth are without lesions.  Mucosa is moist.  No pharyngeal erythema or exudates.  No oropharyngeal masses.  No tonsillar hypertrophy.  Neck:  Supple without adenopathy or thyromegaly.  Trachea is in the midline.  Parotid and submandibular glands are normal to palpation without masses or tenderness.  Eyes:  Extraocular muscles intact.  No nystagmus.  No exophthalmos or  enophthalmos.  Pupils are equal round and reactive to light.  Neurologic:  Alert and oriented.  Cranial nerves 2-12 are grossly normal.    Assessment/Plan:  44-year-old female with asymmetric hearing loss with a mixed hearing loss in the left ear and sensorineural hearing loss in the right ear.  The hearing loss in the left ear may possibly be secondary to otosclerosis.      Plan:   Recommend that the patient proceed with fitting of a hearing aid when she [facility] admission.  I discussed with her that any type of surgical intervention would not eliminate need for hearing aid and she does understand this.  Discussed noise avoidance with the patient preserved with hearing she does have.    Will follow-up in 6 months with preclinic audiogram.  His hearing loss is stable will then follow annually after that.      Chris Oneal M.D.

## 2022-10-27 DIAGNOSIS — Z85.048 HISTORY OF RECTAL OR ANAL CANCER: Primary | ICD-10-CM

## 2022-10-28 ENCOUNTER — HOSPITAL ENCOUNTER (EMERGENCY)
Facility: HOSPITAL | Age: 52
Discharge: HOME | End: 2022-10-28
Attending: FAMILY MEDICINE
Payer: MEDICAID

## 2022-10-28 ENCOUNTER — OFFICE VISIT (OUTPATIENT)
Dept: HEMATOLOGY/ONCOLOGY | Facility: CLINIC | Age: 52
End: 2022-10-28
Payer: MEDICAID

## 2022-10-28 VITALS
HEART RATE: 74 BPM | BODY MASS INDEX: 34.97 KG/M2 | WEIGHT: 215 LBS | RESPIRATION RATE: 18 BRPM | OXYGEN SATURATION: 98 % | TEMPERATURE: 99 F | SYSTOLIC BLOOD PRESSURE: 141 MMHG | DIASTOLIC BLOOD PRESSURE: 90 MMHG

## 2022-10-28 VITALS
HEIGHT: 66 IN | DIASTOLIC BLOOD PRESSURE: 85 MMHG | TEMPERATURE: 99 F | BODY MASS INDEX: 34.55 KG/M2 | SYSTOLIC BLOOD PRESSURE: 127 MMHG | RESPIRATION RATE: 20 BRPM | OXYGEN SATURATION: 100 % | WEIGHT: 215 LBS | HEART RATE: 82 BPM

## 2022-10-28 DIAGNOSIS — K62.5 RECTAL BLEEDING: Primary | ICD-10-CM

## 2022-10-28 DIAGNOSIS — Z08 ENCOUNTER FOR FOLLOW-UP SURVEILLANCE OF RECTAL CANCER: ICD-10-CM

## 2022-10-28 DIAGNOSIS — Z85.048 ENCOUNTER FOR FOLLOW-UP SURVEILLANCE OF RECTAL CANCER: ICD-10-CM

## 2022-10-28 DIAGNOSIS — Z85.048 HISTORY OF RECTAL OR ANAL CANCER: Primary | ICD-10-CM

## 2022-10-28 DIAGNOSIS — N30.00 ACUTE CYSTITIS WITHOUT HEMATURIA: ICD-10-CM

## 2022-10-28 DIAGNOSIS — K62.5 RECTAL BLEEDING: ICD-10-CM

## 2022-10-28 DIAGNOSIS — Z85.048 HISTORY OF RECTAL OR ANAL CANCER: ICD-10-CM

## 2022-10-28 LAB
ALBUMIN SERPL-MCNC: 3.7 GM/DL (ref 3.5–5)
ALBUMIN/GLOB SERPL: 0.9 RATIO (ref 1.1–2)
ALP SERPL-CCNC: 84 UNIT/L (ref 40–150)
ALT SERPL-CCNC: 34 UNIT/L (ref 0–55)
APPEARANCE UR: ABNORMAL
AST SERPL-CCNC: 22 UNIT/L (ref 5–34)
BACTERIA #/AREA URNS AUTO: ABNORMAL /HPF
BASOPHILS # BLD AUTO: 0.03 X10(3)/MCL (ref 0–0.2)
BASOPHILS NFR BLD AUTO: 0.3 %
BILIRUB UR QL STRIP.AUTO: NEGATIVE MG/DL
BILIRUBIN DIRECT+TOT PNL SERPL-MCNC: 0.3 MG/DL
BUN SERPL-MCNC: 13.1 MG/DL (ref 7–18.7)
CALCIUM SERPL-MCNC: 10.1 MG/DL (ref 8.4–10.2)
CHLORIDE SERPL-SCNC: 107 MMOL/L (ref 98–107)
CO2 SERPL-SCNC: 23 MMOL/L (ref 22–29)
COLOR UR AUTO: ABNORMAL
CREAT SERPL-MCNC: 0.79 MG/DL (ref 0.55–1.02)
EOSINOPHIL # BLD AUTO: 0.17 X10(3)/MCL (ref 0–0.9)
EOSINOPHIL NFR BLD AUTO: 1.6 %
ERYTHROCYTE [DISTWIDTH] IN BLOOD BY AUTOMATED COUNT: 14.3 % (ref 11.5–17)
GFR SERPLBLD CREATININE-BSD FMLA CKD-EPI: >60 MLS/MIN/1.73/M2
GLOBULIN SER-MCNC: 4.1 GM/DL (ref 2.4–3.5)
GLUCOSE SERPL-MCNC: 104 MG/DL (ref 74–100)
GLUCOSE UR QL STRIP.AUTO: NORMAL MG/DL
HCT VFR BLD AUTO: 43.1 % (ref 37–47)
HGB BLD-MCNC: 13.7 GM/DL (ref 12–16)
HYALINE CASTS #/AREA URNS LPF: ABNORMAL /LPF
IMM GRANULOCYTES # BLD AUTO: 0.07 X10(3)/MCL (ref 0–0.04)
IMM GRANULOCYTES NFR BLD AUTO: 0.7 %
KETONES UR QL STRIP.AUTO: NEGATIVE MG/DL
LEUKOCYTE ESTERASE UR QL STRIP.AUTO: 250 UNIT/L
LYMPHOCYTES # BLD AUTO: 1.07 X10(3)/MCL (ref 0.6–4.6)
LYMPHOCYTES NFR BLD AUTO: 10 %
MCH RBC QN AUTO: 27.6 PG (ref 27–31)
MCHC RBC AUTO-ENTMCNC: 31.8 MG/DL (ref 33–36)
MCV RBC AUTO: 86.9 FL (ref 80–94)
MONOCYTES # BLD AUTO: 0.76 X10(3)/MCL (ref 0.1–1.3)
MONOCYTES NFR BLD AUTO: 7.1 %
MUCOUS THREADS URNS QL MICRO: ABNORMAL /LPF
NEUTROPHILS # BLD AUTO: 8.6 X10(3)/MCL (ref 2.1–9.2)
NEUTROPHILS NFR BLD AUTO: 80.3 %
NITRITE UR QL STRIP.AUTO: ABNORMAL
NRBC BLD AUTO-RTO: 0 %
PH UR STRIP.AUTO: 5 [PH]
PLATELET # BLD AUTO: 316 X10(3)/MCL (ref 130–400)
PMV BLD AUTO: 8.8 FL (ref 7.4–10.4)
POTASSIUM SERPL-SCNC: 4.1 MMOL/L (ref 3.5–5.1)
PROT SERPL-MCNC: 7.8 GM/DL (ref 6.4–8.3)
PROT UR QL STRIP.AUTO: ABNORMAL MG/DL
RBC # BLD AUTO: 4.96 X10(6)/MCL (ref 4.2–5.4)
RBC #/AREA URNS AUTO: ABNORMAL /HPF
RBC UR QL AUTO: ABNORMAL UNIT/L
SODIUM SERPL-SCNC: 141 MMOL/L (ref 136–145)
SP GR UR STRIP.AUTO: 1.03
SQUAMOUS #/AREA URNS LPF: ABNORMAL /HPF
UROBILINOGEN UR STRIP-ACNC: NORMAL MG/DL
WBC # SPEC AUTO: 10.7 X10(3)/MCL (ref 4.5–11.5)
WBC #/AREA URNS AUTO: ABNORMAL /HPF

## 2022-10-28 PROCEDURE — 99214 OFFICE O/P EST MOD 30 MIN: CPT | Mod: PBBFAC | Performed by: NURSE PRACTITIONER

## 2022-10-28 PROCEDURE — 81001 URINALYSIS AUTO W/SCOPE: CPT | Performed by: PHYSICIAN ASSISTANT

## 2022-10-28 PROCEDURE — 1159F MED LIST DOCD IN RCRD: CPT | Mod: CPTII,,, | Performed by: NURSE PRACTITIONER

## 2022-10-28 PROCEDURE — 99214 PR OFFICE/OUTPT VISIT, EST, LEVL IV, 30-39 MIN: ICD-10-PCS | Mod: S$PBB,,, | Performed by: NURSE PRACTITIONER

## 2022-10-28 PROCEDURE — 3079F DIAST BP 80-89 MM HG: CPT | Mod: CPTII,,, | Performed by: NURSE PRACTITIONER

## 2022-10-28 PROCEDURE — 99283 EMERGENCY DEPT VISIT LOW MDM: CPT | Mod: 25,27

## 2022-10-28 PROCEDURE — 87077 CULTURE AEROBIC IDENTIFY: CPT | Performed by: PHYSICIAN ASSISTANT

## 2022-10-28 PROCEDURE — 1159F PR MEDICATION LIST DOCUMENTED IN MEDICAL RECORD: ICD-10-PCS | Mod: CPTII,,, | Performed by: NURSE PRACTITIONER

## 2022-10-28 PROCEDURE — 3074F SYST BP LT 130 MM HG: CPT | Mod: CPTII,,, | Performed by: NURSE PRACTITIONER

## 2022-10-28 PROCEDURE — 36415 COLL VENOUS BLD VENIPUNCTURE: CPT

## 2022-10-28 PROCEDURE — 3074F PR MOST RECENT SYSTOLIC BLOOD PRESSURE < 130 MM HG: ICD-10-PCS | Mod: CPTII,,, | Performed by: NURSE PRACTITIONER

## 2022-10-28 PROCEDURE — 3079F PR MOST RECENT DIASTOLIC BLOOD PRESSURE 80-89 MM HG: ICD-10-PCS | Mod: CPTII,,, | Performed by: NURSE PRACTITIONER

## 2022-10-28 PROCEDURE — 99214 OFFICE O/P EST MOD 30 MIN: CPT | Mod: S$PBB,,, | Performed by: NURSE PRACTITIONER

## 2022-10-28 RX ORDER — CEFDINIR 300 MG/1
300 CAPSULE ORAL 2 TIMES DAILY
Qty: 14 CAPSULE | Refills: 0 | Status: SHIPPED | OUTPATIENT
Start: 2022-10-28 | End: 2022-11-04

## 2022-10-28 NOTE — PROGRESS NOTES
Chief complaint:  Anal carcinoma; follow up     Reason for follow-up:  -Squamous cell carcinoma of anus  -Tobacco abuse, alcohol abuse    Treatment history:  1. Capecitabine 825 mg/m² p.o. twice daily, Monday-Friday, on each day that radiation therapy is given, throughout the duration of radiation therapy (typically 28 treatment days);  2. Mitomycin 10 mg/m² days 1 and 29. Chemotherapy 03/02/2022-03/31/2022  Definitive CRT; completed 5/4/2022    Social history: Single. Lives in [place], [place]. Has 1 child. Does not work. Has been smoking a pack of cigarettes daily for 18 years. Has been drinking 24 beers over the weekend for 16 years. Denies illicit drug abuse.   Family history: Maternal grandmother experienced lung cancer in her 80s; used to smoke.  Health maintenance:  -07/20/2014: ThinPrep cervical Pap smear: NIL  -08/28/2020: Screening colonoscopy (positive FIT): 5 mm flat polyp ascending colon, removed (pathology: Ascending colon polyp, polypectomy: Adenomatous polyp; no evidence of malignancy) (rescope in 5 years)  -09/25/2020: Screening mammogram (comparison: 05/15/2019): Bilateral breast negative (BI-RADS 1)  Menstrual and OB/GYN history: s/p laparoscopic hysterectomy and bilateral salpingectomy for abnormal uterine bleeding, uterine fibroids, and uteromegaly (03/16/2017)    History of present illness:  51 year-old female referred by surgery, for evaluation and management of anal cancer    She was evaluated by surgery on 05/19/2021 when she presented to emergency department with 3-week history of anal pain and intermittent bleeding. She noticed a large lump that was painful and intermittently bleeding. She first noticed this very small lump a few months/1 year prior and it was not painful nor bleeding at the time, therefore, she did not seek attention. About 3 days prior to presentation, she noticed increasing pain in the anus with intermittent bleeding and then the pain became worse, then, she  presented to the emergency department for evaluation. Denied fevers, chills, nausea, vomiting, abdominal pain, changes in stool color or caliber, melena, dysuria, anal pruritus, vaginal discharge. Denied being sexually active or history of STIs. Reported smoking 1 pack of cigarettes daily, and drinking 12 beers over the weekend. Denies illicit drug abuse. Most recent colonoscopy 08/28/2020 showed a 5 mm adenomatous polyp which was removed. No family history of cancers of colon, rectum, illness, etc. Physical examination revealed a fungating anal mass on the right anal verge, approximately 3 cm size, without evidence of bleeding. Biopsy at bedside was attempted but patient did not tolerate well, resulting in nondiagnostic pathology. EUA on 06/16/2021 found a mass extending from the anal verge from the right anterior midline almost to posterior midline into the anal canal to the dentate line. Pathology returned as squamous cell carcinoma. Significant pain from the mass. Denies nausea or vomiting. Normal bowel movements. Denied fevers, chills, chest pain, dyspnea, nausea, vomiting, constipation, or diarrhea    04/09/2021: MRI lumbar spine without contrast (lumbar plexopathy, traumatic):  -Multifactorial spinal canal stenosis, severe at L3-L5, moderate to severe at L2-L3, and moderate at L1-L2; multilevel neural foraminal stenosis, severe on the right at L5-S1    04/09/2021: CT head without contrast (TIA):  -No acute intracranial findings    05/19/2021: CT A/P with contrast (rectal mass) (comparison: CT A/P 08/10/2015):  -Trace free fluid in the pelvis; borderline mesorectal lymph node, nonspecific (0.8 cm)    05/19/2021: Anal verge mass, biopsy:  -Superficial squamous mucosa and detached fragments of markedly atypical squamous epithelium; a more severe lesion cannot be ruled out; recommend the deeper biopsy for further diagnostic evaluation    06/16/2021: Examination under anesthesia:  -Anal mass extending from the  right anterior midline almost to posterior midline into anal canal dentate line    06/16/2021: Anal mass, biopsy:  -Squamous cell carcinoma arising in a background of high-grade squamous intraepithelial lesion    07/29/2021:  Presents for initial medical oncology consultation.     -01/31/2022: Restaging CT C/A/P with contrast: Soft tissue along the right anal canal is stable to perhaps slightly larger since 10/01/2021 (43 x 31 mm, previously 42 x 23 mm); slight enlargement of the left internal iliac lymph node, now measuring up to 10 mm; multiple other pelvic lymph nodes are stable; no metastasis; trace ascites  -01/31/2022: Whole-body nuclear medicine bone scan: No bone metastasis  -01/21/2022: CMP unremarkable; mild neutrophilic leukocytosis, hemoglobin 11.9  -01/25/2022: Syphilis antibody positive; HIV negative; RPR nonreactive    Interval history   Seen in follow up today, 10/28/22; accompanied by her significant other; reporting an approximate two week history of bright red bleeding from her rectum and having bleeding she notes currently during her visit. She notes follow up with surgery clinic 8/2022 with no abnormal findings on PERLA with no palpable masses. Otherwise today denies other significant symptoms or concerns. Labs stable. Denies unexplained weight loss, nausea, vomiting. No CP, palpitations, breathing stable. No present pain complaints at this time but does note having pelvic pain intermittently. Has been following regularly with rad onc and surgery.     Physical Exam  Vitals:    10/28/22 0853   BP: 127/85   Pulse: 82   Resp: 20   Temp: 98.8 °F (37.1 °C)     General: Alert and oriented. No acute distress  Eye: Pupils are equal, round and reactive to light, Extraocular movements are intact. Normal conjunctiva  HENT: Normocephalic. Oropharynx exam deferred; mask in place due to coronavirus  Neck: Supple, Non-tender  Respiratory: Respirations are non-labored, Symmetrical chest wall expansion. Breath  sounds CTA bilaterally  Cardiovascular: Regular rate, rhythm, Normal peripheral perfusion, No bilateral lower extremity edema  Breast: Exam deferred  Gastrointestinal: Non-distended, Present bowel sounds   GYN: Exam deferred  Genitourinary: Exam deferred  Lymphatics: No lymphadenopathy appreciated  Musculoskeletal: Moves all extremities  Integumentary: Intact. Warm, dry. No rashes, or lesions to visible skin  Neurologic: No focal deficits  Psychiatric: Cooperative. Appropriate mood and affect     Assessment:  1.) Squamous cell carcinoma of anus:  -Presentation (05/2021): Enlarging anal lump for 1 year; anal pain and intermittent bleeding for 3 weeks  -Fungating anal mass on the right anal verge, approximately 3 cm (05/19/2021)  -On EUA, anal mass extending from right anterior midline almost to posterior midline into anal canal dentate line  -Borderline mesorectal lymph node, 0.8 cm on CT A/P (05/19/2021)  -Anal mass not visualized on CT A/P  -EUA and biopsy (06/16/2021)  -08/12/2021: CT chest with contrast (staging): No acute abnormality  -08/24/2021: PET/CT (comparison: CT chest 08/12/2021; CT A/P 05/19/2021): FDG uptake around the anal canal likely corresponding to known malignancy (maximum SUV 20.8); stable size of small mildly hypermetabolic right mesorectal, pelvic sidewall, and inguinal lymph nodes, nonspecific  -10/01/2021: MRI pelvis with and without contrast (comparison: PET/CT 08/24/2021): Mass along the right anal canal measuring up to 55 mm; suspected area of soft tissue extension into the posterior lower vagina/introitus; several suspicious perirectal lymph nodes; nonspecific borderline enlarged bilateral iliac and inguinal lymph nodes (mass 42 x 17 x 55 mm, extension anal margin, most likely extends through the external sphincter; several prominent perirectal lymph nodes measuring up to 8 mm in short axis; bilateral external iliac lymph nodes also measure up to 8 mm in short axis; bilateral inguinal  lymph nodes measure up to 10 mm)  -10/13/2021: Mediport placed  -11/17/2021: Screening mammogram (comparison: 09/25/2020 mammogram): Right breast benign (BI-RADS 2); left breast negative (BI-RADS 1)  -12/09/2021: She called our office stating that she wants to start chemoradiation therapy ASAP and that she is passing blood clots and that tumor in the anus is bleeding a lot  -01/05/2022: Presented to ED with worsening depression for 3 days; suicidal ideation; feeling overwhelmed; transferred to a psych facility ([facility] [place])  -Follows up with ID for latent syphilis (antibody + 01/06/2022 at [facility]; no prior treatment; ID planning to treat with Bicillin LA 2,400,000 units weekly x3 weeks)  -01/31/2022: Restaging CT C/A/P with contrast: Soft tissue along the right anal canal is stable to perhaps slightly larger since 10/01/2021 (43 x 31 mm, previously 42 x 23 mm); slight enlargement of the left internal iliac lymph node, now measuring up to 10 mm; multiple other pelvic lymph nodes are stable; no metastasis; trace ascites  -01/31/2022: Whole-body nuclear medicine bone scan: No bone metastasis  -01/21/2022: CMP unremarkable; mild neutrophilic leukocytosis, hemoglobin 11.9  -01/25/2022: Syphilis antibody positive; HIV negative; RPR nonreactive  -s/p Capecitabine 825 mg/m² p.o. twice daily, Monday-Friday, on each day that radiation therapy is given, throughout the duration of radiation therapy (typically 28 treatment days);  -s/p Mitomycin 10 mg/m² days 1 and 29. Chemotherapy 03/02/2022-03/31/2022  -s/p Definitive CRT; completed 5/4/2022    -5/31/2022: MRI pelvis revealing localized area of asymmetric thickening, enhancement involving the posterolateral aspect of the anal canal, the site of the previous described mass which is significantly smaller in the interval likely representing residual post treatment scarring with residual tumor not entirely excluded  -8/2022: Per surg note; no apparent abnormalities on PERLA;  next follow up 11/29/2022     # History of tobacco abuse, alcohol abuse    # History of depression, suicidal ideation    # Latent syphilis: Syphilis antibody positive (11/25/2022); following up with ID  -s/p Bicillin LA 2,400,000 units weekly x3 (01/25/2022, 02/01/2022, 02/08/2022)    #Comorbidities: Chronic low back pain, chronic neck pain, obesity, HOLLY, on CPAP, etc    -Reviewed / discussed labs; stable; sending to our emergency department now for further evaluation of reported 2 week history of bright red bleeding from rectum; concerns for anal cancer recurrence vs GI bleeding vs hemorrhoids or other abnormality. ED team has been notified    -In the meantime will plan next clinic follow up in 4 weeks; sooner if needed pending hospital findings   -Above discussed at length with the patient  -All questions answered. She understands and agrees this plan      Follow-up:  Evaluate in 8-12 weeks (after completion of chemoradiation therapy) with exam + PERLA:  1. If complete remission, then: Surveillance;    2. If persistent disease, then: Reevaluate in 4 weeks, including imaging studies; if progression or no progression on serial exams, then continue observation and reevaluation at 3-month intervals, utilizing imaging studies; if complete remission, then surveillance  (Follow patients who have not achieved a complete clinical response with persistent anal cancer up to 6 months following completion of RT and chemotherapy as long as there is no evidence of progressive disease during this period of follow-up; persistent disease may continue to regress even at 26 weeks from the start of treatment)    3. If progressive disease, then: If biopsy-proven, then restage, including imaging studies; if locally recurrent, then, APR/groin dissection if positive inguinal lymph nodes, followed by surveillance; if, on restaging, metastatic disease, then, systemic therapy  (Follow patients who have not achieved a complete clinical response  with persistent anal cancer up to 6 months following completion of RT and chemotherapy as long as there is no evidence of progressive disease during this period of follow-up; persistent disease may continue to regress even at 26 weeks from the start of treatment)     Surveillance: (After complete remission):  1. PERLA every 3-6 months for 5 years (will defer to surgery)  2. Inguinal lymph node palpation every 3-6 months for 5 years  3. Anoscopy every 6-12 months for 3 years (will defer to surgery)  4. CT chest/abdomen/pelvis with contrast or chest CT without contrast and abdominal/pelvic MRI with contrast annually for 3 years (stage II-III)

## 2022-10-28 NOTE — ED NOTES
General Surgery clinic contacted, appointment moved up to Nov. 1st at 8am.  Provider XU Butler and patient informed of appointment.

## 2022-10-28 NOTE — ED PROVIDER NOTES
Encounter Date: 10/28/2022     History     Chief Complaint   Patient presents with    Rectal Bleeding     Rectal bleeding x2 weeks, hx of rectal CA, sent by Dr. Arceo for eval. Last chemo February this year. +dysuria      Patient with pmhx of anal cancer presents today c/o painless rectal bleeding for 5 days. She reports passing clots with bowel movements. She was seen by oncologist this morning and was advised to come to the ED for evaluation. Completed chemo and radiation. Also reports dysuria for 2 weeks.     The history is provided by the patient. No  was used.   Review of patient's allergies indicates:  No Known Allergies  Past Medical History:   Diagnosis Date    Anxiety     Cancer     Circulatory anomaly      Past Surgical History:   Procedure Laterality Date    HYSTERECTOMY      INSERTION OF SUBCUTANEOUS PORT      perianal surgery       Family History   Problem Relation Age of Onset    Seizures Mother     Hypertension Mother     Diabetes Mother     No Known Problems Maternal Aunt     No Known Problems Maternal Uncle     No Known Problems Paternal Aunt     No Known Problems Paternal Uncle     No Known Problems Paternal Grandmother     No Known Problems Paternal Grandfather     No Known Problems Maternal Grandmother     No Known Problems Maternal Grandfather     No Known Problems Other      Social History     Tobacco Use    Smoking status: Every Day     Packs/day: 1.00     Types: Cigarettes     Start date: 8/11/1991    Smokeless tobacco: Never   Substance Use Topics    Alcohol use: Yes     Alcohol/week: 1.0 standard drink     Types: 1 Cans of beer per week     Comment: on occassion    Drug use: Never     Review of Systems   Constitutional:  Negative for chills and fever.   Respiratory:  Negative for cough, chest tightness and shortness of breath.    Cardiovascular:  Negative for chest pain, palpitations and leg swelling.   Gastrointestinal:  Positive for anal bleeding and blood in stool.  Negative for abdominal distention, abdominal pain, constipation, diarrhea, nausea, rectal pain and vomiting.   Genitourinary:  Positive for dysuria. Negative for flank pain and hematuria.   Musculoskeletal:  Negative for arthralgias and myalgias.   Skin:  Negative for rash.   Neurological:  Negative for syncope, light-headedness and headaches.     Physical Exam     Initial Vitals [10/28/22 1043]   BP Pulse Resp Temp SpO2   (!) 160/98 79 18 98.5 °F (36.9 °C) 99 %      MAP       --         Physical Exam    Vitals reviewed.  Constitutional: She appears well-developed and well-nourished. She is not diaphoretic. No distress.   HENT:   Head: Normocephalic and atraumatic.   Mouth/Throat: Oropharynx is clear and moist. No oropharyngeal exudate.   Eyes: Conjunctivae and EOM are normal. No scleral icterus.   Neck: Neck supple.   Normal range of motion.  Cardiovascular:  Normal rate, regular rhythm, normal heart sounds and intact distal pulses.           Pulmonary/Chest: Breath sounds normal. No respiratory distress.   Abdominal: Abdomen is soft. Bowel sounds are normal. She exhibits no distension. There is no abdominal tenderness. There is no rebound and no guarding.   Genitourinary: Rectum:      Guaiac result positive.   Guaiac positive stool. : Acceptable.   Genitourinary Comments: Perianal skin changes noted from prior radiation. PERLA performed - small amount of bright red blood noted on finger. No palpable or visible masses.      Musculoskeletal:         General: No edema.      Cervical back: Normal range of motion and neck supple.     Neurological: She is alert and oriented to person, place, and time. GCS score is 15. GCS eye subscore is 4. GCS verbal subscore is 5. GCS motor subscore is 6.   Skin: Skin is warm and dry.   Psychiatric: She has a normal mood and affect.       ED Course   Procedures  Labs Reviewed   URINALYSIS, REFLEX TO URINE CULTURE - Abnormal; Notable for the following components:        Result Value    Appearance, UA Turbid (*)     Protein, UA Trace (*)     Blood, UA 1+ (*)     Nitrites, UA 2+ (*)     Leukocyte Esterase,  (*)     WBC, UA 51-99 (*)     Bacteria, UA Trace (*)     Squamous Epithelial Cells, UA Few (*)     Mucous, UA Few (*)     RBC, UA 11-20 (*)     All other components within normal limits   CULTURE, URINE     Normal CBC and CMP from today         Imaging Results    None          Medications - No data to display              ED Course as of 10/28/22 1503   Fri Oct 28, 2022   1320 CBC and CMP drawn this morning and reviewed. Results are within normal limits. Patient is not anemic. Case discussed with ED case manager and general surgery. Patients appt in surgery clinic for EUA and anoscopy will be moved up to 11/1/22 at 8am. Patient made aware. Stable for discharge. Strict return to ED precautions given.  [SA]      ED Course User Index  [SA] XU To                 Clinical Impression:   Final diagnoses:  [K62.5] Rectal bleeding (Primary)  [N30.00] Acute cystitis without hematuria      ED Disposition Condition    Discharge Stable          ED Prescriptions       Medication Sig Dispense Start Date End Date Auth. Provider    cefdinir (OMNICEF) 300 MG capsule Take 1 capsule (300 mg total) by mouth 2 (two) times daily. for 7 days 14 capsule 10/28/2022 11/4/2022 XU To          Follow-up Information       Follow up With Specialties Details Why Contact Info    [facility] General Surgery On 11/1/2022 at 8:00am [address] [facility] [address] [phone]    [facility] - Emergency Dept Emergency Medicine  If symptoms worsen return to ED immediately [address] [facility] [address] [phone]    [facility] Hematology and Oncology In 2 days  [address] [address] [phone]    Primary Care Provider within  1-2 days  Go in 1 day               XU To  10/28/22 4534

## 2022-10-31 LAB — BACTERIA UR CULT: ABNORMAL

## 2022-11-01 ENCOUNTER — OFFICE VISIT (OUTPATIENT)
Dept: SURGERY | Facility: CLINIC | Age: 52
End: 2022-11-01
Payer: MEDICAID

## 2022-11-01 VITALS
SYSTOLIC BLOOD PRESSURE: 106 MMHG | TEMPERATURE: 98 F | BODY MASS INDEX: 34.91 KG/M2 | OXYGEN SATURATION: 100 % | HEIGHT: 66 IN | HEART RATE: 77 BPM | DIASTOLIC BLOOD PRESSURE: 70 MMHG | WEIGHT: 217.19 LBS

## 2022-11-01 DIAGNOSIS — C21.0 ANAL SQUAMOUS CELL CARCINOMA: Primary | ICD-10-CM

## 2022-11-01 PROCEDURE — 99214 OFFICE O/P EST MOD 30 MIN: CPT | Mod: PBBFAC

## 2022-11-01 NOTE — PROGRESS NOTES
Surgery Clinic Note     CC: anal cancer f/u    HPI:  51-year-old female presents to clinic for evaluation prior to EUA and anoscopy for surveillance of anal squamous cell carcinoma. Patient reports having completed chemo and radiation therapy earlier this year. She reports  a 9 day history bleeding per rectum that is not associated with pain, for which she visited the ED. She states that the blood is both on toilet paper when she wipes and is also seen in the toilet; she additionally reports occasional blood clots on the tissue paper. The ED instructed her to come here today to schedule EUA and anoscopy. She denies diarrhea and constipation. She was additionally prescribed omnicef for acute cystitis. She reports that her urinary symptoms have resolved since her ED visit. Denies f/c/n/v/CP/SOB/weight loss.    PMH:   Past Medical History:   Diagnosis Date    Anxiety     Cancer     Circulatory anomaly         PSH:   Past Surgical History:   Procedure Laterality Date    HYSTERECTOMY      INSERTION OF SUBCUTANEOUS PORT      perianal surgery          Fam Hx:   Family History   Problem Relation Age of Onset    Seizures Mother     Hypertension Mother     Diabetes Mother     No Known Problems Maternal Aunt     No Known Problems Maternal Uncle     No Known Problems Paternal Aunt     No Known Problems Paternal Uncle     No Known Problems Paternal Grandmother     No Known Problems Paternal Grandfather     No Known Problems Maternal Grandmother     No Known Problems Maternal Grandfather     No Known Problems Other         Social Hx:   Social History     Socioeconomic History    Marital status: Single   Tobacco Use    Smoking status: Every Day     Packs/day: 1.00     Types: Cigarettes     Start date: 8/11/1991    Smokeless tobacco: Never   Substance and Sexual Activity    Alcohol use: Yes     Alcohol/week: 1.0 standard drink     Types: 1 Cans of beer per week     Comment: on occassion    Drug use: Never    Sexual activity: Yes      Partners: Male        Allergies:   Review of patient's allergies indicates:  No Known Allergies     ROS: Negative except above     Current Outpatient Medications on File Prior to Visit   Medication Sig Dispense Refill    ALPRAZolam (XANAX) 0.5 MG tablet   See Instructions, Take 1 tab by mouth 30 mins prior to MRI and 1 tab 30 mins before PET scan., # 2 tab(s), 0 Refill(s), Pharmacy: [facility], 168, cm, Height/Length Dosing, 01/25/22 12:51:00 CST, 108.6, kg, Weight Dosing, 01/25/...      amitriptyline (ELAVIL) 50 MG tablet Take 50 mg by mouth.      atorvastatin (LIPITOR) 40 MG tablet Take 1 tablet (40 mg total) by mouth every evening. 90 tablet 3    cefdinir (OMNICEF) 300 MG capsule Take 1 capsule (300 mg total) by mouth 2 (two) times daily. for 7 days 14 capsule 0    cholecalciferol, vitamin D3, (VITAMIN D3) 50 mcg (2,000 unit) Cap capsule Take 1 capsule (2,000 Units total) by mouth once daily. 30 capsule 3    EScitalopram oxalate (LEXAPRO) 10 MG tablet       HYDROcodone-acetaminophen (NORCO) 5-325 mg per tablet       LIDOcaine-prilocaine (EMLA) cream Apply a dime size amount to right chest port area 45 min to an hour prior to port being accessed 30 g 0    morphine (MS CONTIN) 30 MG 12 hr tablet Take 30 mg by mouth.      omega-3s-dha-epa-fish oil (OMEGA-3 FISH OIL) 300-1,000 mg Cap Take 1,000 mg by mouth.      oxyCODONE-acetaminophen (PERCOCET)  mg per tablet Take by mouth.      pregabalin (LYRICA) 50 MG capsule Take 1 capsule (50 mg total) by mouth 2 (two) times daily. 60 capsule 11    silver sulfADIAZINE 1% (SILVADENE) 1 % cream        Current Facility-Administered Medications on File Prior to Visit   Medication Dose Route Frequency Provider Last Rate Last Admin    balsam peru-castor oiL Oint   Topical (Top) Daily Nasrin Macias MD           Physical Exam  /70 (BP Location: Right arm, Patient Position: Sitting, BP Method: Medium (Automatic))   Pulse 77   Temp 98.3 °F  "(36.8 °C) (Oral)   Ht 5' 6" (1.676 m)   Wt 98.5 kg (217 lb 3.2 oz)   LMP  (LMP Unknown)   SpO2 100%   BMI 35.06 kg/m²   General: NAD, AAO X 3  CV: Regular rate and rhythm without murmurs  Resp: Clear to ascultation bilaterally  Abdomen: soft, non-tender, non-distended, bowel sounds present  Rectal: post-radiation scarring of skin around anus; no masses palpable on PERLA    Surgical Pathology:   Anal mass, Biopsy:  - Squamous cell carcinoma arising in a background of high-grade squamous intraepithelial lesion.      Signature Line  *Electronically Signed*     Florence Acosta  Verified: 06/21/21 16:10        Comment (Verified)  Intradepartmental consultation performed.      Specimen Description (Verified)  Anal mass      Clinical Information (Verified)  Clinical History: 49 yo female  Diagnosis: Other specified diseases of anus and rectum      Gross Description (Verified)  2038, Antoine Banegas.  Received in 10% neutral buffered formalin are two irregular fragments of pink tan soft tissue measuring in aggregate 2.2 x 1.6 x 0.4 cm.  Sectioned and entirely submitted in cassettes A and B.      Signature Line  KSS/BAL    Result type: Surgical Pathology Report  Result date: June 16, 2021 7:40 CDT  Result status: Auth (Verified)  Result title: SURGICAL PATH FINAL REPORT  Verified by: Dave BLAKE, Florence CABRERA on June 21, 2021 16:10 CDT    ASSESSMENT/PLAN  51 -year-old female with a history of anal squamous cell carcinoma treated with chemo and radiation therapy, currently experiencing bright red blood per rectum.    - Will plan for surgery on 11/30/22 for EUA and anoscopy  - Will call pt to confirm details  - Will obtain consent day of surgery    Yvan Bobo MS3      Agree with student note  - Plan to schedule EUA with anoscopy for surveillance 11/30/22  - Obtain consents day of surgery  - Will call pt to confirm date and time    Samina Rodriguez MD  [employer] [facility] PGY3    "

## 2022-11-01 NOTE — PROGRESS NOTES
Patient seen by Dr. SHAQ Rodriguez. MD will contact patient when they are ready to schedule her surgery. Written and verbal discharge instructions given.

## 2022-11-03 ENCOUNTER — CLINICAL SUPPORT (OUTPATIENT)
Dept: AUDIOLOGY | Facility: HOSPITAL | Age: 52
End: 2022-11-03
Attending: OTOLARYNGOLOGY
Payer: MEDICAID

## 2022-11-03 DIAGNOSIS — H90.A31 MIXED CONDUCTIVE AND SENSORINEURAL HEARING LOSS OF RIGHT EAR WITH RESTRICTED HEARING OF LEFT EAR: ICD-10-CM

## 2022-11-03 DIAGNOSIS — H90.A22 SENSORINEURAL HEARING LOSS (SNHL) OF LEFT EAR WITH RESTRICTED HEARING OF RIGHT EAR: ICD-10-CM

## 2022-11-03 DIAGNOSIS — C44.520 SQUAMOUS CELL CARCINOMA OF PERIANAL REGION: Primary | ICD-10-CM

## 2022-11-03 NOTE — PROGRESS NOTES
Patient just had an audiological evaluation about 3 months ago.  She is not due until February for a hearing eval. She was scheduled accordingly.

## 2022-11-14 PROBLEM — Z00.00 HEALTHCARE MAINTENANCE: Status: RESOLVED | Noted: 2022-08-11 | Resolved: 2022-11-14

## 2022-11-17 ENCOUNTER — INFUSION (OUTPATIENT)
Dept: INFUSION THERAPY | Facility: HOSPITAL | Age: 52
End: 2022-11-17
Attending: INTERNAL MEDICINE
Payer: MEDICAID

## 2022-11-17 VITALS
WEIGHT: 218.25 LBS | TEMPERATURE: 98 F | SYSTOLIC BLOOD PRESSURE: 129 MMHG | OXYGEN SATURATION: 100 % | HEART RATE: 104 BPM | RESPIRATION RATE: 20 BRPM | HEIGHT: 66 IN | DIASTOLIC BLOOD PRESSURE: 91 MMHG | BODY MASS INDEX: 35.08 KG/M2

## 2022-11-17 DIAGNOSIS — Z85.048 HISTORY OF RECTAL OR ANAL CANCER: Primary | ICD-10-CM

## 2022-11-17 PROCEDURE — 63600175 PHARM REV CODE 636 W HCPCS: Performed by: INTERNAL MEDICINE

## 2022-11-17 PROCEDURE — 96523 IRRIG DRUG DELIVERY DEVICE: CPT

## 2022-11-17 PROCEDURE — A4216 STERILE WATER/SALINE, 10 ML: HCPCS | Performed by: INTERNAL MEDICINE

## 2022-11-17 PROCEDURE — 25000003 PHARM REV CODE 250: Performed by: INTERNAL MEDICINE

## 2022-11-17 RX ORDER — HEPARIN 100 UNIT/ML
500 SYRINGE INTRAVENOUS
Status: CANCELLED | OUTPATIENT
Start: 2022-11-17

## 2022-11-17 RX ORDER — HEPARIN 100 UNIT/ML
500 SYRINGE INTRAVENOUS
Status: DISCONTINUED | OUTPATIENT
Start: 2022-11-17 | End: 2023-10-30

## 2022-11-17 RX ORDER — SODIUM CHLORIDE 0.9 % (FLUSH) 0.9 %
10 SYRINGE (ML) INJECTION
Status: CANCELLED | OUTPATIENT
Start: 2022-11-17

## 2022-11-17 RX ORDER — SODIUM CHLORIDE 0.9 % (FLUSH) 0.9 %
10 SYRINGE (ML) INJECTION
Status: DISCONTINUED | OUTPATIENT
Start: 2022-11-17 | End: 2023-10-30

## 2022-11-17 RX ADMIN — Medication 10 ML: at 10:11

## 2022-11-17 RX ADMIN — Medication 500 UNITS: at 10:11

## 2022-11-29 ENCOUNTER — HOSPITAL ENCOUNTER (OUTPATIENT)
Dept: RADIOLOGY | Facility: HOSPITAL | Age: 52
Discharge: HOME | End: 2022-11-29
Attending: NURSE PRACTITIONER
Payer: MEDICAID

## 2022-11-29 DIAGNOSIS — C44.520 SQUAMOUS CELL CARCINOMA OF PERIANAL REGION: ICD-10-CM

## 2022-11-29 PROCEDURE — 25500020 PHARM REV CODE 255: Performed by: NURSE PRACTITIONER

## 2022-11-29 PROCEDURE — 71260 CT THORAX DX C+: CPT | Mod: TC

## 2022-11-29 PROCEDURE — 74177 CT ABD & PELVIS W/CONTRAST: CPT | Mod: TC

## 2022-11-29 RX ADMIN — IOHEXOL 100 ML: 350 INJECTION, SOLUTION INTRAVENOUS at 11:11

## 2022-12-07 ENCOUNTER — OFFICE VISIT (OUTPATIENT)
Dept: HEMATOLOGY/ONCOLOGY | Facility: CLINIC | Age: 52
End: 2022-12-07
Payer: MEDICAID

## 2022-12-07 VITALS
SYSTOLIC BLOOD PRESSURE: 137 MMHG | RESPIRATION RATE: 20 BRPM | DIASTOLIC BLOOD PRESSURE: 87 MMHG | HEART RATE: 90 BPM | TEMPERATURE: 98 F | HEIGHT: 66 IN | WEIGHT: 219.81 LBS | OXYGEN SATURATION: 100 % | BODY MASS INDEX: 35.32 KG/M2

## 2022-12-07 DIAGNOSIS — Z72.0 TOBACCO ABUSE: ICD-10-CM

## 2022-12-07 DIAGNOSIS — C21.0 SQUAMOUS CELL CANCER, ANUS: ICD-10-CM

## 2022-12-07 DIAGNOSIS — K62.5 RECTAL BLEEDING: ICD-10-CM

## 2022-12-07 DIAGNOSIS — F10.10 ALCOHOL ABUSE: ICD-10-CM

## 2022-12-07 PROCEDURE — 1159F MED LIST DOCD IN RCRD: CPT | Mod: CPTII,,, | Performed by: INTERNAL MEDICINE

## 2022-12-07 PROCEDURE — 99214 OFFICE O/P EST MOD 30 MIN: CPT | Mod: PBBFAC | Performed by: INTERNAL MEDICINE

## 2022-12-07 PROCEDURE — 3008F BODY MASS INDEX DOCD: CPT | Mod: CPTII,,, | Performed by: INTERNAL MEDICINE

## 2022-12-07 PROCEDURE — 3075F PR MOST RECENT SYSTOLIC BLOOD PRESS GE 130-139MM HG: ICD-10-PCS | Mod: CPTII,,, | Performed by: INTERNAL MEDICINE

## 2022-12-07 PROCEDURE — 3079F PR MOST RECENT DIASTOLIC BLOOD PRESSURE 80-89 MM HG: ICD-10-PCS | Mod: CPTII,,, | Performed by: INTERNAL MEDICINE

## 2022-12-07 PROCEDURE — 99214 OFFICE O/P EST MOD 30 MIN: CPT | Mod: S$PBB,,, | Performed by: INTERNAL MEDICINE

## 2022-12-07 PROCEDURE — 3008F PR BODY MASS INDEX (BMI) DOCUMENTED: ICD-10-PCS | Mod: CPTII,,, | Performed by: INTERNAL MEDICINE

## 2022-12-07 PROCEDURE — 1159F PR MEDICATION LIST DOCUMENTED IN MEDICAL RECORD: ICD-10-PCS | Mod: CPTII,,, | Performed by: INTERNAL MEDICINE

## 2022-12-07 PROCEDURE — 3079F DIAST BP 80-89 MM HG: CPT | Mod: CPTII,,, | Performed by: INTERNAL MEDICINE

## 2022-12-07 PROCEDURE — 3075F SYST BP GE 130 - 139MM HG: CPT | Mod: CPTII,,, | Performed by: INTERNAL MEDICINE

## 2022-12-07 PROCEDURE — 1160F PR REVIEW ALL MEDS BY PRESCRIBER/CLIN PHARMACIST DOCUMENTED: ICD-10-PCS | Mod: CPTII,,, | Performed by: INTERNAL MEDICINE

## 2022-12-07 PROCEDURE — 99214 PR OFFICE/OUTPT VISIT, EST, LEVL IV, 30-39 MIN: ICD-10-PCS | Mod: S$PBB,,, | Performed by: INTERNAL MEDICINE

## 2022-12-07 PROCEDURE — 1160F RVW MEDS BY RX/DR IN RCRD: CPT | Mod: CPTII,,, | Performed by: INTERNAL MEDICINE

## 2022-12-07 NOTE — PROGRESS NOTES
History:  Past Medical History:   Diagnosis Date    Anxiety     Cancer     Circulatory anomaly    Past medical history: Chronic low back pain.  Chronic neck pain.  Dyslipidemia.  Obesity.  HOLLY, on CPAP.  Osteoarthritis.  Tobacco abuse.  Alcohol abuse.  Uterine fibroids.  Vitamin D deficiency.  Decreased hearing left ear.  Procedure/surgical history: Left eye surgery.  Left knee surgery.  Hip joint surgery (04/03/2003).  Total laparoscopic hysterectomy and bilateral salpingectomy for abnormal uterine bleeding, uterine fibroids, uteromegaly (03/16/2017).  Colon polypectomy (08/28/2020).  Uterine artery embolization (08/17/2016) for abnormal uterine bleeding secondary to enlarged uterus with multiple fibroids.  Social history: Single.  Lives in [place], [place].  Has 1 child.  Does not work.  Has been smoking a pack of cigarettes daily for 18 years.  Has been drinking 24 beers over the weekend for 16 years.  Denies illicit drug abuse.    Family history: Maternal grandmother experienced lung cancer in her 80s; used to smoke.  Health maintenance:   -07/20/2014: ThinPrep cervical Pap smear: NIL   -08/28/2020: Screening colonoscopy (positive FIT): 5 mm flat polyp ascending colon, removed (pathology: Ascending colon polyp, polypectomy: Adenomatous polyp; no evidence of malignancy) (rescope in 5 years)   -09/25/2020: Screening mammogram (comparison: 05/15/2019): Bilateral breast negative (BI-RADS 1)  Menstrual and OB/GYN history: S/p laparoscopic hysterectomy and bilateral salpingectomy for abnormal uterine bleeding, uterine fibroids, and uteromegaly (03/16/2017)  Past Surgical History:   Procedure Laterality Date    HYSTERECTOMY      INSERTION OF SUBCUTANEOUS PORT      perianal surgery        Social History     Socioeconomic History    Marital status: Single   Tobacco Use    Smoking status: Every Day     Packs/day: 1.00     Types: Cigarettes     Start date: 8/11/1991    Smokeless tobacco: Never   Substance and Sexual  Activity    Alcohol use: Yes     Alcohol/week: 1.0 standard drink     Types: 1 Cans of beer per week     Comment: on occassion    Drug use: Never    Sexual activity: Yes     Partners: Male      Family History   Problem Relation Age of Onset    Seizures Mother     Hypertension Mother     Diabetes Mother     No Known Problems Maternal Aunt     No Known Problems Maternal Uncle     No Known Problems Paternal Aunt     No Known Problems Paternal Uncle     No Known Problems Paternal Grandmother     No Known Problems Paternal Grandfather     No Known Problems Maternal Grandmother     No Known Problems Maternal Grandfather     No Known Problems Other         Reason for Follow-up:  -squamous cell carcinoma of anus, cT4 cN1 cM0, stage IIIC  Rectal bleeding   Tobacco abuse, alcohol abuse       History of Present Illness:   No chief complaint on file.        Oncologic/Hematologic History:  Oncology History    No history exists.   50-year-old female referred by surgery, for evaluation and management of anal cancer.     She was evaluated by surgery on 05/19/2021 when she presented to emergency department with 3-week history of anal pain and intermittent bleeding.  She noticed a large lump that was painful and intermittently bleeding.  She first noticed this very small lump a few months/1 year prior and it was not painful nor bleeding at the time, therefore, she did not seek attention.  About 3 days prior to presentation, she noticed increasing pain in the anus with intermittent bleeding and then the pain became worse, then, she presented to the emergency department for evaluation.  Denied fevers, chills, nausea, vomiting, abdominal pain, changes in stool color or caliber, melena, dysuria, anal pruritus, vaginal discharge.  Denied being sexually active or history of STIs.  Reported smoking 1 pack of cigarettes daily, and drinking 12 beers over the weekend.  Denies illicit drug abuse.  Most recent colonoscopy 08/28/2020 showed a 5  mm adenomatous polyp which was removed.  No family history of cancers of colon, rectum, illness, etc.  Physical examination revealed a fungating anal mass on the right anal verge, approximately 3 cm size, without evidence of bleeding. Biopsy at bedside was attempted but patient did not tolerate well, resulting in nondiagnostic pathology.  EUA on 06/16/2021 found a mass extending from the anal verge from the right anterior midline almost to posterior midline into the anal canal to the dentate line.  Pathology returned as squamous cell carcinoma.  Significant pain from the mass.  Denies nausea or vomiting.  Normal bowel movements.  Denied fevers, chills, chest pain, dyspnea, nausea, vomiting, constipation, or diarrhea.     04/09/2021: MRI lumbar spine without contrast (lumbar plexopathy, traumatic):   -Multifactorial spinal canal stenosis, severe at L3-L5, moderate to severe at L2-L3, and moderate at L1-L2; multilevel neural foraminal stenosis, severe on the right at L5-S1     04/09/2021: CT head without contrast (TIA):   -No acute intracranial findings     05/19/2021: CT A/P with contrast (rectal mass) (comparison: CT A/P 08/10/2015):   -Trace free fluid in the pelvis; borderline mesorectal lymph node, nonspecific (0.8 cm)     05/19/2021: Anal verge mass, biopsy:   -Superficial squamous mucosa and detached fragments of markedly atypical squamous epithelium; a more severe lesion cannot be ruled out; recommend the deeper biopsy for further diagnostic evaluation     06/16/2021: Examination under anesthesia:   -Anal mass extending from the right anterior midline almost to posterior midline into anal canal dentate line     06/16/2021: Anal mass, biopsy:   -Squamous cell carcinoma arising in a background of high-grade squamous intraepithelial lesion     05/19/2021:   -CMP: Essentially unremarkable   -CBC: Essentially unremarkable, hemoglobin 12.7    -S/P radiotherapy to pelvis 03/10/2022-05/04/2022  -09/07/2022: CT  temporal bone without contrast (mixed conductive and sensorineural hearing loss):  Unremarkable temporal bone CT  -09/07/2022:  MRI temporal bones with and without contrast (hearing loss):  1. No acute intracranial abnormality.  2. Right IAC vascular loop, type II.  Otherwise no abnormality of the internal auditory canals or membranous labyrinths.  -10/28/2022: Seen in Oncology Clinic (NP):  2 week history of bright red bleeding from rectum.  -11/01/2022: Seen in surgical clinic for evaluation of blood both on toilet paper and when she wipes and also sees in toilet; occasional blood clots on tissue paper.  ER referred her to surgery for EUA and anoscopy.  Rectal examination by them showed postradiation scarring of skin around the anus but no masses palpable on PERLA.  -11/29/2022:  CTs C/A/P with contrast (comparison:  01/31/2022):  1. Some bladder and rectal wall thickening may be treatment related.  2. Similar small pelvic sidewall and iliac lymph nodes.  3. No new suspicious findings chest, abdomen or pelvis.      07/29/2021:   Presents for initial medical oncology consultation.  Pleasant lady.  In some discomfort from anorectal pain.   -States that anorectal pain started after colonoscopy which was performed on 08/28/2020.  Pain has been getting worse.  Currently, pain level is 10/10.  Has been taking tramadol as needed.  Takes Ex-Lax for constipation; with Levaquin, bowel moods are good.  Blood on toilet wipes.  No blood in toilet bowl.  Throbbing pain in her rectal area.   -Generalized, constant aches and pains, especially in legs and feet   -Some numbness and tingling in feet   -No significant weakness, fatigue, malaise, fevers, chills, anorexia, unintentional weight loss, chest pain, cough, dyspnea, nausea, vomiting, abdominal distention, etc.  No unusual headaches or focal neurological symptoms.    Interval History:  PORT FLUSH   [No matching plan found]   12/07/2022:  -S/P radiotherapy to pelvis  03/10/2022-05/04/2022  -09/07/2022: CT temporal bone without contrast (mixed conductive and sensorineural hearing loss):  Unremarkable temporal bone CT  -09/07/2022:  MRI temporal bones with and without contrast (hearing loss):  1. No acute intracranial abnormality.  2. Right IAC vascular loop, type II.  Otherwise no abnormality of the internal auditory canals or membranous labyrinths.  -10/28/2022: Seen in Oncology Clinic (NP):  2 week history of bright red bleeding from rectum.  -11/01/2022: Seen in surgical clinic for evaluation of blood both on toilet paper and when she wipes and also sees in toilet; occasional blood clots on tissue paper.  ER referred her to surgery for EUA and anoscopy.  Rectal examination by them showed postradiation scarring of skin around the anus but no masses palpable on PERLA.  -11/29/2022:  CTs C/A/P with contrast (comparison:  01/31/2022):  1. Some bladder and rectal wall thickening may be treatment related.  2. Similar small pelvic sidewall and iliac lymph nodes.  3. No new suspicious findings chest, abdomen or pelvis.  Labs reviewed.  Hemoglobin 12.9.  CBC unremarkable.  CMP unremarkable.  Presents for a follow-up visit.  She says that over last couple of months, she had 2 or 3 episodes of painless rectal bleeding, in the form of a few clots even without bowel movement, lasting about 3 or 4 days.  Bleeding was not severe.  Last episode was a month ago.  Bowels moving normally.  No abdominal pain, distention, nausea, vomiting.  Good appetite.  ECOG 1.  No fevers or chills.      Medications:  Current Outpatient Medications on File Prior to Visit   Medication Sig Dispense Refill    ALPRAZolam (XANAX) 0.5 MG tablet   See Instructions, Take 1 tab by mouth 30 mins prior to MRI and 1 tab 30 mins before PET scan., # 2 tab(s), 0 Refill(s), Pharmacy: [facility], 168, cm, Height/Length Dosing, 01/25/22 12:51:00 CST, 108.6, kg, Weight Dosing, 01/25/...      amitriptyline  (ELAVIL) 50 MG tablet Take 50 mg by mouth.      atorvastatin (LIPITOR) 40 MG tablet Take 1 tablet (40 mg total) by mouth every evening. 90 tablet 3    cholecalciferol, vitamin D3, (VITAMIN D3) 50 mcg (2,000 unit) Cap capsule Take 1 capsule (2,000 Units total) by mouth once daily. 30 capsule 3    EScitalopram oxalate (LEXAPRO) 10 MG tablet       HYDROcodone-acetaminophen (NORCO) 5-325 mg per tablet       LIDOcaine-prilocaine (EMLA) cream Apply a dime size amount to right chest port area 45 min to an hour prior to port being accessed 30 g 0    morphine (MS CONTIN) 30 MG 12 hr tablet Take 30 mg by mouth.      omega-3s-dha-epa-fish oil (OMEGA-3 FISH OIL) 300-1,000 mg Cap Take 1,000 mg by mouth.      oxyCODONE-acetaminophen (PERCOCET)  mg per tablet Take by mouth.      pregabalin (LYRICA) 50 MG capsule Take 1 capsule (50 mg total) by mouth 2 (two) times daily. 60 capsule 11    silver sulfADIAZINE 1% (SILVADENE) 1 % cream        Current Facility-Administered Medications on File Prior to Visit   Medication Dose Route Frequency Provider Last Rate Last Admin    balsam peru-castor oiL Oint   Topical (Top) Daily Nasrin Macias MD        heparin, porcine (PF) 100 unit/mL injection flush 500 Units  500 Units Intravenous PRN Pablito Quinones MD   500 Units at 11/17/22 1015    sodium chloride 0.9% flush 10 mL  10 mL Intravenous PRN Pablito Quinones MD   10 mL at 11/17/22 1015       Review of Systems:   All systems reviewed and found to be negative except for the symptoms detailed above    Physical Examination:   VITAL SIGNS: There were no vitals filed for this visit.  GENERAL:  In no apparent distress.    HEAD:  No signs of head trauma.  EYES:  Pupils are equal.  Extraocular motions intact.    EARS:  Hearing grossly intact.  MOUTH:  Oropharynx is normal.   NECK:  No adenopathy, no JVD.     CHEST:  Chest with clear breath sounds bilaterally.  No wheezes, rales, rhonchi.    CARDIAC:  Regular rate and rhythm.  S1 and S2,  without murmurs, gallops, rubs.  VASCULAR:  No Edema.  Peripheral pulses normal and equal in all extremities.  ABDOMEN:  Soft, without detectable tenderness.  No sign of distention.  No   rebound or guarding, and no masses palpated.   Bowel Sounds normal.  MUSCULOSKELETAL:  Good range of motion of all major joints. Extremities without clubbing, cyanosis or edema.    NEUROLOGIC EXAM:  Alert and oriented x 3.  No focal sensory or strength deficits.   Speech normal.  Follows commands.  PSYCHIATRIC:  Mood normal.    No results for input(s): CBC in the last 72 hours.   No results for input(s): CMP in the last 72 hours.     Assessment:  Problem List Items Addressed This Visit    None  #Squamous cell carcinoma of anus:   -Presentation (05/2021): Enlarging anal lump for 1 year; anal pain and intermittent bleeding for 3 weeks   -Fungating anal mass on the right anal verge, approximately 3 cm (05/19/2021)   -On EUA, anal mass extending from right anterior midline almost to posterior midline into anal canal dentate line   -Borderline mesorectal lymph node, 0.8 cm on CT A/P (05/19/2021)   -Anal mass not visualized on CT A/P   -EUA and biopsy (06/16/2021)   -08/12/2021: CT chest with contrast (staging): No acute abnormality   -08/24/2021: PET/CT (comparison: CT chest 08/12/2021; CT A/P 05/19/2021): FDG uptake around the anal canal likely corresponding to known malignancy (maximum SUV 20.8); stable size of small mildly hypermetabolic right mesorectal, pelvic sidewall, and inguinal lymph nodes, nonspecific   -10/01/2021: MRI pelvis with and without contrast (comparison: PET/CT 08/24/2021): Mass along the right anal canal measuring up to 55 mm; suspected area of soft tissue extension into the posterior lower vagina/introitus; several suspicious perirectal lymph nodes; nonspecific borderline enlarged bilateral iliac and inguinal lymph nodes (mass 42 x 17 x 55 mm, extension anal margin, most likely extends through the external  sphincter; several prominent perirectal lymph nodes measuring up to 8 mm in short axis; bilateral external iliac lymph nodes also measure up to 8 mm in short axis; bilateral inguinal lymph nodes measure up to 10 mm)   -10/13/2021: Mediport placed   -11/17/2021: Screening mammogram (comparison: 09/25/2020 mammogram): Right breast benign (BI-RADS 2); left breast negative (BI-RADS 1)   -12/09/2021: She called our office stating that she wants to start chemoradiation therapy ASAP and that she is passing blood clots and that tumor in the anus is bleeding a lot   -01/05/2022: Presented to ED with worsening depression for 3 days; suicidal ideation; feeling overwhelmed; [facility]   -Follows up with ID for latent syphilis (antibody + 01/06/2022 at [facility]; no prior treatment; ID planning to treat with Bicillin LA 2,400,000 units weekly x3 weeks)   -01/31/2022: Restaging CT C/A/P with contrast: Soft tissue along the right anal canal is stable to perhaps slightly larger since 10/01/2021 (43 x 31 mm, previously 42 x 23 mm); slight enlargement of the left internal iliac lymph node, now measuring up to 10 mm; multiple other pelvic lymph nodes are stable; no metastasis; trace ascites   -01/31/2022: Whole-body nuclear medicine bone scan: No bone metastasis   -01/21/2022: CMP unremarkable; mild neutrophilic leukocytosis, hemoglobin 11.9   -01/25/2022: Syphilis antibody positive; HIV negative; RPR nonreactive   -Chemotherapy started 03/02/2022   -Day 29 mitomycin C on 03/31/2022   -Follows up with ID for history of latent syphilis; s/p Bicillin LA 2,400,000 units weekly x3 (01/25/2022, 02/01/2022, 02/08/2022)  -S/P radiotherapy to pelvis 03/10/2022-05/04/2022  -09/07/2022: CT temporal bone without contrast (mixed conductive and sensorineural hearing loss):  Unremarkable temporal bone CT  -09/07/2022:  MRI temporal bones with and without contrast (hearing loss):  1. No acute intracranial  abnormality.  2. Right IAC vascular loop, type II.  Otherwise no abnormality of the internal auditory canals or membranous labyrinths.  -10/28/2022: Seen in Oncology Clinic (NP):  2 week history of bright red bleeding from rectum.  -11/01/2022: Seen in surgical clinic for evaluation of blood both on toilet paper and when she wipes and also sees in toilet; occasional blood clots on tissue paper.  ER referred her to surgery for EUA and anoscopy.  Rectal examination by them showed postradiation scarring of skin around the anus but no masses palpable on PERLA.  -11/29/2022:  CTs C/A/P with contrast (comparison:  01/31/2022):  1. Some bladder and rectal wall thickening may be treatment related.  2. Similar small pelvic sidewall and iliac lymph nodes.  3. No new suspicious findings chest, abdomen or pelvis.  To summarize:   -Squamous cell carcinoma of the anus   -Presentation (05/2021): Enlarging anal lump for 1 year; anal pain and intermittent bleeding for 3 weeks   -Fungating anal mass right anal verge   -S/p EUA (06/16/2021)   -Mass 3 cm on physical exam (05/19/2021); mass not visualized on CT A/P; mass 55 mm on MRI; mass extending into lower vagina/introitus on MRI   -Several suspicious perirectal and nonspecific borderline enlarged bilateral iliac, mesorectal, and inguinal lymph nodes on MRI and PET/CT   >>> 01/21/2022: cT4 cN1c M0, at least stage IIIC   -Noncompliant with follow-up   -Restaging CTs and bone scan (01/31/2022): 43 x 31 mm right anal canal soft tissue (anal cancer); left internal iliac lymph node 10 mm, slightly enlarged; other pelvic lymph nodes stable; no metastasis   -Of note, her restaging PET/CT and pelvic MRI scan was denied by her insurance.   -S/p chemotherapy 03/02/2022-03/31/2022  -S/P radiotherapy to pelvis 03/10/2022-05/04/2022  -rectal bleeding, starting 10/2022; evaluated by surgery 11/01/2022; EUA and anoscopy planned  -no recurrence or metastasis on CTs C/A/P with contrast  11/01/2022      #Tobacco abuse, alcohol abuse     #History of depression, suicidal ideation     #Latent syphilis: Syphilis antibody positive (11/25/2022); following up with ID   -S/p Bicillin LA 2,400,000 units weekly x3 (01/25/2022, 02/01/2022, 02/08/2022)    #Comorbidities: Chronic low back pain, chronic neck pain, obesity, HOLLY, on CPAP, etc.      Plan:   -Squamous cell carcinoma of anus    -S/p chemotherapy 03/02/2022-03/31/2022  -S/P radiotherapy to pelvis 03/10/2022-05/04/2022  -rectal bleeding, starting 10/2022; evaluated by surgery 11/01/2022; EUA and anoscopy planned  -no recurrence or metastasis on CTs C/A/P with contrast 11/01/2022  >>>  Follow-up with surgery for evaluation of rectal bleeding (rule out recurrence of anal carcinoma)    Chemotherapy administered concurrent with radiotherapy:   1.  Capecitabine 825 mg/m² p.o. twice daily, Monday-Friday, on each day that radiation therapy is given, throughout the duration of radiation therapy (typically 28 treatment days);   2.  Mitomycin 10 mg/m² days 1 and 29.     -It is not the standard of care to repeat imaging studies after completion of chemoradiation therapy.  Imaging studies are recommended only if, on 8-12-week evaluation, there is persistent disease.      Surveillance: (After complete remission):   1.  PERLA every 3-6 months for 5 years (will defer to surgery)   2.  Inguinal lymph node palpation every 3-6 months for 5 years   3.  Anoscopy every 6-12 months for 3 years (will defer to surgery)   4.  CT chest/abdomen/pelvis with contrast or chest CT without contrast and abdominal/pelvic MRI with contrast annually for 3 years (stage II-III)      For response assessment, see below.     -History of depression and suicidal ideation, ER presentation 01/05/2022, transferred to outside psych facility; as of 01/21/2022, talking to a counselor via telephone     -01/21/2022: Still smoking a pack of cigarettes daily; tobacco cessation emphasized.     -Latent  syphilis; follow-up with ID; s/p Bicillin LA 2.4 mL IM weekly x3 (completed 02/08/2022)    Follow-up in 1 month, after surgical evaluation.     Above discussed at length with the patient.  All questions answered.   She understands and agrees this plan.   ------------------      Follow-up:  Evaluate in 8-12 weeks (after completion of chemoradiation therapy) with exam + PERLA:   1.  If complete remission, then: Surveillance;     2.  If persistent disease, then: Reevaluate in 4 weeks, including imaging studies; if progression or no progression on serial exams, then continue observation and reevaluation at 3-month intervals, utilizing imaging studies; if complete remission, then surveillance   (Follow patients who have not achieved a complete clinical response with persistent anal cancer up to 6 months following completion of RT and chemotherapy as long as there is no evidence of progressive disease during this period of follow-up; persistent disease may continue to regress even at 26 weeks from the start of treatment)     3.  If progressive disease, then: If biopsy-proven, then restage, including imaging studies; if locally recurrent, then, APR/groin dissection if positive inguinal lymph nodes, followed by surveillance; if, on restaging, metastatic disease, then, systemic therapy   (Follow patients who have not achieved a complete clinical response with persistent anal cancer up to 6 months following completion of RT and chemotherapy as long as there is no evidence of progressive disease during this period of follow-up; persistent disease may continue to regress even at 26 weeks from the start of treatment)      Surveillance: (After complete remission):   1.  PERLA every 3-6 months for 5 years (will defer to surgery)   2.  Inguinal lymph node palpation every 3-6 months for 5 years   3.  Anoscopy every 6-12 months for 3 years (will defer to surgery)   4.  CT chest/abdomen/pelvis with contrast or chest CT without contrast  and abdominal/pelvic MRI with contrast annually for 3 years (stage II-III)       Follow-up:  No follow-ups on file.

## 2022-12-29 ENCOUNTER — OFFICE VISIT (OUTPATIENT)
Dept: INTERNAL MEDICINE | Facility: CLINIC | Age: 52
End: 2022-12-29
Payer: MEDICAID

## 2022-12-29 ENCOUNTER — LAB VISIT (OUTPATIENT)
Dept: LAB | Facility: HOSPITAL | Age: 52
End: 2022-12-29
Attending: STUDENT IN AN ORGANIZED HEALTH CARE EDUCATION/TRAINING PROGRAM
Payer: MEDICAID

## 2022-12-29 VITALS
HEIGHT: 66 IN | BODY MASS INDEX: 35.42 KG/M2 | TEMPERATURE: 98 F | SYSTOLIC BLOOD PRESSURE: 121 MMHG | RESPIRATION RATE: 18 BRPM | WEIGHT: 220.38 LBS | DIASTOLIC BLOOD PRESSURE: 78 MMHG | HEART RATE: 86 BPM

## 2022-12-29 DIAGNOSIS — Z12.31 BREAST CANCER SCREENING BY MAMMOGRAM: ICD-10-CM

## 2022-12-29 DIAGNOSIS — G47.33 OSA ON CPAP: ICD-10-CM

## 2022-12-29 DIAGNOSIS — R73.03 PRE-DIABETES: ICD-10-CM

## 2022-12-29 DIAGNOSIS — E78.5 HYPERLIPIDEMIA, UNSPECIFIED HYPERLIPIDEMIA TYPE: ICD-10-CM

## 2022-12-29 DIAGNOSIS — Z00.00 WELLNESS EXAMINATION: ICD-10-CM

## 2022-12-29 DIAGNOSIS — M54.50 CHRONIC BILATERAL LOW BACK PAIN, UNSPECIFIED WHETHER SCIATICA PRESENT: Primary | ICD-10-CM

## 2022-12-29 DIAGNOSIS — E55.9 VITAMIN D DEFICIENCY: ICD-10-CM

## 2022-12-29 DIAGNOSIS — Z72.0 TOBACCO USE: ICD-10-CM

## 2022-12-29 DIAGNOSIS — G89.29 CHRONIC BILATERAL LOW BACK PAIN, UNSPECIFIED WHETHER SCIATICA PRESENT: Primary | ICD-10-CM

## 2022-12-29 DIAGNOSIS — C21.0 ANAL SQUAMOUS CELL CARCINOMA: ICD-10-CM

## 2022-12-29 LAB
DEPRECATED CALCIDIOL+CALCIFEROL SERPL-MC: 16.6 NG/ML (ref 30–80)
EST. AVERAGE GLUCOSE BLD GHB EST-MCNC: 105.4 MG/DL
HBA1C MFR BLD: 5.3 %
HCV AB SERPL QL IA: NONREACTIVE

## 2022-12-29 PROCEDURE — 99215 OFFICE O/P EST HI 40 MIN: CPT | Mod: PBBFAC

## 2022-12-29 PROCEDURE — 83036 HEMOGLOBIN GLYCOSYLATED A1C: CPT

## 2022-12-29 PROCEDURE — 82306 VITAMIN D 25 HYDROXY: CPT

## 2022-12-29 PROCEDURE — 36415 COLL VENOUS BLD VENIPUNCTURE: CPT

## 2022-12-29 PROCEDURE — 86803 HEPATITIS C AB TEST: CPT

## 2022-12-29 RX ORDER — LIDOCAINE HYDROCHLORIDE 20 MG/ML
JELLY TOPICAL
COMMUNITY
Start: 2022-11-03 | End: 2023-10-30

## 2022-12-29 RX ORDER — ERGOCALCIFEROL 1.25 MG/1
50000 CAPSULE ORAL
Qty: 4 CAPSULE | Refills: 0 | Status: SHIPPED | OUTPATIENT
Start: 2022-12-29 | End: 2023-04-18 | Stop reason: ALTCHOICE
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
## 2022-12-30 ENCOUNTER — TELEPHONE (OUTPATIENT)
Dept: INTERNAL MEDICINE | Facility: CLINIC | Age: 52
End: 2022-12-30
Payer: MEDICAID

## 2022-12-30 NOTE — TELEPHONE ENCOUNTER
Attempt to contact pt at [phone]. However I was unable to contact pt are leave voice msg ; regarding low Vitamin D level and Rx sent to pt pharmacy.

## 2022-12-30 NOTE — TELEPHONE ENCOUNTER
----- Message from Lai Casas DO sent at 12/29/2022  2:56 PM CST -----  Hello,    Please inform Ms. Banegas her Vit D level is low at 16.6. I have sent a Rx for Vit D to take weekly for 4 weeks to her pharmacy. Thanks!    Lai Casas DO

## 2022-12-30 NOTE — TELEPHONE ENCOUNTER
----- Message from Lai Casas DO sent at 12/29/2022  2:56 PM CST -----  Hello,    Please inform Ms. Banegas her Vit D level is low at 16.6. I have sent a Rx for Vit D to take weekly for 4 weeks to her pharmacy. Thanks!    Lai Casas DO   2-4 times a month

## 2023-02-01 DIAGNOSIS — C21.0 MALIGNANT NEOPLASM OF ANUS: Primary | ICD-10-CM

## 2023-02-02 ENCOUNTER — OFFICE VISIT (OUTPATIENT)
Dept: GYNECOLOGY | Facility: CLINIC | Age: 53
End: 2023-02-02
Payer: MEDICAID

## 2023-02-02 ENCOUNTER — INFUSION (OUTPATIENT)
Dept: INFUSION THERAPY | Facility: HOSPITAL | Age: 53
End: 2023-02-02
Attending: INTERNAL MEDICINE
Payer: MEDICAID

## 2023-02-02 VITALS
HEART RATE: 114 BPM | SYSTOLIC BLOOD PRESSURE: 160 MMHG | TEMPERATURE: 98 F | DIASTOLIC BLOOD PRESSURE: 91 MMHG | OXYGEN SATURATION: 100 % | WEIGHT: 225.81 LBS | BODY MASS INDEX: 36.45 KG/M2 | RESPIRATION RATE: 20 BRPM

## 2023-02-02 VITALS
DIASTOLIC BLOOD PRESSURE: 87 MMHG | SYSTOLIC BLOOD PRESSURE: 126 MMHG | RESPIRATION RATE: 20 BRPM | WEIGHT: 225.81 LBS | OXYGEN SATURATION: 100 % | HEART RATE: 107 BPM | HEIGHT: 66 IN | BODY MASS INDEX: 36.29 KG/M2 | TEMPERATURE: 98 F

## 2023-02-02 DIAGNOSIS — Z72.0 TOBACCO USE: ICD-10-CM

## 2023-02-02 DIAGNOSIS — Z85.048 HISTORY OF RECTAL OR ANAL CANCER: Primary | ICD-10-CM

## 2023-02-02 DIAGNOSIS — Z01.419 ENCOUNTER FOR ANNUAL ROUTINE GYNECOLOGICAL EXAMINATION: Primary | ICD-10-CM

## 2023-02-02 PROCEDURE — 1159F PR MEDICATION LIST DOCUMENTED IN MEDICAL RECORD: ICD-10-PCS | Mod: CPTII,,, | Performed by: NURSE PRACTITIONER

## 2023-02-02 PROCEDURE — 3008F PR BODY MASS INDEX (BMI) DOCUMENTED: ICD-10-PCS | Mod: CPTII,,, | Performed by: NURSE PRACTITIONER

## 2023-02-02 PROCEDURE — 99396 PREV VISIT EST AGE 40-64: CPT | Mod: S$PBB,,, | Performed by: NURSE PRACTITIONER

## 2023-02-02 PROCEDURE — 99396 PR PREVENTIVE VISIT,EST,40-64: ICD-10-PCS | Mod: S$PBB,,, | Performed by: NURSE PRACTITIONER

## 2023-02-02 PROCEDURE — 1159F MED LIST DOCD IN RCRD: CPT | Mod: CPTII,,, | Performed by: NURSE PRACTITIONER

## 2023-02-02 PROCEDURE — 3079F PR MOST RECENT DIASTOLIC BLOOD PRESSURE 80-89 MM HG: ICD-10-PCS | Mod: CPTII,,, | Performed by: NURSE PRACTITIONER

## 2023-02-02 PROCEDURE — 96523 IRRIG DRUG DELIVERY DEVICE: CPT | Mod: 59

## 2023-02-02 PROCEDURE — 25000003 PHARM REV CODE 250: Performed by: INTERNAL MEDICINE

## 2023-02-02 PROCEDURE — 3008F BODY MASS INDEX DOCD: CPT | Mod: CPTII,,, | Performed by: NURSE PRACTITIONER

## 2023-02-02 PROCEDURE — 63600175 PHARM REV CODE 636 W HCPCS: Performed by: INTERNAL MEDICINE

## 2023-02-02 PROCEDURE — A4216 STERILE WATER/SALINE, 10 ML: HCPCS | Performed by: INTERNAL MEDICINE

## 2023-02-02 PROCEDURE — 3074F SYST BP LT 130 MM HG: CPT | Mod: CPTII,,, | Performed by: NURSE PRACTITIONER

## 2023-02-02 PROCEDURE — 3079F DIAST BP 80-89 MM HG: CPT | Mod: CPTII,,, | Performed by: NURSE PRACTITIONER

## 2023-02-02 PROCEDURE — 3074F PR MOST RECENT SYSTOLIC BLOOD PRESSURE < 130 MM HG: ICD-10-PCS | Mod: CPTII,,, | Performed by: NURSE PRACTITIONER

## 2023-02-02 PROCEDURE — 99214 OFFICE O/P EST MOD 30 MIN: CPT | Mod: PBBFAC | Performed by: NURSE PRACTITIONER

## 2023-02-02 RX ORDER — SODIUM CHLORIDE 0.9 % (FLUSH) 0.9 %
10 SYRINGE (ML) INJECTION
Status: CANCELLED | OUTPATIENT
Start: 2023-02-02

## 2023-02-02 RX ORDER — SODIUM CHLORIDE 0.9 % (FLUSH) 0.9 %
10 SYRINGE (ML) INJECTION
Status: DISCONTINUED | OUTPATIENT
Start: 2023-02-02 | End: 2023-02-02 | Stop reason: HOSPADM

## 2023-02-02 RX ORDER — HEPARIN 100 UNIT/ML
500 SYRINGE INTRAVENOUS
Status: DISCONTINUED | OUTPATIENT
Start: 2023-02-02 | End: 2023-02-02 | Stop reason: HOSPADM

## 2023-02-02 RX ORDER — HEPARIN 100 UNIT/ML
500 SYRINGE INTRAVENOUS
Status: CANCELLED | OUTPATIENT
Start: 2023-02-02

## 2023-02-02 RX ADMIN — HEPARIN 500 UNITS: 100 SYRINGE at 02:02

## 2023-02-02 RX ADMIN — Medication 10 ML: at 02:02

## 2023-02-02 NOTE — PROGRESS NOTES
"  Subjective:       Patient ID: Antoine Banegas is a 52 y.o. female.    Chief Complaint:  Gynecologic Exam      History of Present Illness  The patient  here for annual exam. Pt had partial hysterectomy in 2017 for fibroids. MG- & BIRADS 2. Denies breast or urinary complaints. Denies pelvic pain, abnormal bleeding or discharge. Pt was treated for syphilis with Bicilin x3 per ID clinic. Admits tobacco use. Dep. screening 0. Denies fly hx of breast, ovarian, uterine or colon cancer. Pt currently followed by oncology for anal cancer.    GYN & OB History  No LMP recorded (lmp unknown). Patient has had a hysterectomy.     Review of patient's allergies indicates:  No Known Allergies  Past Medical History:   Diagnosis Date    Anxiety     Cancer     Circulatory anomaly      OB History    Para Term  AB Living   1 1           SAB IAB Ectopic Multiple Live Births                  # Outcome Date GA Lbr Jonny/2nd Weight Sex Delivery Anes PTL Lv   1 Para                 Review of Systems  Review of Systems    Negative except for pertinent findings for positives per HPI     Objective:    Physical Exam    /87 (BP Location: Left arm, Patient Position: Sitting, BP Method: Medium (Automatic))   Pulse 107   Temp 97.9 °F (36.6 °C) (Oral)   Resp 20   Ht 5' 6" (1.676 m)   Wt 102.4 kg (225 lb 12.8 oz)   LMP  (LMP Unknown)   SpO2 100%   BMI 36.45 kg/m²   GENERAL: Well-developed female in no acute distress.  SKIN: Normal to inspection,warm, dry and intact.  BREASTS: No masses, lumps, discharge, tenderness.  VULVA: General appearance WNL; external genitalia with no lesions or erythema.  BIMANUAL EXAM: Pink vaginal mucosa, Vaginal cuff intact. Uterus/Cervix surgically absent. Christophe adnexa reveal no tenderness.  PSYCHIATRIC: Patient is oriented to person, place, and time. Mood and affect are normal.    Assessment:       1. Encounter for annual routine gynecological examination    2. Tobacco use       Plan: "   Antoine was seen today for gynecologic exam.    Diagnoses and all orders for this visit:    Encounter for annual routine gynecological examination    Tobacco use    Pelvic today, pap deferred d/t hysterectomy  Given phone number to schedule MG per PCP  Smoking cessation counseling provided. Instructed on smoking cessation program through [facility] and pharmacological interventions to aid in cessation.   Follow up in about 1 year (around 2/2/2024) for annual exam.

## 2023-02-07 ENCOUNTER — OFFICE VISIT (OUTPATIENT)
Dept: SURGERY | Facility: CLINIC | Age: 53
End: 2023-02-07
Attending: INTERNAL MEDICINE
Payer: MEDICAID

## 2023-02-07 VITALS
HEIGHT: 66 IN | DIASTOLIC BLOOD PRESSURE: 84 MMHG | WEIGHT: 223 LBS | HEART RATE: 84 BPM | TEMPERATURE: 98 F | OXYGEN SATURATION: 100 % | BODY MASS INDEX: 35.84 KG/M2 | RESPIRATION RATE: 18 BRPM | SYSTOLIC BLOOD PRESSURE: 128 MMHG

## 2023-02-07 DIAGNOSIS — C21.0 MALIGNANT NEOPLASM OF ANUS: ICD-10-CM

## 2023-02-07 PROCEDURE — 99215 OFFICE O/P EST HI 40 MIN: CPT | Mod: PBBFAC

## 2023-02-07 RX ORDER — SODIUM CHLORIDE 9 MG/ML
INJECTION, SOLUTION INTRAVENOUS CONTINUOUS
Status: CANCELLED | OUTPATIENT
Start: 2023-02-07

## 2023-02-07 NOTE — PROGRESS NOTES
Patient seen by Dr Flores. EBRTO scheduled for 2/17/23 with a 2 week f/u.  Preop information explained.

## 2023-02-07 NOTE — H&P (VIEW-ONLY)
[facility] General Surgery Clinic Note    CC: 2 days of hematochezia    HPI: Antoine Banegas is a 52 y.o. female with PMHx of squamous cell carcinoma of the anus s/p chemoradiation in 3/2022 presenting with 2 days of blood on noticed on toilet paper with associated with possible perianal mass and thin stools. Denies visible blood within stool or in toilet bowl. Denies pain with defecation. Denies n/v or significant weight loss. Patient has hx of syphilis s/p treatment. Pt. Denies any other STIs including HPV.  Of note, patient seen in general surgery clinic on 11/1/22 after 9 days of blood per rectum with plan for EUA and anoscopy. Patient unsure of why never had operation scheduled.    Past Medical History:   Diagnosis Date    Anxiety     Cancer     Circulatory anomaly      Past Surgical History:   Procedure Laterality Date    HYSTERECTOMY      INSERTION OF SUBCUTANEOUS PORT      perianal surgery       Current Outpatient Medications   Medication Instructions    ALPRAZolam (XANAX) 0.5 MG tablet   See Instructions, Take 1 tab by mouth 30 mins prior to MRI and 1 tab 30 mins before PET scan., # 2 tab(s), 0 Refill(s), Pharmacy: [facility], 168, cm, Height/Length Dosing, 01/25/22 12:51:00 CST, 108.6, kg, Weight Dosing, 01/25/...    amitriptyline (ELAVIL) 50 mg, Oral, Nightly    atorvastatin (LIPITOR) 40 mg, Oral, Nightly    ergocalciferol (ERGOCALCIFEROL) 50,000 Units, Oral, Every 7 days    EScitalopram oxalate (LEXAPRO) 10 mg, Oral, Daily    HYDROcodone-acetaminophen (NORCO) 5-325 mg per tablet 2 tablets, Oral, Every 6 hours PRN    LIDOcaine HCL 2% (XYLOCAINE) 2 % jelly No dose, route, or frequency recorded.    LIDOcaine-prilocaine (EMLA) cream Apply a dime size amount to right chest port area 45 min to an hour prior to port being accessed    morphine (MS CONTIN) 30 mg, Oral    omega-3s-dha-epa-fish oil (OMEGA-3 FISH OIL) 300-1,000 mg Cap 1,000 mg, Oral    oxyCODONE-acetaminophen (PERCOCET)   mg per tablet 1 tablet, Oral, Every 6 hours PRN    pregabalin (LYRICA) 50 mg, Oral, 2 times daily    silver sulfADIAZINE 1% (SILVADENE) 1 % cream No dose, route, or frequency recorded.     Review of patient's allergies indicates:  No Known Allergies      Social History:  Tobacco: 1 ppd with smoking >30 years  Alcohol: 3 16oz beer/day    Family History:  Grandmother: lung cancer        Objective:    Vitals:  Vitals:    23 0955   BP: 128/84   Pulse: 84   Resp: 18   Temp: 97.7 °F (36.5 °C)          Physical Exam:  Gen: NAD, well nourished  Neuro: awake, alert, answering questions appropriately  CV: regular rhythm  Resp: non-labored breathing  Abd: soft, non-tender, non-distended  Ext: no edema in bilateral ankles  Rectal: patient does not tolerate exam well  mass vs redundant skin present on inferior anal border; post-radiation silver-anal skin changes present; no gross blood present    Imagin22: CT abdomen and pelvis  There is no bowel obstruction.  Mild wall thickening of the rectum.  No perirectal lymph nodes are seen.  There are stable small pelvic sidewall and iliac lymph nodes.  No inguinal adenopathy.    Assessment/Plan:  Antoine Banegas is a 52 y.o. female s/p chemoradiation of squamous cell carcinoma of the anus presenting with recurrence of hematochezia.    - consented for EUA today  - to OR on 23    Rosalio Lester, MS3  [facility] General Surgery     RESIDENT ADDENDUM  52 F with hx anal SCC s/p geovanna protocol 1 year ago who presents for evaluation of anal bleeding concerning for possible recurrence.  For some reason was not scheduled for EUA after last clinic visit, unsure why. Has had continued bleeding since that time. Not on blood thinners.  Patient does not tolerate anal exam 2/2 pain however has obvious radiation changes, no obvious masses.     Will schedule for EUA with possible biopsy with Dr. Robertson on .  Informed consent obtained.       Mary Flores MD  [facility] General  Surgery, PGY5

## 2023-02-07 NOTE — PROGRESS NOTES
[facility] General Surgery Clinic Note    CC: 2 days of hematochezia    HPI: Antoine Banegas is a 52 y.o. female with PMHx of squamous cell carcinoma of the anus s/p chemoradiation in 3/2022 presenting with 2 days of blood on noticed on toilet paper with associated with possible perianal mass and thin stools. Denies visible blood within stool or in toilet bowl. Denies pain with defecation. Denies n/v or significant weight loss. Patient has hx of syphilis s/p treatment. Pt. Denies any other STIs including HPV.  Of note, patient seen in general surgery clinic on 11/1/22 after 9 days of blood per rectum with plan for EUA and anoscopy. Patient unsure of why never had operation scheduled.    Past Medical History:   Diagnosis Date    Anxiety     Cancer     Circulatory anomaly      Past Surgical History:   Procedure Laterality Date    HYSTERECTOMY      INSERTION OF SUBCUTANEOUS PORT      perianal surgery       Current Outpatient Medications   Medication Instructions    ALPRAZolam (XANAX) 0.5 MG tablet   See Instructions, Take 1 tab by mouth 30 mins prior to MRI and 1 tab 30 mins before PET scan., # 2 tab(s), 0 Refill(s), Pharmacy: [facility], 168, cm, Height/Length Dosing, 01/25/22 12:51:00 CST, 108.6, kg, Weight Dosing, 01/25/...    amitriptyline (ELAVIL) 50 mg, Oral, Nightly    atorvastatin (LIPITOR) 40 mg, Oral, Nightly    ergocalciferol (ERGOCALCIFEROL) 50,000 Units, Oral, Every 7 days    EScitalopram oxalate (LEXAPRO) 10 mg, Oral, Daily    HYDROcodone-acetaminophen (NORCO) 5-325 mg per tablet 2 tablets, Oral, Every 6 hours PRN    LIDOcaine HCL 2% (XYLOCAINE) 2 % jelly No dose, route, or frequency recorded.    LIDOcaine-prilocaine (EMLA) cream Apply a dime size amount to right chest port area 45 min to an hour prior to port being accessed    morphine (MS CONTIN) 30 mg, Oral    omega-3s-dha-epa-fish oil (OMEGA-3 FISH OIL) 300-1,000 mg Cap 1,000 mg, Oral    oxyCODONE-acetaminophen (PERCOCET)   mg per tablet 1 tablet, Oral, Every 6 hours PRN    pregabalin (LYRICA) 50 mg, Oral, 2 times daily    silver sulfADIAZINE 1% (SILVADENE) 1 % cream No dose, route, or frequency recorded.     Review of patient's allergies indicates:  No Known Allergies      Social History:  Tobacco: 1 ppd with smoking >30 years  Alcohol: 3 16oz beer/day    Family History:  Grandmother: lung cancer        Objective:    Vitals:  Vitals:    23 0955   BP: 128/84   Pulse: 84   Resp: 18   Temp: 97.7 °F (36.5 °C)          Physical Exam:  Gen: NAD, well nourished  Neuro: awake, alert, answering questions appropriately  CV: regular rhythm  Resp: non-labored breathing  Abd: soft, non-tender, non-distended  Ext: no edema in bilateral ankles  Rectal: patient does not tolerate exam well  mass vs redundant skin present on inferior anal border; post-radiation silver-anal skin changes present; no gross blood present    Imagin22: CT abdomen and pelvis  There is no bowel obstruction.  Mild wall thickening of the rectum.  No perirectal lymph nodes are seen.  There are stable small pelvic sidewall and iliac lymph nodes.  No inguinal adenopathy.    Assessment/Plan:  Antoine Banegas is a 52 y.o. female s/p chemoradiation of squamous cell carcinoma of the anus presenting with recurrence of hematochezia.    - consented for EUA today  - to OR on 23    Rosalio Lester, MS3  [facility]     RESIDENT ADDENDUM  52 F with hx anal SCC s/p geovanna protocol 1 year ago who presents for evaluation of anal bleeding concerning for possible recurrence.  For some reason was not scheduled for EUA after last clinic visit, unsure why. Has had continued bleeding since that time. Not on blood thinners.  Patient does not tolerate anal exam 2/2 pain however has obvious radiation changes, no obvious masses.     Will schedule for EUA with possible biopsy with Dr. Robertson on .  Informed consent obtained.       Mary Flores MD  [facility], PGY5

## 2023-02-15 ENCOUNTER — CLINICAL SUPPORT (OUTPATIENT)
Dept: AUDIOLOGY | Facility: HOSPITAL | Age: 53
End: 2023-02-15
Attending: OTOLARYNGOLOGY
Payer: MEDICAID

## 2023-02-15 DIAGNOSIS — H90.A32 MIXED CONDUCTIVE AND SENSORINEURAL HEARING LOSS OF LEFT EAR WITH RESTRICTED HEARING OF RIGHT EAR: Primary | ICD-10-CM

## 2023-02-15 PROCEDURE — 92557 COMPREHENSIVE HEARING TEST: CPT | Performed by: AUDIOLOGIST

## 2023-02-15 PROCEDURE — 92565 STENGER TEST PURE TONE: CPT | Performed by: AUDIOLOGIST

## 2023-02-15 PROCEDURE — 92567 TYMPANOMETRY: CPT | Performed by: AUDIOLOGIST

## 2023-02-15 NOTE — PROGRESS NOTES
Hearing Evaluation        Patient History: Ms. Banegas has a long-standing hearing loss reporting no changes in hearing since previous evaluation. She denies tinnitus, vertigo, or middle ear issues. All additional history is unremarkable.        Test Results:                    Pure Tone Testing:      Right ear:       Moderately severe rising to moderate from 250-4kHz decreasing to moderately severe at 8kHz. Speech reception threshold is in agreement with puretone findings.  Discrimination score of 92% is considered excellent.        Left ear:          Severe to profound sensorineural hearing loss from 250-8kHz. Speech reception threshold better than puretone findings.  Discrimination score of 76% is considered good.                                                                            Tympanometry:                                           Right ear:       Type 'A' tymp, normal middle ear pressure/function     Left ear:          Type 'A' tymp, normal middle ear pressure/function           Negative kirk at 1k and 2kHz                                Interpretations:      Behavioral test findings indicate no significant changes in hearing since previous hearing evaluation. Speech reception thresholds obtained at 40dB, AD and 80dB, AS. Speech discrimination scores of 92%, AD and 76%, AS, are considered excellent/good.  Immittance measures indicate normal middle ear pressure/function, bilaterally. Although speech is not in agreement with puretone findings in the left ear, negative stengers confirm asymmetry.           Recommendations:   1. Annual hearing evaluations  2. Binaural amplification (Information on [place] [employer] given previously)

## 2023-02-16 ENCOUNTER — ANESTHESIA EVENT (OUTPATIENT)
Dept: SURGERY | Facility: HOSPITAL | Age: 53
End: 2023-02-16
Payer: MEDICAID

## 2023-02-16 NOTE — ANESTHESIA PREPROCEDURE EVALUATION
02/16/2023  Antoine Banegas is a 52 y.o., female.    COVID STATUS: MODERNA X 2 (LASTLY 6/1/21))  BETA-BLOCKER: NONE    PAT NURSE PHONE INTERVIEW 2/16/23    PROBLEM LIST:  -  RECTAL BLEEDING (CONCERN for ANAL SCC); Hx ANAL SQUAMOUS CELL CARCINOMA (Dx 5/19/21) - RT 3/10-5/4/22, CHEMO 3/2-3/31/22      - S/P 6/16/21 EUA RECTAL, ANAL MASS Bx = squamous cell carcinoma arising in a background of high-grade squamous intraepithelial lesion      - S/P 10/12/21 MRI w/ANESTHESIA 2/2 CLAUSTROPHOBIA      - S/P 10/13/21 RIGHT MEDIPORT  -  HYSTERECTOMY  -  OBESITY CLASS II  -  HLD  -  ANXIETY/DEPRESSION, CLAUSTROPHOBIA  -  CHRONIC BACK PAIN w/LLE SCIATICA      - 4/9/21 MRI L-SPINE = Multifactorial spinal canal stenosis, severe at L3-L5, moderate to severe at L2-L3, and moderate at L1-L2; multilevel neural foraminal stenosis, severe on the right at L5-S1  -  CHRONIC NECK PAIN      - 4/23/15 XRAY C-SPINE = Minimal C4-C5 disc space narrowing.  -  OA  -  HARD of HEARING  -  SMOKER 32 PPY  -  ETOH - 6-24 BEERS on WEEKENDS & OCASSIONALLY DURING WEEK SINCE AGE 18  -  1/5/22 UDS + FENTANYL (DENIES ANY SUBSTANCE USE)  -  MODERATE HOLLY w/CPAP - INSTRUCTED to BRING DOS    Vitals:    02/17/23 1000   BP: 132/70   Pulse: 88   Resp: 20   Temp: 36.2 °C (97.2 °F)       AM Rx DOS: LEXAPRO, NORCO/MS CONTIN/PERCOCET PRN, LYRICA    ORDERS -   SURGEON: 12/7/22 CBC, CMP;  ANESTHESIA: ADD: EKG DOS (Dx HOLLY); VALIUM PRN DOS    Pre-op Assessment          Review of Systems         Anesthesia Plan  Type of Anesthesia, risks & benefits discussed:    Anesthesia Type: Gen Supraglottic Airway  Intra-op Monitoring Plan: Standard ASA Monitors  Post Op Pain Control Plan: multimodal analgesia and IV/PO Opioids PRN  Induction:  IV  Airway Plan: Direct  Informed Consent: Informed consent signed with the Patient and all parties understand the risks and agree  with anesthesia plan.  All questions answered. Patient consented to blood products? No  ASA Score: 3  Day of Surgery Review of History & Physical: H&P Update referred to the surgeon/provider.    Ready For Surgery From Anesthesia Perspective.     .    Lab Results   Component Value Date    WBC 8.4 12/07/2022    HGB 12.9 12/07/2022    HCT 38.9 12/07/2022    MCV 83.8 12/07/2022     12/07/2022     CMP  Sodium Level   Date Value Ref Range Status   12/07/2022 142 136 - 145 mmol/L Final     Potassium Level   Date Value Ref Range Status   12/07/2022 3.6 3.5 - 5.1 mmol/L Final     Carbon Dioxide   Date Value Ref Range Status   12/07/2022 22 22 - 29 mmol/L Final     Blood Urea Nitrogen   Date Value Ref Range Status   12/07/2022 12.8 7.0 - 18.7 mg/dL Final     Creatinine   Date Value Ref Range Status   12/07/2022 0.85 0.55 - 1.02 mg/dL Final     Calcium Level Total   Date Value Ref Range Status   12/07/2022 9.6 8.4 - 10.2 mg/dL Final     Albumin Level   Date Value Ref Range Status   12/07/2022 3.6 3.5 - 5.0 gm/dL Final     Bilirubin Total   Date Value Ref Range Status   12/07/2022 0.3 <=1.5 mg/dL Final     Alkaline Phosphatase   Date Value Ref Range Status   12/07/2022 98 40 - 150 unit/L Final     Aspartate Aminotransferase   Date Value Ref Range Status   12/07/2022 21 5 - 34 unit/L Final     Alanine Aminotransferase   Date Value Ref Range Status   12/07/2022 32 0 - 55 unit/L Final     eGFR   Date Value Ref Range Status   12/07/2022 >60 mls/min/1.73/m2 Final

## 2023-02-17 ENCOUNTER — HOSPITAL ENCOUNTER (OUTPATIENT)
Facility: HOSPITAL | Age: 53
Discharge: HOME | End: 2023-02-17
Attending: COLON & RECTAL SURGERY | Admitting: COLON & RECTAL SURGERY
Payer: MEDICAID

## 2023-02-17 ENCOUNTER — ANESTHESIA (OUTPATIENT)
Dept: SURGERY | Facility: HOSPITAL | Age: 53
End: 2023-02-17
Payer: MEDICAID

## 2023-02-17 DIAGNOSIS — C21.0 MALIGNANT NEOPLASM OF ANUS: ICD-10-CM

## 2023-02-17 DIAGNOSIS — Z01.818 OTHER SPECIFIED PRE-OPERATIVE EXAMINATION: ICD-10-CM

## 2023-02-17 PROCEDURE — 36000704 HC OR TIME LEV I 1ST 15 MIN: Performed by: COLON & RECTAL SURGERY

## 2023-02-17 PROCEDURE — 37000008 HC ANESTHESIA 1ST 15 MINUTES: Performed by: COLON & RECTAL SURGERY

## 2023-02-17 PROCEDURE — 63600175 PHARM REV CODE 636 W HCPCS: Performed by: SPECIALIST

## 2023-02-17 PROCEDURE — 45990 PR SURG DIAGNOSTIC EXAM, ANORECTAL: ICD-10-PCS | Mod: ,,, | Performed by: COLON & RECTAL SURGERY

## 2023-02-17 PROCEDURE — 71000033 HC RECOVERY, INTIAL HOUR: Performed by: COLON & RECTAL SURGERY

## 2023-02-17 PROCEDURE — 37000009 HC ANESTHESIA EA ADD 15 MINS: Performed by: COLON & RECTAL SURGERY

## 2023-02-17 PROCEDURE — 93005 ELECTROCARDIOGRAM TRACING: CPT

## 2023-02-17 PROCEDURE — 00902 ANES ANORECTAL PX: CPT | Performed by: COLON & RECTAL SURGERY

## 2023-02-17 PROCEDURE — 36000705 HC OR TIME LEV I EA ADD 15 MIN: Performed by: COLON & RECTAL SURGERY

## 2023-02-17 PROCEDURE — 25000003 PHARM REV CODE 250: Performed by: NURSE ANESTHETIST, CERTIFIED REGISTERED

## 2023-02-17 PROCEDURE — 71000015 HC POSTOP RECOV 1ST HR: Performed by: COLON & RECTAL SURGERY

## 2023-02-17 PROCEDURE — 63600175 PHARM REV CODE 636 W HCPCS: Performed by: NURSE ANESTHETIST, CERTIFIED REGISTERED

## 2023-02-17 PROCEDURE — 45990 SURG DX EXAM ANORECTAL: CPT | Mod: ,,, | Performed by: COLON & RECTAL SURGERY

## 2023-02-17 RX ORDER — MEPERIDINE HYDROCHLORIDE 25 MG/ML
12.5 INJECTION INTRAMUSCULAR; INTRAVENOUS; SUBCUTANEOUS ONCE
Status: DISCONTINUED | OUTPATIENT
Start: 2023-02-17 | End: 2023-02-17 | Stop reason: HOSPADM

## 2023-02-17 RX ORDER — MIDAZOLAM HYDROCHLORIDE 1 MG/ML
2 INJECTION INTRAMUSCULAR; INTRAVENOUS ONCE
Status: COMPLETED | OUTPATIENT
Start: 2023-02-17 | End: 2023-02-17

## 2023-02-17 RX ORDER — HYDROMORPHONE HYDROCHLORIDE 1 MG/ML
0.5 INJECTION, SOLUTION INTRAMUSCULAR; INTRAVENOUS; SUBCUTANEOUS EVERY 5 MIN PRN
Status: DISCONTINUED | OUTPATIENT
Start: 2023-02-17 | End: 2023-02-17 | Stop reason: HOSPADM

## 2023-02-17 RX ORDER — PROCHLORPERAZINE EDISYLATE 5 MG/ML
5 INJECTION INTRAMUSCULAR; INTRAVENOUS ONCE AS NEEDED
Status: DISCONTINUED | OUTPATIENT
Start: 2023-02-17 | End: 2023-02-17 | Stop reason: HOSPADM

## 2023-02-17 RX ORDER — ONDANSETRON 2 MG/ML
4 INJECTION INTRAMUSCULAR; INTRAVENOUS ONCE
Status: DISCONTINUED | OUTPATIENT
Start: 2023-02-17 | End: 2023-02-17 | Stop reason: HOSPADM

## 2023-02-17 RX ORDER — DIPHENHYDRAMINE HYDROCHLORIDE 50 MG/ML
25 INJECTION INTRAMUSCULAR; INTRAVENOUS ONCE AS NEEDED
Status: DISCONTINUED | OUTPATIENT
Start: 2023-02-17 | End: 2023-02-17 | Stop reason: HOSPADM

## 2023-02-17 RX ORDER — MIDAZOLAM HYDROCHLORIDE 1 MG/ML
INJECTION INTRAMUSCULAR; INTRAVENOUS
Status: DISCONTINUED
Start: 2023-02-17 | End: 2023-02-17 | Stop reason: HOSPADM

## 2023-02-17 RX ORDER — PROPOFOL 10 MG/ML
INJECTION, EMULSION INTRAVENOUS
Status: DISCONTINUED | OUTPATIENT
Start: 2023-02-17 | End: 2023-02-17

## 2023-02-17 RX ORDER — DIAZEPAM 5 MG/1
10 TABLET ORAL ONCE
Status: DISCONTINUED | OUTPATIENT
Start: 2023-02-17 | End: 2023-02-17 | Stop reason: HOSPADM

## 2023-02-17 RX ORDER — CEFAZOLIN SODIUM 2 G/50ML
2 SOLUTION INTRAVENOUS
Status: DISCONTINUED | OUTPATIENT
Start: 2023-02-17 | End: 2023-02-17 | Stop reason: HOSPADM

## 2023-02-17 RX ORDER — HYDROMORPHONE HYDROCHLORIDE 1 MG/ML
0.2 INJECTION, SOLUTION INTRAMUSCULAR; INTRAVENOUS; SUBCUTANEOUS EVERY 5 MIN PRN
Status: DISCONTINUED | OUTPATIENT
Start: 2023-02-17 | End: 2023-02-17 | Stop reason: HOSPADM

## 2023-02-17 RX ORDER — DEXAMETHASONE SODIUM PHOSPHATE 4 MG/ML
INJECTION, SOLUTION INTRA-ARTICULAR; INTRALESIONAL; INTRAMUSCULAR; INTRAVENOUS; SOFT TISSUE
Status: DISCONTINUED | OUTPATIENT
Start: 2023-02-17 | End: 2023-02-17

## 2023-02-17 RX ORDER — LIDOCAINE HYDROCHLORIDE 10 MG/ML
1 INJECTION, SOLUTION EPIDURAL; INFILTRATION; INTRACAUDAL; PERINEURAL ONCE
Status: DISCONTINUED | OUTPATIENT
Start: 2023-02-17 | End: 2023-02-17 | Stop reason: HOSPADM

## 2023-02-17 RX ORDER — FENTANYL CITRATE 50 UG/ML
INJECTION, SOLUTION INTRAMUSCULAR; INTRAVENOUS
Status: DISCONTINUED | OUTPATIENT
Start: 2023-02-17 | End: 2023-02-17

## 2023-02-17 RX ORDER — SODIUM CHLORIDE, SODIUM LACTATE, POTASSIUM CHLORIDE, CALCIUM CHLORIDE 600; 310; 30; 20 MG/100ML; MG/100ML; MG/100ML; MG/100ML
INJECTION, SOLUTION INTRAVENOUS CONTINUOUS
Status: DISCONTINUED | OUTPATIENT
Start: 2023-02-17 | End: 2023-02-17 | Stop reason: HOSPADM

## 2023-02-17 RX ORDER — IPRATROPIUM BROMIDE AND ALBUTEROL SULFATE 2.5; .5 MG/3ML; MG/3ML
3 SOLUTION RESPIRATORY (INHALATION) ONCE AS NEEDED
Status: DISCONTINUED | OUTPATIENT
Start: 2023-02-17 | End: 2023-02-17 | Stop reason: HOSPADM

## 2023-02-17 RX ORDER — LIDOCAINE HCL/PF 100 MG/5ML
SYRINGE (ML) INTRAVENOUS
Status: DISCONTINUED | OUTPATIENT
Start: 2023-02-17 | End: 2023-02-17

## 2023-02-17 RX ORDER — ONDANSETRON 2 MG/ML
INJECTION INTRAMUSCULAR; INTRAVENOUS
Status: DISCONTINUED | OUTPATIENT
Start: 2023-02-17 | End: 2023-02-17

## 2023-02-17 RX ORDER — SODIUM CHLORIDE 9 MG/ML
INJECTION, SOLUTION INTRAVENOUS CONTINUOUS
Status: DISCONTINUED | OUTPATIENT
Start: 2023-02-17 | End: 2023-02-17 | Stop reason: HOSPADM

## 2023-02-17 RX ORDER — OXYCODONE AND ACETAMINOPHEN 5; 325 MG/1; MG/1
2 TABLET ORAL ONCE
Status: DISCONTINUED | OUTPATIENT
Start: 2023-02-17 | End: 2023-02-17 | Stop reason: HOSPADM

## 2023-02-17 RX ADMIN — ONDANSETRON 4 MG: 2 INJECTION INTRAMUSCULAR; INTRAVENOUS at 11:02

## 2023-02-17 RX ADMIN — FENTANYL CITRATE 50 MCG: 50 INJECTION, SOLUTION INTRAMUSCULAR; INTRAVENOUS at 11:02

## 2023-02-17 RX ADMIN — MIDAZOLAM HYDROCHLORIDE 2 MG: 1 INJECTION, SOLUTION INTRAMUSCULAR; INTRAVENOUS at 10:02

## 2023-02-17 RX ADMIN — PROPOFOL 200 MG: 10 INJECTION, EMULSION INTRAVENOUS at 11:02

## 2023-02-17 RX ADMIN — DEXAMETHASONE SODIUM PHOSPHATE 4 MG: 4 INJECTION, SOLUTION INTRA-ARTICULAR; INTRALESIONAL; INTRAMUSCULAR; INTRAVENOUS; SOFT TISSUE at 11:02

## 2023-02-17 RX ADMIN — SODIUM CHLORIDE, POTASSIUM CHLORIDE, SODIUM LACTATE AND CALCIUM CHLORIDE: 600; 310; 30; 20 INJECTION, SOLUTION INTRAVENOUS at 11:02

## 2023-02-17 RX ADMIN — LIDOCAINE HYDROCHLORIDE 50 MG: 20 INJECTION INTRAVENOUS at 11:02

## 2023-02-17 RX ADMIN — SODIUM CHLORIDE, POTASSIUM CHLORIDE, SODIUM LACTATE AND CALCIUM CHLORIDE: 600; 310; 30; 20 INJECTION, SOLUTION INTRAVENOUS at 10:02

## 2023-02-17 NOTE — INTERVAL H&P NOTE
The patient has been examined and the H&P has been reviewed:    I concur with the findings and no changes have occurred since H&P was written.    Surgery risks, benefits and alternative options discussed and understood by patient/family.      No changes in medical history since last appointment. Will go to OR for EUA today.     Billy Rivera MD  [facility] - General Surgery - PGY 1

## 2023-02-17 NOTE — OP NOTE
[facility] - Periop Services  General Surgery  Operative Note    SUMMARY     Date of Procedure: 2/17/2023     Procedure: Procedure(s) (LRB):  Anal exam under anesthesia (N/A)       Surgeon(s) and Role:     * Lai Robertson MD - Primary     * Billy Rivera MD - Resident     Assisting Surgeon: None    Pre-Operative Diagnosis: Hx anal squamous cell carcinoma     Post-Operative Diagnosis: Post-Op Diagnosis Codes:     * Malignant neoplasm of anus [C21.0]    Anesthesia: General    Operative Findings (including complications, if any): Radiation proctitis     Description of Technical Procedures:     Patient was seen, identified, and consented in pre-op area. She was then moved to operating room, intubated, and placed in lithotomy position. Patient was then prepped and draped in usual sterile fashion and a timeout was called. On external exam, patient had an area of radiation damage to right lateral anus that was healing with good epithelial coverage. On PERLA, no abnormalities were noted. A Hill-Fergeson was used for exmination. No ulceration or masses were appreciable. An area of radiation proctitis was noted in the anterior rectum. This is likely the source of her bleeding. No other abnormalities were noted and the exam concluded. The patient tolerated the procedure without issue and Dr. Robertson was present and scrubbed throughout.       Estimated Blood Loss (EBL): None           Condition: Good    Disposition: PACU - hemodynamically stable.    Billy Rivera MD  [facility] - General Surgery - PGY 1

## 2023-02-17 NOTE — DISCHARGE INSTRUCTIONS
· Keep follow up appointment on March 2nd  at 12:30 pm in [facility].  Resume home medications unless otherwise instructed by your doctor.    · Do not drink alcohol or drive today, or as long as you are on pain medication.    · Notify MD of any moderate to severe pain unrelieved by over the counter Ibuprofen or Tylenol or for any signs of infection including fever above 100.4, excessive redness or swelling, yellow/green foul- smelling drainage, nausea or vomiting. Clinics number is [phone]. If it is after business hours or emergency call [phone] and state Im having post op complications and need to speak to the surgeon on call.    · Thanks for choosing [facility]! Have a smooth recovery!

## 2023-02-17 NOTE — ANESTHESIA PROCEDURE NOTES
Intubation    Date/Time: 2/17/2023 11:11 AM  Performed by: Zainab Smith CRNA  Authorized by: Breonna Sibley MD     Intubation:     Induction:  Intravenous    Intubated:  Postinduction    Mask Ventilation:  Easy mask    Attempts:  1    Attempted By:  CRNA    Difficult Airway Encountered?: No      Complications:  None    Airway Device:  Supraglottic airway/LMA    Airway Device Size:  4.0    Placement Verified By:  Capnometry    Complicating Factors:  None    Findings Post-Intubation:  BS equal bilateral and atraumatic/condition of teeth unchanged

## 2023-02-17 NOTE — ANESTHESIA POSTPROCEDURE EVALUATION
Anesthesia Post Evaluation    Patient: Antoine Banegas    Procedure(s) Performed: Procedure(s) (LRB):  Anal exam under anesthesia (N/A)    Final Anesthesia Type: general      Patient location during evaluation: DOSC  Post-procedure vital signs: reviewed and stable  Airway patency: patent      Anesthetic complications: no      Cardiovascular status: hemodynamically stable  Respiratory status: spontaneous ventilation  Follow-up not needed.          Vitals Value Taken Time   /80 02/17/23 1240   Temp 36.5 °C (97.7 °F) 02/17/23 1240   Pulse 73 02/17/23 1240   Resp 20 02/17/23 1240   SpO2 100 % 02/17/23 1240         Event Time   Out of Recovery 12:13:00         Pain/Alesia Score: Alesia Score: 10 (2/17/2023 12:10 PM)

## 2023-02-17 NOTE — DISCHARGE SUMMARY
[facility] - Periop Services  Discharge Note  Short Stay    Procedure(s) (LRB):  Anal exam under anesthesia (N/A)      OUTCOME: Patient tolerated treatment/procedure well without complication and is now ready for discharge.    DISPOSITION: Home or Self Care    FINAL DIAGNOSIS:  Radiation proctitis     FOLLOWUP: With PCP    DISCHARGE INSTRUCTIONS:  Able to resume diet and activity as tolerated.     TIME SPENT ON DISCHARGE: 30 minutes    Billy Rivera MD  [facility] - General Surgery - PGY 1

## 2023-02-22 VITALS
HEART RATE: 73 BPM | OXYGEN SATURATION: 100 % | BODY MASS INDEX: 36.09 KG/M2 | TEMPERATURE: 98 F | RESPIRATION RATE: 20 BRPM | SYSTOLIC BLOOD PRESSURE: 138 MMHG | WEIGHT: 223.63 LBS | DIASTOLIC BLOOD PRESSURE: 80 MMHG

## 2023-03-02 ENCOUNTER — OFFICE VISIT (OUTPATIENT)
Dept: SURGERY | Facility: CLINIC | Age: 53
End: 2023-03-02
Payer: MEDICAID

## 2023-03-02 VITALS
SYSTOLIC BLOOD PRESSURE: 114 MMHG | BODY MASS INDEX: 36.35 KG/M2 | DIASTOLIC BLOOD PRESSURE: 78 MMHG | WEIGHT: 226.19 LBS | TEMPERATURE: 99 F | HEART RATE: 88 BPM | OXYGEN SATURATION: 99 % | HEIGHT: 66 IN

## 2023-03-02 DIAGNOSIS — C21.0 SQUAMOUS CELL CARCINOMA OF ANUS: Primary | ICD-10-CM

## 2023-03-02 PROCEDURE — 99214 OFFICE O/P EST MOD 30 MIN: CPT | Mod: PBBFAC

## 2023-03-02 NOTE — PROGRESS NOTES
Pt in Surgery clinic for 2 week post-op.  Evaluated per Dr. JAVON Costello.  Will RTC in 6 months.

## 2023-03-02 NOTE — PROGRESS NOTES
[facility] GENERAL SURGERY   Clinic Note       CC: 2 week post-op       HPI: Antoine Banegas is a 52 y.o. female with PMHx of squamous cell carcinoma of the anus s/p chemoradiation geovanna protocol in 3/2022 and EUA for source of hematochezia on 2/17/23 here for 2 week follow up. EUA noted an area of radiation proctitis in anterior rectum as likely source of bleeding. Pt has been asymptomatic sicne her EUA. Pt denies CP, SOB, abdominal pain, f/c, n/v. Pt is tolerating a diet and having regular non-bloody bowel movements. Pt denies cramping.       Physical Exam:   There were no vitals filed for this visit.   Gen:  NAD, AAOx3   Eye:  OBINNA, EOMI   CVS:  Normal RRR   Chest:  Non-labored breathing on room air   Abd:     s/nt/nd   Ext:  Move all 4 extremities.         A/P: Antoine Banegas is a 52 y.o. female with PMHx of squamous cell carcinoma of the anus s/p chemoradiation geovanna protocol in 3/2022 and EUA for rectal bleeding on 2/17/23 here for 2 week follow up. EUA noted an area of radiation proctitis in anterior rectum as likely source of bleeding. Today, pt has no complaints and is stable. Pt denies any bloody BM and is tolerating PO intake without n/v.       - RTC in 6 months or earlier if symptoms arise  - Please do not hesitate to call with questions or concerns      Luke Lane MD   [employer] General Surgery PGY-1   03/02/2023 12:45 PM

## 2023-03-13 PROBLEM — K62.5 RECTAL BLEEDING: Status: RESOLVED | Noted: 2022-12-07 | Resolved: 2023-03-13

## 2023-03-21 ENCOUNTER — OFFICE VISIT (OUTPATIENT)
Dept: HEMATOLOGY/ONCOLOGY | Facility: CLINIC | Age: 53
End: 2023-03-21
Attending: INTERNAL MEDICINE
Payer: MEDICAID

## 2023-03-21 VITALS
BODY MASS INDEX: 37.28 KG/M2 | OXYGEN SATURATION: 100 % | TEMPERATURE: 98 F | RESPIRATION RATE: 20 BRPM | WEIGHT: 232 LBS | SYSTOLIC BLOOD PRESSURE: 118 MMHG | HEIGHT: 66 IN | HEART RATE: 98 BPM | DIASTOLIC BLOOD PRESSURE: 83 MMHG

## 2023-03-21 DIAGNOSIS — Z85.048 HISTORY OF RECTAL OR ANAL CANCER: Primary | ICD-10-CM

## 2023-03-21 DIAGNOSIS — Z72.0 TOBACCO ABUSE: ICD-10-CM

## 2023-03-21 DIAGNOSIS — C21.0 SQUAMOUS CELL CANCER, ANUS: Primary | ICD-10-CM

## 2023-03-21 DIAGNOSIS — C21.0 MALIGNANT NEOPLASM OF ANUS: ICD-10-CM

## 2023-03-21 DIAGNOSIS — F10.11 HISTORY OF ALCOHOL ABUSE: ICD-10-CM

## 2023-03-21 DIAGNOSIS — C21.0 SQUAMOUS CELL CANCER, ANUS: ICD-10-CM

## 2023-03-21 PROCEDURE — 99214 OFFICE O/P EST MOD 30 MIN: CPT | Mod: PBBFAC | Performed by: INTERNAL MEDICINE

## 2023-03-21 PROCEDURE — 1159F PR MEDICATION LIST DOCUMENTED IN MEDICAL RECORD: ICD-10-PCS | Mod: CPTII,,, | Performed by: INTERNAL MEDICINE

## 2023-03-21 PROCEDURE — 3008F PR BODY MASS INDEX (BMI) DOCUMENTED: ICD-10-PCS | Mod: CPTII,,, | Performed by: INTERNAL MEDICINE

## 2023-03-21 PROCEDURE — 99213 PR OFFICE/OUTPT VISIT, EST, LEVL III, 20-29 MIN: ICD-10-PCS | Mod: S$PBB,,, | Performed by: INTERNAL MEDICINE

## 2023-03-21 PROCEDURE — 3074F SYST BP LT 130 MM HG: CPT | Mod: CPTII,,, | Performed by: INTERNAL MEDICINE

## 2023-03-21 PROCEDURE — 3008F BODY MASS INDEX DOCD: CPT | Mod: CPTII,,, | Performed by: INTERNAL MEDICINE

## 2023-03-21 PROCEDURE — 1160F RVW MEDS BY RX/DR IN RCRD: CPT | Mod: CPTII,,, | Performed by: INTERNAL MEDICINE

## 2023-03-21 PROCEDURE — 1159F MED LIST DOCD IN RCRD: CPT | Mod: CPTII,,, | Performed by: INTERNAL MEDICINE

## 2023-03-21 PROCEDURE — 3079F PR MOST RECENT DIASTOLIC BLOOD PRESSURE 80-89 MM HG: ICD-10-PCS | Mod: CPTII,,, | Performed by: INTERNAL MEDICINE

## 2023-03-21 PROCEDURE — 99213 OFFICE O/P EST LOW 20 MIN: CPT | Mod: S$PBB,,, | Performed by: INTERNAL MEDICINE

## 2023-03-21 PROCEDURE — 3074F PR MOST RECENT SYSTOLIC BLOOD PRESSURE < 130 MM HG: ICD-10-PCS | Mod: CPTII,,, | Performed by: INTERNAL MEDICINE

## 2023-03-21 PROCEDURE — 1160F PR REVIEW ALL MEDS BY PRESCRIBER/CLIN PHARMACIST DOCUMENTED: ICD-10-PCS | Mod: CPTII,,, | Performed by: INTERNAL MEDICINE

## 2023-03-21 PROCEDURE — 3079F DIAST BP 80-89 MM HG: CPT | Mod: CPTII,,, | Performed by: INTERNAL MEDICINE

## 2023-03-21 NOTE — Clinical Note
Orders for today:   Surveillance CT scans of C/A/P with contrast in November  Follow-up with NP in 3 months, with CBC and CMP

## 2023-03-21 NOTE — PROGRESS NOTES
History:  Past Medical History:   Diagnosis Date    Anxiety     Cancer     Circulatory anomaly    Past medical history: Chronic low back pain.  Chronic neck pain.  Dyslipidemia.  Obesity.  HOLLY, on CPAP.  Osteoarthritis.  Tobacco abuse.  Alcohol abuse.  Uterine fibroids.  Vitamin D deficiency.  Decreased hearing left ear.  Procedure/surgical history: Left eye surgery.  Left knee surgery.  Hip joint surgery (04/03/2003).  Total laparoscopic hysterectomy and bilateral salpingectomy for abnormal uterine bleeding, uterine fibroids, uteromegaly (03/16/2017).  Colon polypectomy (08/28/2020).  Uterine artery embolization (08/17/2016) for abnormal uterine bleeding secondary to enlarged uterus with multiple fibroids.  Social history: Single.  Lives in [place], [place].  Has 1 child.  Does not work.  Has been smoking a pack of cigarettes daily for 18 years.  Has been drinking 24 beers over the weekend for 16 years.  Denies illicit drug abuse.    Family history: Maternal grandmother experienced lung cancer in her 80s; used to smoke.  Health maintenance:   -07/20/2014: ThinPrep cervical Pap smear: NIL   -08/28/2020: Screening colonoscopy (positive FIT): 5 mm flat polyp ascending colon, removed (pathology: Ascending colon polyp, polypectomy: Adenomatous polyp; no evidence of malignancy) (rescope in 5 years)   -09/25/2020: Screening mammogram (comparison: 05/15/2019): Bilateral breast negative (BI-RADS 1)  Menstrual and OB/GYN history: S/p laparoscopic hysterectomy and bilateral salpingectomy for abnormal uterine bleeding, uterine fibroids, and uteromegaly (03/16/2017)  Past Surgical History:   Procedure Laterality Date    COLONOSCOPY  08/28/2020    EXAMINATION UNDER ANESTHESIA N/A 2/17/2023    Procedure: Anal exam under anesthesia;  Surgeon: Lai Robertson MD;  Location: [facility];  Service: Colon and Rectal;  Laterality: N/A;    HYSTERECTOMY  03/16/2017    INSERTION OF SUBCUTANEOUS PORT Right 10/31/2021    MRI PELVIS  UNDER ANESTHESIA  10/01/2021    perianal surgery      RECTAL EXAMINATION UNDER ANESTHESIA  06/16/2021      Social History     Socioeconomic History    Marital status: Single   Tobacco Use    Smoking status: Every Day     Packs/day: 1.00     Years: 32.00     Pack years: 32.00     Types: Cigarettes     Start date: 8/11/1991     Passive exposure: Never    Smokeless tobacco: Never   Substance and Sexual Activity    Alcohol use: Yes     Alcohol/week: 2.0 standard drinks     Types: 2 Cans of beer per week     Comment: occ    Drug use: Never    Sexual activity: Not Currently     Partners: Male     Social Determinants of Health     Financial Resource Strain: Low Risk     Difficulty of Paying Living Expenses: Not hard at all   Food Insecurity: No Food Insecurity    Worried About Running Out of Food in the Last Year: Never true    Ran Out of Food in the Last Year: Never true   Transportation Needs: No Transportation Needs    Lack of Transportation (Medical): No    Lack of Transportation (Non-Medical): No   Physical Activity: Inactive    Days of Exercise per Week: 0 days    Minutes of Exercise per Session: 0 min   Stress: Stress Concern Present    Feeling of Stress : Rather much   Social Connections: Socially Isolated    Frequency of Communication with Friends and Family: More than three times a week    Frequency of Social Gatherings with Friends and Family: Twice a week    Attends Denominational Services: Never    Active Member of Clubs or Organizations: No    Attends Club or Organization Meetings: Never    Marital Status:    Housing Stability: Low Risk     Unable to Pay for Housing in the Last Year: No    Number of Places Lived in the Last Year: 1    Unstable Housing in the Last Year: No      Family History   Problem Relation Age of Onset    Seizures Mother     Hypertension Mother     Diabetes Mother     No Known Problems Maternal Aunt     No Known Problems Maternal Uncle     No Known Problems Paternal Aunt     No Known  Problems Paternal Uncle     No Known Problems Paternal Grandmother     No Known Problems Paternal Grandfather     No Known Problems Maternal Grandmother     No Known Problems Maternal Grandfather     No Known Problems Other         Reason for Follow-up:  -squamous cell carcinoma of anus, cT4 cN1 cM0, stage IIIC  Rectal bleeding   Tobacco abuse, alcohol abuse       History of Present Illness:   Rectal Cancer        Oncologic/Hematologic History:  Oncology History    No history exists.   50-year-old female referred by surgery, for evaluation and management of anal cancer.    Squamous cell carcinoma of anus:   -Presentation (05/2021): Enlarging anal lump for 1 year; anal pain and intermittent bleeding for 3 weeks   -Fungating anal mass on the right anal verge, approximately 3 cm (05/19/2021)   -On EUA, anal mass extending from right anterior midline almost to posterior midline into anal canal dentate line   -Borderline mesorectal lymph node, 0.8 cm on CT A/P (05/19/2021)   -Anal mass not visualized on CT A/P   -EUA and biopsy (06/16/2021)   -08/12/2021: CT chest with contrast (staging): No acute abnormality   -08/24/2021: PET/CT (comparison: CT chest 08/12/2021; CT A/P 05/19/2021): FDG uptake around the anal canal likely corresponding to known malignancy (maximum SUV 20.8); stable size of small mildly hypermetabolic right mesorectal, pelvic sidewall, and inguinal lymph nodes, nonspecific   -10/01/2021: MRI pelvis with and without contrast (comparison: PET/CT 08/24/2021): Mass along the right anal canal measuring up to 55 mm; suspected area of soft tissue extension into the posterior lower vagina/introitus; several suspicious perirectal lymph nodes; nonspecific borderline enlarged bilateral iliac and inguinal lymph nodes (mass 42 x 17 x 55 mm, extension anal margin, most likely extends through the external sphincter; several prominent perirectal lymph nodes measuring up to 8 mm in short axis; bilateral external iliac  lymph nodes also measure up to 8 mm in short axis; bilateral inguinal lymph nodes measure up to 10 mm)   -10/13/2021: Mediport placed   -11/17/2021: Screening mammogram (comparison: 09/25/2020 mammogram): Right breast benign (BI-RADS 2); left breast negative (BI-RADS 1)   -12/09/2021: She called our office stating that she wants to start chemoradiation therapy ASAP and that she is passing blood clots and that tumor in the anus is bleeding a lot   -01/05/2022: Presented to ED with worsening depression for 3 days; suicidal ideation; feeling overwhelmed; transferred to a psych facility ([facility])   -Follows up with ID for latent syphilis (antibody + 01/06/2022 at [facility]; no prior treatment; ID planning to treat with Bicillin LA 2,400,000 units weekly x3 weeks)   -01/31/2022: Restaging CT C/A/P with contrast: Soft tissue along the right anal canal is stable to perhaps slightly larger since 10/01/2021 (43 x 31 mm, previously 42 x 23 mm); slight enlargement of the left internal iliac lymph node, now measuring up to 10 mm; multiple other pelvic lymph nodes are stable; no metastasis; trace ascites   -01/31/2022: Whole-body nuclear medicine bone scan: No bone metastasis   -01/21/2022: CMP unremarkable; mild neutrophilic leukocytosis, hemoglobin 11.9   -01/25/2022: Syphilis antibody positive; HIV negative; RPR nonreactive   -Chemotherapy started 03/02/2022   -Day 29 mitomycin C on 03/31/2022   -Follows up with ID for history of latent syphilis; s/p Bicillin LA 2,400,000 units weekly x3 (01/25/2022, 02/01/2022, 02/08/2022)  -S/P radiotherapy to pelvis 03/10/2022-05/04/2022  -09/07/2022: CT temporal bone without contrast (mixed conductive and sensorineural hearing loss):  Unremarkable temporal bone CT  -09/07/2022:  MRI temporal bones with and without contrast (hearing loss):  1. No acute intracranial abnormality.  2. Right IAC vascular loop, type II.  Otherwise no abnormality of the internal auditory canals or  membranous labyrinths.  -10/28/2022: Seen in Oncology Clinic (NP):  2 week history of bright red bleeding from rectum.  -11/01/2022: Seen in surgical clinic for evaluation of blood both on toilet paper and when she wipes and also sees in toilet; occasional blood clots on tissue paper.  ER referred her to surgery for EUA and anoscopy.  Rectal examination by them showed postradiation scarring of skin around the anus but no masses palpable on PERLA.  -11/29/2022:  CTs C/A/P with contrast (comparison:  01/31/2022):  1. Some bladder and rectal wall thickening may be treatment related.  2. Similar small pelvic sidewall and iliac lymph nodes.  3. No new suspicious findings chest, abdomen or pelvis.      07/29/2021:   Presents for initial medical oncology consultation.  Pleasant lady.  In some discomfort from anorectal pain.   -States that anorectal pain started after colonoscopy which was performed on 08/28/2020.  Pain has been getting worse.  Currently, pain level is 10/10.  Has been taking tramadol as needed.  Takes Ex-Lax for constipation; with Levaquin, bowel moods are good.  Blood on toilet wipes.  No blood in toilet bowl.  Throbbing pain in her rectal area.   -Generalized, constant aches and pains, especially in legs and feet   -Some numbness and tingling in feet   -No significant weakness, fatigue, malaise, fevers, chills, anorexia, unintentional weight loss, chest pain, cough, dyspnea, nausea, vomiting, abdominal distention, etc.  No unusual headaches or focal neurological symptoms.    Interval History:  PORT FLUSH   [No matching plan found]   03/21/2023:   -02/17/2023:  Anal exam under anesthesia:  Operative findings:  Radiation proctitis; no ulceration or masses appreciated  Presents for a follow-up visit, accompanied by a male .  In no acute discomfort.  No rectal pain.  Bowels moving normally.  No blood in stool.  Good appetite.  Good energy.      Medications:  Current Outpatient Medications on File  Prior to Visit   Medication Sig Dispense Refill    amitriptyline (ELAVIL) 50 MG tablet Take 50 mg by mouth every evening.      atorvastatin (LIPITOR) 40 MG tablet Take 1 tablet (40 mg total) by mouth every evening. 90 tablet 3    ergocalciferol (ERGOCALCIFEROL) 50,000 unit Cap Take 1 capsule (50,000 Units total) by mouth every 7 days. 4 capsule 0    EScitalopram oxalate (LEXAPRO) 10 MG tablet Take 10 mg by mouth once daily.      HYDROcodone-acetaminophen (NORCO) 5-325 mg per tablet Take 2 tablets by mouth every 6 (six) hours as needed.      LIDOcaine HCL 2% (XYLOCAINE) 2 % jelly       LIDOcaine-prilocaine (EMLA) cream Apply a dime size amount to right chest port area 45 min to an hour prior to port being accessed 30 g 0    morphine (MS CONTIN) 30 MG 12 hr tablet Take 30 mg by mouth.      omega-3s-dha-epa-fish oil (OMEGA-3 FISH OIL) 300-1,000 mg Cap Take 1,000 mg by mouth.      oxyCODONE-acetaminophen (PERCOCET)  mg per tablet Take 1 tablet by mouth every 6 (six) hours as needed.      pregabalin (LYRICA) 50 MG capsule Take 1 capsule (50 mg total) by mouth 2 (two) times daily. 60 capsule 11    silver sulfADIAZINE 1% (SILVADENE) 1 % cream        Current Facility-Administered Medications on File Prior to Visit   Medication Dose Route Frequency Provider Last Rate Last Admin    balsam peru-castor oiL Oint   Topical (Top) Daily Nasrin Macias MD        heparin, porcine (PF) 100 unit/mL injection flush 500 Units  500 Units Intravenous PRN Pablito Quinones MD   500 Units at 11/17/22 1015    sodium chloride 0.9% flush 10 mL  10 mL Intravenous PRN Pablito Quinones MD   10 mL at 11/17/22 1015       Review of Systems:   All systems reviewed and found to be negative except for the symptoms detailed above    Physical Examination:   VITAL SIGNS:   Vitals:    03/21/23 1455   BP: 118/83   Pulse: 98   Resp: 20   Temp: 98 °F (36.7 °C)     GENERAL:  In no apparent distress.    HEAD:  No signs of head trauma.  EYES:  Pupils  are equal.  Extraocular motions intact.    EARS:  Hearing grossly intact.  MOUTH:  Oropharynx is normal.   NECK:  No adenopathy, no JVD.     CHEST:  Chest with clear breath sounds bilaterally.  No wheezes, rales, rhonchi.    CARDIAC:  Regular rate and rhythm.  S1 and S2, without murmurs, gallops, rubs.  VASCULAR:  No Edema.  Peripheral pulses normal and equal in all extremities.  ABDOMEN:  Soft, without detectable tenderness.  No sign of distention.  No   rebound or guarding, and no masses palpated.   Bowel Sounds normal.  MUSCULOSKELETAL:  Good range of motion of all major joints. Extremities without clubbing, cyanosis or edema.    NEUROLOGIC EXAM:  Alert and oriented x 3.  No focal sensory or strength deficits.   Speech normal.  Follows commands.  PSYCHIATRIC:  Mood normal.    No results for input(s): CBC in the last 72 hours.   No results for input(s): CMP in the last 72 hours.     Assessment:  Problem List Items Addressed This Visit          Psychiatric    History of alcohol abuse       Oncology    Squamous cell cancer, anus - Primary       Other    Tobacco abuse     Squamous cell carcinoma of anus:   -Presentation (05/2021): Enlarging anal lump for 1 year; anal pain and intermittent bleeding for 3 weeks   -Fungating anal mass right anal verge   -S/p EUA (06/16/2021)   -Mass 3 cm on physical exam (05/19/2021); mass not visualized on CT A/P; mass 55 mm on MRI; mass extending into lower vagina/introitus on MRI   -Several suspicious perirectal and nonspecific borderline enlarged bilateral iliac, mesorectal, and inguinal lymph nodes on MRI and PET/CT   >>> 01/21/2022: cT4 cN1c M0, at least stage IIIC   -Noncompliant with follow-up   -Restaging CTs and bone scan (01/31/2022): 43 x 31 mm right anal canal soft tissue (anal cancer); left internal iliac lymph node 10 mm, slightly enlarged; other pelvic lymph nodes stable; no metastasis   -Of note, her restaging PET/CT and pelvic MRI scan was denied by her insurance.    -S/p chemotherapy 03/02/2022-03/31/2022  -S/P radiotherapy to pelvis 03/10/2022-05/04/2022  -rectal bleeding, starting 10/2022; evaluated by surgery 11/01/2022; EUA and anoscopy planned  -no recurrence or metastasis on CTs C/A/P with contrast 11/01/2022  -02/17/2023:  Anal exam under anesthesia:  Operative findings:  Radiation proctitis; no ulceration or masses appreciated  -03/21/2023 follow-up visit: Doing well      #Tobacco abuse, alcohol abuse     #History of depression, suicidal ideation    #Latent syphilis: Syphilis antibody positive (11/25/2022); following up with ID   -S/p Bicillin LA 2,400,000 units weekly x3 (01/25/2022, 02/01/2022, 02/08/2022)    #Comorbidities: Chronic low back pain, chronic neck pain, obesity, HOLLY, on CPAP, etc.      Plan:   -Squamous cell carcinoma of anus    -S/p chemotherapy 03/02/2022-03/31/2022  -S/P radiotherapy to pelvis 03/10/2022-05/04/2022  >>>  Continue surveillance (see below for plan)  PERLA every 3-6 months for 5 years (deferred to surgery)   Inguinal lymph node palpation every 3-6 months for 5 years (05/2022-05/2027)  Anoscopy every 6-12 months for 3 years (will defer to surgery)   CT C/A/P with contrast or CT chest without contrast and abdominal/pelvic MRI with contrast annually for 3 years (stages 2-3) (05/2022-05/2025)  >>>  -rectal bleeding, starting 10/2022; evaluated by surgery 11/01/2022; EUA and anoscopy planned  -no recurrence or metastasis on CTs C/A/P with contrast 11/01/2022  -02/17/2023:  Anal exam under anesthesia:  Operative findings:  Radiation proctitis; no ulceration or masses appreciated  >>>  Surveillance CT scans of C/A/P with contrast in 1 year (11/2023)  Clinical follow-up every 3 months, with special attention to inguinal lymph node palpation    Chemotherapy administered concurrent with radiotherapy:   1.  Capecitabine 825 mg/m² p.o. twice daily, Monday-Friday, on each day that radiation therapy is given, throughout the duration of radiation therapy  (typically 28 treatment days);   2.  Mitomycin 10 mg/m² days 1 and 29.     -It is not the standard of care to repeat imaging studies after completion of chemoradiation therapy.  Imaging studies are recommended only if, on 8-12-week evaluation, there is persistent disease.      Surveillance: (After complete remission):   1.  PERLA every 3-6 months for 5 years (will defer to surgery)   2.  Inguinal lymph node palpation every 3-6 months for 5 years   (05/2022-05/2027)   3.  Anoscopy every 6-12 months for 3 years (will defer to surgery)   4.  CT chest/abdomen/pelvis with contrast or chest CT without contrast and abdominal/pelvic MRI with contrast annually for 3 years (stage II-III)   (05/2022-05/2025)    For response assessment, see below.     -History of depression and suicidal ideation, ER presentation 01/05/2022, transferred to outside psych facility; as of 01/21/2022, talking to a counselor via telephone     -01/21/2022: Still smoking a pack of cigarettes daily; tobacco cessation emphasized.     -Latent syphilis; follow-up with ID; s/p Bicillin LA 2.4 mL IM weekly x3 (completed 02/08/2022)    Follow-up in 3 months, with CBC and CMP; to visit with NP     Above discussed at length with the patient.  All questions answered.  Discussed labs and scans and gave her copies of relevant records.   She understands and agrees this plan.   ------------------      Follow-up:  Evaluate in 8-12 weeks (after completion of chemoradiation therapy) with exam + PERLA:   1.  If complete remission, then: Surveillance;     2.  If persistent disease, then: Reevaluate in 4 weeks, including imaging studies; if progression or no progression on serial exams, then continue observation and reevaluation at 3-month intervals, utilizing imaging studies; if complete remission, then surveillance   (Follow patients who have not achieved a complete clinical response with persistent anal cancer up to 6 months following completion of RT and chemotherapy as  long as there is no evidence of progressive disease during this period of follow-up; persistent disease may continue to regress even at 26 weeks from the start of treatment)     3.  If progressive disease, then: If biopsy-proven, then restage, including imaging studies; if locally recurrent, then, APR/groin dissection if positive inguinal lymph nodes, followed by surveillance; if, on restaging, metastatic disease, then, systemic therapy   (Follow patients who have not achieved a complete clinical response with persistent anal cancer up to 6 months following completion of RT and chemotherapy as long as there is no evidence of progressive disease during this period of follow-up; persistent disease may continue to regress even at 26 weeks from the start of treatment)      Surveillance: (After complete remission):   1.  PERLA every 3-6 months for 5 years (will defer to surgery)   2.  Inguinal lymph node palpation every 3-6 months for 5 years   3.  Anoscopy every 6-12 months for 3 years (will defer to surgery)   4.  CT chest/abdomen/pelvis with contrast or chest CT without contrast and abdominal/pelvic MRI with contrast annually for 3 years (stage II-III)       Follow-up:  No follow-ups on file.

## 2023-04-12 ENCOUNTER — OFFICE VISIT (OUTPATIENT)
Dept: OTOLARYNGOLOGY | Facility: CLINIC | Age: 53
End: 2023-04-12
Payer: MEDICAID

## 2023-04-12 VITALS
BODY MASS INDEX: 36.64 KG/M2 | TEMPERATURE: 99 F | SYSTOLIC BLOOD PRESSURE: 127 MMHG | HEART RATE: 94 BPM | HEIGHT: 66 IN | DIASTOLIC BLOOD PRESSURE: 86 MMHG | RESPIRATION RATE: 16 BRPM | WEIGHT: 228 LBS

## 2023-04-12 DIAGNOSIS — H90.A22 SENSORINEURAL HEARING LOSS (SNHL) OF LEFT EAR WITH RESTRICTED HEARING OF RIGHT EAR: ICD-10-CM

## 2023-04-12 DIAGNOSIS — H90.A31 MIXED CONDUCTIVE AND SENSORINEURAL HEARING LOSS OF RIGHT EAR WITH RESTRICTED HEARING OF LEFT EAR: Primary | ICD-10-CM

## 2023-04-12 PROCEDURE — 99214 OFFICE O/P EST MOD 30 MIN: CPT | Mod: PBBFAC | Performed by: OTOLARYNGOLOGY

## 2023-04-12 NOTE — PROGRESS NOTES
Patient Name: Antoine Banegas   YOB: 1970     Chief Complaint:   No chief complaint on file.       History of Present Illness:  August 31, 2022:  51-year-old female presents today for evaluation of hearing loss in her left ear.  The patient is indicated for the past 3 years she is had subjective hearing loss in left ear were it sounds like it is far.  She feels she hears okay out of her right ear.  She does admit to ringing tinnitus in both ears.  She denies any associated ear pain, otorrhea, or dizziness.  She does admit she has difficulty following conversations and will sometimes find herself reading lips and often asking to have people repeat things sent to her.  She believes she may have had recurrent ear infections as a child but is unable to elaborate.  She does not have a history of ear surgery.  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  She does not have any unilateral noise exposure to the left ear.  No history of head trauma or cerebrovascular accidents.  She does have a history of anal cancer for which she would undergone treatment with radiation therapy and chemotherapy completing her treatments 2 months ago.  She does not recall what medication she was treated with.  She denies use of aspirin or any diuretics.  Family history noncontributory his for his hearing loss.  Patient does smoke 1 pack of cigarettes per day and drinks alcohol socially.  Denies illicit drug use.    Patient had an audiogram done on August 29, 2022, which showed asymmetric hearing loss with the left ear being greater than right.  In the left ear she had a severe to profound mixed hearing loss in his right ear moderate to moderately severe sensorineural hearing loss.  Speech reception threshold in the right ear was 45 decibels and 85 decibels on the left ear with 92% discrimination in the right ear and 76% discrimination in the left ear.  Type a  tympanograms bilaterally.  Distortion product otoacoustic emission testing suggested cochlear site of lesion bilaterally.    October 12, 2022:   Patient presents today for follow-up of her hearing loss and review of her CT scan of the temporal bones and MRI scan of the internal auditory canals.  Patient has not had any subjective change in her hearing since her last appointment.    CT scan of the temporal bones did not show any abnormalities.  She does not have any middle ear effusions.  Ossicles appeared to be intact.  No evidence of cholesteatoma.  MRI scan of the internal auditory canals did show the presence of a right internal auditory canal vascular loop but there was no evidence of retrocochlear pathology involving either ear.  Results were discussed with the patient.    The patient had placed a call to the [place] handicap commission in regards to assistance for hearing aids.  She is currently on a waiting list.    She has no other new problems today.    April 12, 2023:   Patient presents today for follow-up of her hearing loss.  She is not noticed any change in her hearing since her last appointment.  The patient has been fitted for hearing aids and is to receive her hearing aids next week.  Audiogram done on February 15, 2023, shows essentially no change in her hearing.  She has a moderately severe sensorineural hearing loss in the right ear and a severe to profound mixed hearing loss in the left ear.  Speech reception threshold is 40 decibels in the right ear and 80 decibels in the left ear with 92% discrimination in the right ear and 76% discrimination in the left ear.  Type a tympanograms bilaterally.  Results were discussed with the patient.    She does not have any other new problems today.    Past Medical History:  Past Medical History:   Diagnosis Date    Anxiety     Cancer     Circulatory anomaly      Past Surgical History:   Procedure Laterality Date    COLONOSCOPY  08/28/2020    EXAMINATION  UNDER ANESTHESIA N/A 2/17/2023    Procedure: Anal exam under anesthesia;  Surgeon: Lai Robertson MD;  Location: [facility] OR;  Service: Colon and Rectal;  Laterality: N/A;    HYSTERECTOMY  03/16/2017    INSERTION OF SUBCUTANEOUS PORT Right 10/31/2021    MRI PELVIS UNDER ANESTHESIA  10/01/2021    perianal surgery      RECTAL EXAMINATION UNDER ANESTHESIA  06/16/2021       Review of Systems:  Unremarkable except as mentioned above.    Current Medications:  Current Outpatient Medications   Medication Sig    amitriptyline (ELAVIL) 50 MG tablet Take 50 mg by mouth every evening.    atorvastatin (LIPITOR) 40 MG tablet Take 1 tablet (40 mg total) by mouth every evening.    ergocalciferol (ERGOCALCIFEROL) 50,000 unit Cap Take 1 capsule (50,000 Units total) by mouth every 7 days.    oxyCODONE-acetaminophen (PERCOCET)  mg per tablet Take 1 tablet by mouth every 6 (six) hours as needed.    EScitalopram oxalate (LEXAPRO) 10 MG tablet Take 10 mg by mouth once daily.    HYDROcodone-acetaminophen (NORCO) 5-325 mg per tablet Take 2 tablets by mouth every 6 (six) hours as needed.    LIDOcaine HCL 2% (XYLOCAINE) 2 % jelly     LIDOcaine-prilocaine (EMLA) cream Apply a dime size amount to right chest port area 45 min to an hour prior to port being accessed (Patient not taking: Reported on 4/12/2023)    morphine (MS CONTIN) 30 MG 12 hr tablet Take 30 mg by mouth.    omega-3s-dha-epa-fish oil (OMEGA-3 FISH OIL) 300-1,000 mg Cap Take 1,000 mg by mouth.    pregabalin (LYRICA) 50 MG capsule Take 1 capsule (50 mg total) by mouth 2 (two) times daily. (Patient not taking: Reported on 4/12/2023)    silver sulfADIAZINE 1% (SILVADENE) 1 % cream      Current Facility-Administered Medications   Medication    balsam peru-castor oiL Oint     Facility-Administered Medications Ordered in Other Visits   Medication    heparin, porcine (PF) 100 unit/mL injection flush 500 Units    sodium chloride 0.9% flush 10 mL        Allergies:  Review of  "patient's allergies indicates:  No Known Allergies     Physical Exam:  Vital signs:   Vitals:    04/12/23 0755   BP: 127/86   BP Location: Right arm   Patient Position: Sitting   BP Method: Medium (Automatic)   Pulse: 94   Resp: 16   Temp: 98.7 °F (37.1 °C)   TempSrc: Oral   Weight: 103.4 kg (228 lb)   Height: 5' 6" (1.676 m)   General:  Well-developed well-nourished female in no acute distress.  Voice is normal.  Head and face:  Normocephalic.  No facial lesions.  No temporomandibular joint tenderness or click.  Ears:  Right ear-auricle is normally developed.  External auditory canal is clear.  Tympanic membrane is nonerythematous.  No middle ear effusion.  Left ear-auricle is normally developed.  External auditory canal is clear.  Tympanic membrane is nonerythematous.  No middle ear effusion.  Eyes:  Extraocular muscles intact.  No nystagmus.  No exophthalmos or enophthalmos.  Pupils are equal round and reactive to light.  Neurologic:  Alert and oriented.  Cranial nerves 2-12 are grossly normal.    Assessment/Plan:  44-year-old female with asymmetric hearing loss with a mixed hearing loss in the left ear and sensorineural hearing loss in the right ear.  The hearing loss in the left ear may possibly be secondary to otosclerosis.      Plan:   Continued use of hearing aids for amplification.    Follow-up in 1 year with audiogram.  Continue noise avoidance.    Chris Oneal M.D.        "

## 2023-04-18 ENCOUNTER — OFFICE VISIT (OUTPATIENT)
Dept: INTERNAL MEDICINE | Facility: CLINIC | Age: 53
End: 2023-04-18
Payer: MEDICAID

## 2023-04-18 ENCOUNTER — LAB VISIT (OUTPATIENT)
Dept: LAB | Facility: HOSPITAL | Age: 53
End: 2023-04-18
Attending: STUDENT IN AN ORGANIZED HEALTH CARE EDUCATION/TRAINING PROGRAM
Payer: MEDICAID

## 2023-04-18 VITALS
DIASTOLIC BLOOD PRESSURE: 75 MMHG | TEMPERATURE: 98 F | OXYGEN SATURATION: 100 % | WEIGHT: 231.38 LBS | HEART RATE: 78 BPM | BODY MASS INDEX: 37.35 KG/M2 | SYSTOLIC BLOOD PRESSURE: 112 MMHG | RESPIRATION RATE: 18 BRPM

## 2023-04-18 DIAGNOSIS — E78.5 HYPERLIPIDEMIA, UNSPECIFIED HYPERLIPIDEMIA TYPE: ICD-10-CM

## 2023-04-18 DIAGNOSIS — F32.A DEPRESSIVE DISORDER: ICD-10-CM

## 2023-04-18 DIAGNOSIS — G89.29 CHRONIC BILATERAL LOW BACK PAIN, UNSPECIFIED WHETHER SCIATICA PRESENT: ICD-10-CM

## 2023-04-18 DIAGNOSIS — Z85.048 HISTORY OF RECTAL OR ANAL CANCER: ICD-10-CM

## 2023-04-18 DIAGNOSIS — E55.9 VITAMIN D DEFICIENCY: ICD-10-CM

## 2023-04-18 DIAGNOSIS — L58.9 RADIATION DERMATITIS: ICD-10-CM

## 2023-04-18 DIAGNOSIS — F41.9 ANXIETY DISORDER, UNSPECIFIED TYPE: ICD-10-CM

## 2023-04-18 DIAGNOSIS — Z78.9 CENTRAL VENOUS CATHETER IN PLACE: ICD-10-CM

## 2023-04-18 DIAGNOSIS — R31.9 HEMATURIA, UNSPECIFIED TYPE: ICD-10-CM

## 2023-04-18 DIAGNOSIS — M54.50 CHRONIC BILATERAL LOW BACK PAIN, UNSPECIFIED WHETHER SCIATICA PRESENT: ICD-10-CM

## 2023-04-18 DIAGNOSIS — M19.90 OSTEOARTHRITIS, UNSPECIFIED OSTEOARTHRITIS TYPE, UNSPECIFIED SITE: ICD-10-CM

## 2023-04-18 DIAGNOSIS — E78.5 HYPERLIPIDEMIA, UNSPECIFIED HYPERLIPIDEMIA TYPE: Primary | ICD-10-CM

## 2023-04-18 LAB
ALBUMIN SERPL-MCNC: 3.6 G/DL (ref 3.5–5)
ALBUMIN/GLOB SERPL: 0.9 RATIO (ref 1.1–2)
ALP SERPL-CCNC: 87 UNIT/L (ref 40–150)
ALT SERPL-CCNC: 40 UNIT/L (ref 0–55)
APPEARANCE UR: CLEAR
AST SERPL-CCNC: 21 UNIT/L (ref 5–34)
BACTERIA #/AREA URNS AUTO: ABNORMAL /HPF
BILIRUB UR QL STRIP.AUTO: NEGATIVE MG/DL
BILIRUBIN DIRECT+TOT PNL SERPL-MCNC: 0.5 MG/DL
BUN SERPL-MCNC: 9.8 MG/DL (ref 9.8–20.1)
CALCIUM SERPL-MCNC: 9.9 MG/DL (ref 8.4–10.2)
CHLORIDE SERPL-SCNC: 107 MMOL/L (ref 98–107)
CHOLEST SERPL-MCNC: 248 MG/DL
CHOLEST/HDLC SERPL: 7 {RATIO} (ref 0–5)
CO2 SERPL-SCNC: 26 MMOL/L (ref 22–29)
COLOR UR AUTO: ABNORMAL
CREAT SERPL-MCNC: 0.77 MG/DL (ref 0.55–1.02)
GFR SERPLBLD CREATININE-BSD FMLA CKD-EPI: >60 MLS/MIN/1.73/M2
GLOBULIN SER-MCNC: 4.2 GM/DL (ref 2.4–3.5)
GLUCOSE SERPL-MCNC: 108 MG/DL (ref 74–100)
GLUCOSE UR QL STRIP.AUTO: NORMAL MG/DL
HDLC SERPL-MCNC: 38 MG/DL (ref 35–60)
HYALINE CASTS #/AREA URNS LPF: ABNORMAL /LPF
KETONES UR QL STRIP.AUTO: NEGATIVE MG/DL
LDLC SERPL CALC-MCNC: 153 MG/DL (ref 50–140)
LEUKOCYTE ESTERASE UR QL STRIP.AUTO: NEGATIVE UNIT/L
NITRITE UR QL STRIP.AUTO: NEGATIVE
PH UR STRIP.AUTO: 5.5 [PH]
POTASSIUM SERPL-SCNC: 4.3 MMOL/L (ref 3.5–5.1)
PROT SERPL-MCNC: 7.8 GM/DL (ref 6.4–8.3)
PROT UR QL STRIP.AUTO: NEGATIVE MG/DL
RBC #/AREA URNS AUTO: ABNORMAL /HPF
RBC UR QL AUTO: NEGATIVE UNIT/L
SODIUM SERPL-SCNC: 141 MMOL/L (ref 136–145)
SP GR UR STRIP.AUTO: 1.01
SQUAMOUS #/AREA URNS LPF: ABNORMAL /HPF
TRIGL SERPL-MCNC: 284 MG/DL (ref 37–140)
UROBILINOGEN UR STRIP-ACNC: NORMAL MG/DL
VLDLC SERPL CALC-MCNC: 57 MG/DL
WBC #/AREA URNS AUTO: ABNORMAL /HPF

## 2023-04-18 PROCEDURE — 36415 COLL VENOUS BLD VENIPUNCTURE: CPT

## 2023-04-18 PROCEDURE — 81001 URINALYSIS AUTO W/SCOPE: CPT

## 2023-04-18 PROCEDURE — 80061 LIPID PANEL: CPT

## 2023-04-18 PROCEDURE — 99213 OFFICE O/P EST LOW 20 MIN: CPT | Mod: PBBFAC

## 2023-04-18 PROCEDURE — 80053 COMPREHEN METABOLIC PANEL: CPT

## 2023-04-18 RX ORDER — ATORVASTATIN CALCIUM 40 MG/1
40 TABLET, FILM COATED ORAL NIGHTLY
Qty: 90 TABLET | Refills: 3 | Status: SHIPPED | OUTPATIENT
Start: 2023-04-18 | End: 2024-04-12

## 2023-04-18 RX ORDER — HYDROCORTISONE 25 MG/G
CREAM TOPICAL 2 TIMES DAILY
Qty: 28 G | Refills: 1 | Status: SHIPPED | OUTPATIENT
Start: 2023-04-18 | End: 2023-10-30

## 2023-04-18 RX ORDER — VIT C/E/ZN/COPPR/LUTEIN/ZEAXAN 250MG-90MG
2000 CAPSULE ORAL DAILY
Qty: 180 CAPSULE | Refills: 3 | Status: SHIPPED | OUTPATIENT
Start: 2023-04-18 | End: 2024-04-17
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
## 2023-04-27 ENCOUNTER — INFUSION (OUTPATIENT)
Dept: INFUSION THERAPY | Facility: HOSPITAL | Age: 53
End: 2023-04-27
Attending: INTERNAL MEDICINE
Payer: MEDICAID

## 2023-04-27 VITALS
DIASTOLIC BLOOD PRESSURE: 93 MMHG | SYSTOLIC BLOOD PRESSURE: 139 MMHG | HEART RATE: 85 BPM | RESPIRATION RATE: 20 BRPM | WEIGHT: 230 LBS | BODY MASS INDEX: 36.96 KG/M2 | HEIGHT: 66 IN | OXYGEN SATURATION: 97 % | TEMPERATURE: 98 F

## 2023-04-27 DIAGNOSIS — Z85.048 HISTORY OF RECTAL OR ANAL CANCER: Primary | ICD-10-CM

## 2023-04-27 RX ORDER — HEPARIN 100 UNIT/ML
500 SYRINGE INTRAVENOUS
Status: DISCONTINUED | OUTPATIENT
Start: 2023-04-27 | End: 2023-04-27 | Stop reason: HOSPADM

## 2023-04-27 RX ORDER — SODIUM CHLORIDE 0.9 % (FLUSH) 0.9 %
10 SYRINGE (ML) INJECTION
Status: DISCONTINUED | OUTPATIENT
Start: 2023-04-27 | End: 2023-04-27 | Stop reason: HOSPADM

## 2023-04-27 RX ORDER — SODIUM CHLORIDE 0.9 % (FLUSH) 0.9 %
10 SYRINGE (ML) INJECTION
Status: CANCELLED | OUTPATIENT
Start: 2023-04-27

## 2023-04-27 RX ORDER — HEPARIN 100 UNIT/ML
500 SYRINGE INTRAVENOUS
Status: CANCELLED | OUTPATIENT
Start: 2023-04-27

## 2023-07-10 ENCOUNTER — OFFICE VISIT (OUTPATIENT)
Dept: INTERNAL MEDICINE | Facility: CLINIC | Age: 53
End: 2023-07-10
Payer: MEDICAID

## 2023-07-10 VITALS
RESPIRATION RATE: 20 BRPM | SYSTOLIC BLOOD PRESSURE: 122 MMHG | OXYGEN SATURATION: 99 % | WEIGHT: 236.81 LBS | TEMPERATURE: 98 F | HEART RATE: 81 BPM | BODY MASS INDEX: 38.22 KG/M2 | DIASTOLIC BLOOD PRESSURE: 80 MMHG

## 2023-07-10 DIAGNOSIS — G47.33 OBSTRUCTIVE SLEEP APNEA SYNDROME: ICD-10-CM

## 2023-07-10 DIAGNOSIS — Z72.0 TOBACCO USE: Primary | ICD-10-CM

## 2023-07-10 DIAGNOSIS — E55.9 VITAMIN D DEFICIENCY: ICD-10-CM

## 2023-07-10 DIAGNOSIS — Z85.048 HISTORY OF RECTAL OR ANAL CANCER: ICD-10-CM

## 2023-07-10 DIAGNOSIS — M19.90 OSTEOARTHRITIS, UNSPECIFIED OSTEOARTHRITIS TYPE, UNSPECIFIED SITE: ICD-10-CM

## 2023-07-10 DIAGNOSIS — M54.9 DORSALGIA, UNSPECIFIED: ICD-10-CM

## 2023-07-10 DIAGNOSIS — M48.062 LUMBAR STENOSIS WITH NEUROGENIC CLAUDICATION: ICD-10-CM

## 2023-07-10 DIAGNOSIS — E78.5 HYPERLIPIDEMIA, UNSPECIFIED HYPERLIPIDEMIA TYPE: ICD-10-CM

## 2023-07-10 PROCEDURE — 99214 OFFICE O/P EST MOD 30 MIN: CPT | Mod: PBBFAC | Performed by: STUDENT IN AN ORGANIZED HEALTH CARE EDUCATION/TRAINING PROGRAM

## 2023-07-10 RX ORDER — PREGABALIN 50 MG/1
50 CAPSULE ORAL 2 TIMES DAILY
Qty: 60 CAPSULE | Refills: 11 | Status: SHIPPED | OUTPATIENT
Start: 2023-07-10 | End: 2024-07-09

## 2023-07-10 RX ORDER — PREGABALIN 50 MG/1
50 CAPSULE ORAL 2 TIMES DAILY
Qty: 60 CAPSULE | Refills: 11 | Status: SHIPPED | OUTPATIENT
Start: 2023-07-10 | End: 2023-07-10
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
## 2023-07-11 PROBLEM — S31.809A OPEN WOUND OF BUTTOCK WITH COMPLICATION: Status: RESOLVED | Noted: 2022-05-18 | Resolved: 2023-07-11

## 2023-07-13 ENCOUNTER — HOSPITAL ENCOUNTER (OUTPATIENT)
Dept: RADIOLOGY | Facility: HOSPITAL | Age: 53
Discharge: HOME | End: 2023-07-13
Attending: STUDENT IN AN ORGANIZED HEALTH CARE EDUCATION/TRAINING PROGRAM
Payer: MEDICAID

## 2023-07-13 DIAGNOSIS — Z12.31 BREAST CANCER SCREENING BY MAMMOGRAM: ICD-10-CM

## 2023-07-13 PROCEDURE — 77063 BREAST TOMOSYNTHESIS BI: CPT | Mod: 26,,, | Performed by: RADIOLOGY

## 2023-07-13 PROCEDURE — 77067 SCR MAMMO BI INCL CAD: CPT | Mod: 26,,, | Performed by: RADIOLOGY

## 2023-07-13 PROCEDURE — 77063 MAMMO DIGITAL SCREENING BILAT WITH TOMO: ICD-10-PCS | Mod: 26,,, | Performed by: RADIOLOGY

## 2023-07-13 PROCEDURE — 77067 MAMMO DIGITAL SCREENING BILAT WITH TOMO: ICD-10-PCS | Mod: 26,,, | Performed by: RADIOLOGY

## 2023-07-13 PROCEDURE — 77067 SCR MAMMO BI INCL CAD: CPT | Mod: TC

## 2023-07-21 ENCOUNTER — OFFICE VISIT (OUTPATIENT)
Dept: HEMATOLOGY/ONCOLOGY | Facility: CLINIC | Age: 53
End: 2023-07-21
Payer: MEDICAID

## 2023-07-21 ENCOUNTER — INFUSION (OUTPATIENT)
Dept: INFUSION THERAPY | Facility: HOSPITAL | Age: 53
End: 2023-07-21
Attending: INTERNAL MEDICINE
Payer: MEDICAID

## 2023-07-21 VITALS
SYSTOLIC BLOOD PRESSURE: 140 MMHG | OXYGEN SATURATION: 98 % | RESPIRATION RATE: 20 BRPM | HEIGHT: 66 IN | TEMPERATURE: 98 F | DIASTOLIC BLOOD PRESSURE: 88 MMHG | BODY MASS INDEX: 38.17 KG/M2 | WEIGHT: 237.5 LBS | HEART RATE: 81 BPM

## 2023-07-21 DIAGNOSIS — Z85.048 HISTORY OF RECTAL OR ANAL CANCER: Primary | ICD-10-CM

## 2023-07-21 PROCEDURE — 3008F BODY MASS INDEX DOCD: CPT | Mod: CPTII,,, | Performed by: NURSE PRACTITIONER

## 2023-07-21 PROCEDURE — 1160F RVW MEDS BY RX/DR IN RCRD: CPT | Mod: CPTII,,, | Performed by: NURSE PRACTITIONER

## 2023-07-21 PROCEDURE — 99214 OFFICE O/P EST MOD 30 MIN: CPT | Mod: S$PBB,,, | Performed by: NURSE PRACTITIONER

## 2023-07-21 PROCEDURE — 99214 PR OFFICE/OUTPT VISIT, EST, LEVL IV, 30-39 MIN: ICD-10-PCS | Mod: S$PBB,,, | Performed by: NURSE PRACTITIONER

## 2023-07-21 PROCEDURE — 3079F DIAST BP 80-89 MM HG: CPT | Mod: CPTII,,, | Performed by: NURSE PRACTITIONER

## 2023-07-21 PROCEDURE — 96523 IRRIG DRUG DELIVERY DEVICE: CPT

## 2023-07-21 PROCEDURE — 63600175 PHARM REV CODE 636 W HCPCS: Performed by: INTERNAL MEDICINE

## 2023-07-21 PROCEDURE — 25000003 PHARM REV CODE 250: Performed by: INTERNAL MEDICINE

## 2023-07-21 PROCEDURE — 3077F PR MOST RECENT SYSTOLIC BLOOD PRESSURE >= 140 MM HG: ICD-10-PCS | Mod: CPTII,,, | Performed by: NURSE PRACTITIONER

## 2023-07-21 PROCEDURE — 3077F SYST BP >= 140 MM HG: CPT | Mod: CPTII,,, | Performed by: NURSE PRACTITIONER

## 2023-07-21 PROCEDURE — 1159F MED LIST DOCD IN RCRD: CPT | Mod: CPTII,,, | Performed by: NURSE PRACTITIONER

## 2023-07-21 PROCEDURE — 3008F PR BODY MASS INDEX (BMI) DOCUMENTED: ICD-10-PCS | Mod: CPTII,,, | Performed by: NURSE PRACTITIONER

## 2023-07-21 PROCEDURE — 3079F PR MOST RECENT DIASTOLIC BLOOD PRESSURE 80-89 MM HG: ICD-10-PCS | Mod: CPTII,,, | Performed by: NURSE PRACTITIONER

## 2023-07-21 PROCEDURE — 1159F PR MEDICATION LIST DOCUMENTED IN MEDICAL RECORD: ICD-10-PCS | Mod: CPTII,,, | Performed by: NURSE PRACTITIONER

## 2023-07-21 PROCEDURE — A4216 STERILE WATER/SALINE, 10 ML: HCPCS | Performed by: INTERNAL MEDICINE

## 2023-07-21 PROCEDURE — 1160F PR REVIEW ALL MEDS BY PRESCRIBER/CLIN PHARMACIST DOCUMENTED: ICD-10-PCS | Mod: CPTII,,, | Performed by: NURSE PRACTITIONER

## 2023-07-21 PROCEDURE — 99214 OFFICE O/P EST MOD 30 MIN: CPT | Mod: PBBFAC | Performed by: NURSE PRACTITIONER

## 2023-07-21 RX ORDER — SODIUM CHLORIDE 0.9 % (FLUSH) 0.9 %
10 SYRINGE (ML) INJECTION
Status: CANCELLED | OUTPATIENT
Start: 2023-07-21

## 2023-07-21 RX ORDER — HEPARIN 100 UNIT/ML
500 SYRINGE INTRAVENOUS
Status: DISCONTINUED | OUTPATIENT
Start: 2023-07-21 | End: 2023-07-21 | Stop reason: HOSPADM

## 2023-07-21 RX ORDER — SODIUM CHLORIDE 0.9 % (FLUSH) 0.9 %
10 SYRINGE (ML) INJECTION
Status: DISCONTINUED | OUTPATIENT
Start: 2023-07-21 | End: 2023-07-21 | Stop reason: HOSPADM

## 2023-07-21 RX ORDER — HEPARIN 100 UNIT/ML
500 SYRINGE INTRAVENOUS
Status: CANCELLED | OUTPATIENT
Start: 2023-07-21

## 2023-07-21 RX ADMIN — Medication 10 ML: at 11:07

## 2023-07-21 RX ADMIN — Medication 500 UNITS: at 11:07

## 2023-07-21 NOTE — Clinical Note
Infusion today [facility] Fu w/ in 3 months with lab work + infusion [facility] To be scheduled after 11/15/23 Ct scans completed

## 2023-07-21 NOTE — PROGRESS NOTES
Reason for Follow-up:  -squamous cell carcinoma of anus, cT4 cN1 cM0, stage IIIC  Rectal bleeding   Tobacco abuse, alcohol abuse    Current Treatment:  Surveillance    Treatment history:  1. Capecitabine 825 mg/m² p.o. twice daily, Monday-Friday, on each day that radiation therapy is given, throughout the duration of radiation therapy (typically 28 treatment days);  2. Mitomycin 10 mg/m² days 1 and 29. Chemotherapy 03/02/2022-03/31/2022  Definitive CRT; completed 5/4/2022    Past medical history: Chronic low back pain.  Chronic neck pain.  Dyslipidemia.  Obesity.  HOLLY, on CPAP.  Osteoarthritis.  Tobacco abuse.  Alcohol abuse.  Uterine fibroids.  Vitamin D deficiency.  Decreased hearing left ear.  Procedure/surgical history: Left eye surgery.  Left knee surgery.  Hip joint surgery (04/03/2003).  Total laparoscopic hysterectomy and bilateral salpingectomy for abnormal uterine bleeding, uterine fibroids, uteromegaly (03/16/2017).  Colon polypectomy (08/28/2020).  Uterine artery embolization (08/17/2016) for abnormal uterine bleeding secondary to enlarged uterus with multiple fibroids.  Social history: Single.  Lives in [place], [place].  Has 1 child.  Does not work.  Has been smoking a pack of cigarettes daily for 18 years.  Has been drinking 24 beers over the weekend for 16 years.  Denies illicit drug abuse.    Family history: Maternal grandmother experienced lung cancer in her 80s; used to smoke.  Health maintenance:   -07/20/2014: ThinPrep cervical Pap smear: NIL   -08/28/2020: Screening colonoscopy (positive FIT): 5 mm flat polyp ascending colon, removed (pathology: Ascending colon polyp, polypectomy: Adenomatous polyp; no evidence of malignancy) (rescope in 5 years)   -09/25/2020: Screening mammogram (comparison: 05/15/2019): Bilateral breast negative (BI-RADS 1)  Menstrual and OB/GYN history: S/p laparoscopic hysterectomy and bilateral salpingectomy for abnormal uterine bleeding, uterine fibroids, and  uteromegaly (03/16/2017)    History of Present Illness:   50-year-old female referred by surgery, for evaluation and management of anal cancer.    Squamous cell carcinoma of anus:   -Presentation (05/2021): Enlarging anal lump for 1 year; anal pain and intermittent bleeding for 3 weeks   -Fungating anal mass on the right anal verge, approximately 3 cm (05/19/2021)   -On EUA, anal mass extending from right anterior midline almost to posterior midline into anal canal dentate line   -Borderline mesorectal lymph node, 0.8 cm on CT A/P (05/19/2021)   -Anal mass not visualized on CT A/P   -EUA and biopsy (06/16/2021)   -08/12/2021: CT chest with contrast (staging): No acute abnormality   -08/24/2021: PET/CT (comparison: CT chest 08/12/2021; CT A/P 05/19/2021): FDG uptake around the anal canal likely corresponding to known malignancy (maximum SUV 20.8); stable size of small mildly hypermetabolic right mesorectal, pelvic sidewall, and inguinal lymph nodes, nonspecific   -10/01/2021: MRI pelvis with and without contrast (comparison: PET/CT 08/24/2021): Mass along the right anal canal measuring up to 55 mm; suspected area of soft tissue extension into the posterior lower vagina/introitus; several suspicious perirectal lymph nodes; nonspecific borderline enlarged bilateral iliac and inguinal lymph nodes (mass 42 x 17 x 55 mm, extension anal margin, most likely extends through the external sphincter; several prominent perirectal lymph nodes measuring up to 8 mm in short axis; bilateral external iliac lymph nodes also measure up to 8 mm in short axis; bilateral inguinal lymph nodes measure up to 10 mm)   -10/13/2021: Mediport placed   -11/17/2021: Screening mammogram (comparison: 09/25/2020 mammogram): Right breast benign (BI-RADS 2); left breast negative (BI-RADS 1)   -12/09/2021: She called our office stating that she wants to start chemoradiation therapy ASAP and that she is passing blood clots and that tumor in the anus is  bleeding a lot   -01/05/2022: Presented to ED with worsening depression for 3 days; suicidal ideation; feeling overwhelmed; [facility]   -Follows up with ID for latent syphilis (antibody + 01/06/2022 at [facility]; no prior treatment; ID planning to treat with Bicillin LA 2,400,000 units weekly x3 weeks)   -01/31/2022: Restaging CT C/A/P with contrast: Soft tissue along the right anal canal is stable to perhaps slightly larger since 10/01/2021 (43 x 31 mm, previously 42 x 23 mm); slight enlargement of the left internal iliac lymph node, now measuring up to 10 mm; multiple other pelvic lymph nodes are stable; no metastasis; trace ascites   -01/31/2022: Whole-body nuclear medicine bone scan: No bone metastasis   -01/21/2022: CMP unremarkable; mild neutrophilic leukocytosis, hemoglobin 11.9   -01/25/2022: Syphilis antibody positive; HIV negative; RPR nonreactive   -Chemotherapy started 03/02/2022   -Day 29 mitomycin C on 03/31/2022   -Follows up with ID for history of latent syphilis; s/p Bicillin LA 2,400,000 units weekly x3 (01/25/2022, 02/01/2022, 02/08/2022)  -S/P radiotherapy to pelvis 03/10/2022-05/04/2022  -09/07/2022: CT temporal bone without contrast (mixed conductive and sensorineural hearing loss):  Unremarkable temporal bone CT  -09/07/2022:  MRI temporal bones with and without contrast (hearing loss):  1. No acute intracranial abnormality.  2. Right IAC vascular loop, type II.  Otherwise no abnormality of the internal auditory canals or membranous labyrinths.  -10/28/2022: Seen in Oncology Clinic (NP):  2 week history of bright red bleeding from rectum.  -11/01/2022: Seen in surgical clinic for evaluation of blood both on toilet paper and when she wipes and also sees in toilet; occasional blood clots on tissue paper.  ER referred her to surgery for EUA and anoscopy.  Rectal examination by them showed postradiation scarring of skin around the anus but no masses palpable  on PERLA.  -11/29/2022:  CTs C/A/P with contrast (comparison:  01/31/2022):  1. Some bladder and rectal wall thickening may be treatment related.  2. Similar small pelvic sidewall and iliac lymph nodes.  3. No new suspicious findings chest, abdomen or pelvis.    Interval History 7/21/23: Patient presents alone for scheduled follow up for history of rectal cancer. She reports doing well without any reportable symptoms. She reports good soft bowel movements and good energy level. She denies any blood in stool or constipation, unintentional weight loss, abdominal pain. She does say that sometimes she has rectal burning pain after using the restroom but it does eventually go away. Lab work reviewed with patient, stable. She is aware of upcoming CT scan. She will follow up with surgery clinic in Sept.2023, she is aware    Review of Systems:   All systems reviewed and found to be negative except for the symptoms detailed above    Lab Results   Component Value Date    WBC 7.98 07/21/2023    RBC 4.86 07/21/2023    HGB 13.3 07/21/2023    HCT 41.3 07/21/2023    MCV 85.0 07/21/2023    MCH 27.4 07/21/2023    MCHC 32.2 (L) 07/21/2023    RDW 14.5 07/21/2023     07/21/2023    MPV 9.0 07/21/2023     CMP  Sodium Level   Date Value Ref Range Status   07/21/2023 136 136 - 145 mmol/L Final     Potassium Level   Date Value Ref Range Status   07/21/2023 4.1 3.5 - 5.1 mmol/L Final     Carbon Dioxide   Date Value Ref Range Status   07/21/2023 25 22 - 29 mmol/L Final     Blood Urea Nitrogen   Date Value Ref Range Status   07/21/2023 9.6 (L) 9.8 - 20.1 mg/dL Final     Creatinine   Date Value Ref Range Status   07/21/2023 0.86 0.55 - 1.02 mg/dL Final     Calcium Level Total   Date Value Ref Range Status   07/21/2023 9.6 8.4 - 10.2 mg/dL Final     Albumin Level   Date Value Ref Range Status   07/21/2023 3.5 3.5 - 5.0 g/dL Final     Bilirubin Total   Date Value Ref Range Status   07/21/2023 0.4 <=1.5 mg/dL Final     Alkaline Phosphatase    Date Value Ref Range Status   07/21/2023 85 40 - 150 unit/L Final     Aspartate Aminotransferase   Date Value Ref Range Status   07/21/2023 26 5 - 34 unit/L Final     Alanine Aminotransferase   Date Value Ref Range Status   07/21/2023 49 0 - 55 unit/L Final     eGFR   Date Value Ref Range Status   07/21/2023 >60 mls/min/1.73/m2 Final       Physical Examination:   VITAL SIGNS:   Vitals:    07/21/23 1036   BP: (!) 140/88   Pulse: 81   Resp: 20   Temp: 98 °F (36.7 °C)     GENERAL:  In no apparent distress.    HEAD:  No signs of head trauma.  EYES:  Pupils are equal.  Extraocular motions intact.    EARS:  Hearing grossly intact.  MOUTH:  Oropharynx is normal.   NECK:  No adenopathy, no JVD.     CHEST:  Chest with clear breath sounds bilaterally.  No wheezes, rales, rhonchi.    CARDIAC:  Regular rate and rhythm.  S1 and S2, without murmurs, gallops, rubs.  VASCULAR:  No Edema.  Peripheral pulses normal and equal in all extremities.  ABDOMEN:  Soft, without detectable tenderness.  No sign of distention.  No   rebound or guarding, and no masses palpated.   Bowel Sounds normal.  MUSCULOSKELETAL:  Good range of motion of all major joints. Extremities without clubbing, cyanosis or edema.    NEUROLOGIC EXAM:  Alert and oriented x 3.  No focal sensory or strength deficits.   Speech normal.  Follows commands.  PSYCHIATRIC:  Mood normal.      Assessment:  Squamous cell carcinoma of anus:   -Presentation (05/2021): Enlarging anal lump for 1 year; anal pain and intermittent bleeding for 3 weeks   -Fungating anal mass right anal verge   -S/p EUA (06/16/2021)   -Mass 3 cm on physical exam (05/19/2021); mass not visualized on CT A/P; mass 55 mm on MRI; mass extending into lower vagina/introitus on MRI   -Several suspicious perirectal and nonspecific borderline enlarged bilateral iliac, mesorectal, and inguinal lymph nodes on MRI and PET/CT   >>> 01/21/2022: cT4 cN1c M0, at least stage IIIC   -Noncompliant with follow-up   -Restaging  CTs and bone scan (01/31/2022): 43 x 31 mm right anal canal soft tissue (anal cancer); left internal iliac lymph node 10 mm, slightly enlarged; other pelvic lymph nodes stable; no metastasis   -Of note, her restaging PET/CT and pelvic MRI scan was denied by her insurance.   -S/p chemotherapy 03/02/2022-03/31/2022  -S/P radiotherapy to pelvis 03/10/2022-05/04/2022  -rectal bleeding, starting 10/2022; evaluated by surgery 11/01/2022; EUA and anoscopy planned  -no recurrence or metastasis on CTs C/A/P with contrast 11/01/2022  -02/17/2023:  Anal exam under anesthesia:  Operative findings:  Radiation proctitis; no ulceration or masses appreciated  -03/21/2023 follow-up visit: Doing well      #Tobacco abuse, alcohol abuse     #History of depression, suicidal ideation    #Latent syphilis: Syphilis antibody positive (11/25/2022); following up with ID   -S/p Bicillin LA 2,400,000 units weekly x3 (01/25/2022, 02/01/2022, 02/08/2022)    #Comorbidities: Chronic low back pain, chronic neck pain, obesity, HOLLY, on CPAP, etc.      Plan:   Squamous cell carcinoma of anus    -S/p chemotherapy 03/02/2022-03/31/2022  -S/P radiotherapy to pelvis 03/10/2022-05/04/2022  >>>  Continue surveillance (see below for plan)  PERLA every 3-6 months for 5 years (deferred to surgery)   Inguinal lymph node palpation every 3-6 months for 5 years (05/2022-05/2027)  Anoscopy every 6-12 months for 3 years (will defer to surgery)   CT C/A/P with contrast or CT chest without contrast and abdominal/pelvic MRI with contrast annually for 3 years (stages 2-3) (05/2022-05/2025)  >>>  -rectal bleeding, starting 10/2022; evaluated by surgery 11/01/2022; EUA and anoscopy planned  -no recurrence or metastasis on CTs C/A/P with contrast 11/01/2022  -02/17/2023:  Anal exam under anesthesia:  Operative findings:  Radiation proctitis; no ulceration or masses appreciated  >>>    ALEX recurrence, Continue surveillance  Continue MPF every 3 months, due today  Follow up  with  in 3 months with lab work (cbc,cmp,mg) + CT scans results  Surveillance CT scans of C/A/P with contrast in 1 year (11/2023)  Clinical follow-up every 3 months, with special attention to inguinal lymph node palpation    Chemotherapy administered concurrent with radiotherapy:   1.  Capecitabine 825 mg/m² p.o. twice daily, Monday-Friday, on each day that radiation therapy is given, throughout the duration of radiation therapy (typically 28 treatment days);   2.  Mitomycin 10 mg/m² days 1 and 29.     -It is not the standard of care to repeat imaging studies after completion of chemoradiation therapy.  Imaging studies are recommended only if, on 8-12-week evaluation, there is persistent disease.      Surveillance: (After complete remission):   1.  PERLA every 3-6 months for 5 years (will defer to surgery)   2.  Inguinal lymph node palpation every 3-6 months for 5 years   (05/2022-05/2027)   3.  Anoscopy every 6-12 months for 3 years (will defer to surgery)   4.  CT chest/abdomen/pelvis with contrast or chest CT without contrast and abdominal/pelvic MRI with contrast annually for 3 years (stage II-III)   (05/2022-05/2025)       Above discussed at length with the patient.  All questions answered.   She understands and agrees this plan.   ------------------  For response assessment, see below.    Follow-up:  Evaluate in 8-12 weeks (after completion of chemoradiation therapy) with exam + PERLA:   1.  If complete remission, then: Surveillance;     2.  If persistent disease, then: Reevaluate in 4 weeks, including imaging studies; if progression or no progression on serial exams, then continue observation and reevaluation at 3-month intervals, utilizing imaging studies; if complete remission, then surveillance   (Follow patients who have not achieved a complete clinical response with persistent anal cancer up to 6 months following completion of RT and chemotherapy as long as there is no evidence of progressive  disease during this period of follow-up; persistent disease may continue to regress even at 26 weeks from the start of treatment)     3.  If progressive disease, then: If biopsy-proven, then restage, including imaging studies; if locally recurrent, then, APR/groin dissection if positive inguinal lymph nodes, followed by surveillance; if, on restaging, metastatic disease, then, systemic therapy   (Follow patients who have not achieved a complete clinical response with persistent anal cancer up to 6 months following completion of RT and chemotherapy as long as there is no evidence of progressive disease during this period of follow-up; persistent disease may continue to regress even at 26 weeks from the start of treatment)      Surveillance: (After complete remission):   1.  PERLA every 3-6 months for 5 years (will defer to surgery)   2.  Inguinal lymph node palpation every 3-6 months for 5 years   3.  Anoscopy every 6-12 months for 3 years (will defer to surgery)   4.  CT chest/abdomen/pelvis with contrast or chest CT without contrast and abdominal/pelvic MRI with contrast annually for 3 years (stage II-III)

## 2023-08-16 ENCOUNTER — HOSPITAL ENCOUNTER (OUTPATIENT)
Dept: WOUND CARE | Facility: HOSPITAL | Age: 53
Discharge: HOME | End: 2023-08-16
Attending: NURSE PRACTITIONER
Payer: MEDICAID

## 2023-08-16 VITALS
SYSTOLIC BLOOD PRESSURE: 145 MMHG | HEART RATE: 100 BPM | TEMPERATURE: 98 F | RESPIRATION RATE: 20 BRPM | DIASTOLIC BLOOD PRESSURE: 86 MMHG

## 2023-08-16 DIAGNOSIS — K62.9 RECTAL LESION: Primary | ICD-10-CM

## 2023-08-16 DIAGNOSIS — K64.4 SKIN TAG OF ANUS: ICD-10-CM

## 2023-08-16 DIAGNOSIS — L90.5 SCAR TISSUE: ICD-10-CM

## 2023-08-16 DIAGNOSIS — K62.89 RECTAL OR ANAL PAIN: ICD-10-CM

## 2023-08-16 DIAGNOSIS — Z85.048 HISTORY OF RECTAL OR ANAL CANCER: ICD-10-CM

## 2023-08-16 PROCEDURE — 99213 PR OFFICE/OUTPT VISIT, EST, LEVL III, 20-29 MIN: ICD-10-PCS | Mod: ,,, | Performed by: NURSE PRACTITIONER

## 2023-08-16 PROCEDURE — 99211 OFF/OP EST MAY X REQ PHY/QHP: CPT

## 2023-08-16 PROCEDURE — 27000999 HC MEDICAL RECORD PHOTO DOCUMENTATION

## 2023-08-16 PROCEDURE — 99213 OFFICE O/P EST LOW 20 MIN: CPT | Mod: ,,, | Performed by: NURSE PRACTITIONER

## 2023-08-16 NOTE — PATIENT INSTRUCTIONS
Perform bleach baths 2-3 times weekly.   Use silver bottle with bleach bath solution after each bowel movement.

## 2023-08-17 NOTE — PROGRESS NOTES
Subjective:       Patient ID: Antoine Banegas is a 52 y.o. female.    Chief Complaint: Wound Care (Rectal irratation)    52-year-old female presents to wound care clinic today with the rectal pain, and nonhealing skin issues of rectum.  She presents to the clinic with spouse.  Medical history:  Squamous cell carcinoma anus stage III, rectal bleeding, anxiety, chronic lower back pain, depression, hyperlipidemia, sleep apnea, obesity, osteoarthritis, vitamin-D deficiency, tobacco use her, latent syphilis, radiation dermatitis, history of alcohol abuse, and bilateral hearing loss.    Today's visit 08/16/2023:  Skin to external rectal area discoloration in skin, small skin tag, 2 small lesions round rectum, patient voices very painful during exam unable to touch area without patient dropping off of bed.  Patient currently is applying lidocaine topical to area.  No open areas at this time.  Discussion with the patient and spouse will send a referral to surgery clinic for possible excision of lesions and biopsy unable to perform an our clinic due to extreme pain.  Gave bleach bath recipe instructed to perform bleach baths 2 to 3 times a week, and use silver bottle with bleach bath water after each bowel movement.  Will have her return in 2 weeks to re-evaluate area.  Instructed to call the office with any questions, concerns, or new skin issues.      Lab Results   Component Value Date/Time    WBC 7.98 07/21/2023 09:57 AM    RBC 4.86 07/21/2023 09:57 AM    HGB 13.3 07/21/2023 09:57 AM    HCT 41.3 07/21/2023 09:57 AM    MCV 85.0 07/21/2023 09:57 AM    MCH 27.4 07/21/2023 09:57 AM    CREATININE 0.86 07/21/2023 09:57 AM    HGBA1C 5.3 12/29/2022 09:18 AM    CRP 1.43 (H) 03/31/2021 09:20 AM     Review of Systems   Skin:  Positive for color change.   All other systems reviewed and are negative.          Objective:        Physical Exam  Vitals reviewed.   Genitourinary:         Comments: Discoloration and skin around rectum  skin Tear, and skin lesions.  Skin:     Capillary Refill: Capillary refill takes less than 2 seconds.   Neurological:      Mental Status: She is alert.                    Assessment:         ICD-10-CM ICD-9-CM   1. Rectal lesion  K62.9 569.49   2. Skin tag of anus  K64.4 455.9   3. Rectal or anal pain  K62.89 569.42   4. Scar tissue  L90.5 709.2   5. History of rectal or anal cancer  Z85.048 V10.06         Plan:   Tissue pathology and/or culture taken:  [] Yes [x] No   Sharp debridement performed:   [] Yes [x] No   Labs ordered this visit:   [] Yes [x] No   Imaging ordered this visit:   [] Yes [x] No         1. Rectal lesion     Will folow up with surgery clinic.  Will perform bleach baths 2-3 times weekly.    2. Skin tag of anus     Will perform bleach baths 2-3 times weekly.    3. Rectal or anal pain     Will apply topical Lidocaine topical as directed.    4. Scar tissue     Will follow up with clinic in 2 weeks.    5. History of rectal or anal cancer     Under the care of Dr. Jones.             Follow up in about 2 weeks (around 8/30/2023).

## 2023-08-24 ENCOUNTER — OFFICE VISIT (OUTPATIENT)
Dept: SURGERY | Facility: CLINIC | Age: 53
End: 2023-08-24
Payer: MEDICAID

## 2023-08-24 VITALS
DIASTOLIC BLOOD PRESSURE: 82 MMHG | WEIGHT: 234 LBS | TEMPERATURE: 98 F | SYSTOLIC BLOOD PRESSURE: 121 MMHG | BODY MASS INDEX: 38.99 KG/M2 | HEART RATE: 90 BPM | HEIGHT: 65 IN | OXYGEN SATURATION: 98 %

## 2023-08-24 DIAGNOSIS — K62.89 RECTAL OR ANAL PAIN: ICD-10-CM

## 2023-08-24 DIAGNOSIS — Z85.048 HISTORY OF RECTAL OR ANAL CANCER: ICD-10-CM

## 2023-08-24 DIAGNOSIS — K62.9 ANAL LESION: Primary | ICD-10-CM

## 2023-08-24 DIAGNOSIS — K62.9 RECTAL LESION: ICD-10-CM

## 2023-08-24 PROCEDURE — 99215 OFFICE O/P EST HI 40 MIN: CPT | Mod: PBBFAC

## 2023-08-24 NOTE — PROGRESS NOTES
[facility]   Clinic Note           HPI: Antoine Banegas is a 52 y.o. female with PMHx of squamous cell carcinoma of the anus s/p chemoradiation geovanna protocol in 3/2022. Last seen in clinic Feb 2023, had noted rectal bleeding at that time. Underwent EUA 2/17/23 which revealed an area of radiation proctitis in anterior rectum. Pt has been asymptomatic sicne her EUA. Has had occasional fecal incontinence since geovanna protocol. Pt denies CP, SOB, abdominal pain, f/c, n/v. Pt is tolerating a diet and having regular non-bloody bowel movements. Pt denies cramping. However, she has had an area of ulceration around her anus that has been present since she underwent treatment. Pt states it is not getting larger but is very sensitive and causes tingling pain.       Physical Exam:   Vitals:    08/24/23 1224   BP: 121/82   Pulse: 90   Temp: 97.8 °F (36.6 °C)      Gen:  NAD, AAOx3   Eye:  OBINNA, EOMI   CVS:  Normal RRR   Chest:  Non-labored breathing on room air   Abd:     s/nt/nd. No inguinal lymphadenopathy.  Ext:  Move all 4 extremities  Anorectal: No blood on glove, reduced anal tone. No masses palpated. Area of hypopigmented ulceration right inferior anal margin, has small mass at edge of ulceration (see image below).               A/P: Antoine Banegas is a 52 y.o. female with PMHx of squamous cell carcinoma of the anus s/p chemoradiation geovanna protocol in 3/2022 and EUA for rectal bleeding on 2/17/23 showing evidence of radiation proctitis presenting for scheduled follow up. Exam reveals area of ulceration at right inferior anal margin and small mass at edge of ulceration.       - will schedule for EUA with biopsy 9/13      Velma Galindo MD   [facility]   08/24/2023 12:45 PM

## 2023-08-24 NOTE — PROGRESS NOTES
Pt seen by Dr. Galindo; Scheduled for anal exam under anesthesia on 13Sep2023; Pt consented for surgery; Pt instructed to return to clinic 2 weeks after surgery for post op appt; Discharge paperwork given w/pt verbalizing understanding     No

## 2023-08-25 RX ORDER — SODIUM CHLORIDE 9 MG/ML
INJECTION, SOLUTION INTRAVENOUS CONTINUOUS
Status: CANCELLED | OUTPATIENT
Start: 2023-08-25

## 2023-08-30 ENCOUNTER — HOSPITAL ENCOUNTER (OUTPATIENT)
Dept: WOUND CARE | Facility: HOSPITAL | Age: 53
Discharge: HOME | End: 2023-08-30
Attending: NURSE PRACTITIONER
Payer: MEDICAID

## 2023-08-30 VITALS
SYSTOLIC BLOOD PRESSURE: 133 MMHG | DIASTOLIC BLOOD PRESSURE: 88 MMHG | HEART RATE: 90 BPM | RESPIRATION RATE: 18 BRPM | OXYGEN SATURATION: 100 %

## 2023-08-30 DIAGNOSIS — K62.89 RECTAL OR ANAL PAIN: ICD-10-CM

## 2023-08-30 DIAGNOSIS — L58.9 RADIATION DERMATITIS: ICD-10-CM

## 2023-08-30 DIAGNOSIS — L59.8 OTHER SPECIFIED DISORDERS OF THE SKIN AND SUBCUTANEOUS TISSUE RELATED TO RADIATION: ICD-10-CM

## 2023-08-30 DIAGNOSIS — Z85.048 HISTORY OF RECTAL OR ANAL CANCER: ICD-10-CM

## 2023-08-30 DIAGNOSIS — K62.9 RECTAL LESION: Primary | ICD-10-CM

## 2023-08-30 DIAGNOSIS — K64.4 SKIN TAG OF ANUS: ICD-10-CM

## 2023-08-30 DIAGNOSIS — L90.5 SCAR TISSUE: ICD-10-CM

## 2023-08-30 PROCEDURE — 27000999 HC MEDICAL RECORD PHOTO DOCUMENTATION

## 2023-08-30 PROCEDURE — 99213 OFFICE O/P EST LOW 20 MIN: CPT | Mod: ,,, | Performed by: NURSE PRACTITIONER

## 2023-08-30 PROCEDURE — 99213 PR OFFICE/OUTPT VISIT, EST, LEVL III, 20-29 MIN: ICD-10-PCS | Mod: ,,, | Performed by: NURSE PRACTITIONER

## 2023-08-30 PROCEDURE — 99211 OFF/OP EST MAY X REQ PHY/QHP: CPT

## 2023-08-30 RX ORDER — SODIUM CHLORIDE, SODIUM LACTATE, POTASSIUM CHLORIDE, CALCIUM CHLORIDE 600; 310; 30; 20 MG/100ML; MG/100ML; MG/100ML; MG/100ML
INJECTION, SOLUTION INTRAVENOUS CONTINUOUS
Status: CANCELLED | OUTPATIENT
Start: 2023-08-30

## 2023-08-30 RX ORDER — ONDANSETRON 2 MG/ML
4 INJECTION INTRAMUSCULAR; INTRAVENOUS EVERY 6 HOURS PRN
Status: CANCELLED | OUTPATIENT
Start: 2023-08-30

## 2023-08-30 NOTE — PROGRESS NOTES
Subjective:       Patient ID: Antoine Banegas is a 52 y.o. female.    Chief Complaint: Wound Consult    52-year-old female presents to wound care clinic today follow up regarding rectal pain, and nonhealing skin issues of rectum.  She presents to the clinic with spouse.  She was seen by surgery clinic and is scheduled for biopsy to area on 9/13/23.  Reviewed previous medical history.  Medical history:  Squamous cell carcinoma anus stage III, rectal bleeding, anxiety, chronic lower back pain, depression, hyperlipidemia, sleep apnea, obesity, osteoarthritis, vitamin-D deficiency, tobacco use her, latent syphilis, radiation dermatitis, history of alcohol abuse, and bilateral hearing loss.    Today's visit  8/30/23:  Reviewed previous progress note.  During rectal area exam discoloration to skin 2 small lesions surrounding rectum with skin tag distally.  No significant change since last visit patient states does feel a little bit better.  Instructed to cleansed area with Vashe, and apply Triad to surrounding skin.  Instructed the patient to continue bleach baths 2 to 3 times a week, and use silver bottle after each bowel movement.  Will have her return to clinic after skin biopsy. Instructed to call the office with any questions, concerns, or new skin issues.  Verbalized understanding of all instructions.      08/16/2023:  Skin to external rectal area discoloration in skin, small skin tag, 2 small lesions round rectum, patient voices very painful during exam unable to touch area without patient dropping off of bed.  Patient currently is applying lidocaine topical to area.  No open areas at this time.  Discussion with the patient and spouse will send a referral to surgery clinic for possible excision of lesions and biopsy unable to perform an our clinic due to extreme pain.  Gave bleach bath recipe instructed to perform bleach baths 2 to 3 times a week, and use silver bottle with bleach bath water after each bowel  movement.  Will have her return in 2 weeks to re-evaluate area.  Instructed to call the office with any questions, concerns, or new skin issues.    Lab Results   Component Value Date/Time    WBC 7.98 07/21/2023 09:57 AM    RBC 4.86 07/21/2023 09:57 AM    HGB 13.3 07/21/2023 09:57 AM    HCT 41.3 07/21/2023 09:57 AM    MCV 85.0 07/21/2023 09:57 AM    MCH 27.4 07/21/2023 09:57 AM    CREATININE 0.86 07/21/2023 09:57 AM    HGBA1C 5.3 12/29/2022 09:18 AM    CRP 1.43 (H) 03/31/2021 09:20 AM     Review of Systems   All other systems reviewed and are negative.          Objective:        Physical Exam  Vitals reviewed.   Genitourinary:         Comments: Discoloration and skin around rectum skin Tear, and skin lesions.  Skin:     General: Skin is warm and dry.      Capillary Refill: Capillary refill takes less than 2 seconds.   Neurological:      Mental Status: She is alert.                  Assessment:         ICD-10-CM ICD-9-CM   1. Rectal lesion  K62.9 569.49   2. Skin tag of anus  K64.4 455.9   3. Rectal or anal pain  K62.89 569.42   4. Scar tissue  L90.5 709.2   5. Other specified disorders of the skin and subcutaneous tissue related to radiation  L59.8 692.82   6. Radiation dermatitis  L58.9 692.82   7. History of rectal or anal cancer  Z85.048 V10.06         Plan:   Tissue pathology and/or culture taken:  [] Yes [x] No   Sharp debridement performed:   [] Yes [x] No   Labs ordered this visit:   [] Yes [x] No   Imaging ordered this visit:   [] Yes [x] No         1. Rectal lesion     Will Cleanse daily with Vashe and apply Triad. Will perform bleach baths 2-3 times weekly.  Will use silver bottle after each bowel movement.    2. Skin tag of anus     Will perform bleach baths 2-3 times weekly.    3. Rectal or anal pain     Will apply topical Lidocaine topical as directed.    4. Scar tissue     Schedule for biopsy with surgery clinic 9/13/23.   5. History of rectal or anal cancer     Under the care of Dr. Jones. Schedule  for biopsy with surgery clinic on 9/13/23.            Follow up in about 16 days (around 9/15/2023) for 8 am.

## 2023-09-01 ENCOUNTER — ANESTHESIA EVENT (OUTPATIENT)
Dept: SURGERY | Facility: HOSPITAL | Age: 53
End: 2023-09-01
Payer: MEDICAID

## 2023-09-01 NOTE — ANESTHESIA PREPROCEDURE EVALUATION
09/01/2023  Antoine Banegas is a 52 y.o., female.    COVID STATUS: MODERNA X 2 (LASTLY 6/1/21))  BETA-BLOCKER: NONE     PAT NP PHONE INTERVIEW 9/1/23, NURSE PHONE INTERVIEW 9/7/23     PROBLEM LIST:  -  ANAL LESION; Hx ANAL SQUAMOUS CELL CARCINOMA (Dx 5/19/21) - XRT 3/10-5/4/22, CHEMO 3/2-3/31/22      - S/P 6/16/21 EUA RECTAL, ANAL MASS Bx = squamous cell carcinoma arising in a background of high-grade squamous intraepithelial lesion      - S/P 10/12/21 MRI w/ANESTHESIA 2/2 CLAUSTROPHOBIA      - S/P 10/13/21 RIGHT MEDIPORT      - S/P 2/17/23 EUA RECTAL for RECTAL BLEEDING; Dx: RADIATION PROCTITIS  -  HYSTERECTOMY  -  OBESITY CLASS II  -  HLD  -  ANXIETY/DEPRESSION, CLAUSTROPHOBIA      - ER 1/5/22 w/SI, +UDS FENTANYL --> INPAT. FACILITY  -  CHRONIC BACK PAIN w/LLE SCIATICA      - 4/9/21 MRI L-SPINE = Multifactorial spinal canal stenosis, severe at L3-L5, moderate to severe at L2-L3, and moderate at L1-L2; multilevel neural foraminal stenosis, severe on the right at L5-S1  -  CHRONIC NECK PAIN      - 4/23/15 XRAY C-SPINE = Minimal C4-C5 disc space narrowing.  -  OA  -  HARD of HEARING  -  SMOKER 32+ PPY  -  ETOH - 6-24 BEERS on WEEKENDS & OCASSIONALLY DURING WEEK SINCE AGE 18  -  MODERATE HOLLY w/CPAP - INSTRUCTED to BRING DOS    AM Rx DOS: LEXAPRO, NORCO/MS CONTIN/PERCOCET PRN, LYRICA     ORDERS -   SURGEON: 2/17/23 EKG; 7/21/23 CBC, CMP  ANESTHESIA: VALIUM PRN DOS      Pre-op Assessment    I have reviewed the Patient Summary Reports.     I have reviewed the Nursing Notes. I have reviewed the NPO Status.   I have reviewed the Medications.     Review of Systems  Anesthesia Hx:  No problems with previous Anesthesia  History of prior surgery of interest to airway management or planning: Previous anesthesia: General 2/23 Exam under anesth anal: LMA; Hysrectomy:; with general anesthesia.  Procedure performed at  an [facility]. Airway issues documented on chart review include laryngeal mask airway used  Denies Family Hx of Anesthesia complications.   Denies Personal Hx of Anesthesia complications.   Hematology/Oncology:  Hematology Normal       -- Cancer in past history:  chemotherapy and radiation  Oncology Comments: Anal CA s/p chemo/RTx     EENT/Dental:EENT/Dental Normal   Cardiovascular:  Cardiovascular Normal  Denies Pacemaker.  Denies MI.   Denies CABG/stent.  ECG has been reviewed.    Pulmonary:   Sleep Apnea, CPAP    Renal/:  Renal/ Normal     Hepatic/GI:  Hepatic/GI Normal  Denies GERD.    Musculoskeletal:   Arthritis     Neurological:  Neurology Normal  Denies CVA. Denies Seizures.    Endocrine:  Endocrine Normal  Obesity / BMI > 30Denies Morbid Obesity / BMI > 40  Dermatological:  Skin Normal    Psych:   anxiety          Physical Exam  General: Cooperative, Alert, Oriented and Flat Affect  Raspy voice  Airway:  Mallampati: III / III  Mouth Opening: Small, but > 3cm  TM Distance: > 6 cm  Neck ROM: Normal ROM    Dental:        Anesthesia Plan  Type of Anesthesia, risks & benefits discussed:    Anesthesia Type: Gen ETT  Intra-op Monitoring Plan: Standard ASA Monitors  Post Op Pain Control Plan: multimodal analgesia and IV/PO Opioids PRN  Induction:  IV  Airway Plan: Direct  Informed Consent: Informed consent signed with the Patient and all parties understand the risks and agree with anesthesia plan.  All questions answered. Patient consented to blood products? No  ASA Score: 3  Day of Surgery Review of History & Physical: H&P Update referred to the surgeon/provider.  Anesthesia Plan Notes: Refuses Spinal/neuraxial block.    Ready For Surgery From Anesthesia Perspective.     .    Lab Results   Component Value Date    WBC 7.98 07/21/2023    HGB 13.3 07/21/2023    HCT 41.3 07/21/2023    MCV 85.0 07/21/2023     07/21/2023     CMP  Sodium Level   Date Value Ref Range Status   07/21/2023 136 136 - 145  mmol/L Final     Potassium Level   Date Value Ref Range Status   07/21/2023 4.1 3.5 - 5.1 mmol/L Final     Carbon Dioxide   Date Value Ref Range Status   07/21/2023 25 22 - 29 mmol/L Final     Blood Urea Nitrogen   Date Value Ref Range Status   07/21/2023 9.6 (L) 9.8 - 20.1 mg/dL Final     Creatinine   Date Value Ref Range Status   07/21/2023 0.86 0.55 - 1.02 mg/dL Final     Calcium Level Total   Date Value Ref Range Status   07/21/2023 9.6 8.4 - 10.2 mg/dL Final     Albumin Level   Date Value Ref Range Status   07/21/2023 3.5 3.5 - 5.0 g/dL Final     Bilirubin Total   Date Value Ref Range Status   07/21/2023 0.4 <=1.5 mg/dL Final     Alkaline Phosphatase   Date Value Ref Range Status   07/21/2023 85 40 - 150 unit/L Final     Aspartate Aminotransferase   Date Value Ref Range Status   07/21/2023 26 5 - 34 unit/L Final     Alanine Aminotransferase   Date Value Ref Range Status   07/21/2023 49 0 - 55 unit/L Final     eGFR   Date Value Ref Range Status   07/21/2023 >60 mls/min/1.73/m2 Final           2/17/23 ANESTHESIA

## 2023-09-13 ENCOUNTER — HOSPITAL ENCOUNTER (OUTPATIENT)
Facility: HOSPITAL | Age: 53
Discharge: HOME | End: 2023-09-13
Attending: COLON & RECTAL SURGERY | Admitting: COLON & RECTAL SURGERY
Payer: MEDICAID

## 2023-09-13 ENCOUNTER — ANESTHESIA (OUTPATIENT)
Dept: SURGERY | Facility: HOSPITAL | Age: 53
End: 2023-09-13
Payer: MEDICAID

## 2023-09-13 DIAGNOSIS — C21.0 SQUAMOUS CELL CANCER, ANUS: Primary | ICD-10-CM

## 2023-09-13 DIAGNOSIS — K62.9 ANAL LESION: ICD-10-CM

## 2023-09-13 PROCEDURE — 63600175 PHARM REV CODE 636 W HCPCS: Performed by: STUDENT IN AN ORGANIZED HEALTH CARE EDUCATION/TRAINING PROGRAM

## 2023-09-13 PROCEDURE — 63600175 PHARM REV CODE 636 W HCPCS: Performed by: NURSE ANESTHETIST, CERTIFIED REGISTERED

## 2023-09-13 PROCEDURE — D9220A PRA ANESTHESIA: ICD-10-PCS | Mod: CRNA,,, | Performed by: NURSE ANESTHETIST, CERTIFIED REGISTERED

## 2023-09-13 PROCEDURE — 63600175 PHARM REV CODE 636 W HCPCS: Performed by: COLON & RECTAL SURGERY

## 2023-09-13 PROCEDURE — 71000033 HC RECOVERY, INTIAL HOUR: Performed by: COLON & RECTAL SURGERY

## 2023-09-13 PROCEDURE — 45990 SURG DX EXAM ANORECTAL: CPT | Mod: ,,, | Performed by: COLON & RECTAL SURGERY

## 2023-09-13 PROCEDURE — D9220A PRA ANESTHESIA: ICD-10-PCS | Mod: ANES,,, | Performed by: ANESTHESIOLOGY

## 2023-09-13 PROCEDURE — 71000016 HC POSTOP RECOV ADDL HR: Performed by: COLON & RECTAL SURGERY

## 2023-09-13 PROCEDURE — D9220A PRA ANESTHESIA: Mod: ANES,,, | Performed by: ANESTHESIOLOGY

## 2023-09-13 PROCEDURE — 37000008 HC ANESTHESIA 1ST 15 MINUTES: Performed by: COLON & RECTAL SURGERY

## 2023-09-13 PROCEDURE — D9220A PRA ANESTHESIA: Mod: CRNA,,, | Performed by: NURSE ANESTHETIST, CERTIFIED REGISTERED

## 2023-09-13 PROCEDURE — 25000242 PHARM REV CODE 250 ALT 637 W/ HCPCS: Performed by: SPECIALIST

## 2023-09-13 PROCEDURE — 36000705 HC OR TIME LEV I EA ADD 15 MIN: Performed by: COLON & RECTAL SURGERY

## 2023-09-13 PROCEDURE — 45990 PR SURG DIAGNOSTIC EXAM, ANORECTAL: ICD-10-PCS | Mod: ,,, | Performed by: COLON & RECTAL SURGERY

## 2023-09-13 PROCEDURE — 25000003 PHARM REV CODE 250: Performed by: NURSE ANESTHETIST, CERTIFIED REGISTERED

## 2023-09-13 PROCEDURE — 36000704 HC OR TIME LEV I 1ST 15 MIN: Performed by: COLON & RECTAL SURGERY

## 2023-09-13 PROCEDURE — 71000015 HC POSTOP RECOV 1ST HR: Performed by: COLON & RECTAL SURGERY

## 2023-09-13 PROCEDURE — 25000003 PHARM REV CODE 250: Performed by: ANESTHESIOLOGY

## 2023-09-13 PROCEDURE — 63600175 PHARM REV CODE 636 W HCPCS: Performed by: SPECIALIST

## 2023-09-13 PROCEDURE — 37000009 HC ANESTHESIA EA ADD 15 MINS: Performed by: COLON & RECTAL SURGERY

## 2023-09-13 RX ORDER — FENTANYL CITRATE 50 UG/ML
INJECTION, SOLUTION INTRAMUSCULAR; INTRAVENOUS
Status: DISCONTINUED | OUTPATIENT
Start: 2023-09-13 | End: 2023-09-13

## 2023-09-13 RX ORDER — SODIUM CHLORIDE, SODIUM LACTATE, POTASSIUM CHLORIDE, CALCIUM CHLORIDE 600; 310; 30; 20 MG/100ML; MG/100ML; MG/100ML; MG/100ML
INJECTION, SOLUTION INTRAVENOUS CONTINUOUS
Status: ACTIVE | OUTPATIENT
Start: 2023-09-13

## 2023-09-13 RX ORDER — ROCURONIUM BROMIDE 10 MG/ML
INJECTION, SOLUTION INTRAVENOUS
Status: DISCONTINUED | OUTPATIENT
Start: 2023-09-13 | End: 2023-09-13

## 2023-09-13 RX ORDER — SUCCINYLCHOLINE CHLORIDE 20 MG/ML
INJECTION INTRAMUSCULAR; INTRAVENOUS
Status: DISCONTINUED | OUTPATIENT
Start: 2023-09-13 | End: 2023-09-13

## 2023-09-13 RX ORDER — DIPHENHYDRAMINE HYDROCHLORIDE 50 MG/ML
25 INJECTION INTRAMUSCULAR; INTRAVENOUS ONCE AS NEEDED
Status: ACTIVE | OUTPATIENT
Start: 2023-09-13 | End: 2035-02-08

## 2023-09-13 RX ORDER — KETOROLAC TROMETHAMINE 30 MG/ML
INJECTION, SOLUTION INTRAMUSCULAR; INTRAVENOUS
Status: DISCONTINUED | OUTPATIENT
Start: 2023-09-13 | End: 2023-09-13

## 2023-09-13 RX ORDER — FAMOTIDINE 20 MG/50ML
20 INJECTION, SOLUTION INTRAVENOUS ONCE
Status: DISCONTINUED | OUTPATIENT
Start: 2023-09-13 | End: 2023-09-13 | Stop reason: HOSPADM

## 2023-09-13 RX ORDER — FAMOTIDINE 10 MG/ML
20 INJECTION INTRAVENOUS
Status: COMPLETED | OUTPATIENT
Start: 2023-09-13 | End: 2023-09-13

## 2023-09-13 RX ORDER — SODIUM CHLORIDE, SODIUM LACTATE, POTASSIUM CHLORIDE, CALCIUM CHLORIDE 600; 310; 30; 20 MG/100ML; MG/100ML; MG/100ML; MG/100ML
INJECTION, SOLUTION INTRAVENOUS CONTINUOUS
Status: DISCONTINUED | OUTPATIENT
Start: 2023-09-13 | End: 2023-09-13 | Stop reason: HOSPADM

## 2023-09-13 RX ORDER — DEXAMETHASONE SODIUM PHOSPHATE 4 MG/ML
INJECTION, SOLUTION INTRA-ARTICULAR; INTRALESIONAL; INTRAMUSCULAR; INTRAVENOUS; SOFT TISSUE
Status: DISCONTINUED | OUTPATIENT
Start: 2023-09-13 | End: 2023-09-13

## 2023-09-13 RX ORDER — ONDANSETRON 2 MG/ML
4 INJECTION INTRAMUSCULAR; INTRAVENOUS ONCE
Status: ACTIVE | OUTPATIENT
Start: 2023-09-13

## 2023-09-13 RX ORDER — IPRATROPIUM BROMIDE AND ALBUTEROL SULFATE 2.5; .5 MG/3ML; MG/3ML
3 SOLUTION RESPIRATORY (INHALATION) ONCE AS NEEDED
Status: COMPLETED | OUTPATIENT
Start: 2023-09-13 | End: 2023-09-13

## 2023-09-13 RX ORDER — LIDOCAINE HYDROCHLORIDE 10 MG/ML
1 INJECTION, SOLUTION EPIDURAL; INFILTRATION; INTRACAUDAL; PERINEURAL ONCE
Status: ACTIVE | OUTPATIENT
Start: 2023-09-13

## 2023-09-13 RX ORDER — ONDANSETRON 2 MG/ML
4 INJECTION INTRAMUSCULAR; INTRAVENOUS EVERY 6 HOURS PRN
Status: DISCONTINUED | OUTPATIENT
Start: 2023-09-13 | End: 2023-09-13 | Stop reason: HOSPADM

## 2023-09-13 RX ORDER — DIAZEPAM 5 MG/1
10 TABLET ORAL ONCE
Status: ACTIVE | OUTPATIENT
Start: 2023-09-13

## 2023-09-13 RX ORDER — IPRATROPIUM BROMIDE AND ALBUTEROL SULFATE 2.5; .5 MG/3ML; MG/3ML
SOLUTION RESPIRATORY (INHALATION)
Status: DISCONTINUED
Start: 2023-09-13 | End: 2023-09-13 | Stop reason: HOSPADM

## 2023-09-13 RX ORDER — OXYCODONE AND ACETAMINOPHEN 5; 325 MG/1; MG/1
2 TABLET ORAL ONCE
Status: ACTIVE | OUTPATIENT
Start: 2023-09-13

## 2023-09-13 RX ORDER — BUPIVACAINE HYDROCHLORIDE 5 MG/ML
INJECTION, SOLUTION EPIDURAL; INTRACAUDAL
Status: DISCONTINUED | OUTPATIENT
Start: 2023-09-13 | End: 2023-09-13 | Stop reason: HOSPADM

## 2023-09-13 RX ORDER — HYDRALAZINE HYDROCHLORIDE 20 MG/ML
INJECTION INTRAMUSCULAR; INTRAVENOUS
Status: DISCONTINUED | OUTPATIENT
Start: 2023-09-13 | End: 2023-09-13

## 2023-09-13 RX ORDER — PROPOFOL 10 MG/ML
VIAL (ML) INTRAVENOUS
Status: DISCONTINUED | OUTPATIENT
Start: 2023-09-13 | End: 2023-09-13

## 2023-09-13 RX ORDER — HYDROMORPHONE HYDROCHLORIDE 1 MG/ML
0.2 INJECTION, SOLUTION INTRAMUSCULAR; INTRAVENOUS; SUBCUTANEOUS EVERY 5 MIN PRN
Status: ACTIVE | OUTPATIENT
Start: 2023-09-13

## 2023-09-13 RX ORDER — FAMOTIDINE 20 MG/1
40 TABLET, FILM COATED ORAL ONCE
Status: DISCONTINUED | OUTPATIENT
Start: 2023-09-13 | End: 2023-09-13

## 2023-09-13 RX ORDER — LIDOCAINE HYDROCHLORIDE 20 MG/ML
INJECTION, SOLUTION EPIDURAL; INFILTRATION; INTRACAUDAL; PERINEURAL
Status: DISCONTINUED | OUTPATIENT
Start: 2023-09-13 | End: 2023-09-13

## 2023-09-13 RX ORDER — HYDROMORPHONE HYDROCHLORIDE 1 MG/ML
0.5 INJECTION, SOLUTION INTRAMUSCULAR; INTRAVENOUS; SUBCUTANEOUS EVERY 5 MIN PRN
Status: ACTIVE | OUTPATIENT
Start: 2023-09-13

## 2023-09-13 RX ORDER — MIDAZOLAM HYDROCHLORIDE 1 MG/ML
2 INJECTION INTRAMUSCULAR; INTRAVENOUS ONCE
Status: COMPLETED | OUTPATIENT
Start: 2023-09-13 | End: 2023-09-13

## 2023-09-13 RX ORDER — MEPERIDINE HYDROCHLORIDE 25 MG/ML
12.5 INJECTION INTRAMUSCULAR; INTRAVENOUS; SUBCUTANEOUS ONCE
Status: ACTIVE | OUTPATIENT
Start: 2023-09-13 | End: 2023-09-14

## 2023-09-13 RX ORDER — PROCHLORPERAZINE EDISYLATE 5 MG/ML
5 INJECTION INTRAMUSCULAR; INTRAVENOUS ONCE AS NEEDED
Status: ACTIVE | OUTPATIENT
Start: 2023-09-13 | End: 2035-02-08

## 2023-09-13 RX ADMIN — ROCURONIUM BROMIDE 40 MG: 10 INJECTION INTRAVENOUS at 07:09

## 2023-09-13 RX ADMIN — MIDAZOLAM HYDROCHLORIDE 2 MG: 1 INJECTION, SOLUTION INTRAMUSCULAR; INTRAVENOUS at 05:09

## 2023-09-13 RX ADMIN — FAMOTIDINE 20 MG: 10 INJECTION, SOLUTION INTRAVENOUS at 06:09

## 2023-09-13 RX ADMIN — KETOROLAC TROMETHAMINE 30 MG: 30 INJECTION, SOLUTION INTRAMUSCULAR at 07:09

## 2023-09-13 RX ADMIN — SODIUM CHLORIDE, POTASSIUM CHLORIDE, SODIUM LACTATE AND CALCIUM CHLORIDE: 600; 310; 30; 20 INJECTION, SOLUTION INTRAVENOUS at 05:09

## 2023-09-13 RX ADMIN — HYDRALAZINE HYDROCHLORIDE 10 MG: 20 INJECTION INTRAMUSCULAR; INTRAVENOUS at 07:09

## 2023-09-13 RX ADMIN — SUCCINYLCHOLINE CHLORIDE 120 MG: 20 INJECTION, SOLUTION INTRAMUSCULAR; INTRAVENOUS at 07:09

## 2023-09-13 RX ADMIN — DEXAMETHASONE SODIUM PHOSPHATE 8 MG: 4 INJECTION, SOLUTION INTRA-ARTICULAR; INTRALESIONAL; INTRAMUSCULAR; INTRAVENOUS; SOFT TISSUE at 07:09

## 2023-09-13 RX ADMIN — LIDOCAINE HYDROCHLORIDE 50 MG: 20 INJECTION, SOLUTION EPIDURAL; INFILTRATION; INTRACAUDAL; PERINEURAL at 07:09

## 2023-09-13 RX ADMIN — FENTANYL CITRATE 100 MCG: 50 INJECTION, SOLUTION INTRAMUSCULAR; INTRAVENOUS at 07:09

## 2023-09-13 RX ADMIN — IPRATROPIUM BROMIDE AND ALBUTEROL SULFATE 3 ML: .5; 3 SOLUTION RESPIRATORY (INHALATION) at 08:09

## 2023-09-13 RX ADMIN — ONDANSETRON 4 MG: 2 INJECTION INTRAMUSCULAR; INTRAVENOUS at 07:09

## 2023-09-13 RX ADMIN — PROPOFOL 200 MG: 10 INJECTION, EMULSION INTRAVENOUS at 07:09

## 2023-09-13 RX ADMIN — SUGAMMADEX 400 MG: 100 INJECTION, SOLUTION INTRAVENOUS at 07:09

## 2023-09-13 NOTE — DISCHARGE INSTRUCTIONS
· Keep follow up appointment [facility].  Resume home medications unless otherwise instructed by your doctor.    · You may take a shower starting tomorrow evening. Wash GENTLY with soap and water (do not scrub) over, rinse with water, and pat dry.    · Use pain medication as needed. Tylenol or Ibuprofen.    · You may use an ice pack as needed for 20 minutes at a time over your incision site to minimize swelling and help relieve pain.    · Do not drink alcohol or drive today, or as long as you are on pain medication.    · Notify MD of any moderate to severe pain unrelieved by pain medicine, if your incision opens and/or bleeds, or for any signs of infection including fever above 100.4, excessive redness or swelling, yellow/green foul- smelling drainage, nausea or vomiting. Clinics number is [phone]. If it is after business hours or emergency call [phone] and state Im having post op complications and need to speak to the surgeon on call.    · Thanks for choosing [facility]! Have a smooth recovery!

## 2023-09-13 NOTE — INTERVAL H&P NOTE
The patient has been examined and the H&P has been reviewed:    I concur with the findings and no changes have occurred since H&P was written.    Surgery risks, benefits and alternative options discussed and understood by patient/family.          There are no hospital problems to display for this patient.        Yazmin Gallego MD  [facility] HO-1  5:29 AM

## 2023-09-13 NOTE — OP NOTE
[facility] - Periop Services  Operative Note    Surgery Date: 9/13/2023     Surgeon(s) and Role:     * Lai Robertson MD - Primary    Assisting Surgeon: Yazmin Gallego MD    Pre-op Diagnosis:  History of anal squamous cell carcinoma    Post-op Diagnosis:  History of anal squamous cell carcinoma    Procedure(s) (LRB):  ANAL Exam under anesthesia (N/A)    Anesthesia: Choice    Operative Findings:   1) Right anterior radiation scar tissue present at site of prior radiation therapy  2) No masses or lesions noted    Technique:  Patient was evaluated and examined preoperatively by myself. Risks, benefits, and alternatives discussed with the patient. Questions answered and patient was consented for surgery.     The patient was intubated without complications. Patient was placed in the prone jacknife position. The perianal and rectal areas were examined. No external hemorrhoids were noted. Right anterior radiation scar was present, approximately 4cm, with surrounding viable and healthy tissue. No external ulcerations or masses present. A digital rectal exam was performed with no masses, lesions, or fistulas felt. The anal vault was examined with no masses or lesions identified. Local anesthesia was used.     Patient tolerated the procedure well without complications. All counts were correct at the end of the procedure. Patient was extubated and taken to the postoperative care area.     Dr. West was present for critical portions and available throughout the case.     Estimated Blood Loss: None         Specimens:   Specimen (24h ago, onward)      None            [id number]    Yazmin Gallego MD  09/13/2023 1:41 PM

## 2023-09-13 NOTE — ANESTHESIA PROCEDURE NOTES
Intubation    Date/Time: 9/13/2023 7:20 AM    Performed by: Shad Rodríguez CRNA  Authorized by: Brooke Loo MD    Intubation:     Induction:  Intravenous    Intubated:  Postinduction    Mask Ventilation:  Easy mask    Attempts:  1    Attempted By:  CRNA    Method of Intubation:  Direct    Blade:  Bren 4    Laryngeal View Grade: Grade IIA - cords partially seen      Difficult Airway Encountered?: No      Complications:  None    Airway Device:  Oral endotracheal tube    Airway Device Size:  7.5    Style/Cuff Inflation:  Cuffed    Inflation Amount (mL):  5    Tube secured:  22    Placement Verified By:  Capnometry    Complicating Factors:  None    Findings Post-Intubation:  BS equal bilateral

## 2023-09-13 NOTE — BRIEF OP NOTE
[facility] - Periop Services  Brief Operative Note    Surgery Date: 9/13/2023     Surgeon(s) and Role:     * Lai Robertson MD - Primary    Assisting Surgeon: None    Pre-op Diagnosis:  * No pre-op diagnosis entered *    Post-op Diagnosis:  Post-Op Diagnosis Codes:     * Anal lesion [K62.9]    Procedure(s) (LRB):  ANAL Exam under anesthesia (N/A)    Anesthesia: Choice    Operative Findings: Right anterior radiation scar tissue present at site of prior radiation therapy, no masses or lesions    Estimated Blood Loss: None         Specimens:   Specimen (24h ago, onward)      None              Discharge Note    OUTCOME: Patient tolerated treatment/procedure well without complication and is now ready for discharge.    DISPOSITION: Home or Self Care    FINAL DIAGNOSIS:  <principal problem not specified>    FOLLOWUP: In clinic    DISCHARGE INSTRUCTIONS:  No discharge procedures on file.

## 2023-09-13 NOTE — ANESTHESIA POSTPROCEDURE EVALUATION
Anesthesia Post Evaluation    Patient: Antoine Banegas    Procedure(s) Performed: Procedure(s) (LRB):  ANAL Exam under anesthesia (N/A)    Final Anesthesia Type: general      Patient location during evaluation: PACU  Patient participation: Yes- Able to Participate  Level of consciousness: awake and alert, awake and oriented  Post-procedure vital signs: reviewed and stable  Pain management: adequate  Airway patency: patent    PONV status at discharge: No PONV  Anesthetic complications: no      Cardiovascular status: blood pressure returned to baseline, hemodynamically stable and stable  Respiratory status: unassisted, spontaneous ventilation and room air  Hydration status: euvolemic  Follow-up not needed.          Vitals Value Taken Time   /88 09/13/23 0940   Temp 36.5 °C (97.7 °F) 09/13/23 0940   Pulse 67 09/13/23 0940   Resp 20 09/13/23 0940   SpO2 97 % 09/13/23 0940         Event Time   Out of Recovery 08:23:00         Pain/Alesia Score: Alesia Score: 10 (9/13/2023  8:25 AM)  Modified Alesia Score: 20 (9/13/2023  9:40 AM)

## 2023-09-13 NOTE — TRANSFER OF CARE
Anesthesia Transfer of Care Note    Patient: Antoine Banegas    Procedure(s) Performed: Procedure(s) (LRB):  ANAL Exam under anesthesia (N/A)    Patient location: PACU    Anesthesia Type: general    Transport from OR: Transported from OR on room air with adequate spontaneous ventilation    Post pain: adequate analgesia    Post assessment: no apparent anesthetic complications    Post vital signs: stable    Level of consciousness: awake    Nausea/Vomiting: no nausea/vomiting    Complications: none    Transfer of care protocol was followed      Last vitals:   Visit Vitals  BP (!) 142/94   Pulse 88   Temp 36.1 °C (97 °F) (Temporal)   Resp 20   Wt 108.5 kg (239 lb 3.2 oz)   LMP  (LMP Unknown)   SpO2 100%   Breastfeeding No   BMI 39.80 kg/m²      Leonardo - it was nice to speak with you today!  I'm glad you're doing so well.    1. If you have any AFib >2 hours, call our office - [phone]  2. Pay attention to diarrhea - if this hasn't improved by next week OR gets worse, pls contact Dr. Espinoza's office    3. Ease back into normal activity as groin sites are not bothering you.    4. Reviewed your cholesterol given the coronary calcification noted on your pre-ablation CT scan.  Will reduce saturated fat in diet (<17 g/day) and continue excellent exercise habits. Recheck fasting labs before Dr. Malhotra appt 8/2020. May require low-dose statin therapy    [place]

## 2023-09-15 VITALS
BODY MASS INDEX: 39.8 KG/M2 | OXYGEN SATURATION: 97 % | RESPIRATION RATE: 20 BRPM | DIASTOLIC BLOOD PRESSURE: 88 MMHG | SYSTOLIC BLOOD PRESSURE: 125 MMHG | HEART RATE: 67 BPM | WEIGHT: 239.19 LBS | TEMPERATURE: 98 F

## 2023-09-26 ENCOUNTER — HOSPITAL ENCOUNTER (OUTPATIENT)
Dept: WOUND CARE | Facility: HOSPITAL | Age: 53
Discharge: HOME | End: 2023-09-26
Attending: NURSE PRACTITIONER
Payer: MEDICAID

## 2023-09-26 VITALS
SYSTOLIC BLOOD PRESSURE: 115 MMHG | DIASTOLIC BLOOD PRESSURE: 78 MMHG | HEART RATE: 89 BPM | TEMPERATURE: 98 F | OXYGEN SATURATION: 98 % | RESPIRATION RATE: 18 BRPM

## 2023-09-26 DIAGNOSIS — L90.5 SCAR TISSUE: ICD-10-CM

## 2023-09-26 DIAGNOSIS — K64.4 SKIN TAG OF ANUS: ICD-10-CM

## 2023-09-26 DIAGNOSIS — Z85.048 HISTORY OF RECTAL OR ANAL CANCER: ICD-10-CM

## 2023-09-26 DIAGNOSIS — K62.9 RECTAL LESION: Primary | ICD-10-CM

## 2023-09-26 DIAGNOSIS — L59.8 OTHER SPECIFIED DISORDERS OF THE SKIN AND SUBCUTANEOUS TISSUE RELATED TO RADIATION: ICD-10-CM

## 2023-09-26 DIAGNOSIS — K62.89 RECTAL OR ANAL PAIN: ICD-10-CM

## 2023-09-26 DIAGNOSIS — Z72.0 TOBACCO USER: ICD-10-CM

## 2023-09-26 DIAGNOSIS — L58.9 RADIATION DERMATITIS: ICD-10-CM

## 2023-09-26 PROCEDURE — 27000999 HC MEDICAL RECORD PHOTO DOCUMENTATION

## 2023-09-26 PROCEDURE — 99212 PR OFFICE/OUTPT VISIT, EST, LEVL II, 10-19 MIN: ICD-10-PCS | Mod: ,,, | Performed by: NURSE PRACTITIONER

## 2023-09-26 PROCEDURE — 99212 OFFICE O/P EST SF 10 MIN: CPT | Mod: ,,, | Performed by: NURSE PRACTITIONER

## 2023-09-26 PROCEDURE — 99211 OFF/OP EST MAY X REQ PHY/QHP: CPT

## 2023-09-26 NOTE — PROGRESS NOTES
Subjective:       Patient ID: Antoine Banegas is a 52 y.o. female.    Chief Complaint: Wound Consult    52-year-old female presents to wound care clinic today follow up regarding rectal pain, and nonhealing skin issues of rectum.  She presents to the clinic with spouse.  She was seen by surgery clinic and is scheduled for biopsy to area on 9/13/23.  Reviewed previous medical history.  Medical history:  Squamous cell carcinoma anus stage III, rectal bleeding, anxiety, chronic lower back pain, depression, hyperlipidemia, sleep apnea, obesity, osteoarthritis, vitamin-D deficiency, tobacco use her, latent syphilis, radiation dermatitis, history of alcohol abuse, and bilateral hearing loss.    Today's visit 9/26/23:  Reviewed previous progress notes.  Rectal area pain has resolved skin lesions resolved.  Patient voices pain is completely gone.  Instructed may just shower daily wash area with soap and water provided Triad to applied to buttocks for protection.  We will place patient on an as needed basis.  Instructed to call the office with any questions, concerns, or new skin issues.  Patient verbalized understanding of all instructions.    8/30/23:  Reviewed previous progress note.  During rectal area exam discoloration to skin 2 small lesions surrounding rectum with skin tag distally.  No significant change since last visit patient states does feel a little bit better.  Instructed to cleansed area with Vashe, and apply Triad to surrounding skin.  Instructed the patient to continue bleach baths 2 to 3 times a week, and use silver bottle after each bowel movement.  Will have her return to clinic after skin biopsy. Instructed to call the office with any questions, concerns, or new skin issues.  Verbalized understanding of all instructions.      08/16/2023:  Skin to external rectal area discoloration in skin, small skin tag, 2 small lesions round rectum, patient voices very painful during exam unable to touch area  without patient dropping off of bed.  Patient currently is applying lidocaine topical to area.  No open areas at this time.  Discussion with the patient and spouse will send a referral to surgery clinic for possible excision of lesions and biopsy unable to perform an our clinic due to extreme pain.  Gave bleach bath recipe instructed to perform bleach baths 2 to 3 times a week, and use silver bottle with bleach bath water after each bowel movement.  Will have her return in 2 weeks to re-evaluate area.  Instructed to call the office with any questions, concerns, or new skin issues.      Lab Results   Component Value Date/Time    WBC 7.98 07/21/2023 09:57 AM    RBC 4.86 07/21/2023 09:57 AM    HGB 13.3 07/21/2023 09:57 AM    HCT 41.3 07/21/2023 09:57 AM    MCV 85.0 07/21/2023 09:57 AM    MCH 27.4 07/21/2023 09:57 AM    CREATININE 0.86 07/21/2023 09:57 AM    HGBA1C 5.3 12/29/2022 09:18 AM    CRP 1.43 (H) 03/31/2021 09:20 AM     Review of Systems   Skin:  Positive for wound.   All other systems reviewed and are negative.          Objective:        Physical Exam  Vitals reviewed.   Genitourinary:     Comments: Discoloration and skin around rectum   Skin:     General: Skin is warm and dry.      Capillary Refill: Capillary refill takes less than 2 seconds.   Neurological:      Mental Status: She is alert.                      Assessment:         ICD-10-CM ICD-9-CM   1. Rectal lesion  K62.9 569.49   2. Skin tag of anus  K64.4 455.9   3. Rectal or anal pain  K62.89 569.42   4. Scar tissue  L90.5 709.2   5. Other specified disorders of the skin and subcutaneous tissue related to radiation  L59.8 692.82   6. Radiation dermatitis  L58.9 692.82   7. History of rectal or anal cancer  Z85.048 V10.06   8. Tobacco user  Z72.0 305.1           Plan:   Tissue pathology and/or culture taken:  [] Yes [x] No   Sharp debridement performed:   [] Yes [x] No   Labs ordered this visit:   [] Yes [x] No   Imaging ordered this visit:   [] Yes [x] No          1. Rectal lesion     Resolved.   2. Skin tag of anus     Resolved.   3. Rectal or anal pain     Resolved.   4. Scar tissue     Will shower daily wash area and apply Triad.   5. Other specified disorders of the skin and subcutaneous tissue related to radiation     Resolved   6. Radiation dermatitis     Resolved.   7. History of rectal or anal cancer     Under the care of Dr. Jones. Schedule for biopsy with surgery clinic on 9/13/23.   8. Tobacco user     Instructed on smoking cessation.              Follow up skin issues.

## 2023-09-28 ENCOUNTER — OFFICE VISIT (OUTPATIENT)
Dept: SURGERY | Facility: CLINIC | Age: 53
End: 2023-09-28
Payer: MEDICAID

## 2023-09-28 VITALS
HEIGHT: 65 IN | WEIGHT: 237.81 LBS | BODY MASS INDEX: 39.62 KG/M2 | HEART RATE: 98 BPM | OXYGEN SATURATION: 99 % | RESPIRATION RATE: 18 BRPM | DIASTOLIC BLOOD PRESSURE: 92 MMHG | SYSTOLIC BLOOD PRESSURE: 143 MMHG

## 2023-09-28 DIAGNOSIS — C21.0 SQUAMOUS CELL CANCER, ANUS: Primary | ICD-10-CM

## 2023-09-28 PROCEDURE — 99214 OFFICE O/P EST MOD 30 MIN: CPT | Mod: PBBFAC

## 2023-09-28 NOTE — PROGRESS NOTES
Pt seen by Dr. Gallego. Pt will return to clinic 3 months. Pt education given both written and verbal.

## 2023-09-28 NOTE — PROGRESS NOTES
[facility] GENERAL SURGERY   Clinic Note       CC: FU for scar tissue on anus      HPI:   Antoine Banegas is a 52 y.o. female with PMHx of squamous cell carcinoma of the anus s/p chemoradiation geovanna protocol in 3/2022, currently on maintenance MPF therapy every 3 months and following with Dr. Quinones. She underwent an EUA 2 weeks ago for tingling and burning pain around her previous cancer site. On examination there was a right anterior radiation scar with surrounding viable and healthy tissue. No external ulcerations or masses present and no internal masses or lesions felt. She is feeling well today, currently has no pain, tenderness, tingling, itching, or problems with her bowel movements. No BRBPR. Denies diarrhea, constipation, N/V, abdominal pain, or weight loss.         Physical Exam:   Vitals:    09/28/23 1200   BP: (!) 143/92   Pulse: 98   Resp: 18      Gen:  NAD, AAOx3   CVS:  Normal RRR   Chest:  Non-labored breathing on room air   Abd:     s/nt/nd   Rectal: Right anterior radiation scar. No external masses or lesions felt. On PERLA no masses or lesions whitney. No blood.  Ext:  Move all 4 extremities.       Interval Labs:    None      Interval Imaging:    None      Interval Pathology:    None      Assessment/Plan:   Antoine Banegas is a 52 y.o. female with PMHx of squamous cell carcinoma of the anus s/p chemoradiation geovanna protocol in 3/2022, currently on maintenance MPF therapy every 3 months and following with Dr. Quinones. She underwent EUA 2 weeks ago with no pertinent findings and no intervention needed.      - Follow up in 3 months for PERLA, will need anoscopy in 6 months  - Continue follow up with Dr. Quinones  - She has annual CT scan scheduled 11/2023      BARBER Chavarria  [facility]  09/28/2023 12:27 PM      I have reviewed the note above; changes made where necessary, I concur with the documentation.     Yazmin Gallego MD  [facility] General Surgery PGY-1  3:56 PM

## 2023-10-30 ENCOUNTER — OFFICE VISIT (OUTPATIENT)
Dept: INTERNAL MEDICINE | Facility: CLINIC | Age: 53
End: 2023-10-30
Payer: MEDICAID

## 2023-10-30 VITALS
BODY MASS INDEX: 40.07 KG/M2 | HEART RATE: 91 BPM | TEMPERATURE: 98 F | OXYGEN SATURATION: 99 % | RESPIRATION RATE: 20 BRPM | SYSTOLIC BLOOD PRESSURE: 131 MMHG | DIASTOLIC BLOOD PRESSURE: 84 MMHG | WEIGHT: 240.81 LBS

## 2023-10-30 DIAGNOSIS — M54.50 CHRONIC BILATERAL LOW BACK PAIN, UNSPECIFIED WHETHER SCIATICA PRESENT: ICD-10-CM

## 2023-10-30 DIAGNOSIS — G89.29 CHRONIC BILATERAL LOW BACK PAIN, UNSPECIFIED WHETHER SCIATICA PRESENT: ICD-10-CM

## 2023-10-30 DIAGNOSIS — F10.10 ALCOHOL ABUSE: ICD-10-CM

## 2023-10-30 DIAGNOSIS — E78.5 HYPERLIPIDEMIA, UNSPECIFIED HYPERLIPIDEMIA TYPE: ICD-10-CM

## 2023-10-30 DIAGNOSIS — G47.33 OBSTRUCTIVE SLEEP APNEA SYNDROME: ICD-10-CM

## 2023-10-30 DIAGNOSIS — M79.605 PAIN IN BOTH LOWER EXTREMITIES: ICD-10-CM

## 2023-10-30 DIAGNOSIS — R21 RASH OF FACE: Primary | ICD-10-CM

## 2023-10-30 DIAGNOSIS — G47.00 INSOMNIA, UNSPECIFIED TYPE: ICD-10-CM

## 2023-10-30 DIAGNOSIS — F41.9 ANXIETY DISORDER, UNSPECIFIED TYPE: ICD-10-CM

## 2023-10-30 DIAGNOSIS — M25.562 CHRONIC PAIN OF LEFT KNEE: ICD-10-CM

## 2023-10-30 DIAGNOSIS — R53.1 WEAKNESS: ICD-10-CM

## 2023-10-30 DIAGNOSIS — F32.A DEPRESSIVE DISORDER: ICD-10-CM

## 2023-10-30 DIAGNOSIS — M48.062 LUMBAR STENOSIS WITH NEUROGENIC CLAUDICATION: ICD-10-CM

## 2023-10-30 DIAGNOSIS — E55.9 VITAMIN D DEFICIENCY: ICD-10-CM

## 2023-10-30 DIAGNOSIS — M79.604 PAIN IN BOTH LOWER EXTREMITIES: ICD-10-CM

## 2023-10-30 DIAGNOSIS — G89.29 CHRONIC PAIN OF LEFT KNEE: ICD-10-CM

## 2023-10-30 PROCEDURE — 99214 OFFICE O/P EST MOD 30 MIN: CPT | Mod: PBBFAC | Performed by: STUDENT IN AN ORGANIZED HEALTH CARE EDUCATION/TRAINING PROGRAM

## 2023-10-30 RX ORDER — QUETIAPINE FUMARATE 25 MG/1
25 TABLET, FILM COATED ORAL NIGHTLY
Qty: 30 TABLET | Refills: 11 | Status: SHIPPED | OUTPATIENT
Start: 2023-10-30 | End: 2024-10-29
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
## 2023-11-15 ENCOUNTER — HOSPITAL ENCOUNTER (OUTPATIENT)
Dept: RADIOLOGY | Facility: HOSPITAL | Age: 53
Discharge: HOME | End: 2023-11-15
Attending: INTERNAL MEDICINE
Payer: MEDICAID

## 2023-11-15 DIAGNOSIS — C21.0 MALIGNANT NEOPLASM OF ANUS: ICD-10-CM

## 2023-11-15 DIAGNOSIS — C21.0 SQUAMOUS CELL CANCER, ANUS: ICD-10-CM

## 2023-11-15 DIAGNOSIS — Z85.048 HISTORY OF RECTAL OR ANAL CANCER: ICD-10-CM

## 2023-11-15 PROCEDURE — 71260 CT THORAX DX C+: CPT | Mod: TC

## 2023-11-15 PROCEDURE — 25500020 PHARM REV CODE 255

## 2023-11-15 PROCEDURE — 74177 CT ABD & PELVIS W/CONTRAST: CPT | Mod: TC

## 2023-11-15 RX ADMIN — IOHEXOL 100 ML: 350 INJECTION, SOLUTION INTRAVENOUS at 07:11

## 2023-11-27 ENCOUNTER — LAB VISIT (OUTPATIENT)
Dept: HEMATOLOGY/ONCOLOGY | Facility: CLINIC | Age: 53
End: 2023-11-27
Attending: INTERNAL MEDICINE
Payer: MEDICAID

## 2023-11-27 ENCOUNTER — INFUSION (OUTPATIENT)
Dept: INFUSION THERAPY | Facility: HOSPITAL | Age: 53
End: 2023-11-27
Attending: INTERNAL MEDICINE
Payer: MEDICAID

## 2023-11-27 VITALS
TEMPERATURE: 99 F | HEIGHT: 65 IN | OXYGEN SATURATION: 100 % | DIASTOLIC BLOOD PRESSURE: 88 MMHG | HEART RATE: 89 BPM | WEIGHT: 239.19 LBS | BODY MASS INDEX: 39.85 KG/M2 | SYSTOLIC BLOOD PRESSURE: 136 MMHG | RESPIRATION RATE: 18 BRPM

## 2023-11-27 VITALS
DIASTOLIC BLOOD PRESSURE: 74 MMHG | WEIGHT: 239.19 LBS | OXYGEN SATURATION: 100 % | SYSTOLIC BLOOD PRESSURE: 127 MMHG | BODY MASS INDEX: 39.85 KG/M2 | RESPIRATION RATE: 18 BRPM | HEIGHT: 65 IN | HEART RATE: 85 BPM | TEMPERATURE: 98 F

## 2023-11-27 DIAGNOSIS — Z86.19 HISTORY OF LATENT SYPHILIS: ICD-10-CM

## 2023-11-27 DIAGNOSIS — C21.0 SQUAMOUS CELL CANCER, ANUS: Primary | ICD-10-CM

## 2023-11-27 DIAGNOSIS — Z85.048 HISTORY OF RECTAL OR ANAL CANCER: Primary | ICD-10-CM

## 2023-11-27 DIAGNOSIS — C21.0 SQUAMOUS CELL CANCER, ANUS: ICD-10-CM

## 2023-11-27 DIAGNOSIS — Z85.048 ENCOUNTER FOR FOLLOW-UP SURVEILLANCE OF ANAL CANCER: ICD-10-CM

## 2023-11-27 DIAGNOSIS — Z85.048 HISTORY OF RECTAL OR ANAL CANCER: ICD-10-CM

## 2023-11-27 DIAGNOSIS — Z72.0 TOBACCO ABUSE: ICD-10-CM

## 2023-11-27 DIAGNOSIS — F10.10 ALCOHOL ABUSE: Primary | ICD-10-CM

## 2023-11-27 DIAGNOSIS — Z08 ENCOUNTER FOR FOLLOW-UP SURVEILLANCE OF ANAL CANCER: ICD-10-CM

## 2023-11-27 LAB
ALBUMIN SERPL-MCNC: 3.6 G/DL (ref 3.5–5)
ALBUMIN/GLOB SERPL: 0.9 RATIO (ref 1.1–2)
ALP SERPL-CCNC: 96 UNIT/L (ref 40–150)
ALT SERPL-CCNC: 78 UNIT/L (ref 0–55)
AST SERPL-CCNC: 46 UNIT/L (ref 5–34)
BASOPHILS # BLD AUTO: 0.03 X10(3)/MCL
BASOPHILS NFR BLD AUTO: 0.3 %
BILIRUB SERPL-MCNC: 0.4 MG/DL
BUN SERPL-MCNC: 15.4 MG/DL (ref 9.8–20.1)
CALCIUM SERPL-MCNC: 9.7 MG/DL (ref 8.4–10.2)
CHLORIDE SERPL-SCNC: 107 MMOL/L (ref 98–107)
CO2 SERPL-SCNC: 24 MMOL/L (ref 22–29)
CREAT SERPL-MCNC: 0.96 MG/DL (ref 0.55–1.02)
EOSINOPHIL # BLD AUTO: 0.15 X10(3)/MCL (ref 0–0.9)
EOSINOPHIL NFR BLD AUTO: 1.6 %
ERYTHROCYTE [DISTWIDTH] IN BLOOD BY AUTOMATED COUNT: 14 % (ref 11.5–17)
GFR SERPLBLD CREATININE-BSD FMLA CKD-EPI: >60 MLS/MIN/1.73/M2
GLOBULIN SER-MCNC: 4.1 GM/DL (ref 2.4–3.5)
GLUCOSE SERPL-MCNC: 115 MG/DL (ref 74–100)
HCT VFR BLD AUTO: 43.3 % (ref 37–47)
HGB BLD-MCNC: 13.7 G/DL (ref 12–16)
IMM GRANULOCYTES # BLD AUTO: 0.05 X10(3)/MCL (ref 0–0.04)
IMM GRANULOCYTES NFR BLD AUTO: 0.5 %
LYMPHOCYTES # BLD AUTO: 1.28 X10(3)/MCL (ref 0.6–4.6)
LYMPHOCYTES NFR BLD AUTO: 13.5 %
MAGNESIUM SERPL-MCNC: 2.1 MG/DL (ref 1.6–2.6)
MCH RBC QN AUTO: 27.2 PG (ref 27–31)
MCHC RBC AUTO-ENTMCNC: 31.6 G/DL (ref 33–36)
MCV RBC AUTO: 85.9 FL (ref 80–94)
MONOCYTES # BLD AUTO: 0.76 X10(3)/MCL (ref 0.1–1.3)
MONOCYTES NFR BLD AUTO: 8 %
NEUTROPHILS # BLD AUTO: 7.23 X10(3)/MCL (ref 2.1–9.2)
NEUTROPHILS NFR BLD AUTO: 76.1 %
NRBC BLD AUTO-RTO: 0 %
PLATELET # BLD AUTO: 238 X10(3)/MCL (ref 130–400)
PMV BLD AUTO: 9.3 FL (ref 7.4–10.4)
POTASSIUM SERPL-SCNC: 3.8 MMOL/L (ref 3.5–5.1)
PROT SERPL-MCNC: 7.7 GM/DL (ref 6.4–8.3)
RBC # BLD AUTO: 5.04 X10(6)/MCL (ref 4.2–5.4)
SODIUM SERPL-SCNC: 139 MMOL/L (ref 136–145)
WBC # SPEC AUTO: 9.5 X10(3)/MCL (ref 4.5–11.5)

## 2023-11-27 PROCEDURE — 36415 COLL VENOUS BLD VENIPUNCTURE: CPT

## 2023-11-27 PROCEDURE — 3075F PR MOST RECENT SYSTOLIC BLOOD PRESS GE 130-139MM HG: ICD-10-PCS | Mod: CPTII,,, | Performed by: INTERNAL MEDICINE

## 2023-11-27 PROCEDURE — 63600175 PHARM REV CODE 636 W HCPCS: Performed by: NURSE PRACTITIONER

## 2023-11-27 PROCEDURE — 80053 COMPREHEN METABOLIC PANEL: CPT

## 2023-11-27 PROCEDURE — 96523 IRRIG DRUG DELIVERY DEVICE: CPT

## 2023-11-27 PROCEDURE — 3008F PR BODY MASS INDEX (BMI) DOCUMENTED: ICD-10-PCS | Mod: CPTII,,, | Performed by: INTERNAL MEDICINE

## 2023-11-27 PROCEDURE — 83735 ASSAY OF MAGNESIUM: CPT

## 2023-11-27 PROCEDURE — 3075F SYST BP GE 130 - 139MM HG: CPT | Mod: CPTII,,, | Performed by: INTERNAL MEDICINE

## 2023-11-27 PROCEDURE — 3008F BODY MASS INDEX DOCD: CPT | Mod: CPTII,,, | Performed by: INTERNAL MEDICINE

## 2023-11-27 PROCEDURE — 3079F PR MOST RECENT DIASTOLIC BLOOD PRESSURE 80-89 MM HG: ICD-10-PCS | Mod: CPTII,,, | Performed by: INTERNAL MEDICINE

## 2023-11-27 PROCEDURE — 85025 COMPLETE CBC W/AUTO DIFF WBC: CPT

## 2023-11-27 PROCEDURE — A4216 STERILE WATER/SALINE, 10 ML: HCPCS | Performed by: NURSE PRACTITIONER

## 2023-11-27 PROCEDURE — 99214 OFFICE O/P EST MOD 30 MIN: CPT | Mod: PBBFAC | Performed by: INTERNAL MEDICINE

## 2023-11-27 PROCEDURE — 25000003 PHARM REV CODE 250: Performed by: NURSE PRACTITIONER

## 2023-11-27 PROCEDURE — 3079F DIAST BP 80-89 MM HG: CPT | Mod: CPTII,,, | Performed by: INTERNAL MEDICINE

## 2023-11-27 PROCEDURE — 99214 OFFICE O/P EST MOD 30 MIN: CPT | Mod: S$PBB,,, | Performed by: INTERNAL MEDICINE

## 2023-11-27 PROCEDURE — 99214 PR OFFICE/OUTPT VISIT, EST, LEVL IV, 30-39 MIN: ICD-10-PCS | Mod: S$PBB,,, | Performed by: INTERNAL MEDICINE

## 2023-11-27 RX ORDER — HEPARIN 100 UNIT/ML
500 SYRINGE INTRAVENOUS
Status: DISCONTINUED | OUTPATIENT
Start: 2023-11-27 | End: 2023-11-27 | Stop reason: HOSPADM

## 2023-11-27 RX ORDER — SODIUM CHLORIDE 0.9 % (FLUSH) 0.9 %
10 SYRINGE (ML) INJECTION
Status: DISCONTINUED | OUTPATIENT
Start: 2023-11-27 | End: 2023-11-27 | Stop reason: HOSPADM

## 2023-11-27 RX ORDER — SODIUM CHLORIDE 0.9 % (FLUSH) 0.9 %
10 SYRINGE (ML) INJECTION
OUTPATIENT
Start: 2023-11-27

## 2023-11-27 RX ORDER — HEPARIN 100 UNIT/ML
500 SYRINGE INTRAVENOUS
OUTPATIENT
Start: 2023-11-27

## 2023-11-27 RX ADMIN — Medication 10 ML: at 02:11

## 2023-11-27 RX ADMIN — HEPARIN 500 UNITS: 100 SYRINGE at 02:11

## 2023-11-27 NOTE — Clinical Note
Surveillance CT scans of C/A/P with contrast in November 2024  Follow-up with surgery for anoscopy/exam under anesthesia; next due March 2024  Follow-up with NP in 3 months, with CBC and CMP

## 2023-11-27 NOTE — NURSING
1355 Arrive for [facility] flush c/o of severe back pain takes home meds for relief of discomfort.
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
## 2024-01-04 ENCOUNTER — OFFICE VISIT (OUTPATIENT)
Dept: SURGERY | Facility: CLINIC | Age: 54
End: 2024-01-04
Payer: MEDICAID

## 2024-01-04 VITALS
TEMPERATURE: 98 F | BODY MASS INDEX: 40.15 KG/M2 | WEIGHT: 241 LBS | RESPIRATION RATE: 18 BRPM | HEART RATE: 82 BPM | SYSTOLIC BLOOD PRESSURE: 137 MMHG | OXYGEN SATURATION: 99 % | HEIGHT: 65 IN | DIASTOLIC BLOOD PRESSURE: 86 MMHG

## 2024-01-04 DIAGNOSIS — C21.0 ANAL CANCER: Primary | ICD-10-CM

## 2024-01-04 PROCEDURE — 99215 OFFICE O/P EST HI 40 MIN: CPT | Mod: PBBFAC

## 2024-01-04 NOTE — PROGRESS NOTES
[facility] General Surgery Clinic Note     HPI:   Antoine Banegas is a 52 y.o. female with PMHx of squamous cell carcinoma of the anus s/p chemoradiation geovanna protocol in 3/2022, currently on maintenance MPF therapy every 3 months and following with Dr. Quinones at [facility]. She has tingling around her previous cancer site but denies any pain. No external ulcerations or masses present and no internal masses or lesions felt. She is feeling well today, currently has no pain, tenderness, and denies rectal bleeding. Denies fever, chills, diarrhea, constipation, N/V, abdominal pain, or weight loss. She states she is having normal bowel movements. Her only complaint today was bilateral upper abdominal pain.     PMH:        Past Medical History:   Diagnosis Date    Anxiety      Cancer      Circulatory anomaly        Meds:   Current Outpatient Medications:     amitriptyline (ELAVIL) 50 MG tablet, Take 50 mg by mouth every evening., Disp: , Rfl:     atorvastatin (LIPITOR) 40 MG tablet, Take 1 tablet (40 mg total) by mouth every evening., Disp: 90 tablet, Rfl: 3    cholecalciferol, vitamin D3, (VITAMIN D3) 25 mcg (1,000 unit) capsule, Take 2 capsules (2,000 Units total) by mouth once daily., Disp: 180 capsule, Rfl: 3    EScitalopram oxalate (LEXAPRO) 10 MG tablet, Take 10 mg by mouth once daily., Disp: , Rfl:     morphine (MS CONTIN) 30 MG 12 hr tablet, Take 30 mg by mouth., Disp: , Rfl:     omega-3s-dha-epa-fish oil (OMEGA-3 FISH OIL) 300-1,000 mg Cap, Take 1,000 mg by mouth., Disp: , Rfl:     oxyCODONE-acetaminophen (PERCOCET)  mg per tablet, Take 1 tablet by mouth every 6 (six) hours as needed., Disp: , Rfl:     pregabalin (LYRICA) 50 MG capsule, Take 1 capsule (50 mg total) by mouth 2 (two) times daily., Disp: 60 capsule, Rfl: 11    QUEtiapine (SEROQUEL) 25 MG Tab, Take 1 tablet (25 mg total) by mouth every evening., Disp: 30 tablet, Rfl: 11  No current facility-administered medications for this visit.      Facility-Administered Medications Ordered in Other Visits:     diazePAM tablet 10 mg, 10 mg, Oral, Once, Marion Salgado FNP    diphenhydrAMINE injection 25 mg, 25 mg, Intravenous, Once PRN, Breonna Sibley MD    HYDROmorphone injection 0.2 mg, 0.2 mg, Intravenous, Q5 Min PRN, Breonna Sibley MD    HYDROmorphone injection 0.5 mg, 0.5 mg, Intravenous, Q5 Min PRN, Breonna Sibley MD    lactated ringers infusion, , Intravenous, Continuous, Breonna Sibley MD, Last Rate: 10 mL/hr at 09/13/23 0559, New Bag at 09/13/23 0559    LIDOcaine (PF) 10 mg/ml (1%) injection 10 mg, 1 mL, Intradermal, Once, Marion Salgado FNP    ondansetron injection 4 mg, 4 mg, Intravenous, Once, Breonna Sibley MD    oxyCODONE-acetaminophen 5-325 mg per tablet 2 tablet, 2 tablet, Oral, Once, Breonna Sibley MD    prochlorperazine injection Soln 5 mg, 5 mg, Intravenous, Once PRN, Breonna Sibley MD  Allergies: Review of patient's allergies indicates:  No Known Allergies  Social History:   Social History            Tobacco Use    Smoking status: Every Day       Current packs/day: 1.00       Average packs/day: 1 pack/day for 32.4 years (32.4 ttl pk-yrs)       Types: Cigarettes       Start date: 8/11/1991       Passive exposure: Never    Smokeless tobacco: Never   Substance Use Topics    Alcohol use: Yes       Alcohol/week: 6.0 standard drinks of alcohol       Types: 6 Cans of beer per week       Comment: daily    Drug use: Never      Family History:         Family History   Problem Relation Age of Onset    Seizures Mother      Hypertension Mother      Diabetes Mother      No Known Problems Maternal Aunt      No Known Problems Maternal Uncle      No Known Problems Paternal Aunt      No Known Problems Paternal Uncle      No Known Problems Paternal Grandmother      No Known Problems Paternal Grandfather      No Known Problems Maternal Grandmother      No Known Problems Maternal Grandfather      No Known  Problems Other        Surgical History:         Past Surgical History:   Procedure Laterality Date    COLONOSCOPY   08/28/2020    EXAMINATION UNDER ANESTHESIA N/A 2/17/2023     Procedure: Anal exam under anesthesia;  Surgeon: Lai Robertson MD;  Location: [facility] OR;  Service: Colon and Rectal;  Laterality: N/A;    EXAMINATION UNDER ANESTHESIA N/A 9/13/2023     Procedure: ANAL Exam under anesthesia;  Surgeon: Lai Robertson MD;  Location: [facility] OR;  Service: Colon and Rectal;  Laterality: N/A;    HYSTERECTOMY   03/16/2017    INSERTION OF SUBCUTANEOUS PORT Right 10/31/2021    MRI PELVIS UNDER ANESTHESIA   10/01/2021    perianal surgery        RECTAL EXAMINATION UNDER ANESTHESIA   06/16/2021      Review of Systems:  Skin: No rashes or itching.  Head: Denies headache or recent trauma.  Eyes: Denies eye pain or double vision.  Neck: Denies swelling or hoarseness of voice.  Respiratory: Denies shortness of breath or chest pain  Cardiac: Denies palpitations or swelling in hands/feet.  Gastrointestinal: Denies nausea, denies vomiting.   Urinary: Denies dysuria or hematuria.  Vascular: Denies claudication or leg swelling.  Neuro: Denies motor deficits. Denies weakness.  Endocrine: Denies excessive sweating or cold intolerance.  Psych: Denies memory problems. Denies anxiety.     Objective:     Vitals:      Vitals:     01/04/24 1205   BP: 137/86   Pulse: 82   Resp: 18   Temp: 97.9 °F (36.6 °C)         Physical Exam:  Gen: NAD  Neuro: awake, alert, answering questions appropriately  CV: RRR  Resp: non-labored breathing, HU  Abd: soft, ND, NT  : No rectal bleeding or palpable masses, skin discoloration to the right of the anus  Ext: moves all 4 spontaneously and purposefully  Skin: warm, well perfused  Lymph: no palpable LAD b/l groin     Pertinent Labs:  None     Imaging:  CT C/A/P 11/2023:  No new suspicious findings chest, abdomen or pelvis.      Micro/Path/Other:  None     Assessment/Plan:     Antoine Zepeda  Bassam is a 52 y.o. female with PMHx of squamous cell carcinoma of the anus s/p chemoradiation geovanna protocol in 3/2022, currently on maintenance MPF therapy every 3 months and following with Dr. Quinones.      -Follow-up visit with surgery clinic in 3 months for repeat PERLA and will be due for anoscopy at that time as well        01/04/2024 12:18 PM      Yesica Valenzuela, MS3  [employer]     I have reviewed the above documentation and edited as appropriate.     Gabby Morel MD  [facility] General Surgery PGY6

## 2024-01-04 NOTE — PROGRESS NOTES
Pt seen by Dr. Morel; Pt instructed to return to clinic in 3 months; Discharge paperwork given w/pt verbalizing understanding

## 2024-01-04 NOTE — PROGRESS NOTES
[facility] General Surgery Clinic Note    HPI:   Antoine Banegas is a 52 y.o. female with PMHx of squamous cell carcinoma of the anus s/p chemoradiation geovanna protocol in 3/2022, currently on maintenance MPF therapy every 3 months and following with Dr. Quinones at [facility]. She has tingling around her previous cancer site but denies any pain. No external ulcerations or masses present and no internal masses or lesions felt. She is feeling well today, currently has no pain, tenderness, and denies rectal bleeding. Denies fever, chills, diarrhea, constipation, N/V, abdominal pain, or weight loss. She states she is having normal bowel movements. Her only complaint today was bilateral upper abdominal pain.    PMH:   Past Medical History:   Diagnosis Date    Anxiety     Cancer     Circulatory anomaly       Meds:   Current Outpatient Medications:     amitriptyline (ELAVIL) 50 MG tablet, Take 50 mg by mouth every evening., Disp: , Rfl:     atorvastatin (LIPITOR) 40 MG tablet, Take 1 tablet (40 mg total) by mouth every evening., Disp: 90 tablet, Rfl: 3    cholecalciferol, vitamin D3, (VITAMIN D3) 25 mcg (1,000 unit) capsule, Take 2 capsules (2,000 Units total) by mouth once daily., Disp: 180 capsule, Rfl: 3    EScitalopram oxalate (LEXAPRO) 10 MG tablet, Take 10 mg by mouth once daily., Disp: , Rfl:     morphine (MS CONTIN) 30 MG 12 hr tablet, Take 30 mg by mouth., Disp: , Rfl:     omega-3s-dha-epa-fish oil (OMEGA-3 FISH OIL) 300-1,000 mg Cap, Take 1,000 mg by mouth., Disp: , Rfl:     oxyCODONE-acetaminophen (PERCOCET)  mg per tablet, Take 1 tablet by mouth every 6 (six) hours as needed., Disp: , Rfl:     pregabalin (LYRICA) 50 MG capsule, Take 1 capsule (50 mg total) by mouth 2 (two) times daily., Disp: 60 capsule, Rfl: 11    QUEtiapine (SEROQUEL) 25 MG Tab, Take 1 tablet (25 mg total) by mouth every evening., Disp: 30 tablet, Rfl: 11  No current facility-administered medications for this visit.    Facility-Administered  Medications Ordered in Other Visits:     diazePAM tablet 10 mg, 10 mg, Oral, Once, Marion Salgado FNP    diphenhydrAMINE injection 25 mg, 25 mg, Intravenous, Once PRN, Breonna Sibley MD    HYDROmorphone injection 0.2 mg, 0.2 mg, Intravenous, Q5 Min PRN, Breonna Sibley MD    HYDROmorphone injection 0.5 mg, 0.5 mg, Intravenous, Q5 Min PRN, Breonna Sibley MD    lactated ringers infusion, , Intravenous, Continuous, Breonna Sibley MD, Last Rate: 10 mL/hr at 09/13/23 0559, New Bag at 09/13/23 0559    LIDOcaine (PF) 10 mg/ml (1%) injection 10 mg, 1 mL, Intradermal, Once, Marion Salgado FNP    ondansetron injection 4 mg, 4 mg, Intravenous, Once, Breonna Sibley MD    oxyCODONE-acetaminophen 5-325 mg per tablet 2 tablet, 2 tablet, Oral, Once, Breonna Sibley MD    prochlorperazine injection Soln 5 mg, 5 mg, Intravenous, Once PRN, Breonna Sibley MD  Allergies: Review of patient's allergies indicates:  No Known Allergies  Social History:   Social History     Tobacco Use    Smoking status: Every Day     Current packs/day: 1.00     Average packs/day: 1 pack/day for 32.4 years (32.4 ttl pk-yrs)     Types: Cigarettes     Start date: 8/11/1991     Passive exposure: Never    Smokeless tobacco: Never   Substance Use Topics    Alcohol use: Yes     Alcohol/week: 6.0 standard drinks of alcohol     Types: 6 Cans of beer per week     Comment: daily    Drug use: Never     Family History:   Family History   Problem Relation Age of Onset    Seizures Mother     Hypertension Mother     Diabetes Mother     No Known Problems Maternal Aunt     No Known Problems Maternal Uncle     No Known Problems Paternal Aunt     No Known Problems Paternal Uncle     No Known Problems Paternal Grandmother     No Known Problems Paternal Grandfather     No Known Problems Maternal Grandmother     No Known Problems Maternal Grandfather     No Known Problems Other      Surgical History:   Past Surgical History:    Procedure Laterality Date    COLONOSCOPY  08/28/2020    EXAMINATION UNDER ANESTHESIA N/A 2/17/2023    Procedure: Anal exam under anesthesia;  Surgeon: Lai Robertson MD;  Location: [facility] OR;  Service: Colon and Rectal;  Laterality: N/A;    EXAMINATION UNDER ANESTHESIA N/A 9/13/2023    Procedure: ANAL Exam under anesthesia;  Surgeon: Lai Robertson MD;  Location: [facility] OR;  Service: Colon and Rectal;  Laterality: N/A;    HYSTERECTOMY  03/16/2017    INSERTION OF SUBCUTANEOUS PORT Right 10/31/2021    MRI PELVIS UNDER ANESTHESIA  10/01/2021    perianal surgery      RECTAL EXAMINATION UNDER ANESTHESIA  06/16/2021     Review of Systems:  Skin: No rashes or itching.  Head: Denies headache or recent trauma.  Eyes: Denies eye pain or double vision.  Neck: Denies swelling or hoarseness of voice.  Respiratory: Denies shortness of breath or chest pain  Cardiac: Denies palpitations or swelling in hands/feet.  Gastrointestinal: Denies nausea, denies vomiting.   Urinary: Denies dysuria or hematuria.  Vascular: Denies claudication or leg swelling.  Neuro: Denies motor deficits. Denies weakness.  Endocrine: Denies excessive sweating or cold intolerance.  Psych: Denies memory problems. Denies anxiety.    Objective:    Vitals:  Vitals:    01/04/24 1205   BP: 137/86   Pulse: 82   Resp: 18   Temp: 97.9 °F (36.6 °C)        Physical Exam:  Gen: NAD  Neuro: awake, alert, answering questions appropriately  CV: RRR  Resp: non-labored breathing, HU  Abd: soft, ND, NT  : No rectal bleeding or palpable masses, skin discoloration to the right of the anus  Ext: moves all 4 spontaneously and purposefully  Skin: warm, well perfused  Lymph: no palpable LAD b/l groin    Pertinent Labs:  None    Imaging:  CT C/A/P 11/2023:  No new suspicious findings chest, abdomen or pelvis.     Micro/Path/Other:  None    Assessment/Plan:    Antoine Banegas is a 52 y.o. female with PMHx of squamous cell carcinoma of the anus s/p chemoradiation  geovanna protocol in 3/2022, currently on maintenance MPF therapy every 3 months and following with Dr. Quinones.     -Follow-up visit with surgery clinic in 3 months for repeat PERLA and will be due for anoscopy at that time as well      01/04/2024 12:18 PM     Yesica Valenzuela, MS3  [employer]    I have reviewed the above documentation and edited as appropriate.     Gabby Morel MD  [facility] General Surgery PGY6

## 2024-02-26 PROBLEM — Z85.048 ENCOUNTER FOR FOLLOW-UP SURVEILLANCE OF ANAL CANCER: Status: RESOLVED | Noted: 2023-11-27 | Resolved: 2024-02-26

## 2024-02-26 PROBLEM — Z08 ENCOUNTER FOR FOLLOW-UP SURVEILLANCE OF ANAL CANCER: Status: RESOLVED | Noted: 2023-11-27 | Resolved: 2024-02-26

## 2024-03-15 ENCOUNTER — TELEPHONE (OUTPATIENT)
Dept: HEMATOLOGY/ONCOLOGY | Facility: CLINIC | Age: 54
End: 2024-03-15
Payer: MEDICAID

## 2024-03-18 ENCOUNTER — OFFICE VISIT (OUTPATIENT)
Dept: INTERNAL MEDICINE | Facility: CLINIC | Age: 54
End: 2024-03-18
Payer: MEDICAID

## 2024-03-18 ENCOUNTER — LAB VISIT (OUTPATIENT)
Dept: LAB | Facility: HOSPITAL | Age: 54
End: 2024-03-18
Attending: STUDENT IN AN ORGANIZED HEALTH CARE EDUCATION/TRAINING PROGRAM
Payer: MEDICAID

## 2024-03-18 VITALS
BODY MASS INDEX: 39.51 KG/M2 | TEMPERATURE: 98 F | SYSTOLIC BLOOD PRESSURE: 128 MMHG | OXYGEN SATURATION: 100 % | DIASTOLIC BLOOD PRESSURE: 84 MMHG | HEART RATE: 83 BPM | RESPIRATION RATE: 20 BRPM | WEIGHT: 237.38 LBS

## 2024-03-18 DIAGNOSIS — M19.90 OSTEOARTHRITIS, UNSPECIFIED OSTEOARTHRITIS TYPE, UNSPECIFIED SITE: ICD-10-CM

## 2024-03-18 DIAGNOSIS — Z72.0 TOBACCO ABUSE: ICD-10-CM

## 2024-03-18 DIAGNOSIS — E78.5 HYPERLIPIDEMIA, UNSPECIFIED HYPERLIPIDEMIA TYPE: Primary | ICD-10-CM

## 2024-03-18 DIAGNOSIS — E55.9 VITAMIN D DEFICIENCY: ICD-10-CM

## 2024-03-18 DIAGNOSIS — G47.33 OBSTRUCTIVE SLEEP APNEA SYNDROME: ICD-10-CM

## 2024-03-18 DIAGNOSIS — M48.062 LUMBAR STENOSIS WITH NEUROGENIC CLAUDICATION: ICD-10-CM

## 2024-03-18 DIAGNOSIS — E78.5 HYPERLIPIDEMIA, UNSPECIFIED HYPERLIPIDEMIA TYPE: ICD-10-CM

## 2024-03-18 LAB
DEPRECATED CALCIDIOL+CALCIFEROL SERPL-MC: 16 NG/ML (ref 30–80)
EST. AVERAGE GLUCOSE BLD GHB EST-MCNC: 134.1 MG/DL
HBA1C MFR BLD: 6.3 %

## 2024-03-18 PROCEDURE — 36415 COLL VENOUS BLD VENIPUNCTURE: CPT

## 2024-03-18 PROCEDURE — 82306 VITAMIN D 25 HYDROXY: CPT

## 2024-03-18 PROCEDURE — 83036 HEMOGLOBIN GLYCOSYLATED A1C: CPT

## 2024-03-18 PROCEDURE — 99214 OFFICE O/P EST MOD 30 MIN: CPT | Mod: PBBFAC | Performed by: STUDENT IN AN ORGANIZED HEALTH CARE EDUCATION/TRAINING PROGRAM
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
## 2024-03-28 ENCOUNTER — CLINICAL SUPPORT (OUTPATIENT)
Dept: AUDIOLOGY | Facility: HOSPITAL | Age: 54
End: 2024-03-28
Attending: OTOLARYNGOLOGY
Payer: MEDICAID

## 2024-03-28 DIAGNOSIS — H90.A32 MIXED CONDUCTIVE AND SENSORINEURAL HEARING LOSS OF LEFT EAR WITH RESTRICTED HEARING OF RIGHT EAR: Primary | ICD-10-CM

## 2024-03-28 DIAGNOSIS — H90.A31 MIXED CONDUCTIVE AND SENSORINEURAL HEARING LOSS OF RIGHT EAR WITH RESTRICTED HEARING OF LEFT EAR: ICD-10-CM

## 2024-03-28 DIAGNOSIS — H90.A22 SENSORINEURAL HEARING LOSS (SNHL) OF LEFT EAR WITH RESTRICTED HEARING OF RIGHT EAR: ICD-10-CM

## 2024-03-28 PROCEDURE — 92557 COMPREHENSIVE HEARING TEST: CPT | Performed by: AUDIOLOGIST

## 2024-03-28 PROCEDURE — 92567 TYMPANOMETRY: CPT | Performed by: AUDIOLOGIST

## 2024-03-28 NOTE — PROGRESS NOTES
Hearing Evaluation        Patient History: Ms. Banegas has a long-standing hearing loss reporting no changes in hearing since previous evaluation. She also reports transient bilateral tinnitus. Vertigo and middle ear issues are denied. Currently wearing binaural amplification received through LCD and reportedly doing well with them. All additional history is unremarkable.        Test Results:                    Pure Tone Testing:      Right ear:       Moderate to severe sensorineural hearing loss from 250-8kHz. Speech reception threshold is in agreement with puretone findings.  Discrimination score of 84% is considered good.        Left ear:          Severe to profound mixed hearing loss from 250-8kHz. Speech reception threshold is in agreement with puretone findings.  Discrimination score of 77% is considered good.                                                                            Tympanometry:                                           Right ear:       Type 'A' tymp, normal middle ear pressure/function     Left ear:          Type 'Ad' tymp, hypermobile (2.97mL) compliance                                     Interpretations:      Behavioral test findings indicate no significant changes in hearing since previous hearing evaluation. Speech reception thresholds obtained at 45dB, AD and 80dB, AS, and are in agreement with puretone findings. Speech discrimination scores of 84%, AD and 77%, AS, are considered good.  Immittance measures indicate normal middle ear pressure/function, right ear and hypercompliant TM mobility, left ear.            Recommendations:   1. Annual hearing evaluations  2. Continue use of binaural amplification

## 2024-04-04 ENCOUNTER — OFFICE VISIT (OUTPATIENT)
Dept: SURGERY | Facility: CLINIC | Age: 54
End: 2024-04-04
Payer: MEDICAID

## 2024-04-04 ENCOUNTER — OFFICE VISIT (OUTPATIENT)
Dept: HEMATOLOGY/ONCOLOGY | Facility: CLINIC | Age: 54
End: 2024-04-04
Payer: MEDICAID

## 2024-04-04 ENCOUNTER — INFUSION (OUTPATIENT)
Dept: INFUSION THERAPY | Facility: HOSPITAL | Age: 54
End: 2024-04-04
Attending: INTERNAL MEDICINE
Payer: MEDICAID

## 2024-04-04 VITALS
RESPIRATION RATE: 18 BRPM | HEART RATE: 81 BPM | OXYGEN SATURATION: 100 % | HEIGHT: 66 IN | BODY MASS INDEX: 37.88 KG/M2 | BODY MASS INDEX: 38.09 KG/M2 | HEART RATE: 80 BPM | RESPIRATION RATE: 16 BRPM | SYSTOLIC BLOOD PRESSURE: 143 MMHG | OXYGEN SATURATION: 96 % | WEIGHT: 235.69 LBS | SYSTOLIC BLOOD PRESSURE: 122 MMHG | DIASTOLIC BLOOD PRESSURE: 85 MMHG | TEMPERATURE: 98 F | DIASTOLIC BLOOD PRESSURE: 79 MMHG | TEMPERATURE: 98 F | WEIGHT: 237 LBS | HEIGHT: 66 IN

## 2024-04-04 VITALS
DIASTOLIC BLOOD PRESSURE: 86 MMHG | TEMPERATURE: 98 F | HEART RATE: 100 BPM | RESPIRATION RATE: 17 BRPM | WEIGHT: 235.81 LBS | OXYGEN SATURATION: 97 % | BODY MASS INDEX: 37.9 KG/M2 | SYSTOLIC BLOOD PRESSURE: 135 MMHG | HEIGHT: 66 IN

## 2024-04-04 DIAGNOSIS — Z85.048 HISTORY OF RECTAL OR ANAL CANCER: Primary | ICD-10-CM

## 2024-04-04 DIAGNOSIS — Z72.0 TOBACCO ABUSE: ICD-10-CM

## 2024-04-04 DIAGNOSIS — Z86.19 HISTORY OF LATENT SYPHILIS: ICD-10-CM

## 2024-04-04 DIAGNOSIS — F10.10 ALCOHOL ABUSE: ICD-10-CM

## 2024-04-04 PROCEDURE — 99215 OFFICE O/P EST HI 40 MIN: CPT | Mod: PBBFAC,25,27

## 2024-04-04 PROCEDURE — 1159F MED LIST DOCD IN RCRD: CPT | Mod: CPTII,,, | Performed by: NURSE PRACTITIONER

## 2024-04-04 PROCEDURE — 3008F BODY MASS INDEX DOCD: CPT | Mod: CPTII,,, | Performed by: NURSE PRACTITIONER

## 2024-04-04 PROCEDURE — 3044F HG A1C LEVEL LT 7.0%: CPT | Mod: CPTII,,, | Performed by: NURSE PRACTITIONER

## 2024-04-04 PROCEDURE — 63600175 PHARM REV CODE 636 W HCPCS: Performed by: NURSE PRACTITIONER

## 2024-04-04 PROCEDURE — 25000003 PHARM REV CODE 250: Performed by: NURSE PRACTITIONER

## 2024-04-04 PROCEDURE — A4216 STERILE WATER/SALINE, 10 ML: HCPCS | Performed by: NURSE PRACTITIONER

## 2024-04-04 PROCEDURE — 3079F DIAST BP 80-89 MM HG: CPT | Mod: CPTII,,, | Performed by: NURSE PRACTITIONER

## 2024-04-04 PROCEDURE — 99214 OFFICE O/P EST MOD 30 MIN: CPT | Mod: S$PBB,,, | Performed by: NURSE PRACTITIONER

## 2024-04-04 PROCEDURE — 96523 IRRIG DRUG DELIVERY DEVICE: CPT

## 2024-04-04 PROCEDURE — 99214 OFFICE O/P EST MOD 30 MIN: CPT | Mod: PBBFAC,25 | Performed by: NURSE PRACTITIONER

## 2024-04-04 PROCEDURE — 1160F RVW MEDS BY RX/DR IN RCRD: CPT | Mod: CPTII,,, | Performed by: NURSE PRACTITIONER

## 2024-04-04 PROCEDURE — 3075F SYST BP GE 130 - 139MM HG: CPT | Mod: CPTII,,, | Performed by: NURSE PRACTITIONER

## 2024-04-04 RX ORDER — SODIUM CHLORIDE 0.9 % (FLUSH) 0.9 %
10 SYRINGE (ML) INJECTION
OUTPATIENT
Start: 2024-04-04

## 2024-04-04 RX ORDER — SODIUM CHLORIDE 0.9 % (FLUSH) 0.9 %
10 SYRINGE (ML) INJECTION
Status: DISCONTINUED | OUTPATIENT
Start: 2024-04-04 | End: 2024-04-04 | Stop reason: HOSPADM

## 2024-04-04 RX ORDER — HEPARIN 100 UNIT/ML
500 SYRINGE INTRAVENOUS
OUTPATIENT
Start: 2024-04-04

## 2024-04-04 RX ORDER — HEPARIN 100 UNIT/ML
500 SYRINGE INTRAVENOUS
Status: DISCONTINUED | OUTPATIENT
Start: 2024-04-04 | End: 2024-04-04 | Stop reason: HOSPADM

## 2024-04-04 RX ADMIN — HEPARIN 500 UNITS: 100 SYRINGE at 09:04

## 2024-04-04 RX ADMIN — Medication 10 ML: at 09:04

## 2024-04-04 NOTE — PROGRESS NOTES
General Surgery Clinic Note      CC: 3 month follow up     Subjective  Antoine Banegas is a 53 y.o. female with a history of squamous cell carcinoma of the anus s/p chemoradiation geovanna protocol in 3/22 who is currently on maintenance MPF therapy every three months and following with Dr. Quinones at [facility]. Patient reports feeling well and has no concerns. Patient says she is still smoking. Denies any anal pain, tenderness, rectal bleeding, fever, chills, diarrhea, constipation, nausea, vomiting, abdominal pain, or weight loss. Patient reports having normal bowel movements.     ROS  Pertinent items noted in the HPI, otherwise negative.       PMHx:   Past Medical History:   Diagnosis Date    Anxiety     Cancer     Circulatory anomaly      PSHx:   Past Surgical History:   Procedure Laterality Date    COLONOSCOPY  08/28/2020    EXAMINATION UNDER ANESTHESIA N/A 2/17/2023    Procedure: Anal exam under anesthesia;  Surgeon: Lai Robertson MD;  Location: [facility] OR;  Service: Colon and Rectal;  Laterality: N/A;    EXAMINATION UNDER ANESTHESIA N/A 9/13/2023    Procedure: ANAL Exam under anesthesia;  Surgeon: Lai Robertson MD;  Location: [facility] OR;  Service: Colon and Rectal;  Laterality: N/A;    HYSTERECTOMY  03/16/2017    INSERTION OF SUBCUTANEOUS PORT Right 10/31/2021    MRI PELVIS UNDER ANESTHESIA  10/01/2021    perianal surgery      RECTAL EXAMINATION UNDER ANESTHESIA  06/16/2021     FHx:   Family History   Problem Relation Age of Onset    Seizures Mother     Hypertension Mother     Diabetes Mother     No Known Problems Maternal Aunt     No Known Problems Maternal Uncle     No Known Problems Paternal Aunt     No Known Problems Paternal Uncle     No Known Problems Paternal Grandmother     No Known Problems Paternal Grandfather     No Known Problems Maternal Grandmother     No Known Problems Maternal Grandfather     No Known Problems Other      Meds:   Current Outpatient Medications on File Prior to Visit    Medication Sig Dispense Refill    amitriptyline (ELAVIL) 50 MG tablet Take 50 mg by mouth every evening.      atorvastatin (LIPITOR) 40 MG tablet Take 1 tablet (40 mg total) by mouth every evening. 90 tablet 3    cholecalciferol, vitamin D3, (VITAMIN D3) 25 mcg (1,000 unit) capsule Take 2 capsules (2,000 Units total) by mouth once daily. 180 capsule 3    EScitalopram oxalate (LEXAPRO) 10 MG tablet Take 10 mg by mouth once daily.      morphine (MS CONTIN) 30 MG 12 hr tablet Take 30 mg by mouth.      omega-3s-dha-epa-fish oil (OMEGA-3 FISH OIL) 300-1,000 mg Cap Take 1,000 mg by mouth.      oxyCODONE-acetaminophen (PERCOCET)  mg per tablet Take 1 tablet by mouth every 6 (six) hours as needed.      pregabalin (LYRICA) 50 MG capsule Take 1 capsule (50 mg total) by mouth 2 (two) times daily. 60 capsule 11    QUEtiapine (SEROQUEL) 25 MG Tab Take 1 tablet (25 mg total) by mouth every evening. 30 tablet 11     Current Facility-Administered Medications on File Prior to Visit   Medication Dose Route Frequency Provider Last Rate Last Admin    diazePAM tablet 10 mg  10 mg Oral Once Marion Salgado FNP        diphenhydrAMINE injection 25 mg  25 mg Intravenous Once PRN Breonna Sibley MD        HYDROmorphone injection 0.2 mg  0.2 mg Intravenous Q5 Min PRN Breonna Sibley MD        HYDROmorphone injection 0.5 mg  0.5 mg Intravenous Q5 Min PRN Breonna Sibley MD        lactated ringers infusion   Intravenous Continuous Breonna Sibley MD 10 mL/hr at 09/13/23 0559 New Bag at 09/13/23 0559    LIDOcaine (PF) 10 mg/ml (1%) injection 10 mg  1 mL Intradermal Once Marion Salgado FNP        ondansetron injection 4 mg  4 mg Intravenous Once Breonna Sibley MD        oxyCODONE-acetaminophen 5-325 mg per tablet 2 tablet  2 tablet Oral Once Breonna Sibley MD        prochlorperazine injection Soln 5 mg  5 mg Intravenous Once PRN Breonna Sibley MD         Social:   Tobacco Use    Smoking  status: Every Day       Current packs/day: 1.00       Average packs/day: 1 pack/day for 32.4 years (32.4 ttl pk-yrs)       Types: Cigarettes       Start date: 8/11/1991       Passive exposure: Never    Smokeless tobacco: Never   Substance Use Topics    Alcohol use: Yes       Alcohol/week: 6.0 standard drinks of alcohol       Types: 6 Cans of beer per week       Comment: daily    Drug use: Never     Allergies: Review of patient's allergies indicates:  No Known Allergies     Objective  Vitals:    04/04/24 1053   BP: 122/79   Pulse: 81   Resp: 18   Temp: 98 °F (36.7 °C)          Gen: Pt in NAD, resting comfortably  CV: RRR, no murmurs or rubs appreciated  Resp: CTAB, no wheezes or rales appreciated  Abd: Soft, non tender, non distended  PERLA: No tenderness, appropriate tone, no bleeding appreciated. Internal hemorrhoids palpated but not prolapsing.       Labs  Lab Results   Component Value Date    WBC 8.93 04/04/2024    HGB 14.0 04/04/2024    HCT 43.2 04/04/2024    MCV 85.5 04/04/2024     04/04/2024       CMP  Sodium Level   Date Value Ref Range Status   04/04/2024 139 136 - 145 mmol/L Final     Potassium Level   Date Value Ref Range Status   04/04/2024 3.9 3.5 - 5.1 mmol/L Final     Carbon Dioxide   Date Value Ref Range Status   04/04/2024 25 22 - 29 mmol/L Final     Blood Urea Nitrogen   Date Value Ref Range Status   04/04/2024 12.0 9.8 - 20.1 mg/dL Final     Creatinine   Date Value Ref Range Status   04/04/2024 0.85 0.55 - 1.02 mg/dL Final     Calcium Level Total   Date Value Ref Range Status   04/04/2024 9.4 8.4 - 10.2 mg/dL Final     Albumin Level   Date Value Ref Range Status   04/04/2024 3.3 (L) 3.5 - 5.0 g/dL Final     Bilirubin Total   Date Value Ref Range Status   04/04/2024 0.4 <=1.5 mg/dL Final     Alkaline Phosphatase   Date Value Ref Range Status   04/04/2024 97 40 - 150 unit/L Final     Aspartate Aminotransferase   Date Value Ref Range Status   04/04/2024 27 5 - 34 unit/L Final     Alanine  Aminotransferase   Date Value Ref Range Status   04/04/2024 51 0 - 55 unit/L Final     eGFR   Date Value Ref Range Status   04/04/2024 >60 mls/min/1.73/m2 Final       Imaging  No recent imaging.       Assessment/Plan  Antoine Banegas is a 53 y.o. female with a history of squamous cell carcinoma of the anus s/p chemoradiation geovanna protocol in 3/22 who is currently on maintenance MPF therapy every three months and following with Dr. Quinones at [facility].      - Schedule for anoscopy under anesthesia   - Obtained consents  - Will follow up in ______        Shahrzad Costello MS3  \[employer] [facility] [employer] [place] [facility]\"" [employer]

## 2024-04-04 NOTE — PROGRESS NOTES
Reason for Follow-up:  -squamous cell carcinoma of anus, cT4 cN1 cM0, stage IIIC  Rectal bleeding   Tobacco abuse, alcohol abuse    Current Treatment:  Surveillance    Treatment history:  1. Capecitabine 825 mg/m² p.o. twice daily, Monday-Friday, on each day that radiation therapy is given, throughout the duration of radiation therapy (typically 28 treatment days);  2. Mitomycin 10 mg/m² days 1 and 29. Chemotherapy 03/02/2022-03/31/2022  Definitive CRT; completed 5/4/2022    Past medical history: Chronic low back pain.  Chronic neck pain.  Dyslipidemia.  Obesity.  HOLLY, on CPAP.  Osteoarthritis.  Tobacco abuse.  Alcohol abuse.  Uterine fibroids.  Vitamin D deficiency.  Decreased hearing left ear.  Procedure/surgical history: Left eye surgery.  Left knee surgery.  Hip joint surgery (04/03/2003).  Total laparoscopic hysterectomy and bilateral salpingectomy for abnormal uterine bleeding, uterine fibroids, uteromegaly (03/16/2017).  Colon polypectomy (08/28/2020).  Uterine artery embolization (08/17/2016) for abnormal uterine bleeding secondary to enlarged uterus with multiple fibroids.  Social history: Single.  Lives in [place], [place].  Has 1 child.  Does not work.  Has been smoking a pack of cigarettes daily for 18 years.  Has been drinking 24 beers over the weekend for 16 years.  Denies illicit drug abuse.    Family history: Maternal grandmother experienced lung cancer in her 80s; used to smoke.  Health maintenance:   -07/20/2014: ThinPrep cervical Pap smear: NIL   -08/28/2020: Screening colonoscopy (positive FIT): 5 mm flat polyp ascending colon, removed (pathology: Ascending colon polyp, polypectomy: Adenomatous polyp; no evidence of malignancy) (rescope in 5 years)   -09/25/2020: Screening mammogram (comparison: 05/15/2019): Bilateral breast negative (BI-RADS 1)  Menstrual and OB/GYN history: S/p laparoscopic hysterectomy and bilateral salpingectomy for abnormal uterine bleeding, uterine fibroids, and  uteromegaly (03/16/2017)    History of Present Illness:   50-year-old female referred by surgery, for evaluation and management of anal cancer.    Squamous cell carcinoma of anus:   -Presentation (05/2021): Enlarging anal lump for 1 year; anal pain and intermittent bleeding for 3 weeks   -Fungating anal mass on the right anal verge, approximately 3 cm (05/19/2021)   -On EUA, anal mass extending from right anterior midline almost to posterior midline into anal canal dentate line   -Borderline mesorectal lymph node, 0.8 cm on CT A/P (05/19/2021)   -Anal mass not visualized on CT A/P   -EUA and biopsy (06/16/2021)   -08/12/2021: CT chest with contrast (staging): No acute abnormality   -08/24/2021: PET/CT (comparison: CT chest 08/12/2021; CT A/P 05/19/2021): FDG uptake around the anal canal likely corresponding to known malignancy (maximum SUV 20.8); stable size of small mildly hypermetabolic right mesorectal, pelvic sidewall, and inguinal lymph nodes, nonspecific   -10/01/2021: MRI pelvis with and without contrast (comparison: PET/CT 08/24/2021): Mass along the right anal canal measuring up to 55 mm; suspected area of soft tissue extension into the posterior lower vagina/introitus; several suspicious perirectal lymph nodes; nonspecific borderline enlarged bilateral iliac and inguinal lymph nodes (mass 42 x 17 x 55 mm, extension anal margin, most likely extends through the external sphincter; several prominent perirectal lymph nodes measuring up to 8 mm in short axis; bilateral external iliac lymph nodes also measure up to 8 mm in short axis; bilateral inguinal lymph nodes measure up to 10 mm)   -10/13/2021: Mediport placed   -11/17/2021: Screening mammogram (comparison: 09/25/2020 mammogram): Right breast benign (BI-RADS 2); left breast negative (BI-RADS 1)   -12/09/2021: She called our office stating that she wants to start chemoradiation therapy ASAP and that she is passing blood clots and that tumor in the anus is  bleeding a lot   -01/05/2022: Presented to ED with worsening depression for 3 days; suicidal ideation; feeling overwhelmed; [facility]   -Follows up with ID for latent syphilis (antibody + 01/06/2022 at [facility]; no prior treatment; ID planning to treat with Bicillin LA 2,400,000 units weekly x3 weeks)   -01/31/2022: Restaging CT C/A/P with contrast: Soft tissue along the right anal canal is stable to perhaps slightly larger since 10/01/2021 (43 x 31 mm, previously 42 x 23 mm); slight enlargement of the left internal iliac lymph node, now measuring up to 10 mm; multiple other pelvic lymph nodes are stable; no metastasis; trace ascites   -01/31/2022: Whole-body nuclear medicine bone scan: No bone metastasis   -01/21/2022: CMP unremarkable; mild neutrophilic leukocytosis, hemoglobin 11.9   -01/25/2022: Syphilis antibody positive; HIV negative; RPR nonreactive   -Chemotherapy started 03/02/2022   -Day 29 mitomycin C on 03/31/2022   -Follows up with ID for history of latent syphilis; s/p Bicillin LA 2,400,000 units weekly x3 (01/25/2022, 02/01/2022, 02/08/2022)  -S/P radiotherapy to pelvis 03/10/2022-05/04/2022  -09/07/2022: CT temporal bone without contrast (mixed conductive and sensorineural hearing loss):  Unremarkable temporal bone CT  -09/07/2022:  MRI temporal bones with and without contrast (hearing loss):  1. No acute intracranial abnormality.  2. Right IAC vascular loop, type II.  Otherwise no abnormality of the internal auditory canals or membranous labyrinths.  -10/28/2022: Seen in Oncology Clinic (NP):  2 week history of bright red bleeding from rectum.  -11/01/2022: Seen in surgical clinic for evaluation of blood both on toilet paper and when she wipes and also sees in toilet; occasional blood clots on tissue paper.  ER referred her to surgery for EUA and anoscopy.  Rectal examination by them showed postradiation scarring of skin around the anus but no masses palpable  on PERLA.  -11/29/2022:  CTs C/A/P with contrast (comparison:  01/31/2022):  1. Some bladder and rectal wall thickening may be treatment related.  2. Similar small pelvic sidewall and iliac lymph nodes.  3. No new suspicious findings chest, abdomen or pelvis.    Interval History 4/4/24: Patient presents alone for scheduled follow up for history of rectal cancer.  She is due for MediPort flushed today She reports doing well without any reportable symptoms. She reports normal and consistent bowel movements.  She denies any abdominal pain, blood in urine/stool, changes in bowel or bladder patterns, back pain, rectal pain or any unintentional weight loss.  She reports good appetite and energy level.  She was due for anoscopy with anesthesia last month however was never completed nor scheduled.  Patient to follow up with surgery Clinic today instructed patient discussed scheduling anoscopy as it is past due.  She is current with surveillance CT scans.  Lab work reviewed with patient, stable.  Elevated blood glucose level.  Patient to follow up with the PCP.  Discussed plan of care and follow-up appointments with the patient.    Review of Systems   All other systems reviewed and are negative.    Physical Exam  Constitutional:       Appearance: Normal appearance.   HENT:      Head: Normocephalic.      Mouth/Throat:      Mouth: Mucous membranes are moist.   Eyes:      Pupils: Pupils are equal, round, and reactive to light.   Cardiovascular:      Rate and Rhythm: Normal rate and regular rhythm.      Pulses: Normal pulses.      Heart sounds: Normal heart sounds.   Pulmonary:      Effort: Pulmonary effort is normal.      Breath sounds: Normal breath sounds.   Abdominal:      General: Bowel sounds are normal.      Palpations: Abdomen is soft.   Musculoskeletal:         General: Normal range of motion.      Cervical back: Normal range of motion.   Skin:     General: Skin is warm and dry.      Capillary Refill: Capillary refill  takes less than 2 seconds.   Neurological:      General: No focal deficit present.      Mental Status: She is alert and oriented to person, place, and time.   Psychiatric:         Attention and Perception: Attention normal.         Mood and Affect: Affect normal.         Speech: Speech normal.         Behavior: Behavior normal.         Thought Content: Thought content normal.         Lab Results   Component Value Date    WBC 8.93 04/04/2024    RBC 5.05 04/04/2024    HGB 14.0 04/04/2024    HCT 43.2 04/04/2024    MCV 85.5 04/04/2024    MCH 27.7 04/04/2024    MCHC 32.4 (L) 04/04/2024    RDW 14.3 04/04/2024     04/04/2024    MPV 8.9 04/04/2024     CMP  Sodium Level   Date Value Ref Range Status   04/04/2024 139 136 - 145 mmol/L Final     Potassium Level   Date Value Ref Range Status   04/04/2024 3.9 3.5 - 5.1 mmol/L Final     Carbon Dioxide   Date Value Ref Range Status   04/04/2024 25 22 - 29 mmol/L Final     Blood Urea Nitrogen   Date Value Ref Range Status   04/04/2024 12.0 9.8 - 20.1 mg/dL Final     Creatinine   Date Value Ref Range Status   04/04/2024 0.85 0.55 - 1.02 mg/dL Final     Calcium Level Total   Date Value Ref Range Status   04/04/2024 9.4 8.4 - 10.2 mg/dL Final     Albumin Level   Date Value Ref Range Status   04/04/2024 3.3 (L) 3.5 - 5.0 g/dL Final     Bilirubin Total   Date Value Ref Range Status   04/04/2024 0.4 <=1.5 mg/dL Final     Alkaline Phosphatase   Date Value Ref Range Status   04/04/2024 97 40 - 150 unit/L Final     Aspartate Aminotransferase   Date Value Ref Range Status   04/04/2024 27 5 - 34 unit/L Final     Alanine Aminotransferase   Date Value Ref Range Status   04/04/2024 51 0 - 55 unit/L Final     eGFR   Date Value Ref Range Status   04/04/2024 >60 mls/min/1.73/m2 Final     VITAL SIGNS:   Vitals:    04/04/24 0833   BP: 135/86   Pulse: 100   Resp: 17   Temp: 97.8 °F (36.6 °C)         Lab Results   Component Value Date    WBC 8.93 04/04/2024    RBC 5.05 04/04/2024    HGB 14.0  04/04/2024    HCT 43.2 04/04/2024    MCV 85.5 04/04/2024    MCH 27.7 04/04/2024    MCHC 32.4 (L) 04/04/2024    RDW 14.3 04/04/2024     04/04/2024    MPV 8.9 04/04/2024     CMP  Sodium Level   Date Value Ref Range Status   04/04/2024 139 136 - 145 mmol/L Final     Potassium Level   Date Value Ref Range Status   04/04/2024 3.9 3.5 - 5.1 mmol/L Final     Carbon Dioxide   Date Value Ref Range Status   04/04/2024 25 22 - 29 mmol/L Final     Blood Urea Nitrogen   Date Value Ref Range Status   04/04/2024 12.0 9.8 - 20.1 mg/dL Final     Creatinine   Date Value Ref Range Status   04/04/2024 0.85 0.55 - 1.02 mg/dL Final     Calcium Level Total   Date Value Ref Range Status   04/04/2024 9.4 8.4 - 10.2 mg/dL Final     Albumin Level   Date Value Ref Range Status   04/04/2024 3.3 (L) 3.5 - 5.0 g/dL Final     Bilirubin Total   Date Value Ref Range Status   04/04/2024 0.4 <=1.5 mg/dL Final     Alkaline Phosphatase   Date Value Ref Range Status   04/04/2024 97 40 - 150 unit/L Final     Aspartate Aminotransferase   Date Value Ref Range Status   04/04/2024 27 5 - 34 unit/L Final     Alanine Aminotransferase   Date Value Ref Range Status   04/04/2024 51 0 - 55 unit/L Final     eGFR   Date Value Ref Range Status   04/04/2024 >60 mls/min/1.73/m2 Final       Assessment:  Squamous cell carcinoma of anus:   -Presentation (05/2021): Enlarging anal lump for 1 year; anal pain and intermittent bleeding for 3 weeks   -Fungating anal mass right anal verge   -S/p EUA (06/16/2021)   -Mass 3 cm on physical exam (05/19/2021); mass not visualized on CT A/P; mass 55 mm on MRI; mass extending into lower vagina/introitus on MRI   -Several suspicious perirectal and nonspecific borderline enlarged bilateral iliac, mesorectal, and inguinal lymph nodes on MRI and PET/CT   >>> 01/21/2022: cT4 cN1c M0, at least stage IIIC   -Noncompliant with follow-up   -Restaging CTs and bone scan (01/31/2022): 43 x 31 mm right anal canal soft tissue (anal cancer);  left internal iliac lymph node 10 mm, slightly enlarged; other pelvic lymph nodes stable; no metastasis   -Of note, her restaging PET/CT and pelvic MRI scan was denied by her insurance.   -S/p chemotherapy 03/02/2022-03/31/2022  -S/P radiotherapy to pelvis 03/10/2022-05/04/2022  -rectal bleeding, starting 10/2022; evaluated by surgery 11/01/2022; EUA and anoscopy planned  -no recurrence or metastasis on CTs C/A/P with contrast 11/01/2022  -02/17/2023:  Anal exam under anesthesia:  Operative findings:  Radiation proctitis; no ulceration or masses appreciated  -03/21/2023 follow-up visit: Doing well  -07/13/2023: Bilateral screening mammogram with tomosynthesis (comparison:  11/17/2021 mammogram, etc.):  Right breast benign (BI-RADS 2); left breast negative (BI-RADS 1)  -09/13/2023:  Anal exam under anesthesia:  Right anterior radiation scar tissue present at the site of prior radiation therapy; no masses or lesions noted  -ALEX on surveillance CTs C/A/P with contrast 11/15/2023    #Tobacco abuse, alcohol abuse     #History of depression, suicidal ideation    #Latent syphilis: Syphilis antibody positive (11/25/2022); following up with ID   -S/p Bicillin LA 2,400,000 units weekly x3 (01/25/2022, 02/01/2022, 02/08/2022)    #Comorbidities: Chronic low back pain, chronic neck pain, obesity, HOLLY, on CPAP, etc.      Plan:   Squamous cell carcinoma of anus    -S/p chemotherapy 03/02/2022-03/31/2022  -S/P radiotherapy to pelvis 03/10/2022-05/04/2022  >>>  Continue surveillance (see below for plan)  PERLA every 3-6 months for 5 years (deferred to surgery)   Inguinal lymph node palpation every 3-6 months for 5 years (05/2022-05/2027)  Anoscopy every 6-12 months for 3 years (will defer to surgery)   CT C/A/P with contrast or CT chest without contrast and abdominal/pelvic MRI with contrast annually for 3 years (stages 2-3) (05/2022-05/2025)  >>>  -rectal bleeding, starting 10/2022; evaluated by surgery 11/01/2022; EUA and anoscopy  planned  -no recurrence or metastasis on CTs C/A/P with contrast 11/01/2022  -02/17/2023:  Anal exam under anesthesia:  Operative findings:  Radiation proctitis; no ulceration or masses appreciated  >>>    ALEX recurrence, Continue surveillance  Continue MPF every 3 months, due today  Follow up with  in 8 months with anoscopy/PERLA, CT results and lab work (cbc,cmp,mg) Surveillance CT scans of C/A/P with contrast in 1 year (11/2024)-Ordered today   Follow-up with surgery for anoscopy/exam under anesthesia; next due March 2024     Chemotherapy administered concurrent with radiotherapy:   1.  Capecitabine 825 mg/m² p.o. twice daily, Monday-Friday, on each day that radiation therapy is given, throughout the duration of radiation therapy (typically 28 treatment days);   2.  Mitomycin 10 mg/m² days 1 and 29.     -It is not the standard of care to repeat imaging studies after completion of chemoradiation therapy.  Imaging studies are recommended only if, on 8-12-week evaluation, there is persistent disease.      Surveillance: (After complete remission):   1.  PERLA every 3-6 months for 5 years (will defer to surgery)   2.  Inguinal lymph node palpation every 3-6 months for 5 years   (05/2022-05/2027)   3.  Anoscopy every 6-12 months for 3 years (will defer to surgery)   4.  CT chest/abdomen/pelvis with contrast or chest CT without contrast and abdominal/pelvic MRI with contrast annually for 3 years (stage II-III)   (05/2022-05/2025)       Above discussed at length with the patient.  All questions answered.   She understands and agrees this plan.   ------------------  For response assessment, see below.    Follow-up:  Evaluate in 8-12 weeks (after completion of chemoradiation therapy) with exam + PERLA:   1.  If complete remission, then: Surveillance;     2.  If persistent disease, then: Reevaluate in 4 weeks, including imaging studies; if progression or no progression on serial exams, then continue observation and  reevaluation at 3-month intervals, utilizing imaging studies; if complete remission, then surveillance   (Follow patients who have not achieved a complete clinical response with persistent anal cancer up to 6 months following completion of RT and chemotherapy as long as there is no evidence of progressive disease during this period of follow-up; persistent disease may continue to regress even at 26 weeks from the start of treatment)     3.  If progressive disease, then: If biopsy-proven, then restage, including imaging studies; if locally recurrent, then, APR/groin dissection if positive inguinal lymph nodes, followed by surveillance; if, on restaging, metastatic disease, then, systemic therapy   (Follow patients who have not achieved a complete clinical response with persistent anal cancer up to 6 months following completion of RT and chemotherapy as long as there is no evidence of progressive disease during this period of follow-up; persistent disease may continue to regress even at 26 weeks from the start of treatment)      Surveillance: (After complete remission):   1.  PERLA every 3-6 months for 5 years (will defer to surgery)   2.  Inguinal lymph node palpation every 3-6 months for 5 years   3.  Anoscopy every 6-12 months for 3 years (will defer to surgery)   4.  CT chest/abdomen/pelvis with contrast or chest CT without contrast and abdominal/pelvic MRI with contrast annually for 3 years (stage II-III)

## 2024-04-04 NOTE — PROGRESS NOTES
General Surgery Clinic Note      CC: 3 month follow up     Subjective  Antoine Banegas is a 53 y.o. female with a history of squamous cell carcinoma of the anus s/p chemoradiation geovanna protocol in 3/22 who is currently on maintenance MPF therapy every three months and following with Dr. Quinones at [facility]. Patient reports feeling well and has no concerns. Patient says she is still smoking. Denies any anal pain, tenderness, rectal bleeding, fever, chills, diarrhea, constipation, nausea, vomiting, abdominal pain, or weight loss. Patient reports having normal bowel movements.       Pertinent ROS noted in the HPI, otherwise negative.       PMHx:   Past Medical History:   Diagnosis Date    Anxiety     Cancer     Circulatory anomaly      PSHx:   Past Surgical History:   Procedure Laterality Date    COLONOSCOPY  08/28/2020    EXAMINATION UNDER ANESTHESIA N/A 2/17/2023    Procedure: Anal exam under anesthesia;  Surgeon: Lai Robertson MD;  Location: [facility] OR;  Service: Colon and Rectal;  Laterality: N/A;    EXAMINATION UNDER ANESTHESIA N/A 9/13/2023    Procedure: ANAL Exam under anesthesia;  Surgeon: Lai Robertson MD;  Location: [facility] OR;  Service: Colon and Rectal;  Laterality: N/A;    HYSTERECTOMY  03/16/2017    INSERTION OF SUBCUTANEOUS PORT Right 10/31/2021    MRI PELVIS UNDER ANESTHESIA  10/01/2021    perianal surgery      RECTAL EXAMINATION UNDER ANESTHESIA  06/16/2021     FHx:   Family History   Problem Relation Age of Onset    Seizures Mother     Hypertension Mother     Diabetes Mother     No Known Problems Maternal Aunt     No Known Problems Maternal Uncle     No Known Problems Paternal Aunt     No Known Problems Paternal Uncle     No Known Problems Paternal Grandmother     No Known Problems Paternal Grandfather     No Known Problems Maternal Grandmother     No Known Problems Maternal Grandfather     No Known Problems Other      Meds:   Current Outpatient Medications on File Prior to Visit    Medication Sig Dispense Refill    amitriptyline (ELAVIL) 50 MG tablet Take 50 mg by mouth every evening.      atorvastatin (LIPITOR) 40 MG tablet Take 1 tablet (40 mg total) by mouth every evening. 90 tablet 3    cholecalciferol, vitamin D3, (VITAMIN D3) 25 mcg (1,000 unit) capsule Take 2 capsules (2,000 Units total) by mouth once daily. 180 capsule 3    EScitalopram oxalate (LEXAPRO) 10 MG tablet Take 10 mg by mouth once daily.      morphine (MS CONTIN) 30 MG 12 hr tablet Take 30 mg by mouth.      omega-3s-dha-epa-fish oil (OMEGA-3 FISH OIL) 300-1,000 mg Cap Take 1,000 mg by mouth.      oxyCODONE-acetaminophen (PERCOCET)  mg per tablet Take 1 tablet by mouth every 6 (six) hours as needed.      pregabalin (LYRICA) 50 MG capsule Take 1 capsule (50 mg total) by mouth 2 (two) times daily. 60 capsule 11    QUEtiapine (SEROQUEL) 25 MG Tab Take 1 tablet (25 mg total) by mouth every evening. (Patient not taking: Reported on 4/4/2024) 30 tablet 11     Current Facility-Administered Medications on File Prior to Visit   Medication Dose Route Frequency Provider Last Rate Last Admin    diazePAM tablet 10 mg  10 mg Oral Once Marion Salgado FNP        diphenhydrAMINE injection 25 mg  25 mg Intravenous Once PRN Breonna Sibley MD        HYDROmorphone injection 0.2 mg  0.2 mg Intravenous Q5 Min PRN Breonna Sibley MD        HYDROmorphone injection 0.5 mg  0.5 mg Intravenous Q5 Min PRN Breonna Sibley MD        lactated ringers infusion   Intravenous Continuous Breonna Sibley MD 10 mL/hr at 09/13/23 0559 New Bag at 09/13/23 0559    LIDOcaine (PF) 10 mg/ml (1%) injection 10 mg  1 mL Intradermal Once Marion Salgado FNP        ondansetron injection 4 mg  4 mg Intravenous Once Breonna Sibley MD        oxyCODONE-acetaminophen 5-325 mg per tablet 2 tablet  2 tablet Oral Once Breonna Sibley MD        prochlorperazine injection Soln 5 mg  5 mg Intravenous Once PRN Breonna Sibley MD          Social:   Tobacco Use    Smoking status: Every Day       Current packs/day: 1.00       Average packs/day: 1 pack/day for 32.4 years (32.4 ttl pk-yrs)       Types: Cigarettes       Start date: 8/11/1991       Passive exposure: Never    Smokeless tobacco: Never   Substance Use Topics    Alcohol use: Yes       Alcohol/week: 6.0 standard drinks of alcohol       Types: 6 Cans of beer per week       Comment: daily    Drug use: Never     Allergies: Review of patient's allergies indicates:  No Known Allergies     Objective  Vitals:    04/04/24 1053   BP: 122/79   Pulse: 81   Resp: 18   Temp: 98 °F (36.7 °C)          Gen: Pt in NAD, resting comfortably  CV: RRR, no murmurs or rubs appreciated  Resp: CTAB, no wheezes or rales appreciated  Abd: Soft, non tender, non distended  PERLA: No tenderness, adequate anal tone, no rectal bleeding or masses appreciated.       Labs  Lab Results   Component Value Date    WBC 8.93 04/04/2024    HGB 14.0 04/04/2024    HCT 43.2 04/04/2024    MCV 85.5 04/04/2024     04/04/2024       CMP  Sodium Level   Date Value Ref Range Status   04/04/2024 139 136 - 145 mmol/L Final     Potassium Level   Date Value Ref Range Status   04/04/2024 3.9 3.5 - 5.1 mmol/L Final     Carbon Dioxide   Date Value Ref Range Status   04/04/2024 25 22 - 29 mmol/L Final     Blood Urea Nitrogen   Date Value Ref Range Status   04/04/2024 12.0 9.8 - 20.1 mg/dL Final     Creatinine   Date Value Ref Range Status   04/04/2024 0.85 0.55 - 1.02 mg/dL Final     Calcium Level Total   Date Value Ref Range Status   04/04/2024 9.4 8.4 - 10.2 mg/dL Final     Albumin Level   Date Value Ref Range Status   04/04/2024 3.3 (L) 3.5 - 5.0 g/dL Final     Bilirubin Total   Date Value Ref Range Status   04/04/2024 0.4 <=1.5 mg/dL Final     Alkaline Phosphatase   Date Value Ref Range Status   04/04/2024 97 40 - 150 unit/L Final     Aspartate Aminotransferase   Date Value Ref Range Status   04/04/2024 27 5 - 34 unit/L Final     Alanine  Aminotransferase   Date Value Ref Range Status   04/04/2024 51 0 - 55 unit/L Final     eGFR   Date Value Ref Range Status   04/04/2024 >60 mls/min/1.73/m2 Final       Imaging  No recent imaging.       Assessment/Plan  Antoine Banegas is a 53 y.o. female with a history of squamous cell carcinoma of the anus s/p chemoradiation geovanna protocol in 3/22 who is currently on maintenance MPF therapy every three months and following with Dr. Quinones at [facility].      - will follow up with patient for scheduling anoscopy under anesthesia  - consent for anal exam under anesthesia obtained in office  - Will follow up in clinic in 3 months    Shahrzad Costello MS3  [facility] [employer]      Reviewed and edited by resident as necessary.   Josiane Mendoza MD  [facility] General Surgery, PGY-1

## 2024-04-04 NOTE — PROGRESS NOTES
Pt was seen by Dr Mendoza pt was consented today for anoscopy pt will receive a call from provider with date and time of sx. Pt was scheduled for a 3 month f/u. Pt received written and verbal sx instructions.

## 2024-04-04 NOTE — H&P (VIEW-ONLY)
General Surgery Clinic Note      CC: 3 month follow up     Subjective  Antoine Banegas is a 53 y.o. female with a history of squamous cell carcinoma of the anus s/p chemoradiation geovanna protocol in 3/22 who is currently on maintenance MPF therapy every three months and following with Dr. Quinones at [facility]. Patient reports feeling well and has no concerns. Patient says she is still smoking. Denies any anal pain, tenderness, rectal bleeding, fever, chills, diarrhea, constipation, nausea, vomiting, abdominal pain, or weight loss. Patient reports having normal bowel movements.       Pertinent ROS noted in the HPI, otherwise negative.       PMHx:   Past Medical History:   Diagnosis Date    Anxiety     Cancer     Circulatory anomaly      PSHx:   Past Surgical History:   Procedure Laterality Date    COLONOSCOPY  08/28/2020    EXAMINATION UNDER ANESTHESIA N/A 2/17/2023    Procedure: Anal exam under anesthesia;  Surgeon: Lai Robertson MD;  Location: [facility] OR;  Service: Colon and Rectal;  Laterality: N/A;    EXAMINATION UNDER ANESTHESIA N/A 9/13/2023    Procedure: ANAL Exam under anesthesia;  Surgeon: Lai Robertson MD;  Location: [facility] OR;  Service: Colon and Rectal;  Laterality: N/A;    HYSTERECTOMY  03/16/2017    INSERTION OF SUBCUTANEOUS PORT Right 10/31/2021    MRI PELVIS UNDER ANESTHESIA  10/01/2021    perianal surgery      RECTAL EXAMINATION UNDER ANESTHESIA  06/16/2021     FHx:   Family History   Problem Relation Age of Onset    Seizures Mother     Hypertension Mother     Diabetes Mother     No Known Problems Maternal Aunt     No Known Problems Maternal Uncle     No Known Problems Paternal Aunt     No Known Problems Paternal Uncle     No Known Problems Paternal Grandmother     No Known Problems Paternal Grandfather     No Known Problems Maternal Grandmother     No Known Problems Maternal Grandfather     No Known Problems Other      Meds:   Current Outpatient Medications on File Prior to Visit    Medication Sig Dispense Refill    amitriptyline (ELAVIL) 50 MG tablet Take 50 mg by mouth every evening.      atorvastatin (LIPITOR) 40 MG tablet Take 1 tablet (40 mg total) by mouth every evening. 90 tablet 3    cholecalciferol, vitamin D3, (VITAMIN D3) 25 mcg (1,000 unit) capsule Take 2 capsules (2,000 Units total) by mouth once daily. 180 capsule 3    EScitalopram oxalate (LEXAPRO) 10 MG tablet Take 10 mg by mouth once daily.      morphine (MS CONTIN) 30 MG 12 hr tablet Take 30 mg by mouth.      omega-3s-dha-epa-fish oil (OMEGA-3 FISH OIL) 300-1,000 mg Cap Take 1,000 mg by mouth.      oxyCODONE-acetaminophen (PERCOCET)  mg per tablet Take 1 tablet by mouth every 6 (six) hours as needed.      pregabalin (LYRICA) 50 MG capsule Take 1 capsule (50 mg total) by mouth 2 (two) times daily. 60 capsule 11    QUEtiapine (SEROQUEL) 25 MG Tab Take 1 tablet (25 mg total) by mouth every evening. (Patient not taking: Reported on 4/4/2024) 30 tablet 11     Current Facility-Administered Medications on File Prior to Visit   Medication Dose Route Frequency Provider Last Rate Last Admin    diazePAM tablet 10 mg  10 mg Oral Once Marion Salgado FNP        diphenhydrAMINE injection 25 mg  25 mg Intravenous Once PRN Breonna Sibley MD        HYDROmorphone injection 0.2 mg  0.2 mg Intravenous Q5 Min PRN Breonna Sibley MD        HYDROmorphone injection 0.5 mg  0.5 mg Intravenous Q5 Min PRN Breonna Sibley MD        lactated ringers infusion   Intravenous Continuous Breonna Sibley MD 10 mL/hr at 09/13/23 0559 New Bag at 09/13/23 0559    LIDOcaine (PF) 10 mg/ml (1%) injection 10 mg  1 mL Intradermal Once Marion Salgado FNP        ondansetron injection 4 mg  4 mg Intravenous Once Breonna Sibley MD        oxyCODONE-acetaminophen 5-325 mg per tablet 2 tablet  2 tablet Oral Once Breonna Sibley MD        prochlorperazine injection Soln 5 mg  5 mg Intravenous Once PRN Breonna Sibley MD          Social:   Tobacco Use    Smoking status: Every Day       Current packs/day: 1.00       Average packs/day: 1 pack/day for 32.4 years (32.4 ttl pk-yrs)       Types: Cigarettes       Start date: 8/11/1991       Passive exposure: Never    Smokeless tobacco: Never   Substance Use Topics    Alcohol use: Yes       Alcohol/week: 6.0 standard drinks of alcohol       Types: 6 Cans of beer per week       Comment: daily    Drug use: Never     Allergies: Review of patient's allergies indicates:  No Known Allergies     Objective  Vitals:    04/04/24 1053   BP: 122/79   Pulse: 81   Resp: 18   Temp: 98 °F (36.7 °C)          Gen: Pt in NAD, resting comfortably  CV: RRR, no murmurs or rubs appreciated  Resp: CTAB, no wheezes or rales appreciated  Abd: Soft, non tender, non distended  PERLA: No tenderness, adequate anal tone, no rectal bleeding or masses appreciated.       Labs  Lab Results   Component Value Date    WBC 8.93 04/04/2024    HGB 14.0 04/04/2024    HCT 43.2 04/04/2024    MCV 85.5 04/04/2024     04/04/2024       CMP  Sodium Level   Date Value Ref Range Status   04/04/2024 139 136 - 145 mmol/L Final     Potassium Level   Date Value Ref Range Status   04/04/2024 3.9 3.5 - 5.1 mmol/L Final     Carbon Dioxide   Date Value Ref Range Status   04/04/2024 25 22 - 29 mmol/L Final     Blood Urea Nitrogen   Date Value Ref Range Status   04/04/2024 12.0 9.8 - 20.1 mg/dL Final     Creatinine   Date Value Ref Range Status   04/04/2024 0.85 0.55 - 1.02 mg/dL Final     Calcium Level Total   Date Value Ref Range Status   04/04/2024 9.4 8.4 - 10.2 mg/dL Final     Albumin Level   Date Value Ref Range Status   04/04/2024 3.3 (L) 3.5 - 5.0 g/dL Final     Bilirubin Total   Date Value Ref Range Status   04/04/2024 0.4 <=1.5 mg/dL Final     Alkaline Phosphatase   Date Value Ref Range Status   04/04/2024 97 40 - 150 unit/L Final     Aspartate Aminotransferase   Date Value Ref Range Status   04/04/2024 27 5 - 34 unit/L Final     Alanine  Aminotransferase   Date Value Ref Range Status   04/04/2024 51 0 - 55 unit/L Final     eGFR   Date Value Ref Range Status   04/04/2024 >60 mls/min/1.73/m2 Final       Imaging  No recent imaging.       Assessment/Plan  Antoine Banegas is a 53 y.o. female with a history of squamous cell carcinoma of the anus s/p chemoradiation geovanna protocol in 3/22 who is currently on maintenance MPF therapy every three months and following with Dr. Quinones at [facility].      - will follow up with patient for scheduling anoscopy under anesthesia  - consent for anal exam under anesthesia obtained in office  - Will follow up in clinic in 3 months    Shahrzad Costello MS3  [facility] [employer]      Reviewed and edited by resident as necessary.   Josiane Mendoza MD  [facility] General Surgery, PGY-1

## 2024-04-04 NOTE — Clinical Note
MPF today infusion for MPF every 3 months  fu w/ Early december with lab work to review scans + infusion for MPF

## 2024-04-11 ENCOUNTER — ANESTHESIA EVENT (OUTPATIENT)
Dept: SURGERY | Facility: HOSPITAL | Age: 54
End: 2024-04-11
Payer: MEDICAID

## 2024-04-11 NOTE — ANESTHESIA PREPROCEDURE EVALUATION
Antoine Banegas is a 53 y.o. female PRESENTING FOR  Exam under anesthesia - Anoscopy & ANOSCOPY (Anus) with a history of   -squamous cell carcinoma of the anus s/p chemoradiation geovanna protocol in 3/22  -SMOKER  -ETOH ABUSE  -H/O PSA (FENTANYL)  -CHRONIC NECK AND BACK PAIN  -HLD  -HOLLY ON CPAP  -DEPRESSION/H/O SI/ANXIETY  -PRE-DM, A1C 6.3 3/18/24  -OBESITY    BETA-BLOCKER: NONE    New Orders for Anesthesia: CBG    Patient Active Problem List   Diagnosis    Anxiety disorder    History of rectal or anal cancer    Chronic low back pain    Depressive disorder    Hyperlipidemia    Malignant neoplasm of anus    Obstructive sleep apnea syndrome    Obesity    Osteoarthritis    Pain of lower extremity    Paraparesis    Tobacco user    Vitamin D deficiency    Dermatitis associated with moisture    Dermatitis    Effect, radiation    Lumbar stenosis with neurogenic claudication    Latent syphilis    Radiation dermatitis    Mixed conductive and sensorineural hearing loss of right ear with restricted hearing of left ear    Sensorineural hearing loss (SNHL) of left ear with restricted hearing of right ear    Squamous cell cancer, anus    Tobacco abuse    Alcohol abuse    Rectal lesion    Skin tag of anus    Rectal or anal pain    Scar tissue    Other specified disorders of the skin and subcutaneous tissue related to radiation    History of latent syphilis     Pre-op Assessment    I have reviewed the NPO Status.      Review of Systems  Anesthesia Hx:  No problems with previous Anesthesia                Social:  Smoker, Alcohol Use, Recreational Drugs       Hematology/Oncology:                        --  Cancer in past history:       Other (see Oncology comments)       radiation       Cardiovascular:                hyperlipidemia                             Pulmonary:        Sleep Apnea, CPAP                Renal/:  Renal/ Normal                 Hepatic/GI:  Hepatic/GI Normal                 Neurological:        Chronic Pain  Syndrome                         Endocrine:        Obesity / BMI > 30  Psych:   anxiety depression              Vitals:    04/24/24 0515 04/24/24 0534 04/24/24 0542 04/24/24 0601   BP: 131/88 131/88  130/72   Pulse: 89   82   Resp:    20   Temp: 37.1 °C (98.7 °F)   36.3 °C (97.3 °F)   TempSrc: Oral   Temporal   SpO2: 100%   100%   Weight:   106.4 kg (234 lb 9.6 oz)          Physical Exam  General: Alert, Cooperative and Well nourished    Airway:  Mallampati: II   Mouth Opening: Normal  TM Distance: Normal  Tongue: Normal  Neck ROM: Normal ROM    Dental:  Intact    Chest/Lungs:  Clear to auscultation, Normal Respiratory Rate    Heart:  Rate: Normal  Rhythm: Regular Rhythm  Sounds: Normal       Latest Reference Range & Units 04/24/24 05:21   POCT Glucose 70 - 110 mg/dL 122 (H)   (H): Data is abnormally high  Lab Results   Component Value Date    WBC 8.93 04/04/2024    HGB 14.0 04/04/2024    HCT 43.2 04/04/2024    MCV 85.5 04/04/2024     04/04/2024       CMP  Sodium Level   Date Value Ref Range Status   04/04/2024 139 136 - 145 mmol/L Final     Potassium Level   Date Value Ref Range Status   04/04/2024 3.9 3.5 - 5.1 mmol/L Final     Carbon Dioxide   Date Value Ref Range Status   04/04/2024 25 22 - 29 mmol/L Final     Blood Urea Nitrogen   Date Value Ref Range Status   04/04/2024 12.0 9.8 - 20.1 mg/dL Final     Creatinine   Date Value Ref Range Status   04/04/2024 0.85 0.55 - 1.02 mg/dL Final     Calcium Level Total   Date Value Ref Range Status   04/04/2024 9.4 8.4 - 10.2 mg/dL Final     Albumin Level   Date Value Ref Range Status   04/04/2024 3.3 (L) 3.5 - 5.0 g/dL Final     Bilirubin Total   Date Value Ref Range Status   04/04/2024 0.4 <=1.5 mg/dL Final     Alkaline Phosphatase   Date Value Ref Range Status   04/04/2024 97 40 - 150 unit/L Final     Aspartate Aminotransferase   Date Value Ref Range Status   04/04/2024 27 5 - 34 unit/L Final     Alanine Aminotransferase   Date Value Ref Range Status   04/04/2024  51 0 - 55 unit/L Final     eGFR   Date Value Ref Range Status   04/04/2024 >60 mls/min/1.73/m2 Final     Lab Results   Component Value Date    INR 0.96 (L) 06/26/2017    PROTIME 9.8 06/26/2017                 Anesthesia Plan  Type of Anesthesia, risks & benefits discussed:    Anesthesia Type: Gen ETT  Intra-op Monitoring Plan: Standard ASA Monitors  Post Op Pain Control Plan: IV/PO Opioids PRN  Induction:  IV  Airway Plan: Direct  Informed Consent: Informed consent signed with the Patient and all parties understand the risks and agree with anesthesia plan.  All questions answered.   ASA Score: 3  Day of Surgery Review of History & Physical: H&P Update referred to the surgeon/provider.    Ready For Surgery From Anesthesia Perspective.     .

## 2024-04-16 ENCOUNTER — OFFICE VISIT (OUTPATIENT)
Dept: OTOLARYNGOLOGY | Facility: CLINIC | Age: 54
End: 2024-04-16
Payer: MEDICAID

## 2024-04-16 VITALS — WEIGHT: 235.88 LBS | HEIGHT: 66 IN | BODY MASS INDEX: 37.91 KG/M2

## 2024-04-16 DIAGNOSIS — H90.6 MIXED HEARING LOSS, BILATERAL: ICD-10-CM

## 2024-04-16 DIAGNOSIS — H90.A21 SENSORINEURAL HEARING LOSS (SNHL) OF RIGHT EAR WITH RESTRICTED HEARING OF LEFT EAR: ICD-10-CM

## 2024-04-16 DIAGNOSIS — H60.8X3 CHRONIC ECZEMATOUS OTITIS EXTERNA OF BOTH EARS: ICD-10-CM

## 2024-04-16 DIAGNOSIS — H90.A32 MIXED CONDUCTIVE AND SENSORINEURAL HEARING LOSS OF LEFT EAR WITH RESTRICTED HEARING OF RIGHT EAR: Primary | ICD-10-CM

## 2024-04-16 PROCEDURE — 99213 OFFICE O/P EST LOW 20 MIN: CPT | Mod: PBBFAC | Performed by: OTOLARYNGOLOGY

## 2024-04-16 RX ORDER — FLUOCINOLONE ACETONIDE 0.11 MG/ML
OIL AURICULAR (OTIC)
Qty: 20 ML | Refills: 1 | Status: SHIPPED | OUTPATIENT
Start: 2024-04-16

## 2024-04-16 NOTE — PROGRESS NOTES
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  The patient is indicated for the past 3 years she is had subjective hearing loss in left ear were it sounds like it is far.  She feels she hears okay out of her right ear.  She does admit to ringing tinnitus in both ears.  She denies any associated ear pain, otorrhea, or dizziness.  She does admit she has difficulty following conversations and will sometimes find herself reading lips and often asking to have people repeat things sent to her.  She believes she may have had recurrent ear infections as a child but is unable to elaborate.  She does not have a history of ear surgery.  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  She does not have any unilateral noise exposure to the left ear.  No history of head trauma or cerebrovascular accidents.  She does have a history of anal cancer for which she would undergone treatment with radiation therapy and chemotherapy completing her treatments 2 months ago.  She does not recall what medication she was treated with.  She denies use of aspirin or any diuretics.  Family history noncontributory his for his hearing loss.  Patient does smoke 1 pack of cigarettes per day and drinks alcohol socially.  Denies illicit drug use.    Patient had an audiogram done on August 29, 2022, which showed asymmetric hearing loss with the left ear being greater than right.  In the left ear she had a severe to profound mixed hearing loss in his right ear moderate to moderately severe sensorineural hearing loss.  Speech reception threshold in the right ear was 45 decibels and 85 decibels on the left ear with  92% discrimination in the right ear and 76% discrimination in the left ear.  Type a tympanograms bilaterally.  Distortion product otoacoustic emission testing suggested cochlear site of lesion bilaterally.    October 12, 2022:   Patient presents today for follow-up of her hearing loss and review of her CT scan of the temporal bones and MRI scan of the internal auditory canals.  Patient has not had any subjective change in her hearing since her last appointment.    CT scan of the temporal bones did not show any abnormalities.  She does not have any middle ear effusions.  Ossicles appeared to be intact.  No evidence of cholesteatoma.  MRI scan of the internal auditory canals did show the presence of a right internal auditory canal vascular loop but there was no evidence of retrocochlear pathology involving either ear.  Results were discussed with the patient.    The patient had placed a call to the [place] handicap commission in regards to assistance for hearing aids.  She is currently on a waiting list.    She has no other new problems today.    April 12, 2023:   Patient presents today for follow-up of her hearing loss.  She is not noticed any change in her hearing since her last appointment.  The patient has been fitted for hearing aids and is to receive her hearing aids next week.  Audiogram done on February 15, 2023, shows essentially no change in her hearing.  She has a moderately severe sensorineural hearing loss in the right ear and a severe to profound mixed hearing loss in the left ear.  Speech reception threshold is 40 decibels in the right ear and 80 decibels in the left ear with 92% discrimination in the right ear and 76% discrimination in the left ear.  Type a tympanograms bilaterally.  Results were discussed with the patient.    She does not have any other new problems today.    4/16/2024:  Patient presents today for follow-up of her hearing loss.  Since last appointment she has been fitted with  hearing aids and has been using them in both ears and finds that they are helpful.  She has not noticed any change in her hearing since last appointment.  The only new problem that she has today is that of complaints of frequent itching in both ears which has been present for an unspecified period of time.  She does not have any associated pain or drainage.  She feels that the itching is deep within the ears.  She has not had any treatment or evaluation for this previously.    Audiogram done on 3/28/2024, shows no change in her hearing.  She has a moderate to severe sensorineural hearing loss in the right ear and a severe to profound mixed hearing loss in the left ear.  Speech reception threshold is 45 decibels in the right ear and 80 decibels in the left ear with 84% discrimination in the right ear and 77% in the left ear.  Type A tympanogram on the right type Ad tympanogram on the left.  Results were discussed with the patient.    Past Medical History:  Past Medical History:   Diagnosis Date    Anxiety     Cancer     Circulatory anomaly      Past Surgical History:   Procedure Laterality Date    COLONOSCOPY  08/28/2020    EXAMINATION UNDER ANESTHESIA N/A 2/17/2023    Procedure: Anal exam under anesthesia;  Surgeon: Lai Robertson MD;  Location: [facility] OR;  Service: Colon and Rectal;  Laterality: N/A;    EXAMINATION UNDER ANESTHESIA N/A 9/13/2023    Procedure: ANAL Exam under anesthesia;  Surgeon: Lai Robertson MD;  Location: [facility] OR;  Service: Colon and Rectal;  Laterality: N/A;    HYSTERECTOMY  03/16/2017    INSERTION OF SUBCUTANEOUS PORT Right 10/31/2021    MRI PELVIS UNDER ANESTHESIA  10/01/2021    perianal surgery      RECTAL EXAMINATION UNDER ANESTHESIA  06/16/2021       Review of Systems:  Unremarkable except as mentioned above.    Current Medications:  Current Outpatient Medications   Medication Sig Dispense Refill    amitriptyline (ELAVIL) 50 MG tablet Take 50 mg by mouth every evening.       cholecalciferol, vitamin D3, (VITAMIN D3) 25 mcg (1,000 unit) capsule Take 2 capsules (2,000 Units total) by mouth once daily. 180 capsule 3    EScitalopram oxalate (LEXAPRO) 10 MG tablet Take 10 mg by mouth once daily.      omega-3s-dha-epa-fish oil (OMEGA-3 FISH OIL) 300-1,000 mg Cap Take 1,000 mg by mouth.      pregabalin (LYRICA) 50 MG capsule Take 1 capsule (50 mg total) by mouth 2 (two) times daily. 60 capsule 11    atorvastatin (LIPITOR) 40 MG tablet Take 1 tablet (40 mg total) by mouth every evening. 90 tablet 3    fluocinolone acetonide oiL 0.01 % Drop 4 drops in the affected ear bid for 14 days then prn itching thereafter. 20 mL 1    morphine (MS CONTIN) 30 MG 12 hr tablet Take 30 mg by mouth.      oxyCODONE-acetaminophen (PERCOCET)  mg per tablet Take 1 tablet by mouth every 6 (six) hours as needed.       No current facility-administered medications for this visit.     Facility-Administered Medications Ordered in Other Visits   Medication Dose Route Frequency Provider Last Rate Last Admin    diazePAM tablet 10 mg  10 mg Oral Once Marion Salgado FNP        diphenhydrAMINE injection 25 mg  25 mg Intravenous Once PRN rBeonna Sibley MD        HYDROmorphone injection 0.2 mg  0.2 mg Intravenous Q5 Min PRN Breonna Sibley MD        HYDROmorphone injection 0.5 mg  0.5 mg Intravenous Q5 Min PRN Breonna Sibley MD        lactated ringers infusion   Intravenous Continuous Breonna Sibley MD 10 mL/hr at 09/13/23 0559 New Bag at 09/13/23 0559    LIDOcaine (PF) 10 mg/ml (1%) injection 10 mg  1 mL Intradermal Once Marion Salgado FNP        ondansetron injection 4 mg  4 mg Intravenous Once Breonna Sibley MD        oxyCODONE-acetaminophen 5-325 mg per tablet 2 tablet  2 tablet Oral Once Breonna Sibley MD        prochlorperazine injection Soln 5 mg  5 mg Intravenous Once PRN Breonna Sibley MD            Allergies:  Review of patient's allergies indicates:  No Known  "Allergies     Physical Exam:  Vital signs:   Vitals:    04/16/24 0824   Weight: 107 kg (235 lb 14.3 oz)   Height: 5' 6" (1.676 m)   General:  Well-developed well-nourished female in no acute distress.  Voice is normal.  Head and face:  Normocephalic.  No facial lesions.  No temporomandibular joint tenderness or click.  Ears:  Right ear-auricle is normally developed.  External auditory canal is clear.  Ear canal is dry with little cerumen.  Tympanic membrane is nonerythematous.  No middle ear effusion.  Left ear-auricle is normally developed.  External auditory canal is clear dry with little cerumen.  Tympanic membrane is nonerythematous.  No middle ear effusion.  Eyes:  Extraocular muscles intact.  No nystagmus.  No exophthalmos or enophthalmos.  Pupils are equal round and reactive to light.  Neurologic:  Alert and oriented.  Cranial nerves 2-12 are grossly normal.    Assessment/Plan:  53-year-old female with asymmetric hearing loss with a mixed hearing loss in the left ear and sensorineural hearing loss in the right ear.  The hearing loss in the left ear may possibly be secondary to otosclerosis.    Bilateral chronic otitis externa.    Plan:   Continued use of hearing aids for amplification.    Follow-up in 1 year with audiogram.  Continue noise avoidance.  Fluocinolone otic oil 4 drops in both ears b.i.d. for 14 days then p.r.n. itching thereafter.    Chris Oneal M.D.          "

## 2024-04-24 ENCOUNTER — HOSPITAL ENCOUNTER (OUTPATIENT)
Facility: HOSPITAL | Age: 54
LOS: 1 days | Discharge: HOME | End: 2024-04-24
Attending: COLON & RECTAL SURGERY | Admitting: COLON & RECTAL SURGERY
Payer: MEDICAID

## 2024-04-24 ENCOUNTER — ANESTHESIA (OUTPATIENT)
Dept: SURGERY | Facility: HOSPITAL | Age: 54
End: 2024-04-24
Payer: MEDICAID

## 2024-04-24 DIAGNOSIS — Z85.048 HISTORY OF RECTAL OR ANAL CANCER: ICD-10-CM

## 2024-04-24 LAB — POCT GLUCOSE: 122 MG/DL (ref 70–110)

## 2024-04-24 PROCEDURE — 36000704 HC OR TIME LEV I 1ST 15 MIN: Performed by: COLON & RECTAL SURGERY

## 2024-04-24 PROCEDURE — 63600175 PHARM REV CODE 636 W HCPCS: Performed by: SPECIALIST

## 2024-04-24 PROCEDURE — 71000033 HC RECOVERY, INTIAL HOUR: Performed by: COLON & RECTAL SURGERY

## 2024-04-24 PROCEDURE — 63600175 PHARM REV CODE 636 W HCPCS

## 2024-04-24 PROCEDURE — 63600175 PHARM REV CODE 636 W HCPCS: Performed by: NURSE ANESTHETIST, CERTIFIED REGISTERED

## 2024-04-24 PROCEDURE — 37000008 HC ANESTHESIA 1ST 15 MINUTES: Performed by: COLON & RECTAL SURGERY

## 2024-04-24 PROCEDURE — 37000009 HC ANESTHESIA EA ADD 15 MINS: Performed by: COLON & RECTAL SURGERY

## 2024-04-24 PROCEDURE — 45300 PROCTOSIGMOIDOSCOPY DX: CPT | Mod: ,,, | Performed by: COLON & RECTAL SURGERY

## 2024-04-24 PROCEDURE — 25000003 PHARM REV CODE 250: Performed by: NURSE ANESTHETIST, CERTIFIED REGISTERED

## 2024-04-24 PROCEDURE — 82962 GLUCOSE BLOOD TEST: CPT | Performed by: COLON & RECTAL SURGERY

## 2024-04-24 PROCEDURE — 36000705 HC OR TIME LEV I EA ADD 15 MIN: Performed by: COLON & RECTAL SURGERY

## 2024-04-24 PROCEDURE — D9220A PRA ANESTHESIA: Mod: ANES,,, | Performed by: ANESTHESIOLOGY

## 2024-04-24 PROCEDURE — 71000015 HC POSTOP RECOV 1ST HR: Performed by: COLON & RECTAL SURGERY

## 2024-04-24 PROCEDURE — D9220A PRA ANESTHESIA: Mod: CRNA,,, | Performed by: NURSE ANESTHETIST, CERTIFIED REGISTERED

## 2024-04-24 PROCEDURE — 63600175 PHARM REV CODE 636 W HCPCS: Performed by: ANESTHESIOLOGY

## 2024-04-24 RX ORDER — MEPERIDINE HYDROCHLORIDE 25 MG/ML
12.5 INJECTION INTRAMUSCULAR; INTRAVENOUS; SUBCUTANEOUS EVERY 10 MIN PRN
Status: DISCONTINUED | OUTPATIENT
Start: 2024-04-24 | End: 2024-04-24 | Stop reason: HOSPADM

## 2024-04-24 RX ORDER — ONDANSETRON HYDROCHLORIDE 2 MG/ML
4 INJECTION, SOLUTION INTRAVENOUS DAILY PRN
Status: DISCONTINUED | OUTPATIENT
Start: 2024-04-24 | End: 2024-04-24 | Stop reason: HOSPADM

## 2024-04-24 RX ORDER — FENTANYL CITRATE 50 UG/ML
INJECTION, SOLUTION INTRAMUSCULAR; INTRAVENOUS
Status: DISCONTINUED | OUTPATIENT
Start: 2024-04-24 | End: 2024-04-24

## 2024-04-24 RX ORDER — ONDANSETRON HYDROCHLORIDE 2 MG/ML
4 INJECTION, SOLUTION INTRAVENOUS EVERY 12 HOURS PRN
Status: DISCONTINUED | OUTPATIENT
Start: 2024-04-24 | End: 2024-04-24 | Stop reason: HOSPADM

## 2024-04-24 RX ORDER — SUCCINYLCHOLINE CHLORIDE 20 MG/ML
INJECTION INTRAMUSCULAR; INTRAVENOUS
Status: DISCONTINUED | OUTPATIENT
Start: 2024-04-24 | End: 2024-04-24

## 2024-04-24 RX ORDER — KETOROLAC TROMETHAMINE 30 MG/ML
INJECTION, SOLUTION INTRAMUSCULAR; INTRAVENOUS
Status: DISCONTINUED | OUTPATIENT
Start: 2024-04-24 | End: 2024-04-24

## 2024-04-24 RX ORDER — SODIUM CHLORIDE 9 MG/ML
INJECTION, SOLUTION INTRAVENOUS CONTINUOUS
Status: DISCONTINUED | OUTPATIENT
Start: 2024-04-24 | End: 2024-04-24 | Stop reason: HOSPADM

## 2024-04-24 RX ORDER — LIDOCAINE HYDROCHLORIDE 20 MG/ML
INJECTION INTRAVENOUS
Status: DISCONTINUED | OUTPATIENT
Start: 2024-04-24 | End: 2024-04-24

## 2024-04-24 RX ORDER — SODIUM CHLORIDE 0.9 % (FLUSH) 0.9 %
10 SYRINGE (ML) INJECTION
Status: DISCONTINUED | OUTPATIENT
Start: 2024-04-24 | End: 2024-04-24 | Stop reason: HOSPADM

## 2024-04-24 RX ORDER — HYDROMORPHONE HYDROCHLORIDE 1 MG/ML
0.2 INJECTION, SOLUTION INTRAMUSCULAR; INTRAVENOUS; SUBCUTANEOUS EVERY 5 MIN PRN
Status: DISCONTINUED | OUTPATIENT
Start: 2024-04-24 | End: 2024-04-24 | Stop reason: HOSPADM

## 2024-04-24 RX ORDER — PROPOFOL 10 MG/ML
VIAL (ML) INTRAVENOUS
Status: DISCONTINUED | OUTPATIENT
Start: 2024-04-24 | End: 2024-04-24

## 2024-04-24 RX ORDER — OXYCODONE HYDROCHLORIDE 5 MG/1
5 TABLET ORAL
Status: DISCONTINUED | OUTPATIENT
Start: 2024-04-24 | End: 2024-04-24 | Stop reason: HOSPADM

## 2024-04-24 RX ORDER — DEXAMETHASONE SODIUM PHOSPHATE 4 MG/ML
INJECTION, SOLUTION INTRA-ARTICULAR; INTRALESIONAL; INTRAMUSCULAR; INTRAVENOUS; SOFT TISSUE
Status: DISCONTINUED | OUTPATIENT
Start: 2024-04-24 | End: 2024-04-24

## 2024-04-24 RX ORDER — MIDAZOLAM HYDROCHLORIDE 2 MG/2ML
2 INJECTION, SOLUTION INTRAMUSCULAR; INTRAVENOUS ONCE AS NEEDED
Status: COMPLETED | OUTPATIENT
Start: 2024-04-24 | End: 2024-04-24

## 2024-04-24 RX ORDER — SODIUM CHLORIDE, SODIUM LACTATE, POTASSIUM CHLORIDE, CALCIUM CHLORIDE 600; 310; 30; 20 MG/100ML; MG/100ML; MG/100ML; MG/100ML
INJECTION, SOLUTION INTRAVENOUS CONTINUOUS
Status: ACTIVE | OUTPATIENT
Start: 2024-04-24

## 2024-04-24 RX ADMIN — SODIUM CHLORIDE, POTASSIUM CHLORIDE, SODIUM LACTATE AND CALCIUM CHLORIDE: 600; 310; 30; 20 INJECTION, SOLUTION INTRAVENOUS at 07:04

## 2024-04-24 RX ADMIN — PROPOFOL 200 MG: 10 INJECTION, EMULSION INTRAVENOUS at 07:04

## 2024-04-24 RX ADMIN — DEXAMETHASONE SODIUM PHOSPHATE 4 MG: 4 INJECTION, SOLUTION INTRA-ARTICULAR; INTRALESIONAL; INTRAMUSCULAR; INTRAVENOUS; SOFT TISSUE at 07:04

## 2024-04-24 RX ADMIN — SODIUM CHLORIDE, POTASSIUM CHLORIDE, SODIUM LACTATE AND CALCIUM CHLORIDE: 600; 310; 30; 20 INJECTION, SOLUTION INTRAVENOUS at 06:04

## 2024-04-24 RX ADMIN — MIDAZOLAM HYDROCHLORIDE 2 MG: 1 INJECTION, SOLUTION INTRAMUSCULAR; INTRAVENOUS at 06:04

## 2024-04-24 RX ADMIN — KETOROLAC TROMETHAMINE 30 MG: 30 INJECTION, SOLUTION INTRAMUSCULAR at 07:04

## 2024-04-24 RX ADMIN — FENTANYL CITRATE 50 MCG: 50 INJECTION INTRAMUSCULAR; INTRAVENOUS at 07:04

## 2024-04-24 RX ADMIN — SUCCINYLCHOLINE CHLORIDE 140 MG: 20 INJECTION, SOLUTION INTRAMUSCULAR; INTRAVENOUS; PARENTERAL at 07:04

## 2024-04-24 RX ADMIN — ONDANSETRON 4 MG: 2 INJECTION INTRAMUSCULAR; INTRAVENOUS at 07:04

## 2024-04-24 RX ADMIN — LIDOCAINE HYDROCHLORIDE 50 MG: 20 INJECTION INTRAVENOUS at 07:04

## 2024-04-24 NOTE — TRANSFER OF CARE
Anesthesia Transfer of Care Note    Patient: Antoine Banegas    Procedure(s) Performed: Procedure(s) (LRB):  Exam under anesthesia (N/A)  PROCTOSCOPY (N/A)    Patient location: PACU    Anesthesia Type: general    Transport from OR: Transported from OR on room air with adequate spontaneous ventilation    Post pain: adequate analgesia    Post assessment: no apparent anesthetic complications    Post vital signs: stable    Level of consciousness: sedated    Nausea/Vomiting: no nausea/vomiting    Complications: none    Transfer of care protocol was followed      Last vitals:

## 2024-04-24 NOTE — OP NOTE
[facility] - [facility]iop [facility]  Operative Note  Surgery    SUMMARY     Surgery Date: 4/24/2024     Surgeons and Role:     * Lai Robertson MD - Primary     * Josiane Mendoza MD- PGY-1    Pre-op Diagnosis:  * No pre-op diagnosis entered *    Post-op Diagnosis: Post-Op Diagnosis Codes:     * History of rectal or anal cancer [Z85.048]    Procedure Performed:    Procedure(s) (LRB):  Exam under anesthesia (N/A)  PROCTOSCOPY (N/A)    Technique:  Patient was evaluated and examined preoperatively. Questions answered and consent obtained prior to surgery.     The patient was intubated without complications. Patient was placed in lithotomy position, was prepped and draped in the standard fashion. Timeout was completed. A well demarcated area of dermal hypopigmentation of the right perianal region was observed consistent with post-radiation changes. A digital rectal exam was performed and revealed no palpable masses.     A [place] retractor was then inserted and the anus was inspected  circumferentially. No masses, ulceration, or mucosal defects identified. A proctoscope was then inserted and the rectal mucosal was inspected circumferentially. No abnormalities identified on proctoscopy.    Patient tolerated the procedure well without complications. Patient was extubated and taken to the postoperative care area.     Dr. Robertson was present for critical portions and available throughout the case.     Operative Findings: stable post-radiation dermal changes, well demarcated hypopigmentation, no masses or ulceration noted on exam. normal rectal mucosa on proctoscopy, no ulceration or masses.     Estimated Blood Loss: * No values recorded between 4/24/2024  7:38 AM and 4/24/2024  7:53 AM *         Specimens:   Specimen (24h ago, onward)      None

## 2024-04-24 NOTE — DISCHARGE INSTRUCTIONS
· Keep follow up appointment [facility].  Resume home medications unless otherwise instructed by your doctor.    · No heavy lifting or straining over 10 pounds for 3-4 weeks.    · You may take a shower starting tomorrow evening. Wash GENTLY with soap and water (do not scrub) over incision, rinse with water, and pat dry.    · Use pain medication as instructed.Tylenol or Ibuprofen as needed.    · You may use an ice pack as needed for 20 minutes at a time over your incision site to minimize swelling and help relieve pain.    · Do not drink alcohol or drive today, or as long as you are on pain medication.    · Notify MD of any moderate to severe pain unrelieved by pain medicine, if your incision opens and/or bleeds, or for any signs of infection including fever above 100.4, excessive redness or swelling, yellow/green foul- smelling drainage, nausea or vomiting. Clinics number is [phone]. If it is after business hours or emergency call [phone] and state Im having post op complications and need to speak to the surgeon on call.    · Thanks for choosing [facility]! Have a smooth recovery!

## 2024-04-24 NOTE — INTERVAL H&P NOTE
H&P Update    Patient seen and examined this morning. There are no changes to the documented H&P.    Patient has held home blood thinners for appropriate amount of time: N/A    Planned procedure: Exam under anesthesia, ANOSCOPY    Positioning: Lithotomy    Pre-operative Heparin Ordered: N/A    Pre-operative Antibiotics Ordered: N/A    Laterality Marked: N/A    Josiane Mendoza MD  5:22 AM  04/24/2024

## 2024-04-24 NOTE — ANESTHESIA PROCEDURE NOTES
Intubation    Date/Time: 4/24/2024 7:13 AM    Performed by: Zainab Smith CRNA  Authorized by: Candida Jj MD    Intubation:     Induction:  Intravenous    Intubated:  Postinduction    Mask Ventilation:  Easy with oral airway    Attempted By:  CRNA    Blade:  Candelaria 2    Laryngeal View Grade: Grade IIA - cords partially seen      Difficult Airway Encountered?: No      Complications:  None    Airway Device:  Oral endotracheal tube    Airway Device Size:  7.5    Inflation Amount (mL):  5    Tube secured:  22    Secured at:  The lips    Placement Verified By:  Capnometry    Complicating Factors:  Oropharyngeal edema or fat    Findings Post-Intubation:  BS equal bilateral and atraumatic/condition of teeth unchanged

## 2024-04-24 NOTE — PLAN OF CARE
Problem: Adult Inpatient Plan of Care  Goal: Plan of Care Review  Outcome: Progressing  Goal: Patient-Specific Goal (Individualized)  Outcome: Progressing  Goal: Absence of Hospital-Acquired Illness or Injury  Outcome: Progressing  Goal: Optimal Comfort and Wellbeing  Outcome: Progressing  Goal: Readiness for Transition of Care  Outcome: Progressing     Problem: Infection  Goal: Absence of Infection Signs and Symptoms  Outcome: Progressing

## 2024-04-24 NOTE — BRIEF OP NOTE
[facility]  Brief Operative Note    Surgery Date: 4/24/2024     Surgeons and Role:     * Lai Robertson MD - Primary     * Josiane Mendoza MD - PGY-1    Assisting Surgeon: None    Pre-op Diagnosis:  * No pre-op diagnosis entered *    Post-op Diagnosis:  Post-Op Diagnosis Codes:     * History of rectal or anal cancer [Z85.048]    Procedure(s) (LRB):  Exam under anesthesia (N/A)  PROCTOSCOPY (N/A)    Anesthesia: Choice    Operative Findings: stable post-radiation dermal changes, well demarcated hypopigmentation, no masses or ulceration noted on exam. normal rectal mucosa on proctoscopy, no ulceration or masses.     Estimated Blood Loss: * No values recorded between 4/24/2024  7:38 AM and 4/24/2024  7:49 AM *         Specimens:   Specimen (24h ago, onward)      None              Discharge Note    OUTCOME: Patient tolerated treatment/procedure well without complication and is now ready for discharge.    DISPOSITION: Home or Self Care    FINAL DIAGNOSIS:  <principal problem not specified>    FOLLOWUP: In clinic    DISCHARGE INSTRUCTIONS:  No discharge procedures on file.

## 2024-04-24 NOTE — ANESTHESIA POSTPROCEDURE EVALUATION
Anesthesia Post Evaluation    Patient: Antoine Banegas    Procedure(s) Performed: Procedure(s) (LRB):  Exam under anesthesia (N/A)  PROCTOSCOPY (N/A)    Final Anesthesia Type: general      Patient location during evaluation: DOSC  Post-procedure vital signs: reviewed and stable  Airway patency: patent      Anesthetic complications: no      Cardiovascular status: hemodynamically stable  Respiratory status: spontaneous ventilation  Follow-up not needed.              Vitals Value Taken Time   /66 04/24/24 0831   Temp 36.4 °C (97.5 °F) 04/24/24 0815   Pulse 85 04/24/24 0833   Resp 20 04/24/24 0815   SpO2 96 % 04/24/24 0833   Vitals shown include unfiled device data.      Event Time   Out of Recovery 08:15:00         Pain/Alesia Score: Alesia Score: 10 (4/24/2024  8:15 AM)  Modified Alesia Score: 20 (4/24/2024  8:35 AM)

## 2024-04-25 VITALS
SYSTOLIC BLOOD PRESSURE: 119 MMHG | RESPIRATION RATE: 20 BRPM | DIASTOLIC BLOOD PRESSURE: 66 MMHG | WEIGHT: 234.63 LBS | TEMPERATURE: 98 F | BODY MASS INDEX: 37.87 KG/M2 | HEART RATE: 70 BPM | OXYGEN SATURATION: 97 %

## 2024-04-26 ENCOUNTER — OFFICE VISIT (OUTPATIENT)
Dept: FAMILY MEDICINE | Facility: CLINIC | Age: 54
End: 2024-04-26
Payer: MEDICAID

## 2024-04-26 VITALS
WEIGHT: 236 LBS | OXYGEN SATURATION: 100 % | HEART RATE: 75 BPM | HEIGHT: 66 IN | BODY MASS INDEX: 37.93 KG/M2 | RESPIRATION RATE: 18 BRPM | TEMPERATURE: 98 F | SYSTOLIC BLOOD PRESSURE: 131 MMHG | DIASTOLIC BLOOD PRESSURE: 86 MMHG

## 2024-04-26 DIAGNOSIS — R21 RASH OF FACE: ICD-10-CM

## 2024-04-26 DIAGNOSIS — L58.9 RADIATION DERMATITIS: Primary | ICD-10-CM

## 2024-04-26 PROCEDURE — 99214 OFFICE O/P EST MOD 30 MIN: CPT | Mod: PBBFAC | Performed by: FAMILY MEDICINE

## 2024-04-26 RX ORDER — HYDROCORTISONE VALERATE 2 MG/G
OINTMENT TOPICAL 2 TIMES DAILY
Qty: 15 G | Refills: 1 | Status: SHIPPED | OUTPATIENT
Start: 2024-04-26

## 2024-04-26 NOTE — PROGRESS NOTES
Subjective     Patient ID: Antoine Banegas is a 53 y.o. female.    Chief Complaint: Rash (Itchy rash on face that peels from time to time and itching all over body for about 2 years)    HPI  54 yo female presents to derm screening clinic with complaints of skin itching and peeling mostly on face, neck. Reports symptoms started after starting radiation 2 years ago. No longer receiving radiation. Last dose > 6 months. Uses Jergen products to moisturize skin. Patient as a PMH obesity, depression, etoh use, SNHL. HLD, syphllis treated, anal cancer s/p chemo and radiation.     Review of Systems  Per HPI     Objective     Physical Exam  Vitals:    04/26/24 1036   BP: 131/86   Pulse: 75   Resp: 18   Temp: 97.9 °F (36.6 °C)   Gen: NAD, AXO  Integumentary: hyperpigmentation on face, neck, no peeling or flaking, skin appears well moisturized     Assessment and Plan     1. Radiation dermatitis    2. Rash of face  -     Ambulatory referral/consult to Dermatology    Other orders  -     hydrocortisone valerate (WEST-CESAR) 0.2 % ointment; Apply topically 2 (two) times daily. PRN Use only during flares  Dispense: 15 g; Refill: 1    Hydrocortisone valerate to apply during flares only  Discussed general skin scare, sunscreen. Cerave  keep in fridge             Follow up in about 4 months (around 8/26/2024) for Derm 3- 4 months.

## 2024-07-05 ENCOUNTER — INFUSION (OUTPATIENT)
Dept: INFUSION THERAPY | Facility: HOSPITAL | Age: 54
End: 2024-07-05
Attending: INTERNAL MEDICINE
Payer: MEDICAID

## 2024-07-05 VITALS
RESPIRATION RATE: 20 BRPM | SYSTOLIC BLOOD PRESSURE: 164 MMHG | WEIGHT: 237.88 LBS | HEIGHT: 66 IN | TEMPERATURE: 98 F | BODY MASS INDEX: 38.23 KG/M2 | HEART RATE: 103 BPM | OXYGEN SATURATION: 97 % | DIASTOLIC BLOOD PRESSURE: 91 MMHG

## 2024-07-05 DIAGNOSIS — Z85.048 HISTORY OF RECTAL OR ANAL CANCER: Primary | ICD-10-CM

## 2024-07-05 PROCEDURE — 63600175 PHARM REV CODE 636 W HCPCS: Performed by: NURSE PRACTITIONER

## 2024-07-05 PROCEDURE — A4216 STERILE WATER/SALINE, 10 ML: HCPCS | Performed by: NURSE PRACTITIONER

## 2024-07-05 PROCEDURE — 96523 IRRIG DRUG DELIVERY DEVICE: CPT

## 2024-07-05 PROCEDURE — 25000003 PHARM REV CODE 250: Performed by: NURSE PRACTITIONER

## 2024-07-05 RX ORDER — SODIUM CHLORIDE 0.9 % (FLUSH) 0.9 %
10 SYRINGE (ML) INJECTION
OUTPATIENT
Start: 2024-07-05

## 2024-07-05 RX ORDER — HEPARIN 100 UNIT/ML
500 SYRINGE INTRAVENOUS
Status: DISCONTINUED | OUTPATIENT
Start: 2024-07-05 | End: 2024-07-05 | Stop reason: HOSPADM

## 2024-07-05 RX ORDER — HEPARIN 100 UNIT/ML
500 SYRINGE INTRAVENOUS
OUTPATIENT
Start: 2024-07-05

## 2024-07-05 RX ORDER — SODIUM CHLORIDE 0.9 % (FLUSH) 0.9 %
10 SYRINGE (ML) INJECTION
Status: DISCONTINUED | OUTPATIENT
Start: 2024-07-05 | End: 2024-07-05 | Stop reason: HOSPADM

## 2024-07-05 RX ADMIN — Medication 10 ML: at 09:07

## 2024-07-05 RX ADMIN — HEPARIN 500 UNITS: 100 SYRINGE at 09:07

## 2024-07-05 NOTE — NURSING
"0856  Pt arrived for MP flush.  Pt accomp by a male.  Denies any pain.  Does report some "short windedness" and diarrhea.  0905  At first unable to get blood return until laid pt back and had her cough.  Easy full blood return with easy flush.  Pt to return in 3 mon for next MP flush.  "

## 2024-08-30 ENCOUNTER — OFFICE VISIT (OUTPATIENT)
Dept: DERMATOLOGY | Facility: CLINIC | Age: 54
End: 2024-08-30
Payer: MEDICAID

## 2024-08-30 VITALS
OXYGEN SATURATION: 100 % | HEIGHT: 66 IN | HEART RATE: 88 BPM | DIASTOLIC BLOOD PRESSURE: 73 MMHG | WEIGHT: 228.81 LBS | BODY MASS INDEX: 36.77 KG/M2 | TEMPERATURE: 99 F | SYSTOLIC BLOOD PRESSURE: 112 MMHG

## 2024-08-30 DIAGNOSIS — L58.9 RADIATION DERMATITIS: ICD-10-CM

## 2024-08-30 DIAGNOSIS — R21 SKIN RASH: ICD-10-CM

## 2024-08-30 DIAGNOSIS — L81.9 HYPERPIGMENTATION OF SKIN: Primary | ICD-10-CM

## 2024-08-30 LAB
ALBUMIN SERPL-MCNC: 3.3 G/DL (ref 3.5–5)
ALBUMIN/GLOB SERPL: 0.8 RATIO (ref 1.1–2)
ALP SERPL-CCNC: 114 UNIT/L (ref 40–150)
ALT SERPL-CCNC: 46 UNIT/L (ref 0–55)
ANION GAP SERPL CALC-SCNC: 7 MEQ/L
AST SERPL-CCNC: 24 UNIT/L (ref 5–34)
BASOPHILS # BLD AUTO: 0.01 X10(3)/MCL
BASOPHILS NFR BLD AUTO: 0.1 %
BILIRUB SERPL-MCNC: 0.4 MG/DL
BUN SERPL-MCNC: 9.3 MG/DL (ref 9.8–20.1)
CALCIUM SERPL-MCNC: 9.5 MG/DL (ref 8.4–10.2)
CHLORIDE SERPL-SCNC: 106 MMOL/L (ref 98–107)
CO2 SERPL-SCNC: 24 MMOL/L (ref 22–29)
CREAT SERPL-MCNC: 0.81 MG/DL (ref 0.55–1.02)
CREAT/UREA NIT SERPL: 11
EOSINOPHIL # BLD AUTO: 0.12 X10(3)/MCL (ref 0–0.9)
EOSINOPHIL NFR BLD AUTO: 1.5 %
ERYTHROCYTE [DISTWIDTH] IN BLOOD BY AUTOMATED COUNT: 13.2 % (ref 11.5–17)
GFR SERPLBLD CREATININE-BSD FMLA CKD-EPI: >60 ML/MIN/1.73/M2
GLOBULIN SER-MCNC: 4.1 GM/DL (ref 2.4–3.5)
GLUCOSE SERPL-MCNC: 358 MG/DL (ref 74–100)
HAV IGM SERPL QL IA: NONREACTIVE
HBV CORE IGM SERPL QL IA: NONREACTIVE
HBV SURFACE AG SERPL QL IA: NONREACTIVE
HCT VFR BLD AUTO: 43.6 % (ref 37–47)
HCV AB SERPL QL IA: NONREACTIVE
HGB BLD-MCNC: 14.3 G/DL (ref 12–16)
HIV 1+2 AB+HIV1 P24 AG SERPL QL IA: NONREACTIVE
IGA SERPL-MCNC: 159 MG/DL (ref 65–421)
IGG SERPL-MCNC: 1411 MG/DL (ref 522–1631)
IGM SERPL-MCNC: 59 MG/DL (ref 33–293)
IMM GRANULOCYTES # BLD AUTO: 0.04 X10(3)/MCL (ref 0–0.04)
IMM GRANULOCYTES NFR BLD AUTO: 0.5 %
LYMPHOCYTES # BLD AUTO: 1.3 X10(3)/MCL (ref 0.6–4.6)
LYMPHOCYTES NFR BLD AUTO: 16.1 %
MCH RBC QN AUTO: 27.4 PG (ref 27–31)
MCHC RBC AUTO-ENTMCNC: 32.8 G/DL (ref 33–36)
MCV RBC AUTO: 83.7 FL (ref 80–94)
MONOCYTES # BLD AUTO: 0.55 X10(3)/MCL (ref 0.1–1.3)
MONOCYTES NFR BLD AUTO: 6.8 %
NEUTROPHILS # BLD AUTO: 6.06 X10(3)/MCL (ref 2.1–9.2)
NEUTROPHILS NFR BLD AUTO: 75 %
NRBC BLD AUTO-RTO: 0 %
PLATELET # BLD AUTO: 249 X10(3)/MCL (ref 130–400)
PMV BLD AUTO: 10.2 FL (ref 7.4–10.4)
POTASSIUM SERPL-SCNC: 4 MMOL/L (ref 3.5–5.1)
PROT SERPL-MCNC: 7.4 GM/DL (ref 6.4–8.3)
RBC # BLD AUTO: 5.21 X10(6)/MCL (ref 4.2–5.4)
SODIUM SERPL-SCNC: 137 MMOL/L (ref 136–145)
WBC # BLD AUTO: 8.08 X10(3)/MCL (ref 4.5–11.5)

## 2024-08-30 PROCEDURE — 87389 HIV-1 AG W/HIV-1&-2 AB AG IA: CPT | Performed by: DERMATOLOGY

## 2024-08-30 PROCEDURE — 36415 COLL VENOUS BLD VENIPUNCTURE: CPT | Performed by: DERMATOLOGY

## 2024-08-30 PROCEDURE — 85025 COMPLETE CBC W/AUTO DIFF WBC: CPT | Performed by: DERMATOLOGY

## 2024-08-30 PROCEDURE — 99214 OFFICE O/P EST MOD 30 MIN: CPT | Mod: PBBFAC | Performed by: DERMATOLOGY

## 2024-08-30 PROCEDURE — 80053 COMPREHEN METABOLIC PANEL: CPT | Performed by: DERMATOLOGY

## 2024-08-30 PROCEDURE — 80074 ACUTE HEPATITIS PANEL: CPT | Performed by: DERMATOLOGY

## 2024-08-30 PROCEDURE — 82784 ASSAY IGA/IGD/IGG/IGM EACH: CPT | Performed by: DERMATOLOGY

## 2024-08-30 RX ORDER — TRIAMCINOLONE ACETONIDE 1 MG/G
OINTMENT TOPICAL 2 TIMES DAILY
Qty: 453 G | Refills: 0 | Status: SHIPPED | OUTPATIENT
Start: 2024-08-30

## 2024-08-30 RX ORDER — TACROLIMUS 1 MG/G
OINTMENT TOPICAL 2 TIMES DAILY
Qty: 60 G | Refills: 0 | Status: SHIPPED | OUTPATIENT
Start: 2024-08-30

## 2024-08-30 NOTE — PROGRESS NOTES
"[facility] Visit    Chief Complaint:      New patient for skin rash    Subjective:     HPI:  Antoine Banegas is a 53 y.o. female with a past medical history of anal cancer s/p chemo and radiation, HLD, obesity, treated syphilis, depression, alcohol use.    She presents to Dermatology clinic today for skin rash on her face and neck.  Patient reports onset was about 2 years ago when she began localized radiation for anal cancer.  Last radiation was over a year ago.  [facility] and prescribed hydrocortisone 0.2% ointment for use with acute flares.  She reports that she has been using it twice daily and has not noticed any improvement.  She reports generalized itching of her entire body; worse on her face.  She states that she does not have any skin rashes on the rest of her body except hyperpigmented areas on her face, neck, thighs and genital area.  She uses CeraVe cream and an oatmeal soap which provides some mild temporary relief.  Reports family history of eczema in mom.  Otherwise doing well.      Objective:   Last 24 Hour Vital Signs:  Vitals  BP: 112/73  Temp: 98.5 °F (36.9 °C)  Temp Source: Oral  Pulse: 88  SpO2: 100 %  Height: 5' 6" (167.6 cm)  Weight: 103.8 kg (228 lb 12.8 oz)    Physical Examination:  General: AAOx3. No acute distress  Skin: Exposed skin is warm & dry.  Hyperpigmentation and lichenification of skin on face and neck. No dryness, erythema, scaling or sores noted. No skin breakdown    Assessment & Plan:     Contact vs radiation dermatitis  - Dr. White recommended getting a biopsy of neck area.  However, patient declined today due to anxiety.    - Rx sent for tacrolimus 0.1% ointment for use on face and triamcinolone 0.1% ointment for use on the rest of body.  - Counseled to mix tacrolimus ointment with Vaseline/Aquaphor prior to use.  Do not use triamcinolone ointment on face.  - Counseled on possible side effects of topical steroids " including skin atrophy or discoloration.  - Counseled patient regarding limiting prolonged hot showers, avoidance of soap with fragrances/dyes, avoiding irritating agents, and recommended brands like Dove sensitive skin and use of unscented moisturizer daily including CeraVe or Aquifor  - Avoid scratching lesion as it can lead to skin thickening. Can put CeraVe lotion in the fridge.  Discussed general skin care and provided handout.  - CBC, CMP, hep panel, HIV, ANIBAL, SPEP ordered      Follow-ups  6-8 weeks       Malika Eduardo MD  \[facility]\"" Family Medicine Resident, [facility]

## 2024-08-30 NOTE — PROGRESS NOTES
Patient seen and examined with resident, agree with plan as outlined. Facial and neck rash most c/w contact derm; pt associates rash timing with radiation for anal cancer in past-discussed unlikely cause for facial rash however could biopsy which pt defers; also states she has total body itching-evaluate with labs      Juan Alberto White MD  [facility]

## 2024-09-10 ENCOUNTER — OFFICE VISIT (OUTPATIENT)
Dept: GYNECOLOGY | Facility: CLINIC | Age: 54
End: 2024-09-10
Payer: MEDICAID

## 2024-09-10 VITALS
HEIGHT: 66 IN | DIASTOLIC BLOOD PRESSURE: 86 MMHG | WEIGHT: 224.81 LBS | SYSTOLIC BLOOD PRESSURE: 137 MMHG | BODY MASS INDEX: 36.13 KG/M2 | TEMPERATURE: 99 F | OXYGEN SATURATION: 97 % | RESPIRATION RATE: 20 BRPM | HEART RATE: 88 BPM

## 2024-09-10 DIAGNOSIS — Z12.31 VISIT FOR SCREENING MAMMOGRAM: ICD-10-CM

## 2024-09-10 DIAGNOSIS — Z01.419 ENCOUNTER FOR ANNUAL ROUTINE GYNECOLOGICAL EXAMINATION: Primary | ICD-10-CM

## 2024-09-10 DIAGNOSIS — N89.8 VAGINAL IRRITATION: ICD-10-CM

## 2024-09-10 DIAGNOSIS — Z72.0 TOBACCO USE: ICD-10-CM

## 2024-09-10 PROCEDURE — 3079F DIAST BP 80-89 MM HG: CPT | Mod: CPTII,,, | Performed by: NURSE PRACTITIONER

## 2024-09-10 PROCEDURE — 99396 PREV VISIT EST AGE 40-64: CPT | Mod: S$PBB,,, | Performed by: NURSE PRACTITIONER

## 2024-09-10 PROCEDURE — 1159F MED LIST DOCD IN RCRD: CPT | Mod: CPTII,,, | Performed by: NURSE PRACTITIONER

## 2024-09-10 PROCEDURE — 3008F BODY MASS INDEX DOCD: CPT | Mod: CPTII,,, | Performed by: NURSE PRACTITIONER

## 2024-09-10 PROCEDURE — 3044F HG A1C LEVEL LT 7.0%: CPT | Mod: CPTII,,, | Performed by: NURSE PRACTITIONER

## 2024-09-10 PROCEDURE — 3075F SYST BP GE 130 - 139MM HG: CPT | Mod: CPTII,,, | Performed by: NURSE PRACTITIONER

## 2024-09-10 PROCEDURE — 99214 OFFICE O/P EST MOD 30 MIN: CPT | Mod: PBBFAC | Performed by: NURSE PRACTITIONER

## 2024-09-10 RX ORDER — CLOTRIMAZOLE AND BETAMETHASONE DIPROPIONATE 10; .64 MG/G; MG/G
CREAM TOPICAL 2 TIMES DAILY
Qty: 45 G | Refills: 1 | Status: SHIPPED | OUTPATIENT
Start: 2024-09-10

## 2024-09-10 RX ORDER — CLOTRIMAZOLE AND BETAMETHASONE DIPROPIONATE 10; .64 MG/G; MG/G
CREAM TOPICAL 2 TIMES DAILY
Qty: 45 G | Refills: 1 | Status: SHIPPED | OUTPATIENT
Start: 2024-09-10 | End: 2024-09-10 | Stop reason: SDUPTHER

## 2024-09-16 ENCOUNTER — HOSPITAL ENCOUNTER (OUTPATIENT)
Dept: RADIOLOGY | Facility: HOSPITAL | Age: 54
Discharge: HOME | End: 2024-09-16
Attending: NURSE PRACTITIONER
Payer: MEDICAID

## 2024-09-16 ENCOUNTER — OFFICE VISIT (OUTPATIENT)
Dept: GYNECOLOGY | Facility: CLINIC | Age: 54
End: 2024-09-16
Payer: MEDICAID

## 2024-09-16 VITALS
HEART RATE: 100 BPM | BODY MASS INDEX: 35.81 KG/M2 | HEIGHT: 66 IN | RESPIRATION RATE: 20 BRPM | WEIGHT: 222.81 LBS | SYSTOLIC BLOOD PRESSURE: 129 MMHG | DIASTOLIC BLOOD PRESSURE: 89 MMHG | OXYGEN SATURATION: 100 % | TEMPERATURE: 98 F

## 2024-09-16 DIAGNOSIS — N89.8 VAGINAL IRRITATION: Primary | ICD-10-CM

## 2024-09-16 DIAGNOSIS — Z12.31 VISIT FOR SCREENING MAMMOGRAM: ICD-10-CM

## 2024-09-16 PROCEDURE — 3008F BODY MASS INDEX DOCD: CPT | Mod: CPTII,,, | Performed by: NURSE PRACTITIONER

## 2024-09-16 PROCEDURE — 77063 BREAST TOMOSYNTHESIS BI: CPT | Mod: TC

## 2024-09-16 PROCEDURE — 3044F HG A1C LEVEL LT 7.0%: CPT | Mod: CPTII,,, | Performed by: NURSE PRACTITIONER

## 2024-09-16 PROCEDURE — 3079F DIAST BP 80-89 MM HG: CPT | Mod: CPTII,,, | Performed by: NURSE PRACTITIONER

## 2024-09-16 PROCEDURE — 1159F MED LIST DOCD IN RCRD: CPT | Mod: CPTII,,, | Performed by: NURSE PRACTITIONER

## 2024-09-16 PROCEDURE — 3074F SYST BP LT 130 MM HG: CPT | Mod: CPTII,,, | Performed by: NURSE PRACTITIONER

## 2024-09-16 PROCEDURE — 87255 GENET VIRUS ISOLATE HSV: CPT | Performed by: NURSE PRACTITIONER

## 2024-09-16 PROCEDURE — 99213 OFFICE O/P EST LOW 20 MIN: CPT | Mod: PBBFAC | Performed by: NURSE PRACTITIONER

## 2024-09-16 PROCEDURE — 99213 OFFICE O/P EST LOW 20 MIN: CPT | Mod: S$PBB,,, | Performed by: NURSE PRACTITIONER

## 2024-09-16 RX ORDER — VALACYCLOVIR HYDROCHLORIDE 1 G/1
1000 TABLET, FILM COATED ORAL EVERY 12 HOURS
Qty: 20 TABLET | Refills: 0 | Status: SHIPPED | OUTPATIENT
Start: 2024-09-16 | End: 2024-09-26

## 2024-09-16 NOTE — PROGRESS NOTES
"  [facility] -  Gynecology / Women's Health Clinic    Subjective:       Patient ID: Antoine Banegas is a 53 y.o. female.    Chief Complaint:  Gynecologic Exam (Medication follow up)    History of Present Illness  The patient  here for pelvic exam and medication f/u. Pt had partial hysterectomy in 2017 for fibroids. At last visit, pt c/o vaginal itching/rash x1 week. Admits to recently using a new oatmeal soap and Dove scented soap. Used OTC feminine cream. She is followed by dermatology for contact vs radiation dermatitis to face. Today, pt states    GYN & OB History  No LMP recorded (lmp unknown). Patient has had a hysterectomy.       Review of patient's allergies indicates:  No Known Allergies  Past Medical History:   Diagnosis Date    Abnormal Pap smear of cervix     Anxiety     Cancer     Circulatory anomaly     Hyperlipidemia      OB History    Para Term  AB Living   1 1           SAB IAB Ectopic Multiple Live Births                  # Outcome Date GA Lbr Jonny/2nd Weight Sex Type Anes PTL Lv   1 Para                 Review of Systems  Review of Systems    Negative except for pertinent findings for positives per HPI     Objective:    Physical Exam    /89 (BP Location: Left arm, Patient Position: Sitting, BP Method: Large (Automatic))   Pulse 100   Temp 98.2 °F (36.8 °C) (Oral)   Resp 20   Ht 5' 6" (1.676 m)   Wt 101.1 kg (222 lb 12.8 oz)   LMP  (LMP Unknown)   SpO2 100%   BMI 35.96 kg/m²   GENERAL: Well-developed female in no acute distress.  SKIN: Normal to inspection,warm, dry and intact.  VULVA: General appearance external genitalia with multiple excoriations to lashell labia majora and clitoral montes de oca   PSYCHIATRIC: Patient is oriented to person, place, and time. Mood and affect are normal.    Pelvic exam-declined d/t comfort  Assessment:         ICD-10-CM ICD-9-CM   1. Vaginal irritation  N89.8 623.9     Plan:   Antoine was seen today for gynecologic " exam.    Diagnoses and all orders for this visit:    Vaginal irritation  -     HSV 1 & 2, IgM; Future  -     Herpes simplex virus culture [facility]; Vagina  -     HSV 1 & 2, IgG; Future  -     valACYclovir (VALTREX) 1000 MG tablet; Take 1 tablet (1,000 mg total) by mouth every 12 (twelve) hours. For initial episode. Drink plenty of fluid. for 10 days    HSV testing  Will treat with Valtrex based on symptoms while waiting on results.  Cool vaseline to affected area.  Follow up for annual exam.

## 2024-09-19 ENCOUNTER — TELEPHONE (OUTPATIENT)
Dept: GYNECOLOGY | Facility: CLINIC | Age: 54
End: 2024-09-19
Payer: MEDICAID

## 2024-09-19 LAB — AR CULTURE, HSV FINAL: NORMAL

## 2024-09-19 NOTE — TELEPHONE ENCOUNTER
HSV 1 & 2 IgG is positive. Indicates pt has been exposed. This antibody is detectable after initial infection and usually remains detectable. Although this test can distinguish between type 1 and type 2 HSV, it cannot distinguish between oral vs genital infection.     Pt denies ever being diagnosed with HSV. Informed pt that HSV is a virus with high probability of recurrence/outbreaks. Medications available to decrease symptoms and outbreaks. Virus can shed to sexual partners even during asymptomatic periods and can lead to transmission. Encouraged safe sex practices with consistent condom use. Discussed usual prodromal symptoms and need to start episodic therapy at first sign of recurrence.       Pt states she will get a second opinion.

## 2024-09-30 ENCOUNTER — OFFICE VISIT (OUTPATIENT)
Dept: INTERNAL MEDICINE | Facility: CLINIC | Age: 54
End: 2024-09-30
Payer: MEDICAID

## 2024-09-30 ENCOUNTER — LAB VISIT (OUTPATIENT)
Dept: LAB | Facility: HOSPITAL | Age: 54
End: 2024-09-30
Attending: STUDENT IN AN ORGANIZED HEALTH CARE EDUCATION/TRAINING PROGRAM
Payer: MEDICAID

## 2024-09-30 VITALS
OXYGEN SATURATION: 98 % | SYSTOLIC BLOOD PRESSURE: 138 MMHG | DIASTOLIC BLOOD PRESSURE: 86 MMHG | WEIGHT: 221.63 LBS | HEART RATE: 90 BPM | RESPIRATION RATE: 20 BRPM | HEIGHT: 66 IN | TEMPERATURE: 98 F | BODY MASS INDEX: 35.62 KG/M2

## 2024-09-30 DIAGNOSIS — Z11.3 SCREENING EXAMINATION FOR STI: ICD-10-CM

## 2024-09-30 DIAGNOSIS — G47.33 OBSTRUCTIVE SLEEP APNEA SYNDROME: ICD-10-CM

## 2024-09-30 DIAGNOSIS — R73.9 HYPERGLYCEMIA: ICD-10-CM

## 2024-09-30 DIAGNOSIS — I73.9 CLAUDICATION: ICD-10-CM

## 2024-09-30 DIAGNOSIS — Z11.3 SCREENING EXAMINATION FOR STI: Primary | ICD-10-CM

## 2024-09-30 DIAGNOSIS — R73.03 PREDIABETES: ICD-10-CM

## 2024-09-30 DIAGNOSIS — E78.5 HYPERLIPIDEMIA, UNSPECIFIED HYPERLIPIDEMIA TYPE: ICD-10-CM

## 2024-09-30 PROCEDURE — 99214 OFFICE O/P EST MOD 30 MIN: CPT | Mod: PBBFAC | Performed by: STUDENT IN AN ORGANIZED HEALTH CARE EDUCATION/TRAINING PROGRAM

## 2024-09-30 PROCEDURE — 36415 COLL VENOUS BLD VENIPUNCTURE: CPT

## 2024-09-30 PROCEDURE — 86695 HERPES SIMPLEX TYPE 1 TEST: CPT

## 2024-09-30 PROCEDURE — 86696 HERPES SIMPLEX TYPE 2 TEST: CPT

## 2024-09-30 RX ORDER — ESCITALOPRAM OXALATE 10 MG/1
10 TABLET ORAL DAILY
Qty: 90 TABLET | Refills: 3 | Status: SHIPPED | OUTPATIENT
Start: 2024-09-30 | End: 2025-09-30

## 2024-09-30 RX ORDER — ATORVASTATIN CALCIUM 40 MG/1
40 TABLET, FILM COATED ORAL NIGHTLY
Qty: 90 TABLET | Refills: 3 | Status: SHIPPED | OUTPATIENT
Start: 2024-09-30 | End: 2025-09-25
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
## 2024-09-30 NOTE — PROGRESS NOTES
I have reviewed the notes, assessments, and/or procedures performed this visit, and I concur with the documentation.    Low-dose CT scan scheduled for November    GABRIELE/Arterial US for BLE claudication symptoms    Was dx with herpes by Gyn and treated with improvement of symptoms. Symptoms resolved. She would like a second opinion since she is not sexually active. It was discussed with her that since she's not having current symptoms it is difficult to give an opinion on this. Ideally a PCR of de-roofed lesion would be sent to lab, but this is not possible. Will get serology, keeping in mind limited utility of this.

## 2024-10-01 LAB
HSV1 IGG SERPL QL IA: POSITIVE
HSV2 IGG SERPL QL IA: POSITIVE

## 2024-10-07 ENCOUNTER — INFUSION (OUTPATIENT)
Dept: INFUSION THERAPY | Facility: HOSPITAL | Age: 54
End: 2024-10-07
Attending: INTERNAL MEDICINE
Payer: MEDICAID

## 2024-10-07 VITALS
TEMPERATURE: 98 F | OXYGEN SATURATION: 100 % | HEIGHT: 66 IN | DIASTOLIC BLOOD PRESSURE: 92 MMHG | RESPIRATION RATE: 20 BRPM | BODY MASS INDEX: 35.4 KG/M2 | HEART RATE: 86 BPM | SYSTOLIC BLOOD PRESSURE: 148 MMHG | WEIGHT: 220.25 LBS

## 2024-10-07 DIAGNOSIS — Z85.048 HISTORY OF RECTAL OR ANAL CANCER: Primary | ICD-10-CM

## 2024-10-07 PROCEDURE — 96523 IRRIG DRUG DELIVERY DEVICE: CPT

## 2024-10-07 PROCEDURE — 25000003 PHARM REV CODE 250: Performed by: INTERNAL MEDICINE

## 2024-10-07 PROCEDURE — A4216 STERILE WATER/SALINE, 10 ML: HCPCS | Performed by: INTERNAL MEDICINE

## 2024-10-07 PROCEDURE — 63600175 PHARM REV CODE 636 W HCPCS: Performed by: INTERNAL MEDICINE

## 2024-10-07 RX ORDER — HEPARIN 100 UNIT/ML
500 SYRINGE INTRAVENOUS
Status: DISCONTINUED | OUTPATIENT
Start: 2024-10-07 | End: 2024-10-07 | Stop reason: HOSPADM

## 2024-10-07 RX ORDER — SODIUM CHLORIDE 0.9 % (FLUSH) 0.9 %
10 SYRINGE (ML) INJECTION
Status: DISCONTINUED | OUTPATIENT
Start: 2024-10-07 | End: 2024-10-07 | Stop reason: HOSPADM

## 2024-10-07 RX ORDER — SODIUM CHLORIDE 0.9 % (FLUSH) 0.9 %
10 SYRINGE (ML) INJECTION
OUTPATIENT
Start: 2024-10-07

## 2024-10-07 RX ORDER — HEPARIN 100 UNIT/ML
500 SYRINGE INTRAVENOUS
OUTPATIENT
Start: 2024-10-07

## 2024-10-07 RX ADMIN — HEPARIN 500 UNITS: 100 SYRINGE at 01:10

## 2024-10-07 RX ADMIN — SODIUM CHLORIDE, PRESERVATIVE FREE 10 ML: 5 INJECTION INTRAVENOUS at 01:10

## 2024-10-07 NOTE — NURSING
Pt and spouse ambulated to [facility] for [facility]F, pt denied pain & other symptoms, but very anxious about getting port flushed; AVS given, pt returns 1/7/25 for next MPF.

## 2024-10-15 ENCOUNTER — OFFICE VISIT (OUTPATIENT)
Dept: SURGERY | Facility: CLINIC | Age: 54
End: 2024-10-15
Payer: MEDICAID

## 2024-10-15 VITALS
DIASTOLIC BLOOD PRESSURE: 84 MMHG | BODY MASS INDEX: 36.49 KG/M2 | OXYGEN SATURATION: 98 % | HEART RATE: 75 BPM | HEIGHT: 65 IN | TEMPERATURE: 98 F | WEIGHT: 219 LBS | SYSTOLIC BLOOD PRESSURE: 128 MMHG

## 2024-10-15 DIAGNOSIS — Z12.9 SCREENING FOR CANCER: Primary | ICD-10-CM

## 2024-10-15 PROCEDURE — 99214 OFFICE O/P EST MOD 30 MIN: CPT | Mod: PBBFAC

## 2024-10-15 NOTE — PROGRESS NOTES
[facility] General Surgery Clinic Note    HPI: Antoine Banegas is a 53 y.o. female with a history of squamous cell carcinoma of the anus s/p chemoradiation Katlyn protocol in 3/2022 who is currently on maintenance MPF (mediport flush?) therapy every three months and following with Dr. Quinones at [facility] presenting to clinic for a 3 month follow up following anoscopy. She had an anoscopy for anal exam under anesthesia on 4/24/2024. Patient reports feeling well and has no concerns. She denies any anal pain, bleeding from rectum or blood in stool, fever, chills, diarrhea, constipation, nausea, vomiting, or weight loss. Patient reports having normal bowel movements.     PMH:   Past Medical History:   Diagnosis Date    Abnormal Pap smear of cervix     Anxiety     Cancer     Circulatory anomaly     Hyperlipidemia       Meds:   Current Outpatient Medications:     amitriptyline (ELAVIL) 50 MG tablet, Take 50 mg by mouth every evening., Disp: , Rfl:     atorvastatin (LIPITOR) 40 MG tablet, Take 1 tablet (40 mg total) by mouth every evening., Disp: 90 tablet, Rfl: 3    clotrimazole-betamethasone 1-0.05% (LOTRISONE) cream, Apply topically 2 (two) times daily. To external affected vaginal area, Disp: 45 g, Rfl: 1    EScitalopram oxalate (LEXAPRO) 10 MG tablet, Take 1 tablet (10 mg total) by mouth once daily., Disp: 90 tablet, Rfl: 3    fluocinolone acetonide oiL 0.01 % Drop, 4 drops in the affected ear bid for 14 days then prn itching thereafter., Disp: 20 mL, Rfl: 1    hydrocortisone valerate (WEST-CESAR) 0.2 % ointment, Apply topically 2 (two) times daily. PRN Use only during flares, Disp: 15 g, Rfl: 1    morphine (MS CONTIN) 30 MG 12 hr tablet, Take 30 mg by mouth. (Patient not taking: Reported on 9/30/2024), Disp: , Rfl:     omega-3s-dha-epa-fish oil (OMEGA-3 FISH OIL) 300-1,000 mg Cap, Take 1,000 mg by mouth., Disp: , Rfl:     pregabalin (LYRICA) 50 MG capsule, Take 1 capsule (50 mg total) by mouth 2 (two) times daily. (Patient not  taking: Reported on 9/10/2024), Disp: 60 capsule, Rfl: 11    tacrolimus (PROTOPIC) 0.1 % ointment, Apply topically 2 (two) times daily. Mix with vaseline/aquaphor, Disp: 60 g, Rfl: 0    triamcinolone acetonide 0.1% (KENALOG) 0.1 % ointment, Apply topically 2 (two) times daily. Do not use on face, Disp: 453 g, Rfl: 0    valACYclovir (VALTREX) 1000 MG tablet, Take 1 tablet (1,000 mg total) by mouth every 12 (twelve) hours. For initial episode. Drink plenty of fluid. for 10 days, Disp: 20 tablet, Rfl: 0  No current facility-administered medications for this visit.    Facility-Administered Medications Ordered in Other Visits:     diazePAM tablet 10 mg, 10 mg, Oral, Once, Marion Salgado FNP    diphenhydrAMINE injection 25 mg, 25 mg, Intravenous, Once PRN, Breonna Sibley MD    HYDROmorphone injection 0.2 mg, 0.2 mg, Intravenous, Q5 Min PRN, Breonna Sibley MD    HYDROmorphone injection 0.5 mg, 0.5 mg, Intravenous, Q5 Min PRN, Breonna Sibley MD    lactated ringers infusion, , Intravenous, Continuous, Breonna Sibley MD, Last Rate: 10 mL/hr at 09/13/23 0559, New Bag at 04/24/24 0706    lactated ringers infusion, , Intravenous, Continuous, Candida Jj MD, Last Rate: 10 mL/hr at 04/24/24 0602, New Bag at 04/24/24 0602    LIDOcaine (PF) 10 mg/ml (1%) injection 10 mg, 1 mL, Intradermal, Once, Marion Salgado FNP    ondansetron injection 4 mg, 4 mg, Intravenous, Once, Breonna Sibley MD    oxyCODONE-acetaminophen 5-325 mg per tablet 2 tablet, 2 tablet, Oral, Once, Breonna Sibley MD    prochlorperazine injection Soln 5 mg, 5 mg, Intravenous, Once PRN, Breonna Sibley MD  Allergies: Review of patient's allergies indicates:  No Known Allergies  Social History:   Social History     Tobacco Use    Smoking status: Every Day     Current packs/day: 1.00     Average packs/day: 1 pack/day for 33.2 years (33.2 ttl pk-yrs)     Types: Cigarettes     Start date: 8/11/1991     Passive  exposure: Never    Smokeless tobacco: Never   Substance Use Topics    Alcohol use: Yes     Alcohol/week: 6.0 standard drinks of alcohol     Types: 6 Cans of beer per week     Comment: daily    Drug use: Never     Family History:   Family History   Problem Relation Name Age of Onset    Seizures Mother      Hypertension Mother      Diabetes Mother      No Known Problems Maternal Aunt      No Known Problems Maternal Uncle      No Known Problems Paternal Aunt      No Known Problems Paternal Uncle      No Known Problems Paternal Grandmother      No Known Problems Paternal Grandfather      No Known Problems Maternal Grandmother      No Known Problems Maternal Grandfather      No Known Problems Other       Surgical History:   Past Surgical History:   Procedure Laterality Date    COLONOSCOPY  08/28/2020    EXAMINATION UNDER ANESTHESIA N/A 2/17/2023    Procedure: Anal exam under anesthesia;  Surgeon: Lai Robertson MD;  Location: [facility] OR;  Service: Colon and Rectal;  Laterality: N/A;    EXAMINATION UNDER ANESTHESIA N/A 9/13/2023    Procedure: ANAL Exam under anesthesia;  Surgeon: Lai Robertson MD;  Location: [facility] OR;  Service: Colon and Rectal;  Laterality: N/A;    EXAMINATION UNDER ANESTHESIA N/A 4/24/2024    Procedure: Exam under anesthesia;  Surgeon: Lai Robertson MD;  Location: [facility] OR;  Service: Colon and Rectal;  Laterality: N/A;  Anoscopy  Patient is outpatient    HYSTERECTOMY  03/16/2017    INSERTION OF SUBCUTANEOUS PORT Right 10/31/2021    MRI PELVIS UNDER ANESTHESIA  10/01/2021    perianal surgery      PROCTOSCOPY N/A 4/24/2024    Procedure: PROCTOSCOPY;  Surgeon: Lai Robertson MD;  Location: [facility] OR;  Service: Colon and Rectal;  Laterality: N/A;    RECTAL EXAMINATION UNDER ANESTHESIA  06/16/2021       Objective:    Vitals:  There were no vitals filed for this visit.     Physical Exam:  Gen: NAD  Neuro: awake, alert, answering questions appropriately  CV: RRR  Resp: non-labored breathing,  HU  Abd: soft, ND, NT  : Some hypopigmented discoloration from previous excision, no visible masses, bruising, or bleeding. PERLA with good external and internal sphincter tone, no palpable masses or hemorrhoids.  Ext: moves all 4 spontaneously and purposefully  Skin: warm, well perfused      Assessment/Plan:  Antoine Banegas is a 53 y.o. female with a history of squamous cell carcinoma of the anus s/p chemoradiation Katlyn protocol in 3/22 who is currently on maintenance MPF therapy every three months and following with Dr. Quinones at [facility]. Anoscopy for anal exam under anesthesia on 4/24/2024 with no masses, ulceration, or mucosal defects identified. NCCN guidelines recommend physical exam with PERLA and inguinal lymph node exam every 6 months.    - Follow up in clinic in 6 months for PERLA and inguinal lymph node physical exam  - Will discuss screening anoscopy timing with colorectal surgery. Will call patient with dates.    Samina Xiao MS-3  [employer] [facility]    I have seen the patient with the medical student. I have reviewed and edited this note as appropriate.     Ace Fischer MD  [employer] [facility] PGY1

## 2024-10-15 NOTE — PROGRESS NOTES
[facility] General Surgery Clinic Note    HPI: Antoine Banegas is a 53 y.o. female with a history of squamous cell carcinoma of the anus s/p chemoradiation Katlyn protocol in 3/2022 who is currently on maintenance MPF (mediport flush?) therapy every three months and following with Dr. Quinones at [facility] presenting to clinic for a 3 month follow up following anoscopy. She had an anoscopy for anal exam under anesthesia on 4/24/2024. Patient reports feeling well and has no concerns. She denies any anal pain, bleeding from rectum or blood in stool, fever, chills, diarrhea, constipation, nausea, vomiting, or weight loss. Patient reports having normal bowel movements.     PMH:   Past Medical History:   Diagnosis Date    Abnormal Pap smear of cervix     Anxiety     Cancer     Circulatory anomaly     Hyperlipidemia       Meds:   Current Outpatient Medications:     amitriptyline (ELAVIL) 50 MG tablet, Take 50 mg by mouth every evening., Disp: , Rfl:     atorvastatin (LIPITOR) 40 MG tablet, Take 1 tablet (40 mg total) by mouth every evening., Disp: 90 tablet, Rfl: 3    clotrimazole-betamethasone 1-0.05% (LOTRISONE) cream, Apply topically 2 (two) times daily. To external affected vaginal area, Disp: 45 g, Rfl: 1    EScitalopram oxalate (LEXAPRO) 10 MG tablet, Take 1 tablet (10 mg total) by mouth once daily., Disp: 90 tablet, Rfl: 3    fluocinolone acetonide oiL 0.01 % Drop, 4 drops in the affected ear bid for 14 days then prn itching thereafter., Disp: 20 mL, Rfl: 1    hydrocortisone valerate (WEST-CESAR) 0.2 % ointment, Apply topically 2 (two) times daily. PRN Use only during flares, Disp: 15 g, Rfl: 1    morphine (MS CONTIN) 30 MG 12 hr tablet, Take 30 mg by mouth. (Patient not taking: Reported on 9/30/2024), Disp: , Rfl:     omega-3s-dha-epa-fish oil (OMEGA-3 FISH OIL) 300-1,000 mg Cap, Take 1,000 mg by mouth., Disp: , Rfl:     pregabalin (LYRICA) 50 MG capsule, Take 1 capsule (50 mg total) by mouth 2 (two) times daily. (Patient not  taking: Reported on 9/10/2024), Disp: 60 capsule, Rfl: 11    tacrolimus (PROTOPIC) 0.1 % ointment, Apply topically 2 (two) times daily. Mix with vaseline/aquaphor, Disp: 60 g, Rfl: 0    triamcinolone acetonide 0.1% (KENALOG) 0.1 % ointment, Apply topically 2 (two) times daily. Do not use on face, Disp: 453 g, Rfl: 0    valACYclovir (VALTREX) 1000 MG tablet, Take 1 tablet (1,000 mg total) by mouth every 12 (twelve) hours. For initial episode. Drink plenty of fluid. for 10 days, Disp: 20 tablet, Rfl: 0  No current facility-administered medications for this visit.    Facility-Administered Medications Ordered in Other Visits:     diazePAM tablet 10 mg, 10 mg, Oral, Once, Marion Salgado FNP    diphenhydrAMINE injection 25 mg, 25 mg, Intravenous, Once PRN, Breonna Sibley MD    HYDROmorphone injection 0.2 mg, 0.2 mg, Intravenous, Q5 Min PRN, Breonna Sibley MD    HYDROmorphone injection 0.5 mg, 0.5 mg, Intravenous, Q5 Min PRN, Breonna Sibley MD    lactated ringers infusion, , Intravenous, Continuous, Breonna Sibley MD, Last Rate: 10 mL/hr at 09/13/23 0559, New Bag at 04/24/24 0706    lactated ringers infusion, , Intravenous, Continuous, Candida Jj MD, Last Rate: 10 mL/hr at 04/24/24 0602, New Bag at 04/24/24 0602    LIDOcaine (PF) 10 mg/ml (1%) injection 10 mg, 1 mL, Intradermal, Once, Marion Salgado FNP    ondansetron injection 4 mg, 4 mg, Intravenous, Once, Breonna Sibley MD    oxyCODONE-acetaminophen 5-325 mg per tablet 2 tablet, 2 tablet, Oral, Once, Breonna Sibley MD    prochlorperazine injection Soln 5 mg, 5 mg, Intravenous, Once PRN, Breonna Sibley MD  Allergies: Review of patient's allergies indicates:  No Known Allergies  Social History:   Social History     Tobacco Use    Smoking status: Every Day     Current packs/day: 1.00     Average packs/day: 1 pack/day for 33.2 years (33.2 ttl pk-yrs)     Types: Cigarettes     Start date: 8/11/1991     Passive  exposure: Never    Smokeless tobacco: Never   Substance Use Topics    Alcohol use: Yes     Alcohol/week: 6.0 standard drinks of alcohol     Types: 6 Cans of beer per week     Comment: daily    Drug use: Never     Family History:   Family History   Problem Relation Name Age of Onset    Seizures Mother      Hypertension Mother      Diabetes Mother      No Known Problems Maternal Aunt      No Known Problems Maternal Uncle      No Known Problems Paternal Aunt      No Known Problems Paternal Uncle      No Known Problems Paternal Grandmother      No Known Problems Paternal Grandfather      No Known Problems Maternal Grandmother      No Known Problems Maternal Grandfather      No Known Problems Other       Surgical History:   Past Surgical History:   Procedure Laterality Date    COLONOSCOPY  08/28/2020    EXAMINATION UNDER ANESTHESIA N/A 2/17/2023    Procedure: Anal exam under anesthesia;  Surgeon: Lai Robertson MD;  Location: [facility] OR;  Service: Colon and Rectal;  Laterality: N/A;    EXAMINATION UNDER ANESTHESIA N/A 9/13/2023    Procedure: ANAL Exam under anesthesia;  Surgeon: Lai Robertson MD;  Location: [facility] OR;  Service: Colon and Rectal;  Laterality: N/A;    EXAMINATION UNDER ANESTHESIA N/A 4/24/2024    Procedure: Exam under anesthesia;  Surgeon: Lai Robertson MD;  Location: [facility] OR;  Service: Colon and Rectal;  Laterality: N/A;  Anoscopy  Patient is outpatient    HYSTERECTOMY  03/16/2017    INSERTION OF SUBCUTANEOUS PORT Right 10/31/2021    MRI PELVIS UNDER ANESTHESIA  10/01/2021    perianal surgery      PROCTOSCOPY N/A 4/24/2024    Procedure: PROCTOSCOPY;  Surgeon: Lai Robertson MD;  Location: [facility] OR;  Service: Colon and Rectal;  Laterality: N/A;    RECTAL EXAMINATION UNDER ANESTHESIA  06/16/2021       Objective:    Vitals:  There were no vitals filed for this visit.     Physical Exam:  Gen: NAD  Neuro: awake, alert, answering questions appropriately  CV: RRR  Resp: non-labored breathing,  HU  Abd: soft, ND, NT  : Some hypopigmented discoloration from previous excision, no visible masses, bruising, or bleeding. PERLA with good external and internal sphincter tone, no palpable masses or hemorrhoids.  Ext: moves all 4 spontaneously and purposefully  Skin: warm, well perfused      Assessment/Plan:  Antoine Banegas is a 53 y.o. female with a history of squamous cell carcinoma of the anus s/p chemoradiation Katlyn protocol in 3/22 who is currently on maintenance MPF therapy every three months and following with Dr. Quinones at [facility]. Anoscopy for anal exam under anesthesia on 4/24/2024 with no masses, ulceration, or mucosal defects identified. NCCN guidelines recommend physical exam with PERLA and inguinal lymph node exam every 6 months.    - Follow up in clinic in 6 months for PERLA and inguinal lymph node physical exam  - Will discuss screening anoscopy timing with colorectal surgery. Will call patient with dates.    Samina Xiao MS-3  [facility]    I have seen the patient with the medical student. I have reviewed and edited this note as appropriate.     Ace Fischer MD  [employer] [facility] PGY1

## 2024-10-15 NOTE — PROGRESS NOTES
I have reviewed the notes, assessments, and/or procedures performed by Dr Fischer, I concur with her/his documentation of Antoine Banegas.  Date of Service: 10/15/2024    [facility]

## 2024-10-15 NOTE — PROGRESS NOTES
Patient seen by Dr. MARLEE Fischer. Will return in  6 months. Will schedule for proctoscope once speaks with staff. Written and verbal discharge instructions given.

## 2024-10-15 NOTE — PROGRESS NOTES
In concurrence with above NCCN guidelines, we will:  - continue with physical exam with focus on PERLA and inguinal lymph node basin exam every 6 months  - Will discuss with Dr. Robertson a date for next anoscopy (pt has been getting anoscopy q6 months and is not yet at 3y post treatment)    Samina Villanueva MD

## 2024-10-25 ENCOUNTER — HOSPITAL ENCOUNTER (OUTPATIENT)
Dept: RADIOLOGY | Facility: HOSPITAL | Age: 54
Discharge: HOME | End: 2024-10-25
Attending: STUDENT IN AN ORGANIZED HEALTH CARE EDUCATION/TRAINING PROGRAM
Payer: MEDICAID

## 2024-10-25 DIAGNOSIS — I73.9 CLAUDICATION: ICD-10-CM

## 2024-10-25 LAB
IMMEDIATE ARM BP: 153 MMHG
IMMEDIATE LEFT ABI: 1.08
IMMEDIATE LEFT TIBIAL: 165 MMHG
IMMEDIATE RIGHT ABI: 1.03
IMMEDIATE RIGHT TIBIAL: 157 MMHG
LEFT ABI: 1.03
LEFT ARM BP: 146 MMHG
LEFT CFA PSV: 116 CM/S
LEFT DORSALIS PEDIS: 161 MMHG
LEFT PERONEAL SYS PSV: 56 CM/S
LEFT POPLITEAL PSV: 58 CM/S
LEFT POST TIBIAL SYS PSV: 44 CM/S
LEFT POSTERIOR TIBIAL: 135 MMHG
LEFT PROFUNDA SYS PSV: 103 CM/S
LEFT SUPER FEMORAL DIST SYS PSV: 53 CM/S
LEFT SUPER FEMORAL MID SYS PSV: 75 CM/S
LEFT SUPER FEMORAL PROX SYS PSV: 124 CM/S
OHS CV LEFT LOWER EXTREMITY ABI (NO CALC): 1.03
OHS CV RIGHT ABI LOWER EXTREMITY (NO CALC): 0.99
RIGHT ABI: 0.99
RIGHT ARM BP: 156 MMHG
RIGHT CFA PSV: 146 CM/S
RIGHT DORSALIS PEDIS PSV: 70 CM/S
RIGHT DORSALIS PEDIS: 155 MMHG
RIGHT POPLITEAL PSV: 72 CM/S
RIGHT POST TIBIAL SYS PSV: 67 CM/S
RIGHT POSTERIOR TIBIAL: 142 MMHG
RIGHT PROFUNDA SYS PSV: 149 CM/S
RIGHT SUPER FEMORAL DIST SYS PSV: 63 CM/S
RIGHT SUPER FEMORAL MID SYS PSV: 74 CM/S
RIGHT SUPER FEMORAL PROX SYS PSV: 124 CM/S

## 2024-10-25 PROCEDURE — 93925 LOWER EXTREMITY STUDY: CPT

## 2024-10-25 PROCEDURE — 93924 LWR XTR VASC STDY BILAT: CPT

## 2024-11-08 DIAGNOSIS — C21.0 MALIGNANT NEOPLASM OF ANUS: Primary | ICD-10-CM

## 2024-11-08 RX ORDER — HEPARIN SODIUM 5000 [USP'U]/ML
5000 INJECTION, SOLUTION INTRAVENOUS; SUBCUTANEOUS EVERY 12 HOURS
OUTPATIENT
Start: 2024-11-08

## 2024-11-11 ENCOUNTER — ANESTHESIA EVENT (OUTPATIENT)
Dept: SURGERY | Facility: HOSPITAL | Age: 54
End: 2024-11-11
Payer: MEDICAID

## 2024-11-11 NOTE — ANESTHESIA PREPROCEDURE EVALUATION
Antoine Banegas is a 53 y.o. female PRESENTING FOR Anoscopy/RECTAL EUA with a history of   -squamous cell carcinoma of the anus s/p chemoradiation geovanna protocol in 3/22      Vitals:    11/15/24 0619 11/15/24 0627 11/15/24 0657   BP:  136/83 136/83   Pulse:  83    Temp:  36.6 °C (97.8 °F)    TempSrc:  Oral    SpO2:  99%    Weight: 99.3 kg (219 lb)         -SMOKER  -ETOH ABUSE  -H/O PSA (FENTANYL)  -CHRONIC NECK AND BACK PAIN  -HLD  -HOLLY ON CPAP  -DEPRESSION/H/O SI/ANXIETY  -PRE-DM, A1C 6.3 3/18/24; glu 323 9/16/24  -Elevated LFTs 9/16/24  -OBESITY, BMI 36  -S/P HYST 2017     BETA-BLOCKER: NONE     New Orders for Anesthesia: DM PROTOCOL     Patient Active Problem List   Diagnosis    Anxiety disorder    History of rectal or anal cancer    Chronic low back pain    Depressive disorder    Hyperlipidemia    Malignant neoplasm of anus    Obstructive sleep apnea syndrome    Obesity    Osteoarthritis    Pain of lower extremity    Paraparesis    Tobacco user    Vitamin D deficiency    Dermatitis associated with moisture    Dermatitis    Effect, radiation    Lumbar stenosis with neurogenic claudication    Latent syphilis    Radiation dermatitis    Mixed conductive and sensorineural hearing loss of right ear with restricted hearing of left ear    Sensorineural hearing loss (SNHL) of left ear with restricted hearing of right ear    Squamous cell cancer, anus    Tobacco abuse    Alcohol abuse    Rectal lesion    Skin tag of anus    Rectal or anal pain    Scar tissue    Other specified disorders of the skin and subcutaneous tissue related to radiation    History of latent syphilis       Past Surgical History:   Procedure Laterality Date    COLONOSCOPY  08/28/2020    EXAMINATION UNDER ANESTHESIA N/A 2/17/2023    Procedure: Anal exam under anesthesia;  Surgeon: Lai Robertson MD;  Location: [facility];  Service: Colon and Rectal;  Laterality: N/A;    EXAMINATION UNDER ANESTHESIA N/A 9/13/2023    Procedure: ANAL Exam under  anesthesia;  Surgeon: Lai Robertson MD;  Location: [facility];  Service: Colon and Rectal;  Laterality: N/A;    EXAMINATION UNDER ANESTHESIA N/A 4/24/2024    Procedure: Exam under anesthesia;  Surgeon: Lai Robertson MD;  Location: [facility];  Service: Colon and Rectal;  Laterality: N/A;  Anoscopy  Patient is outpatient    HYSTERECTOMY  03/16/2017    INSERTION OF SUBCUTANEOUS PORT Right 10/31/2021    MRI PELVIS UNDER ANESTHESIA  10/01/2021    perianal surgery      PROCTOSCOPY N/A 4/24/2024    Procedure: PROCTOSCOPY;  Surgeon: Lai Robertson MD;  Location: [facility];  Service: Colon and Rectal;  Laterality: N/A;    RECTAL EXAMINATION UNDER ANESTHESIA  06/16/2021       Lab Results   Component Value Date    WBC 11.44 09/16/2024    HGB 14.5 09/16/2024    HCT 43.0 09/16/2024     09/16/2024       CMP  Sodium   Date Value Ref Range Status   09/16/2024 134 (L) 136 - 145 mmol/L Final     Potassium   Date Value Ref Range Status   09/16/2024 3.9 3.5 - 5.1 mmol/L Final     Chloride   Date Value Ref Range Status   09/16/2024 104 98 - 107 mmol/L Final     CO2   Date Value Ref Range Status   09/16/2024 23 22 - 29 mmol/L Final     Blood Urea Nitrogen   Date Value Ref Range Status   09/16/2024 11.3 9.8 - 20.1 mg/dL Final     Creatinine   Date Value Ref Range Status   09/16/2024 1.00 0.55 - 1.02 mg/dL Final     Calcium   Date Value Ref Range Status   09/16/2024 9.7 8.4 - 10.2 mg/dL Final     Albumin   Date Value Ref Range Status   09/16/2024 3.5 3.5 - 5.0 g/dL Final     Bilirubin Total   Date Value Ref Range Status   09/16/2024 0.6 <=1.5 mg/dL Final     ALP   Date Value Ref Range Status   09/16/2024 128 40 - 150 unit/L Final     AST   Date Value Ref Range Status   09/16/2024 37 (H) 5 - 34 unit/L Final     ALT   Date Value Ref Range Status   09/16/2024 83 (H) 0 - 55 unit/L Final     eGFR   Date Value Ref Range Status   09/16/2024 >60 mL/min/1.73/m2 Final       Lab Results   Component Value Date    INR 0.96 (L)  06/26/2017       Lab Results   Component Value Date    APTT 26.5 06/26/2017              Current Outpatient Medications   Medication Instructions    amitriptyline (ELAVIL) 50 mg, Nightly    atorvastatin (LIPITOR) 40 mg, Oral, Nightly    clotrimazole-betamethasone 1-0.05% (LOTRISONE) cream Topical (Top), 2 times daily, To external affected vaginal area    EScitalopram oxalate (LEXAPRO) 10 mg, Oral, Daily    fluocinolone acetonide oiL 0.01 % Drop 4 drops in the affected ear bid for 14 days then prn itching thereafter.    hydrocortisone valerate (WEST-CESAR) 0.2 % ointment Topical (Top), 2 times daily, PRN Use only during flares    morphine (MS CONTIN) 30 mg    omega-3s-dha-epa-fish oil (OMEGA-3 FISH OIL) 300-1,000 mg Cap 1,000 mg    pregabalin (LYRICA) 50 mg, Oral, 2 times daily    tacrolimus (PROTOPIC) 0.1 % ointment Topical (Top), 2 times daily, Mix with vaseline/aquaphor    triamcinolone acetonide 0.1% (KENALOG) 0.1 % ointment Topical (Top), 2 times daily, Do not use on face    valACYclovir (VALTREX) 1,000 mg, Oral, Every 12 hours, For initial episode. Drink plenty of fluid.       Past anesthesia records:       Pre-op Assessment          Review of Systems      Physical Exam  General: Well nourished, Cooperative, Alert and Oriented    Airway:  Mallampati: I / I  Mouth Opening: Normal  TM Distance: Normal  Tongue: Large  Neck ROM: Normal ROM    Dental:  Intact  Chips on caps noted .       Anesthesia Plan  Type of Anesthesia, risks & benefits discussed:    Anesthesia Type: Gen Supraglottic Airway  Intra-op Monitoring Plan: Standard ASA Monitors  Post Op Pain Control Plan: IV/PO Opioids PRN  Induction:  IV  Airway Plan: Direct  Informed Consent: Informed consent signed with the Patient representative and all parties understand the risks and agree with anesthesia plan.  All questions answered. Patient consented to blood products? No  ASA Score: 3  Day of Surgery Review of History & Physical: H&P Update referred to the  surgeon/provider.    Ready For Surgery From Anesthesia Perspective.     .

## 2024-11-15 ENCOUNTER — ANESTHESIA (OUTPATIENT)
Dept: SURGERY | Facility: HOSPITAL | Age: 54
End: 2024-11-15
Payer: MEDICAID

## 2024-11-15 ENCOUNTER — HOSPITAL ENCOUNTER (INPATIENT)
Facility: HOSPITAL | Age: 54
LOS: 1 days | Discharge: HOME | DRG: 376 | End: 2024-11-15
Attending: COLON & RECTAL SURGERY | Admitting: COLON & RECTAL SURGERY
Payer: MEDICAID

## 2024-11-15 DIAGNOSIS — C21.0 MALIGNANT NEOPLASM OF ANUS: ICD-10-CM

## 2024-11-15 LAB
POCT GLUCOSE: 152 MG/DL (ref 70–110)
POCT GLUCOSE: 187 MG/DL (ref 70–110)

## 2024-11-15 PROCEDURE — 0DJD8ZZ INSPECTION OF LOWER INTESTINAL TRACT, VIA NATURAL OR ARTIFICIAL OPENING ENDOSCOPIC: ICD-10-PCS | Performed by: COLON & RECTAL SURGERY

## 2024-11-15 PROCEDURE — 25000003 PHARM REV CODE 250

## 2024-11-15 PROCEDURE — 71000015 HC POSTOP RECOV 1ST HR: Performed by: COLON & RECTAL SURGERY

## 2024-11-15 PROCEDURE — 63600175 PHARM REV CODE 636 W HCPCS: Performed by: SPECIALIST

## 2024-11-15 PROCEDURE — 71000033 HC RECOVERY, INTIAL HOUR: Performed by: COLON & RECTAL SURGERY

## 2024-11-15 PROCEDURE — 36000704 HC OR TIME LEV I 1ST 15 MIN: Performed by: COLON & RECTAL SURGERY

## 2024-11-15 PROCEDURE — 37000009 HC ANESTHESIA EA ADD 15 MINS: Performed by: COLON & RECTAL SURGERY

## 2024-11-15 PROCEDURE — 37000008 HC ANESTHESIA 1ST 15 MINUTES: Performed by: COLON & RECTAL SURGERY

## 2024-11-15 PROCEDURE — 25000003 PHARM REV CODE 250: Performed by: NURSE ANESTHETIST, CERTIFIED REGISTERED

## 2024-11-15 PROCEDURE — 63600175 PHARM REV CODE 636 W HCPCS: Performed by: NURSE ANESTHETIST, CERTIFIED REGISTERED

## 2024-11-15 PROCEDURE — 45990 SURG DX EXAM ANORECTAL: CPT | Mod: ,,, | Performed by: COLON & RECTAL SURGERY

## 2024-11-15 PROCEDURE — 36000705 HC OR TIME LEV I EA ADD 15 MIN: Performed by: COLON & RECTAL SURGERY

## 2024-11-15 RX ORDER — IPRATROPIUM BROMIDE AND ALBUTEROL SULFATE 2.5; .5 MG/3ML; MG/3ML
3 SOLUTION RESPIRATORY (INHALATION) ONCE AS NEEDED
Status: DISCONTINUED | OUTPATIENT
Start: 2024-11-15 | End: 2024-11-15 | Stop reason: HOSPADM

## 2024-11-15 RX ORDER — SODIUM CHLORIDE, SODIUM LACTATE, POTASSIUM CHLORIDE, CALCIUM CHLORIDE 600; 310; 30; 20 MG/100ML; MG/100ML; MG/100ML; MG/100ML
INJECTION, SOLUTION INTRAVENOUS CONTINUOUS
OUTPATIENT
Start: 2024-11-15

## 2024-11-15 RX ORDER — PROPOFOL 10 MG/ML
INJECTION, EMULSION INTRAVENOUS
Status: DISCONTINUED | OUTPATIENT
Start: 2024-11-15 | End: 2024-11-15

## 2024-11-15 RX ORDER — GLUCAGON 1 MG
1 KIT INJECTION
Status: DISCONTINUED | OUTPATIENT
Start: 2024-11-15 | End: 2024-11-15 | Stop reason: HOSPADM

## 2024-11-15 RX ORDER — KETOROLAC TROMETHAMINE 30 MG/ML
INJECTION, SOLUTION INTRAMUSCULAR; INTRAVENOUS
Status: DISCONTINUED | OUTPATIENT
Start: 2024-11-15 | End: 2024-11-15

## 2024-11-15 RX ORDER — DIPHENHYDRAMINE HYDROCHLORIDE 50 MG/ML
25 INJECTION INTRAMUSCULAR; INTRAVENOUS ONCE AS NEEDED
Status: DISCONTINUED | OUTPATIENT
Start: 2024-11-15 | End: 2024-11-15 | Stop reason: HOSPADM

## 2024-11-15 RX ORDER — GLYCOPYRROLATE 0.2 MG/ML
INJECTION INTRAMUSCULAR; INTRAVENOUS
Status: DISCONTINUED | OUTPATIENT
Start: 2024-11-15 | End: 2024-11-15

## 2024-11-15 RX ORDER — INSULIN ASPART 100 [IU]/ML
0-5 INJECTION, SOLUTION INTRAVENOUS; SUBCUTANEOUS ONCE AS NEEDED
Status: DISCONTINUED | OUTPATIENT
Start: 2024-11-15 | End: 2024-11-15 | Stop reason: HOSPADM

## 2024-11-15 RX ORDER — MEPERIDINE HYDROCHLORIDE 25 MG/ML
12.5 INJECTION INTRAMUSCULAR; INTRAVENOUS; SUBCUTANEOUS ONCE
Status: DISCONTINUED | OUTPATIENT
Start: 2024-11-15 | End: 2024-11-15

## 2024-11-15 RX ORDER — KETAMINE HCL IN 0.9 % NACL 50 MG/5 ML
SYRINGE (ML) INTRAVENOUS
Status: DISCONTINUED | OUTPATIENT
Start: 2024-11-15 | End: 2024-11-15

## 2024-11-15 RX ORDER — MUPIROCIN 20 MG/G
OINTMENT TOPICAL 2 TIMES DAILY
Status: DISCONTINUED | OUTPATIENT
Start: 2024-11-15 | End: 2024-11-15 | Stop reason: HOSPADM

## 2024-11-15 RX ORDER — MEPERIDINE HYDROCHLORIDE 25 MG/ML
12.5 INJECTION INTRAMUSCULAR; INTRAVENOUS; SUBCUTANEOUS ONCE
Status: DISCONTINUED | OUTPATIENT
Start: 2024-11-15 | End: 2024-11-15 | Stop reason: HOSPADM

## 2024-11-15 RX ORDER — ONDANSETRON HYDROCHLORIDE 2 MG/ML
INJECTION, SOLUTION INTRAVENOUS
Status: DISCONTINUED | OUTPATIENT
Start: 2024-11-15 | End: 2024-11-15

## 2024-11-15 RX ORDER — ONDANSETRON HYDROCHLORIDE 2 MG/ML
4 INJECTION, SOLUTION INTRAVENOUS ONCE
Status: DISCONTINUED | OUTPATIENT
Start: 2024-11-15 | End: 2024-11-15

## 2024-11-15 RX ORDER — LIDOCAINE HYDROCHLORIDE 20 MG/ML
INJECTION INTRAVENOUS
Status: DISCONTINUED | OUTPATIENT
Start: 2024-11-15 | End: 2024-11-15

## 2024-11-15 RX ORDER — PROCHLORPERAZINE EDISYLATE 5 MG/ML
5 INJECTION INTRAMUSCULAR; INTRAVENOUS ONCE AS NEEDED
Status: DISCONTINUED | OUTPATIENT
Start: 2024-11-15 | End: 2024-11-15 | Stop reason: HOSPADM

## 2024-11-15 RX ORDER — DIAZEPAM 5 MG/1
10 TABLET ORAL ONCE
OUTPATIENT
Start: 2024-11-15 | End: 2024-11-15

## 2024-11-15 RX ORDER — MIDAZOLAM HYDROCHLORIDE 2 MG/2ML
2 INJECTION, SOLUTION INTRAMUSCULAR; INTRAVENOUS ONCE
Status: COMPLETED | OUTPATIENT
Start: 2024-11-15 | End: 2024-11-15

## 2024-11-15 RX ORDER — DIPHENHYDRAMINE HYDROCHLORIDE 50 MG/ML
25 INJECTION INTRAMUSCULAR; INTRAVENOUS ONCE AS NEEDED
Status: DISCONTINUED | OUTPATIENT
Start: 2024-11-15 | End: 2024-11-15

## 2024-11-15 RX ORDER — ONDANSETRON HYDROCHLORIDE 2 MG/ML
4 INJECTION, SOLUTION INTRAVENOUS ONCE
Status: DISCONTINUED | OUTPATIENT
Start: 2024-11-15 | End: 2024-11-15 | Stop reason: HOSPADM

## 2024-11-15 RX ORDER — HYDROMORPHONE HYDROCHLORIDE 1 MG/ML
0.2 INJECTION, SOLUTION INTRAMUSCULAR; INTRAVENOUS; SUBCUTANEOUS EVERY 5 MIN PRN
Status: DISCONTINUED | OUTPATIENT
Start: 2024-11-15 | End: 2024-11-15

## 2024-11-15 RX ORDER — PROCHLORPERAZINE EDISYLATE 5 MG/ML
5 INJECTION INTRAMUSCULAR; INTRAVENOUS ONCE AS NEEDED
Status: DISCONTINUED | OUTPATIENT
Start: 2024-11-15 | End: 2024-11-15

## 2024-11-15 RX ORDER — OXYCODONE AND ACETAMINOPHEN 5; 325 MG/1; MG/1
2 TABLET ORAL ONCE
Status: DISCONTINUED | OUTPATIENT
Start: 2024-11-15 | End: 2024-11-15 | Stop reason: HOSPADM

## 2024-11-15 RX ORDER — HYDROMORPHONE HYDROCHLORIDE 1 MG/ML
0.5 INJECTION, SOLUTION INTRAMUSCULAR; INTRAVENOUS; SUBCUTANEOUS EVERY 5 MIN PRN
Status: DISCONTINUED | OUTPATIENT
Start: 2024-11-15 | End: 2024-11-15 | Stop reason: HOSPADM

## 2024-11-15 RX ORDER — FENTANYL CITRATE 50 UG/ML
INJECTION, SOLUTION INTRAMUSCULAR; INTRAVENOUS
Status: DISCONTINUED | OUTPATIENT
Start: 2024-11-15 | End: 2024-11-15

## 2024-11-15 RX ORDER — OXYCODONE AND ACETAMINOPHEN 5; 325 MG/1; MG/1
2 TABLET ORAL ONCE
Status: DISCONTINUED | OUTPATIENT
Start: 2024-11-15 | End: 2024-11-15

## 2024-11-15 RX ORDER — DEXAMETHASONE SODIUM PHOSPHATE 4 MG/ML
INJECTION, SOLUTION INTRA-ARTICULAR; INTRALESIONAL; INTRAMUSCULAR; INTRAVENOUS; SOFT TISSUE
Status: DISCONTINUED | OUTPATIENT
Start: 2024-11-15 | End: 2024-11-15

## 2024-11-15 RX ORDER — SODIUM CHLORIDE, SODIUM LACTATE, POTASSIUM CHLORIDE, CALCIUM CHLORIDE 600; 310; 30; 20 MG/100ML; MG/100ML; MG/100ML; MG/100ML
INJECTION, SOLUTION INTRAVENOUS CONTINUOUS
Status: DISCONTINUED | OUTPATIENT
Start: 2024-11-15 | End: 2024-11-15 | Stop reason: HOSPADM

## 2024-11-15 RX ORDER — HYDROMORPHONE HYDROCHLORIDE 1 MG/ML
0.2 INJECTION, SOLUTION INTRAMUSCULAR; INTRAVENOUS; SUBCUTANEOUS EVERY 5 MIN PRN
Status: DISCONTINUED | OUTPATIENT
Start: 2024-11-15 | End: 2024-11-15 | Stop reason: HOSPADM

## 2024-11-15 RX ORDER — SODIUM CHLORIDE 9 MG/ML
INJECTION, SOLUTION INTRAVENOUS CONTINUOUS
Status: DISCONTINUED | OUTPATIENT
Start: 2024-11-15 | End: 2024-11-15 | Stop reason: HOSPADM

## 2024-11-15 RX ORDER — IPRATROPIUM BROMIDE AND ALBUTEROL SULFATE 2.5; .5 MG/3ML; MG/3ML
3 SOLUTION RESPIRATORY (INHALATION) ONCE AS NEEDED
Status: DISCONTINUED | OUTPATIENT
Start: 2024-11-15 | End: 2024-11-15

## 2024-11-15 RX ORDER — HYDROMORPHONE HYDROCHLORIDE 1 MG/ML
0.5 INJECTION, SOLUTION INTRAMUSCULAR; INTRAVENOUS; SUBCUTANEOUS EVERY 5 MIN PRN
Status: DISCONTINUED | OUTPATIENT
Start: 2024-11-15 | End: 2024-11-15

## 2024-11-15 RX ORDER — GLUCAGON 1 MG
1 KIT INJECTION
Status: DISCONTINUED | OUTPATIENT
Start: 2024-11-15 | End: 2024-11-15

## 2024-11-15 RX ADMIN — DEXAMETHASONE SODIUM PHOSPHATE 4 MG: 4 INJECTION, SOLUTION INTRA-ARTICULAR; INTRALESIONAL; INTRAMUSCULAR; INTRAVENOUS; SOFT TISSUE at 10:11

## 2024-11-15 RX ADMIN — PROPOFOL 200 MG: 10 INJECTION, EMULSION INTRAVENOUS at 10:11

## 2024-11-15 RX ADMIN — KETOROLAC TROMETHAMINE 30 MG: 30 INJECTION, SOLUTION INTRAMUSCULAR at 10:11

## 2024-11-15 RX ADMIN — LIDOCAINE HYDROCHLORIDE 100 MG: 20 INJECTION INTRAVENOUS at 10:11

## 2024-11-15 RX ADMIN — MIDAZOLAM HYDROCHLORIDE 2 MG: 1 INJECTION, SOLUTION INTRAMUSCULAR; INTRAVENOUS at 09:11

## 2024-11-15 RX ADMIN — FENTANYL CITRATE 50 MCG: 50 INJECTION INTRAMUSCULAR; INTRAVENOUS at 10:11

## 2024-11-15 RX ADMIN — Medication 25 MG: at 10:11

## 2024-11-15 RX ADMIN — ONDANSETRON 4 MG: 2 INJECTION INTRAMUSCULAR; INTRAVENOUS at 10:11

## 2024-11-15 RX ADMIN — GLYCOPYRROLATE 0.1 MG: 0.2 INJECTION INTRAMUSCULAR; INTRAVENOUS at 10:11

## 2024-11-15 RX ADMIN — SODIUM CHLORIDE: 9 INJECTION, SOLUTION INTRAVENOUS at 10:11

## 2024-11-15 NOTE — DISCHARGE SUMMARY
[facility]  Discharge Note  Short Stay    Procedure(s) (LRB):  Anoscopy and exam under anesthesia (N/A)      OUTCOME: Patient tolerated treatment/procedure well without complication and is now ready for discharge.    DISPOSITION: Home or Self Care    FINAL DIAGNOSIS:  Malignant neoplasm of anus    FOLLOWUP: In clinic    DISCHARGE INSTRUCTIONS:    Discharge Procedure Orders   Diet Adult Regular     No dressing needed     Notify your health care provider if you experience any of the following:  redness, tenderness, or signs of infection (pain, swelling, redness, odor or green/yellow discharge around incision site)     Activity as tolerated        TIME SPENT ON DISCHARGE: 33 minutes

## 2024-11-15 NOTE — DISCHARGE INSTRUCTIONS
· Keep all follow up appointments .  Resume home medications unless otherwise instructed by your doctor.    · You may return to your regular activity level    · You may take a shower  or bath starting tomorrow evening. Wash GENTLY with soap and water.                                                                                       ·There is some gauze in your rectum that will probably come out with your next bowel movement.  · Use pain medication as instructed. You alternate Tylenol and Ibuprofen if need for discomfort/pain  · You may use an ice pack as needed for 20 minutes at a time over your incision site to minimize swelling and help relieve pain.    · Do not drink alcohol or drive today.    · Notify MD of any moderate to severe pain unrelieved by pain medicine, if your incision opens and/or bleeds, or for any signs of infection including fever above 100.4, excessive redness or swelling, yellow/green foul- smelling drainage, nausea or vomiting. Clinics number is [phone]. If it is after business hours or emergency call [phone] and state Im having post op complications and need to speak to the surgeon on call.    · Thanks for choosing [facility]! Have a smooth recovery!

## 2024-11-15 NOTE — TRANSFER OF CARE
Anesthesia Transfer of Care Note    Patient: Antoine Banegas    Procedure(s) Performed: Procedure(s) (LRB):  Anoscopy and exam under anesthesia (N/A)    Patient location: PACU    Anesthesia Type: general    Transport from OR: Transported from OR on room air with adequate spontaneous ventilation    Post pain: adequate analgesia    Post assessment: no apparent anesthetic complications and tolerated procedure well    Post vital signs: stable    Level of consciousness: sedated    Nausea/Vomiting: no nausea/vomiting    Complications: none    Transfer of care protocol was followedComments: Report to Servando BERGMAN      Last vitals: Visit Vitals  /83   Pulse 83   Temp 36.6 °C (97.8 °F) (Oral)   Wt 99.3 kg (219 lb)   LMP  (LMP Unknown)   SpO2 99%   Breastfeeding No   BMI 36.44 kg/m²

## 2024-11-15 NOTE — OP NOTE
[facility] Per[facility]  Operative Note      Date of Procedure: 11/15/2024     Procedure: Procedure(s) (LRB):  Anoscopy and exam under anesthesia (N/A)     Surgeons and Role:     * Lai Robertson MD - Primary    Assisting Surgeon: Calvin Henderson MD    Pre-Operative Diagnosis: Squamous cell carcinoma of anus    Post-Operative Diagnosis: Squamous cell carcinoma of anus    Anesthesia: Monitor Anesthesia Care    Estimated Blood Loss (EBL): 0 ml           Specimens: None     Description of Technical Procedures: Patient was evaluated and examined preoperatively. Questions answered and consent obtained prior to surgery. After informed consent was obtained, patient was placed in the lithotomy position.  The perianal skin was prepped with Betadine. A well demarcated area of dermal hypopigmentation of the right perianal region was observed consistent with post-radiation changes.  Digit rectal exam was performed without palpation of lesion.   [place] retractor was inserted into the anus for rectum visualization circumferentially. No masses, lesions or mucosal defects were identified    Upon completion, no active bleeding was noted.  A dry gauze dressing was applied to the anus.  Patient tolerated procedure well and there were no complications.Patient was extubated and taken to the postoperative care area.      Dr. Robertson was present for critical portions and available throughout the case.      Calvin Henderson MD MPH  [facility]

## 2024-11-15 NOTE — ANESTHESIA POSTPROCEDURE EVALUATION
Anesthesia Post Evaluation    Patient: Antoine Banegas    Procedure(s) Performed: Procedure(s) (LRB):  Anoscopy and exam under anesthesia (N/A)    Final Anesthesia Type: general      Patient location during evaluation: PACU  Patient participation: Yes- Able to Participate  Level of consciousness: awake and responds to stimulation  Post-procedure vital signs: reviewed and stable  Pain management: adequate  Airway patency: patent    PONV status at discharge: No PONV  Anesthetic complications: no      Cardiovascular status: blood pressure returned to baseline  Respiratory status: unassisted  Hydration status: euvolemic  Follow-up not needed.              Vitals Value Taken Time   /88 11/15/24 1145   Temp 36.8 °C (98.2 °F) 11/15/24 1145   Pulse 62 11/15/24 1145   Resp 20 11/15/24 1145   SpO2 99 % 11/15/24 1145         Event Time   Out of Recovery 11:04:00         Pain/Alesia Score: Alesia Score: 9 (11/15/2024 11:04 AM)  Modified Alesia Score: 20 (11/15/2024 11:45 AM)

## 2024-11-15 NOTE — ANESTHESIA PROCEDURE NOTES
Intubation    Date/Time: 11/15/2024 10:06 AM    Performed by: Casper Jiménez CRNA  Authorized by: Breonna Sibley MD    Intubation:     Induction:  Intravenous    Intubated:  Postinduction    Mask Ventilation:  Easy with oral airway    Attempts:  2    Attempted By:  CRNA    Difficult Airway Encountered?: No      Airway Device:  Supraglottic airway/LMA    Airway Device Size:  5.0    Style/Cuff Inflation:  Cuffed (inflated to minimal occlusive pressure)    Placement Verified By:  Capnometry    Complicating Factors:  None    Findings Post-Intubation:  BS equal bilateral and atraumatic/condition of teeth unchanged  Notes:      Att #1 LMA 4 leak noted at 20 cm H2O  Att #2 LMA 5 no leak @ 20 cm H2O       Implemented All Fall Risk Interventions:  [place] to call system. Call bell, personal items and telephone within reach. Instruct patient to call for assistance. Room bathroom lighting operational. Non-slip footwear when patient is off stretcher. Physically safe environment: no spills, clutter or unnecessary equipment. Stretcher in lowest position, wheels locked, appropriate side rails in place. Provide visual cue, wrist band, yellow gown, etc. Monitor gait and stability. Monitor for mental status changes and reorient to person, place, and time. Review medications for side effects contributing to fall risk. Reinforce activity limits and safety measures with patient and family.

## 2024-11-15 NOTE — H&P
[facility] General Surgery Clinic Note    HPI: Antoine Banegas is a 53 y.o. female with a history of squamous cell carcinoma of the anus s/p chemoradiation Katlyn protocol in 3/2022 who is currently on maintenance MPF (mediport flush?) therapy every three months and following with Dr. Quinones at [facility] presenting to clinic for a 3 month follow up following anoscopy. She had an anoscopy for anal exam under anesthesia on 4/24/2024. Patient reports feeling well and has no concerns. She denies any anal pain, bleeding from rectum or blood in stool, fever, chills, diarrhea, constipation, nausea, vomiting, or weight loss. Patient reports having normal bowel movements.     PMH:   Past Medical History:   Diagnosis Date    Abnormal Pap smear of cervix     Anxiety     Cancer     Circulatory anomaly     Hyperlipidemia       Meds:   Current Outpatient Medications:     amitriptyline (ELAVIL) 50 MG tablet, Take 50 mg by mouth every evening., Disp: , Rfl:     atorvastatin (LIPITOR) 40 MG tablet, Take 1 tablet (40 mg total) by mouth every evening., Disp: 90 tablet, Rfl: 3    clotrimazole-betamethasone 1-0.05% (LOTRISONE) cream, Apply topically 2 (two) times daily. To external affected vaginal area, Disp: 45 g, Rfl: 1    EScitalopram oxalate (LEXAPRO) 10 MG tablet, Take 1 tablet (10 mg total) by mouth once daily., Disp: 90 tablet, Rfl: 3    fluocinolone acetonide oiL 0.01 % Drop, 4 drops in the affected ear bid for 14 days then prn itching thereafter., Disp: 20 mL, Rfl: 1    hydrocortisone valerate (WEST-CESAR) 0.2 % ointment, Apply topically 2 (two) times daily. PRN Use only during flares, Disp: 15 g, Rfl: 1    morphine (MS CONTIN) 30 MG 12 hr tablet, Take 30 mg by mouth. (Patient not taking: Reported on 9/30/2024), Disp: , Rfl:     omega-3s-dha-epa-fish oil (OMEGA-3 FISH OIL) 300-1,000 mg Cap, Take 1,000 mg by mouth., Disp: , Rfl:     pregabalin (LYRICA) 50 MG capsule, Take 1 capsule (50 mg total) by mouth 2 (two) times daily. (Patient not  taking: Reported on 9/10/2024), Disp: 60 capsule, Rfl: 11    tacrolimus (PROTOPIC) 0.1 % ointment, Apply topically 2 (two) times daily. Mix with vaseline/aquaphor, Disp: 60 g, Rfl: 0    triamcinolone acetonide 0.1% (KENALOG) 0.1 % ointment, Apply topically 2 (two) times daily. Do not use on face, Disp: 453 g, Rfl: 0    valACYclovir (VALTREX) 1000 MG tablet, Take 1 tablet (1,000 mg total) by mouth every 12 (twelve) hours. For initial episode. Drink plenty of fluid. for 10 days, Disp: 20 tablet, Rfl: 0  No current facility-administered medications for this visit.    Facility-Administered Medications Ordered in Other Visits:     diazePAM tablet 10 mg, 10 mg, Oral, Once, Marion Salgado FNP    diphenhydrAMINE injection 25 mg, 25 mg, Intravenous, Once PRN, Breonna Sibley MD    HYDROmorphone injection 0.2 mg, 0.2 mg, Intravenous, Q5 Min PRN, Breonna Sibley MD    HYDROmorphone injection 0.5 mg, 0.5 mg, Intravenous, Q5 Min PRN, Breonna Sibley MD    lactated ringers infusion, , Intravenous, Continuous, Breonna Sibley MD, Last Rate: 10 mL/hr at 09/13/23 0559, New Bag at 04/24/24 0706    lactated ringers infusion, , Intravenous, Continuous, Candida Jj MD, Last Rate: 10 mL/hr at 04/24/24 0602, New Bag at 04/24/24 0602    LIDOcaine (PF) 10 mg/ml (1%) injection 10 mg, 1 mL, Intradermal, Once, Marion Salgado FNP    ondansetron injection 4 mg, 4 mg, Intravenous, Once, Breonna Sibley MD    oxyCODONE-acetaminophen 5-325 mg per tablet 2 tablet, 2 tablet, Oral, Once, Breonna Sibley MD    prochlorperazine injection Soln 5 mg, 5 mg, Intravenous, Once PRN, Breonna Sibley MD  Allergies: Review of patient's allergies indicates:  No Known Allergies  Social History:   Social History     Tobacco Use    Smoking status: Every Day     Current packs/day: 1.00     Average packs/day: 1 pack/day for 33.2 years (33.2 ttl pk-yrs)     Types: Cigarettes     Start date: 8/11/1991     Passive  exposure: Never    Smokeless tobacco: Never   Substance Use Topics    Alcohol use: Yes     Alcohol/week: 6.0 standard drinks of alcohol     Types: 6 Cans of beer per week     Comment: daily    Drug use: Never     Family History:   Family History   Problem Relation Name Age of Onset    Seizures Mother      Hypertension Mother      Diabetes Mother      No Known Problems Maternal Aunt      No Known Problems Maternal Uncle      No Known Problems Paternal Aunt      No Known Problems Paternal Uncle      No Known Problems Paternal Grandmother      No Known Problems Paternal Grandfather      No Known Problems Maternal Grandmother      No Known Problems Maternal Grandfather      No Known Problems Other       Surgical History:   Past Surgical History:   Procedure Laterality Date    COLONOSCOPY  08/28/2020    EXAMINATION UNDER ANESTHESIA N/A 2/17/2023    Procedure: Anal exam under anesthesia;  Surgeon: Lai Robertson MD;  Location: [facility];  Service: Colon and Rectal;  Laterality: N/A;    EXAMINATION UNDER ANESTHESIA N/A 9/13/2023    Procedure: ANAL Exam under anesthesia;  Surgeon: Lai Robertson MD;  Location: [facility];  Service: Colon and Rectal;  Laterality: N/A;    EXAMINATION UNDER ANESTHESIA N/A 4/24/2024    Procedure: Exam under anesthesia;  Surgeon: Lai Robertson MD;  Location: [facility];  Service: Colon and Rectal;  Laterality: N/A;  Anoscopy  Patient is outpatient    HYSTERECTOMY  03/16/2017    INSERTION OF SUBCUTANEOUS PORT Right 10/31/2021    MRI PELVIS UNDER ANESTHESIA  10/01/2021    perianal surgery      PROCTOSCOPY N/A 4/24/2024    Procedure: PROCTOSCOPY;  Surgeon: Lai Robertson MD;  Location: [facility];  Service: Colon and Rectal;  Laterality: N/A;    RECTAL EXAMINATION UNDER ANESTHESIA  06/16/2021       Objective:    Vitals:  There were no vitals filed for this visit.     Physical Exam:  Gen: NAD  Neuro: awake, alert, answering questions appropriately  CV: RRR  Resp: non-labored breathing,  HU  Abd: soft, ND, NT  : Some hypopigmented discoloration from previous excision, no visible masses, bruising, or bleeding. PERLA with good external and internal sphincter tone, no palpable masses or hemorrhoids.  Ext: moves all 4 spontaneously and purposefully  Skin: warm, well perfused      Assessment/Plan:  Antoine Banegas is a 53 y.o. female with a history of squamous cell carcinoma of the anus s/p chemoradiation Katlyn protocol in 3/22 who is currently on maintenance MPF therapy every three months and following with Dr. Quinones at [facility]. Anoscopy for anal exam under anesthesia on 4/24/2024 with no masses, ulceration, or mucosal defects identified. NCCN guidelines recommend physical exam with PERLA and inguinal lymph node exam every 6 months.    - Follow up in clinic in 6 months for PERLA and inguinal lymph node physical exam  - Will discuss screening anoscopy timing with colorectal surgery. Will call patient with dates.    Samina Xiao MS-3  [facility]    I have seen the patient with the medical student. I have reviewed and edited this note as appropriate.     Ace Fischer MD  [facility] PGY1

## 2024-11-18 VITALS
TEMPERATURE: 98 F | HEART RATE: 62 BPM | DIASTOLIC BLOOD PRESSURE: 88 MMHG | RESPIRATION RATE: 20 BRPM | BODY MASS INDEX: 36.44 KG/M2 | SYSTOLIC BLOOD PRESSURE: 155 MMHG | WEIGHT: 219 LBS | OXYGEN SATURATION: 99 %

## 2024-11-25 ENCOUNTER — HOSPITAL ENCOUNTER (OUTPATIENT)
Dept: RADIOLOGY | Facility: HOSPITAL | Age: 54
Discharge: HOME | End: 2024-11-25
Attending: INTERNAL MEDICINE
Payer: MEDICAID

## 2024-11-25 DIAGNOSIS — C21.0 SQUAMOUS CELL CANCER, ANUS: ICD-10-CM

## 2024-11-25 DIAGNOSIS — Z85.048 HISTORY OF RECTAL OR ANAL CANCER: ICD-10-CM

## 2024-11-25 LAB
CREAT SERPL-MCNC: 0.86 MG/DL (ref 0.55–1.02)
GFR SERPLBLD CREATININE-BSD FMLA CKD-EPI: >60 ML/MIN/1.73/M2

## 2024-11-25 PROCEDURE — 74177 CT ABD & PELVIS W/CONTRAST: CPT | Mod: TC

## 2024-11-25 PROCEDURE — 25500020 PHARM REV CODE 255

## 2024-11-25 PROCEDURE — 82565 ASSAY OF CREATININE: CPT | Performed by: INTERNAL MEDICINE

## 2024-11-25 RX ORDER — DIATRIZOATE MEGLUMINE AND DIATRIZOATE SODIUM 660; 100 MG/ML; MG/ML
SOLUTION ORAL; RECTAL
Status: COMPLETED
Start: 2024-11-25 | End: 2024-11-25

## 2024-11-25 RX ADMIN — DIATRIZOATE MEGLUMINE AND DIATRIZOATE SODIUM 30 ML: 660; 100 LIQUID ORAL; RECTAL at 09:11

## 2024-11-25 RX ADMIN — IOHEXOL 100 ML: 350 INJECTION, SOLUTION INTRAVENOUS at 09:11

## 2024-12-11 DIAGNOSIS — Z85.048 HISTORY OF RECTAL OR ANAL CANCER: Primary | ICD-10-CM

## 2024-12-12 ENCOUNTER — APPOINTMENT (OUTPATIENT)
Dept: HEMATOLOGY/ONCOLOGY | Facility: CLINIC | Age: 54
End: 2024-12-12
Payer: MEDICAID

## 2024-12-12 ENCOUNTER — OFFICE VISIT (OUTPATIENT)
Dept: HEMATOLOGY/ONCOLOGY | Facility: CLINIC | Age: 54
End: 2024-12-12
Attending: INTERNAL MEDICINE
Payer: MEDICAID

## 2024-12-12 VITALS
WEIGHT: 221 LBS | OXYGEN SATURATION: 99 % | RESPIRATION RATE: 20 BRPM | SYSTOLIC BLOOD PRESSURE: 141 MMHG | HEIGHT: 65 IN | DIASTOLIC BLOOD PRESSURE: 86 MMHG | TEMPERATURE: 98 F | HEART RATE: 86 BPM | BODY MASS INDEX: 36.82 KG/M2

## 2024-12-12 DIAGNOSIS — C21.0 SQUAMOUS CELL CANCER, ANUS: ICD-10-CM

## 2024-12-12 DIAGNOSIS — Z08 ENCOUNTER FOR FOLLOW-UP SURVEILLANCE OF ANAL CANCER: ICD-10-CM

## 2024-12-12 DIAGNOSIS — C21.0 MALIGNANT NEOPLASM OF ANUS: ICD-10-CM

## 2024-12-12 DIAGNOSIS — F10.10 ALCOHOL ABUSE: Primary | ICD-10-CM

## 2024-12-12 DIAGNOSIS — Z85.048 HISTORY OF RECTAL OR ANAL CANCER: ICD-10-CM

## 2024-12-12 DIAGNOSIS — Z85.048 ENCOUNTER FOR FOLLOW-UP SURVEILLANCE OF ANAL CANCER: ICD-10-CM

## 2024-12-12 LAB
ALBUMIN SERPL-MCNC: 3.6 G/DL (ref 3.5–5)
ALBUMIN/GLOB SERPL: 0.8 RATIO (ref 1.1–2)
ALP SERPL-CCNC: 103 UNIT/L (ref 40–150)
ALT SERPL-CCNC: 49 UNIT/L (ref 0–55)
ANION GAP SERPL CALC-SCNC: 9 MEQ/L
AST SERPL-CCNC: 30 UNIT/L (ref 5–34)
BASOPHILS # BLD AUTO: 0.03 X10(3)/MCL
BASOPHILS NFR BLD AUTO: 0.3 %
BILIRUB SERPL-MCNC: 0.4 MG/DL
BUN SERPL-MCNC: 7.7 MG/DL (ref 9.8–20.1)
CALCIUM SERPL-MCNC: 9.9 MG/DL (ref 8.4–10.2)
CHLORIDE SERPL-SCNC: 105 MMOL/L (ref 98–107)
CO2 SERPL-SCNC: 24 MMOL/L (ref 22–29)
CREAT SERPL-MCNC: 0.86 MG/DL (ref 0.55–1.02)
CREAT/UREA NIT SERPL: 9
EOSINOPHIL # BLD AUTO: 0.15 X10(3)/MCL (ref 0–0.9)
EOSINOPHIL NFR BLD AUTO: 1.6 %
ERYTHROCYTE [DISTWIDTH] IN BLOOD BY AUTOMATED COUNT: 13.6 % (ref 11.5–17)
GFR SERPLBLD CREATININE-BSD FMLA CKD-EPI: >60 ML/MIN/1.73/M2
GLOBULIN SER-MCNC: 4.3 GM/DL (ref 2.4–3.5)
GLUCOSE SERPL-MCNC: 271 MG/DL (ref 74–100)
HCT VFR BLD AUTO: 43.6 % (ref 37–47)
HGB BLD-MCNC: 14.1 G/DL (ref 12–16)
IMM GRANULOCYTES # BLD AUTO: 0.07 X10(3)/MCL (ref 0–0.04)
IMM GRANULOCYTES NFR BLD AUTO: 0.7 %
LYMPHOCYTES # BLD AUTO: 1.6 X10(3)/MCL (ref 0.6–4.6)
LYMPHOCYTES NFR BLD AUTO: 17 %
MCH RBC QN AUTO: 27.3 PG (ref 27–31)
MCHC RBC AUTO-ENTMCNC: 32.3 G/DL (ref 33–36)
MCV RBC AUTO: 84.5 FL (ref 80–94)
MONOCYTES # BLD AUTO: 0.66 X10(3)/MCL (ref 0.1–1.3)
MONOCYTES NFR BLD AUTO: 7 %
NEUTROPHILS # BLD AUTO: 6.9 X10(3)/MCL (ref 2.1–9.2)
NEUTROPHILS NFR BLD AUTO: 73.4 %
NRBC BLD AUTO-RTO: 0 %
PLATELET # BLD AUTO: 236 X10(3)/MCL (ref 130–400)
PMV BLD AUTO: 9 FL (ref 7.4–10.4)
POTASSIUM SERPL-SCNC: 4.5 MMOL/L (ref 3.5–5.1)
PROT SERPL-MCNC: 7.9 GM/DL (ref 6.4–8.3)
RBC # BLD AUTO: 5.16 X10(6)/MCL (ref 4.2–5.4)
SODIUM SERPL-SCNC: 138 MMOL/L (ref 136–145)
WBC # BLD AUTO: 9.41 X10(3)/MCL (ref 4.5–11.5)

## 2024-12-12 PROCEDURE — 36415 COLL VENOUS BLD VENIPUNCTURE: CPT

## 2024-12-12 PROCEDURE — 3044F HG A1C LEVEL LT 7.0%: CPT | Mod: CPTII,,, | Performed by: INTERNAL MEDICINE

## 2024-12-12 PROCEDURE — 99214 OFFICE O/P EST MOD 30 MIN: CPT | Mod: S$PBB,,, | Performed by: INTERNAL MEDICINE

## 2024-12-12 PROCEDURE — 99214 OFFICE O/P EST MOD 30 MIN: CPT | Mod: PBBFAC | Performed by: INTERNAL MEDICINE

## 2024-12-12 PROCEDURE — 1159F MED LIST DOCD IN RCRD: CPT | Mod: CPTII,,, | Performed by: INTERNAL MEDICINE

## 2024-12-12 PROCEDURE — 80053 COMPREHEN METABOLIC PANEL: CPT

## 2024-12-12 PROCEDURE — 3079F DIAST BP 80-89 MM HG: CPT | Mod: CPTII,,, | Performed by: INTERNAL MEDICINE

## 2024-12-12 PROCEDURE — 1160F RVW MEDS BY RX/DR IN RCRD: CPT | Mod: CPTII,,, | Performed by: INTERNAL MEDICINE

## 2024-12-12 PROCEDURE — 3077F SYST BP >= 140 MM HG: CPT | Mod: CPTII,,, | Performed by: INTERNAL MEDICINE

## 2024-12-12 PROCEDURE — 3008F BODY MASS INDEX DOCD: CPT | Mod: CPTII,,, | Performed by: INTERNAL MEDICINE

## 2024-12-12 PROCEDURE — 85025 COMPLETE CBC W/AUTO DIFF WBC: CPT
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
## 2024-12-12 NOTE — Clinical Note
Orders for 12/12/2024:  Follow-up with nurse practitioner in 6 months with CBC and CMP  Follow-up with surgery for anoscopy/examination under anesthesia; next due May 2025 Surveillance CT scans of C/A/P with contrast in November 2025 Follow-up with NP in 6 months with CBC and CMP

## 2025-01-06 ENCOUNTER — TELEPHONE (OUTPATIENT)
Dept: INTERNAL MEDICINE | Facility: CLINIC | Age: 55
End: 2025-01-06
Payer: MEDICAID

## 2025-01-06 NOTE — TELEPHONE ENCOUNTER
DR. PHELPS,     MSDomenica DALTON IS REQUESTING A SCRIPT FOR A NEW CPAP MACHINE WITH SUPPLIES. THE ONE SHE HAS HAS GONE OUT FOR 3 DAYS NOW AND SHE IS HAVING TROUBLE SLEEPING AND BREATHING.  SHE CALLED [facility] AND SHE STATED THAT SHE WAS TOLD THAT SHE IS UP FOR A NEW MACHINE.       SHE IS ASKING THAT WE SEND THE SCRIPT TO [facility].    PLEASE ADVISE.

## 2025-01-07 ENCOUNTER — INFUSION (OUTPATIENT)
Dept: INFUSION THERAPY | Facility: HOSPITAL | Age: 55
End: 2025-01-07
Attending: INTERNAL MEDICINE
Payer: MEDICAID

## 2025-01-07 VITALS
HEART RATE: 98 BPM | TEMPERATURE: 1 F | RESPIRATION RATE: 20 BRPM | OXYGEN SATURATION: 96 % | SYSTOLIC BLOOD PRESSURE: 149 MMHG | BODY MASS INDEX: 37.03 KG/M2 | HEIGHT: 65 IN | WEIGHT: 222.25 LBS | DIASTOLIC BLOOD PRESSURE: 97 MMHG

## 2025-01-07 DIAGNOSIS — Z85.048 HISTORY OF RECTAL OR ANAL CANCER: Primary | ICD-10-CM

## 2025-01-07 PROCEDURE — 63600175 PHARM REV CODE 636 W HCPCS: Performed by: INTERNAL MEDICINE

## 2025-01-07 PROCEDURE — A4216 STERILE WATER/SALINE, 10 ML: HCPCS | Performed by: INTERNAL MEDICINE

## 2025-01-07 PROCEDURE — 96523 IRRIG DRUG DELIVERY DEVICE: CPT

## 2025-01-07 PROCEDURE — 25000003 PHARM REV CODE 250: Performed by: INTERNAL MEDICINE

## 2025-01-07 RX ORDER — SODIUM CHLORIDE 0.9 % (FLUSH) 0.9 %
10 SYRINGE (ML) INJECTION
OUTPATIENT
Start: 2025-01-07

## 2025-01-07 RX ORDER — SODIUM CHLORIDE 0.9 % (FLUSH) 0.9 %
10 SYRINGE (ML) INJECTION
Status: DISCONTINUED | OUTPATIENT
Start: 2025-01-07 | End: 2025-01-07 | Stop reason: HOSPADM

## 2025-01-07 RX ORDER — HEPARIN 100 UNIT/ML
500 SYRINGE INTRAVENOUS
Status: DISCONTINUED | OUTPATIENT
Start: 2025-01-07 | End: 2025-01-07 | Stop reason: HOSPADM

## 2025-01-07 RX ORDER — HEPARIN 100 UNIT/ML
500 SYRINGE INTRAVENOUS
OUTPATIENT
Start: 2025-01-07

## 2025-01-07 RX ADMIN — HEPARIN 500 UNITS: 100 SYRINGE at 01:01

## 2025-01-07 RX ADMIN — Medication 10 ML: at 01:01

## 2025-01-07 NOTE — NURSING
1255  Pt arrived for MP flush q 3 mon.  Pt accomp by her .  Pt rates LOP 4/10 to left arm where blood was drawn previously.  Pt reports a cough and no other symptoms.

## 2025-01-16 DIAGNOSIS — G47.33 OSA (OBSTRUCTIVE SLEEP APNEA): Primary | ICD-10-CM

## 2025-01-29 DIAGNOSIS — G47.33 OBSTRUCTIVE SLEEP APNEA SYNDROME: Primary | ICD-10-CM

## 2025-01-31 ENCOUNTER — OFFICE VISIT (OUTPATIENT)
Dept: INTERNAL MEDICINE | Facility: CLINIC | Age: 55
End: 2025-01-31
Payer: MEDICAID

## 2025-01-31 VITALS
TEMPERATURE: 98 F | SYSTOLIC BLOOD PRESSURE: 142 MMHG | DIASTOLIC BLOOD PRESSURE: 90 MMHG | WEIGHT: 217.19 LBS | HEIGHT: 65 IN | BODY MASS INDEX: 36.19 KG/M2 | RESPIRATION RATE: 20 BRPM | HEART RATE: 95 BPM

## 2025-01-31 DIAGNOSIS — M48.062 LUMBAR STENOSIS WITH NEUROGENIC CLAUDICATION: ICD-10-CM

## 2025-01-31 DIAGNOSIS — R73.03 PREDIABETES: ICD-10-CM

## 2025-01-31 DIAGNOSIS — F41.9 ANXIETY DISORDER, UNSPECIFIED TYPE: ICD-10-CM

## 2025-01-31 DIAGNOSIS — G47.33 OBSTRUCTIVE SLEEP APNEA SYNDROME: Primary | ICD-10-CM

## 2025-01-31 DIAGNOSIS — E08.00 DIABETES MELLITUS DUE TO UNDERLYING CONDITION WITH HYPEROSMOLARITY WITHOUT COMA, WITHOUT LONG-TERM CURRENT USE OF INSULIN: ICD-10-CM

## 2025-01-31 LAB — GLUCOSE SERPL-MCNC: 224 MG/DL (ref 70–110)

## 2025-01-31 PROCEDURE — 82962 GLUCOSE BLOOD TEST: CPT | Mod: PBBFAC | Performed by: INTERNAL MEDICINE

## 2025-01-31 PROCEDURE — 99214 OFFICE O/P EST MOD 30 MIN: CPT | Mod: PBBFAC | Performed by: INTERNAL MEDICINE

## 2025-01-31 RX ORDER — METFORMIN HYDROCHLORIDE 500 MG/1
500 TABLET ORAL 2 TIMES DAILY WITH MEALS
Qty: 180 TABLET | Refills: 1 | Status: SHIPPED | OUTPATIENT
Start: 2025-01-31 | End: 2025-07-30

## 2025-01-31 NOTE — PROGRESS NOTES
"[facility] INTERNAL MEDICINE  PRE-OP CLINIC VISIT    SUBJECTIVE:    HPI: Antoine Banegas is a w/ PMH of HTN, Hyperlipidemia,COPD, HOLLY and GERD who presents today for renewal of CPAP.She has appt with [facility] and sees Dr. Romero.Her old  machines has mold and is dysfunctional. She has no chest pain or discomfort w/ activities and has not noticed any abnormal swelling or dyspnea. She is a 2 pack/ day smoker and has been advised to quit smoking. She lives independently at home and does all ADLs/IADLs w/o assistance. She is able to walk from the parking lot to the clinic w/o any difficulties and is able to climb up >1 flight of stairs and walk up hill >1 block w/o stopping.     ROS:  12 [place] Ros is negative.    OBJECTIVE:    Vitals:   BP (!) 142/90 (BP Location: Left arm, Patient Position: Sitting)   Pulse 95   Temp 98 °F (36.7 °C) (Oral)   Resp 20   Ht 5' 5" (1.651 m)   Wt 98.5 kg (217 lb 3.2 oz)   LMP  (LMP Unknown)   BMI 36.14 kg/m²      Physical Exam:  General: Well nourished w/o distress  HEENT: NC/AT; PERRL; nasal and oral mucosa moist and clear; no sinus tenderness; no thyromegaly  Neck: Full ROM; no lymphadenopathy  Pulm: CTA bilaterally, normal work of breathing  CV: S1, S2 w/o murmurs or gallops; no edema noted  GI: Soft with normal bowel sounds in all quadrants, no masses on palpation  MSK: Full ROM of all extremities and spine w/o limitation or discomfort  Derm: No rashes, abnormal bruising, or skin lesions  Neuro: AAOx4; CN II-XII intact; motor/sensory function intact  Psych: Cooperative; appropriate mood and affect    Significant findings:CBC---CMP--All labs and studies reviewed for 15 mts.She had gluif 271 and will follow up with       ASSESSMENT & PLAN:    1.HTN-- stage 1. HTN- low salt diet and life style modifications.    2. New Onset DM- Prior glucose 271.  Today Cbg is 224. I will start her on metformin.    3.Hyperlipidemia- doing well on statin    4.COPD- Dc smoking.    5.HOLLY- on " CPAP. Pt needs a new CPAP machine. Her 9 yr old CPAP machine is dysfunctional and broken. She is compliant with CPAP and sees benefit with therapy. As her current machine is broken, she is in need of a new machine.    6.Nicotine addiction- dc smoking. Counselled.    Anxiety- on elavil..    Disposition:       All issues discussed for 25 additional minutes.      Irvin Saleh MD  [facility]  [facility]

## 2025-02-05 ENCOUNTER — TELEPHONE (OUTPATIENT)
Dept: INTERNAL MEDICINE | Facility: CLINIC | Age: 55
End: 2025-02-05
Payer: MEDICAID

## 2025-02-18 DIAGNOSIS — G47.33 OBSTRUCTIVE SLEEP APNEA SYNDROME: Primary | ICD-10-CM

## 2025-02-27 DIAGNOSIS — G47.30 SLEEP APNEA: Primary | ICD-10-CM

## 2025-02-27 DIAGNOSIS — F41.9 ANXIETY DISORDER: ICD-10-CM

## 2025-03-19 ENCOUNTER — OFFICE VISIT (OUTPATIENT)
Dept: INTERNAL MEDICINE | Facility: CLINIC | Age: 55
End: 2025-03-19
Payer: MEDICAID

## 2025-03-19 VITALS
BODY MASS INDEX: 35.84 KG/M2 | WEIGHT: 215.38 LBS | RESPIRATION RATE: 20 BRPM | HEART RATE: 84 BPM | DIASTOLIC BLOOD PRESSURE: 87 MMHG | TEMPERATURE: 98 F | SYSTOLIC BLOOD PRESSURE: 132 MMHG | OXYGEN SATURATION: 100 %

## 2025-03-19 DIAGNOSIS — F32.A DEPRESSIVE DISORDER: ICD-10-CM

## 2025-03-19 DIAGNOSIS — M25.562 PAIN IN BOTH KNEES, UNSPECIFIED CHRONICITY: ICD-10-CM

## 2025-03-19 DIAGNOSIS — Z85.048 HISTORY OF RECTAL OR ANAL CANCER: ICD-10-CM

## 2025-03-19 DIAGNOSIS — G47.00 INSOMNIA, UNSPECIFIED TYPE: ICD-10-CM

## 2025-03-19 DIAGNOSIS — F41.9 ANXIETY DISORDER, UNSPECIFIED TYPE: ICD-10-CM

## 2025-03-19 DIAGNOSIS — M25.561 PAIN IN BOTH KNEES, UNSPECIFIED CHRONICITY: ICD-10-CM

## 2025-03-19 DIAGNOSIS — E08.00 DIABETES MELLITUS DUE TO UNDERLYING CONDITION WITH HYPEROSMOLARITY WITHOUT COMA, WITHOUT LONG-TERM CURRENT USE OF INSULIN: Primary | ICD-10-CM

## 2025-03-19 DIAGNOSIS — M54.9 BACK PAIN, UNSPECIFIED BACK LOCATION, UNSPECIFIED BACK PAIN LATERALITY, UNSPECIFIED CHRONICITY: ICD-10-CM

## 2025-03-19 DIAGNOSIS — E78.5 HYPERLIPIDEMIA, UNSPECIFIED HYPERLIPIDEMIA TYPE: ICD-10-CM

## 2025-03-19 LAB — HBA1C MFR BLD: 8.8 %

## 2025-03-19 PROCEDURE — 83036 HEMOGLOBIN GLYCOSYLATED A1C: CPT | Mod: PBBFAC | Performed by: STUDENT IN AN ORGANIZED HEALTH CARE EDUCATION/TRAINING PROGRAM

## 2025-03-19 PROCEDURE — 99214 OFFICE O/P EST MOD 30 MIN: CPT | Mod: PBBFAC | Performed by: STUDENT IN AN ORGANIZED HEALTH CARE EDUCATION/TRAINING PROGRAM

## 2025-03-19 RX ORDER — QUETIAPINE FUMARATE 25 MG/1
25 TABLET, FILM COATED ORAL NIGHTLY
Qty: 90 TABLET | Refills: 3 | Status: SHIPPED | OUTPATIENT
Start: 2025-03-19 | End: 2026-03-19

## 2025-03-19 RX ORDER — METFORMIN HYDROCHLORIDE 1000 MG/1
1000 TABLET ORAL NIGHTLY
Qty: 90 TABLET | Refills: 3 | Status: SHIPPED | OUTPATIENT
Start: 2025-03-19 | End: 2026-03-19

## 2025-03-19 RX ORDER — ATORVASTATIN CALCIUM 40 MG/1
40 TABLET, FILM COATED ORAL NIGHTLY
Qty: 90 TABLET | Refills: 3 | Status: SHIPPED | OUTPATIENT
Start: 2025-03-19 | End: 2026-03-14

## 2025-03-19 RX ORDER — ESCITALOPRAM OXALATE 10 MG/1
10 TABLET ORAL DAILY
Qty: 90 TABLET | Refills: 3 | Status: SHIPPED | OUTPATIENT
Start: 2025-03-19 | End: 2026-03-19
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
## 2025-03-25 ENCOUNTER — CLINICAL SUPPORT (OUTPATIENT)
Dept: AUDIOLOGY | Facility: HOSPITAL | Age: 55
End: 2025-03-25
Payer: MEDICAID

## 2025-03-25 DIAGNOSIS — H90.A32 MIXED CONDUCTIVE AND SENSORINEURAL HEARING LOSS OF LEFT EAR WITH RESTRICTED HEARING OF RIGHT EAR: ICD-10-CM

## 2025-03-25 DIAGNOSIS — H90.A21 SENSORINEURAL HEARING LOSS (SNHL) OF RIGHT EAR WITH RESTRICTED HEARING OF LEFT EAR: ICD-10-CM

## 2025-03-25 PROCEDURE — 92557 COMPREHENSIVE HEARING TEST: CPT | Performed by: AUDIOLOGIST

## 2025-03-25 PROCEDURE — 92567 TYMPANOMETRY: CPT | Performed by: AUDIOLOGIST

## 2025-03-25 NOTE — PROGRESS NOTES
Hearing Evaluation        Patient History: Ms. Banegas has a long-standing hearing loss (left mixed>right SNHL) reporting no changes in hearing since previous evaluation. She also reports transient bilateral tinnitus. Vertigo and middle ear issues are denied. All additional history is unremarkable.        Test Results:                    Pure Tone Testing:      Right ear:       Moderate to moderately severe sensorineural hearing loss from 250-8kHz. Speech reception threshold is in agreement with puretone findings.  Discrimination score of 92% is considered excellent.        Left ear:          Profound mixed hearing loss from 250-8kHz. Speech reception threshold is in agreement with puretone findings.  Discrimination score of 64% is considered fair.                                                                            Tympanometry:                                           Right ear:       Type 'A' tymp, normal middle ear pressure/function     Left ear:          Type 'Ad' tymp, hyper-mobile (1.86mL) mobility                                  Interpretations:      Behavioral test findings indicate no significant changes in hearing since previous hearing evaluation. Speech reception thresholds obtained at 45dB, AD and 85dB, AS, and are in agreement with puretone findings. Speech discrimination scores of 92%, AD and 64%, AS, are considered excellent/fair.  Immittance measures indicate normal middle ear pressure/function for the right ear and hyper-compliant TM mobility for the left ear.            Recommendations:   Continue with ENT follow up  RTC per ENT

## 2025-03-26 ENCOUNTER — HOSPITAL ENCOUNTER (OUTPATIENT)
Dept: RADIOLOGY | Facility: HOSPITAL | Age: 55
Discharge: HOME | End: 2025-03-26
Attending: STUDENT IN AN ORGANIZED HEALTH CARE EDUCATION/TRAINING PROGRAM
Payer: MEDICAID

## 2025-03-26 ENCOUNTER — ANCILLARY ORDERS (OUTPATIENT)
Dept: INTERNAL MEDICINE | Facility: CLINIC | Age: 55
End: 2025-03-26

## 2025-03-26 DIAGNOSIS — M54.6 ACUTE MIDLINE THORACIC BACK PAIN: ICD-10-CM

## 2025-03-26 DIAGNOSIS — Z85.048 HISTORY OF RECTAL OR ANAL CANCER: ICD-10-CM

## 2025-03-26 DIAGNOSIS — M54.50 ACUTE MIDLINE LOW BACK PAIN, UNSPECIFIED WHETHER SCIATICA PRESENT: ICD-10-CM

## 2025-03-26 DIAGNOSIS — M48.062 LUMBAR STENOSIS WITH NEUROGENIC CLAUDICATION: ICD-10-CM

## 2025-03-26 DIAGNOSIS — M48.062 LUMBAR STENOSIS WITH NEUROGENIC CLAUDICATION: Primary | ICD-10-CM

## 2025-03-26 DIAGNOSIS — M54.9 BACK PAIN, UNSPECIFIED BACK LOCATION, UNSPECIFIED BACK PAIN LATERALITY, UNSPECIFIED CHRONICITY: ICD-10-CM

## 2025-03-26 LAB
CREAT SERPL-MCNC: 0.81 MG/DL (ref 0.55–1.02)
GFR SERPLBLD CREATININE-BSD FMLA CKD-EPI: >60 ML/MIN/1.73/M2

## 2025-03-26 PROCEDURE — 72132 CT LUMBAR SPINE W/DYE: CPT | Mod: TC

## 2025-03-26 PROCEDURE — 25500020 PHARM REV CODE 255

## 2025-03-26 PROCEDURE — 74177 CT ABD & PELVIS W/CONTRAST: CPT | Mod: TC

## 2025-03-26 PROCEDURE — 72129 CT CHEST SPINE W/DYE: CPT | Mod: TC

## 2025-03-26 PROCEDURE — 82565 ASSAY OF CREATININE: CPT | Performed by: STUDENT IN AN ORGANIZED HEALTH CARE EDUCATION/TRAINING PROGRAM

## 2025-03-26 RX ORDER — DIATRIZOATE MEGLUMINE AND DIATRIZOATE SODIUM 660; 100 MG/ML; MG/ML
SOLUTION ORAL; RECTAL
Status: COMPLETED
Start: 2025-03-26 | End: 2025-03-26

## 2025-03-26 RX ADMIN — IOHEXOL 95 ML: 350 INJECTION, SOLUTION INTRAVENOUS at 09:03

## 2025-03-26 RX ADMIN — DIATRIZOATE MEGLUMINE AND DIATRIZOATE SODIUM 5 ML: 660; 100 LIQUID ORAL; RECTAL at 08:03

## 2025-04-02 ENCOUNTER — PROCEDURE VISIT (OUTPATIENT)
Dept: SLEEP MEDICINE | Facility: HOSPITAL | Age: 55
End: 2025-04-02
Payer: MEDICAID

## 2025-04-02 DIAGNOSIS — G47.33 OBSTRUCTIVE SLEEP APNEA SYNDROME: ICD-10-CM

## 2025-04-02 PROCEDURE — 95811 POLYSOM 6/>YRS CPAP 4/> PARM: CPT

## 2025-04-04 DIAGNOSIS — G47.33 OSA (OBSTRUCTIVE SLEEP APNEA): Primary | ICD-10-CM

## 2025-04-07 ENCOUNTER — INFUSION (OUTPATIENT)
Dept: INFUSION THERAPY | Facility: HOSPITAL | Age: 55
End: 2025-04-07
Attending: INTERNAL MEDICINE
Payer: MEDICAID

## 2025-04-07 VITALS
SYSTOLIC BLOOD PRESSURE: 135 MMHG | HEIGHT: 65 IN | RESPIRATION RATE: 20 BRPM | TEMPERATURE: 98 F | BODY MASS INDEX: 36.26 KG/M2 | OXYGEN SATURATION: 99 % | WEIGHT: 217.63 LBS | DIASTOLIC BLOOD PRESSURE: 76 MMHG | HEART RATE: 98 BPM

## 2025-04-07 DIAGNOSIS — Z85.048 HISTORY OF RECTAL OR ANAL CANCER: Primary | ICD-10-CM

## 2025-04-07 PROCEDURE — A4216 STERILE WATER/SALINE, 10 ML: HCPCS | Performed by: INTERNAL MEDICINE

## 2025-04-07 PROCEDURE — 25000003 PHARM REV CODE 250: Performed by: INTERNAL MEDICINE

## 2025-04-07 PROCEDURE — 96523 IRRIG DRUG DELIVERY DEVICE: CPT

## 2025-04-07 PROCEDURE — 63600175 PHARM REV CODE 636 W HCPCS: Performed by: INTERNAL MEDICINE

## 2025-04-07 RX ORDER — SODIUM CHLORIDE 0.9 % (FLUSH) 0.9 %
10 SYRINGE (ML) INJECTION
Status: DISCONTINUED | OUTPATIENT
Start: 2025-04-07 | End: 2025-04-07 | Stop reason: HOSPADM

## 2025-04-07 RX ORDER — SODIUM CHLORIDE 0.9 % (FLUSH) 0.9 %
10 SYRINGE (ML) INJECTION
OUTPATIENT
Start: 2025-04-07

## 2025-04-07 RX ORDER — HEPARIN 100 UNIT/ML
500 SYRINGE INTRAVENOUS
Status: DISCONTINUED | OUTPATIENT
Start: 2025-04-07 | End: 2025-04-07 | Stop reason: HOSPADM

## 2025-04-07 RX ORDER — HEPARIN 100 UNIT/ML
500 SYRINGE INTRAVENOUS
OUTPATIENT
Start: 2025-04-07

## 2025-04-07 RX ADMIN — SODIUM CHLORIDE, PRESERVATIVE FREE 10 ML: 5 INJECTION INTRAVENOUS at 09:04

## 2025-04-07 RX ADMIN — HEPARIN 500 UNITS: 100 SYRINGE at 09:04

## 2025-04-07 NOTE — NURSING
Pt presented to Infusion Clinic for q3 month port flush, pt c/o being tired, port flushed and no blood return obtained; AVS given, pt returns 7/8/25 for next port flush.

## 2025-04-15 ENCOUNTER — OFFICE VISIT (OUTPATIENT)
Dept: SURGERY | Facility: CLINIC | Age: 55
End: 2025-04-15
Payer: MEDICAID

## 2025-04-15 VITALS
OXYGEN SATURATION: 99 % | BODY MASS INDEX: 35.93 KG/M2 | DIASTOLIC BLOOD PRESSURE: 82 MMHG | HEIGHT: 65 IN | WEIGHT: 215.63 LBS | SYSTOLIC BLOOD PRESSURE: 127 MMHG | TEMPERATURE: 98 F | HEART RATE: 82 BPM

## 2025-04-15 DIAGNOSIS — C21.0 ANAL SQUAMOUS CELL CARCINOMA: ICD-10-CM

## 2025-04-15 DIAGNOSIS — C21.0 SQUAMOUS CELL CANCER, ANUS: Primary | ICD-10-CM

## 2025-04-15 PROCEDURE — 99215 OFFICE O/P EST HI 40 MIN: CPT | Mod: PBBFAC

## 2025-04-15 RX ORDER — ONDANSETRON 4 MG/1
8 TABLET, ORALLY DISINTEGRATING ORAL EVERY 8 HOURS PRN
OUTPATIENT
Start: 2025-04-15

## 2025-04-15 RX ORDER — HEPARIN SODIUM 5000 [USP'U]/ML
5000 INJECTION, SOLUTION INTRAVENOUS; SUBCUTANEOUS EVERY 8 HOURS
OUTPATIENT
Start: 2025-04-15

## 2025-04-15 RX ORDER — SODIUM CHLORIDE 9 MG/ML
INJECTION, SOLUTION INTRAVENOUS CONTINUOUS
OUTPATIENT
Start: 2025-04-15

## 2025-04-15 RX ORDER — CEFAZOLIN SODIUM 2 G/50ML
2 SOLUTION INTRAVENOUS
OUTPATIENT
Start: 2025-04-15

## 2025-04-15 NOTE — PROGRESS NOTES
Patient Name: Antoine Banegas   YOB: 1970     Chief Complaint:   Chief Complaint   Patient presents with    Mixed conductive and sensorineural hearing loss of right ea     Annual visit        History of Present Illness:  August 31, 2022: 51-year-old female presents today for evaluation of hearing loss in her left ear.  The patient is indicated for the past 3 years she is had subjective hearing loss in left ear were it sounds like it is far.  She feels she hears okay out of her right ear.  She does admit to ringing tinnitus in both ears.  She denies any associated ear pain, otorrhea, or dizziness.  She does admit she has difficulty following conversations and will sometimes find herself reading lips and often asking to have people repeat things sent to her.  She believes she may have had recurrent ear infections as a child but is unable to elaborate.  She does not have a history of ear surgery.  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  She does not have any unilateral noise exposure to the left ear.  No history of head trauma or cerebrovascular accidents.  She does have a history of anal cancer for which she would undergone treatment with radiation therapy and chemotherapy completing her treatments 2 months ago.  She does not recall what medication she was treated with.  She denies use of aspirin or any diuretics. Family history noncontributory his for his hearing loss.  Patient does smoke 1 pack of cigarettes per day and drinks alcohol socially.  Denies illicit drug use.    Patient had an audiogram done on August 29, 2022, which showed asymmetric hearing loss with the left ear being greater than right.  In the left ear she had a severe to profound mixed hearing loss in his right ear moderate to moderately severe sensorineural hearing loss.  Speech reception threshold in the right ear was 45 decibels and 85 decibels on the left ear with  92% discrimination in the right ear and 76% discrimination in the left ear.  Type a tympanograms bilaterally.  Distortion product otoacoustic emission testing suggested cochlear site of lesion bilaterally.    October 12, 2022: Patient presents today for follow-up of her hearing loss and review of her CT scan of the temporal bones and MRI scan of the internal auditory canals.  Patient has not had any subjective change in her hearing since her last appointment.    CT scan of the temporal bones did not show any abnormalities.  She does not have any middle ear effusions.  Ossicles appeared to be intact.  No evidence of cholesteatoma.  MRI scan of the internal auditory canals did show the presence of a right internal auditory canal vascular loop but there was no evidence of retrocochlear pathology involving either ear.  Results were discussed with the patient.    The patient had placed a call to the [place] handicap commission in regards to assistance for hearing aids.  She is currently on a waiting list.    She has no other new problems today.    April 12, 2023: Patient presents today for follow-up of her hearing loss.  She is not noticed any change in her hearing since her last appointment.  The patient has been fitted for hearing aids and is to receive her hearing aids next week.  Audiogram done on February 15, 2023, shows essentially no change in her hearing.  She has a moderately severe sensorineural hearing loss in the right ear and a severe to profound mixed hearing loss in the left ear.  Speech reception threshold is 40 decibels in the right ear and 80 decibels in the left ear with 92% discrimination in the right ear and 76% discrimination in the left ear.  Type a tympanograms bilaterally.  Results were discussed with the patient.    She does not have any other new problems today.    4/16/2024: Patient presents today for follow-up of her hearing loss.  Since last appointment she has been fitted with hearing  aids and has been using them in both ears and finds that they are helpful.  She has not noticed any change in her hearing since last appointment.  The only new problem that she has today is that of complaints of frequent itching in both ears which has been present for an unspecified period of time.  She does not have any associated pain or drainage.  She feels that the itching is deep within the ears.  She has not had any treatment or evaluation for this previously.    Audiogram done on 3/28/2024, shows no change in her hearing.  She has a moderate to severe sensorineural hearing loss in the right ear and a severe to profound mixed hearing loss in the left ear.  Speech reception threshold is 45 decibels in the right ear and 80 decibels in the left ear with 84% discrimination in the right ear and 77% in the left ear.  Type A tympanogram on the right type Ad tympanogram on the left.  Results were discussed with the patient.    04/16/2025: States she lost her hearing aids. Otherwise, she is doing well. Dermotic resolved otic itching.    Past Medical History:     Diagnosis Date    Anxiety     Cancer     Circulatory anomaly      Past Surgical History:   Procedure Laterality Date    COLONOSCOPY  08/28/2020    EXAMINATION UNDER ANESTHESIA N/A 2/17/2023    Procedure: Anal exam under anesthesia;  Surgeon: Lai Robertson MD;  Location: [facility] OR;  Service: Colon and Rectal;  Laterality: N/A;    EXAMINATION UNDER ANESTHESIA N/A 9/13/2023    Procedure: ANAL Exam under anesthesia;  Surgeon: Lai Robertson MD;  Location: [facility] OR;  Service: Colon and Rectal;  Laterality: N/A;    HYSTERECTOMY  03/16/2017    INSERTION OF SUBCUTANEOUS PORT Right 10/31/2021    MRI PELVIS UNDER ANESTHESIA  10/01/2021    perianal surgery      RECTAL EXAMINATION UNDER ANESTHESIA  06/16/2021       Review of Systems:  Unremarkable except as mentioned above.    Current Medications:  Current Outpatient Medications   Medication Sig Dispense Refill     amitriptyline (ELAVIL) 50 MG tablet Take 50 mg by mouth every evening.      cholecalciferol, vitamin D3, (VITAMIN D3) 25 mcg (1,000 unit) capsule Take 2 capsules (2,000 Units total) by mouth once daily. 180 capsule 3    EScitalopram oxalate (LEXAPRO) 10 MG tablet Take 10 mg by mouth once daily.      omega-3s-dha-epa-fish oil (OMEGA-3 FISH OIL) 300-1,000 mg Cap Take 1,000 mg by mouth.      pregabalin (LYRICA) 50 MG capsule Take 1 capsule (50 mg total) by mouth 2 (two) times daily. 60 capsule 11    atorvastatin (LIPITOR) 40 MG tablet Take 1 tablet (40 mg total) by mouth every evening. 90 tablet 3    fluocinolone acetonide oiL 0.01 % Drop 4 drops in the affected ear bid for 14 days then prn itching thereafter. 20 mL 1    morphine (MS CONTIN) 30 MG 12 hr tablet Take 30 mg by mouth.      oxyCODONE-acetaminophen (PERCOCET)  mg per tablet Take 1 tablet by mouth every 6 (six) hours as needed.       No current facility-administered medications for this visit.     Facility-Administered Medications Ordered in Other Visits   Medication Dose Route Frequency Provider Last Rate Last Admin    diazePAM tablet 10 mg  10 mg Oral Once Marion Salgado FNP        diphenhydrAMINE injection 25 mg  25 mg Intravenous Once PRN Breonna Sibley MD        HYDROmorphone injection 0.2 mg  0.2 mg Intravenous Q5 Min PRN Breonna Sibley MD        HYDROmorphone injection 0.5 mg  0.5 mg Intravenous Q5 Min PRN Breonna Sibley MD        lactated ringers infusion   Intravenous Continuous Breonna Sibley MD 10 mL/hr at 09/13/23 0559 New Bag at 09/13/23 0559    LIDOcaine (PF) 10 mg/ml (1%) injection 10 mg  1 mL Intradermal Once Marion Salgado FNP        ondansetron injection 4 mg  4 mg Intravenous Once Breonna Sibley MD        oxyCODONE-acetaminophen 5-325 mg per tablet 2 tablet  2 tablet Oral Once Breonna Sibley MD        prochlorperazine injection Soln 5 mg  5 mg Intravenous Once PRN Breonna Sibley MD   "          Allergies:  Review of patient's allergies indicates:  No Known Allergies     Physical Exam:  Vital signs:   Vitals:    04/16/24 0824   Weight: 107 kg (235 lb 14.3 oz)   Height: 5' 6" (1.676 m)       General:  Well-developed well-nourished female in no acute distress.  Voice is normal.  Head and face:  Normocephalic.  No facial lesions.  No temporomandibular joint tenderness or click.  Ears:  Right ear-auricle is normally developed.  External auditory canal is clear.  Ear canal is dry with little cerumen.  Tympanic membrane is nonerythematous.  No middle ear effusion.  Left ear-auricle is normally developed.  External auditory canal is clear dry with little cerumen.  Tympanic membrane is nonerythematous.  No middle ear effusion.  Eyes:  Extraocular muscles intact.  No nystagmus.  No exophthalmos or enophthalmos.  Pupils are equal round and reactive to light.  Neurologic:  Alert and oriented.  Cranial nerves 2-12 are grossly normal.      Audio:         Assessment/Plan:  55 yo female with asymmetric hearing loss with a mixed hearing loss in the left ear and sensorineural hearing loss in the right ear.  The hearing loss in the left ear may possibly be secondary to otosclerosis.  Hx of chronic otitis externa.    Plan:   - Given information for [employer]ACD to contact regarding replacement options  - RTC 1 year with audiogram.  Continue noise avoidance.      Kristi Ferreira NP        "

## 2025-04-15 NOTE — PROGRESS NOTES
I have reviewed the notes, assessments, and/or procedures performed by Dr Swift, I concur with her/his documentation of Antoine Banegas.  Date of Service: 4/15/2025    [facility]

## 2025-04-15 NOTE — PROGRESS NOTES
Patient seen by Dr. JAVON Swift Scheduled for surgery 5/28/25. One Day surgery instructions sheet explained and given. Will return in 6 months for visit. Written and verbal discharge instructions given.

## 2025-04-15 NOTE — PROGRESS NOTES
[facility] Clinic Note    HPI:   Antoine Banegas is a 53 y.o. female with a PMHx of HOLLY on CPAP, T2DM, chronic tobacco and alcohol use, HLD, s/p hysterectomy and BSO 3/2017 of squamous cell carcinoma of the anus s/p chemoradiation Katlyn protocol in 3/2022 who is currently on maintenance MPF therapy every three months and following with Dr. Quinones at [facility] presenting to clinic for a 6 month follow up following anoscopy. Last anoscopy 11/2024 with next exam due May 2025.     Patient with no concerns today. She denies any anal pain, bleeding from rectum or blood in stool, fever, chills, nausea. Vomiting, changes in stool caliber, consistency, or color.     Most recent colonoscopy and mammogram 2024 at [facility]     PMH:   Past Medical History:   Diagnosis Date    Abnormal Pap smear of cervix     Anxiety     Cancer     Circulatory anomaly     Hyperlipidemia       Meds: Current Medications[1]  Allergies: Review of patient's allergies indicates:  No Known Allergies  Social History: Social History[2]  Family History:   Family History   Problem Relation Name Age of Onset    Seizures Mother      Hypertension Mother      Diabetes Mother      No Known Problems Maternal Aunt      No Known Problems Maternal Uncle      No Known Problems Paternal Aunt      No Known Problems Paternal Uncle      No Known Problems Paternal Grandmother      No Known Problems Paternal Grandfather      No Known Problems Maternal Grandmother      No Known Problems Maternal Grandfather      No Known Problems Other       Surgical History:   Past Surgical History:   Procedure Laterality Date    COLONOSCOPY  08/28/2020    EXAMINATION UNDER ANESTHESIA N/A 2/17/2023    Procedure: Anal exam under anesthesia;  Surgeon: Lai Robertson MD;  Location: [facility];  Service: Colon and Rectal;  Laterality: N/A;    EXAMINATION UNDER ANESTHESIA N/A 9/13/2023    Procedure: ANAL Exam under anesthesia;  Surgeon: Lai Robertson MD;  Location: [facility];  Service:  Colon and Rectal;  Laterality: N/A;    EXAMINATION UNDER ANESTHESIA N/A 4/24/2024    Procedure: Exam under anesthesia;  Surgeon: Lai Robertson MD;  Location: [facility] OR;  Service: Colon and Rectal;  Laterality: N/A;  Anoscopy  Patient is outpatient    EXAMINATION UNDER ANESTHESIA N/A 11/15/2024    Procedure: Anoscopy and exam under anesthesia;  Surgeon: Lai Robertson MD;  Location: [facility] OR;  Service: Colon and Rectal;  Laterality: N/A;    HYSTERECTOMY  03/16/2017    INSERTION OF SUBCUTANEOUS PORT Right 10/31/2021    MRI PELVIS UNDER ANESTHESIA  10/01/2021    perianal surgery      PROCTOSCOPY N/A 4/24/2024    Procedure: PROCTOSCOPY;  Surgeon: Lai Robertson MD;  Location: [facility] OR;  Service: Colon and Rectal;  Laterality: N/A;    RECTAL EXAMINATION UNDER ANESTHESIA  06/16/2021     Review of Systems:  Skin: No rashes or itching.  Head: Denies headache or recent trauma.  Eyes: Denies eye pain or double vision.  Neck: Denies swelling or hoarseness of voice.  Respiratory: Denies shortness of breath or chest pain  Cardiac: Denies palpitations or swelling in hands/feet.  Gastrointestinal: Denies nausea, denies vomiting.   Urinary: Denies dysuria or hematuria.  Vascular: Denies claudication or leg swelling.  Neuro: Denies motor deficits. Denies weakness.  Endocrine: Denies excessive sweating or cold intolerance.  Psych: Denies memory problems. Denies anxiety.    Objective:    Vitals:  Vitals:    04/15/25 0828   BP: 127/82   Pulse: 82   Temp: 98.3 °F (36.8 °C)        Physical Exam:  Gen: NAD  Neuro: awake, alert, answering questions appropriately  CV: RR  Resp: non-labored breathing, HU  Abd: soft, ND  :  refused   Ext: moves all 4 spontaneously and purposefully  Skin: warm, well perfused    Pertinent Labs:  N/A    Imaging:  CT CAP (3/26/2025):  No evidence of residual recurrent or metastatic disease seen  Hepatomegaly and hepatic steatosis    CT Lumbar spine (3/26/2025):  No evidence of osseous  metastatic disease or acute osseous abnormality.     CT Thoracic Spine (3/26/2025):  Mild multilevel thoracic disc degeneration.     No evidence of osseous metastatic disease.     No significant central canal or foraminal stenosis.     Diffuse hepatic steatosis    Micro/Path/Other:  N/A    Assessment/Plan:  Antoine Banegas is a 53 y.o. female with a history of squamous cell carcinoma of the anus s/p chemoradiation Katlyn protocol in 3/22 who is currently on maintenance MPF therapy every three months and following with Dr. Quinones at [facility]. Anoscopy for anal exam under anesthesia on 4/24/2024 with no masses, ulceration, or mucosal defects identified. NCCN guidelines recommend physical exam with PERLA and inguinal lymph node exam every 6 months as well as anoscopy every 6-12 months for 3 years (5/2022-5/2025). Patient amenable to EUA/anoscopy on 5/28/2025. Will obtain consent then.    - Anoscopy scheduled for May 28, 2025  - RTC 6 months after anoscopy for serial PERLA q6 months until 5/2027 per previous notes    Katie Whalen, MS3  [facility] General Surgery     I have reviewed the notes, assessments, and/or procedures performed by medical student, I concur with her/his documentation of Antoine Banegas.    Gallo Swift MD  [facility] Surgery PGY-1       [1]   Current Outpatient Medications:     atorvastatin (LIPITOR) 40 MG tablet, Take 1 tablet (40 mg total) by mouth every evening., Disp: 90 tablet, Rfl: 3    clotrimazole-betamethasone 1-0.05% (LOTRISONE) cream, Apply topically 2 (two) times daily. To external affected vaginal area, Disp: 45 g, Rfl: 1    EScitalopram oxalate (LEXAPRO) 10 MG tablet, Take 1 tablet (10 mg total) by mouth once daily., Disp: 90 tablet, Rfl: 3    hydrocortisone valerate (WEST-CESAR) 0.2 % ointment, Apply topically 2 (two) times daily. PRN Use only during flares, Disp: 15 g, Rfl: 1    metFORMIN (GLUCOPHAGE) 1000 MG tablet, Take 1 tablet (1,000 mg total) by mouth every evening., Disp: 90  tablet, Rfl: 3    omega-3s-dha-epa-fish oil (OMEGA-3 FISH OIL) 300-1,000 mg Cap, Take 1,000 mg by mouth. (Patient not taking: Reported on 3/19/2025), Disp: , Rfl:     QUEtiapine (SEROQUEL) 25 MG Tab, Take 1 tablet (25 mg total) by mouth every evening., Disp: 90 tablet, Rfl: 3    valACYclovir (VALTREX) 1000 MG tablet, Take 1 tablet (1,000 mg total) by mouth every 12 (twelve) hours. For initial episode. Drink plenty of fluid. for 10 days, Disp: 20 tablet, Rfl: 0  No current facility-administered medications for this visit.    Facility-Administered Medications Ordered in Other Visits:     diazePAM tablet 10 mg, 10 mg, Oral, Once, Marion Salgado FNP    lactated ringers infusion, , Intravenous, Continuous, Breonna Sibley MD, Last Rate: 10 mL/hr at 09/13/23 0559, New Bag at 04/24/24 0706    lactated ringers infusion, , Intravenous, Continuous, Candida Jj MD, Last Rate: 10 mL/hr at 04/24/24 0602, New Bag at 04/24/24 0602    LIDOcaine (PF) 10 mg/ml (1%) injection 10 mg, 1 mL, Intradermal, Once, Marion Salgado FNP  [2]   Social History  Tobacco Use    Smoking status: Every Day     Current packs/day: 1.00     Average packs/day: 1 pack/day for 33.7 years (33.7 ttl pk-yrs)     Types: Cigarettes     Start date: 8/11/1991     Passive exposure: Never    Smokeless tobacco: Never   Substance Use Topics    Alcohol use: Yes     Alcohol/week: 6.0 standard drinks of alcohol     Types: 6 Cans of beer per week     Comment: daily    Drug use: Never

## 2025-04-16 ENCOUNTER — OFFICE VISIT (OUTPATIENT)
Dept: OTOLARYNGOLOGY | Facility: CLINIC | Age: 55
End: 2025-04-16
Payer: MEDICAID

## 2025-04-16 VITALS — SYSTOLIC BLOOD PRESSURE: 135 MMHG | DIASTOLIC BLOOD PRESSURE: 87 MMHG | TEMPERATURE: 98 F | HEART RATE: 74 BPM

## 2025-04-16 DIAGNOSIS — H90.A21 SENSORINEURAL HEARING LOSS (SNHL) OF RIGHT EAR WITH RESTRICTED HEARING OF LEFT EAR: Primary | ICD-10-CM

## 2025-04-16 DIAGNOSIS — H90.A32 MIXED CONDUCTIVE AND SENSORINEURAL HEARING LOSS OF LEFT EAR WITH RESTRICTED HEARING OF RIGHT EAR: ICD-10-CM

## 2025-04-16 PROCEDURE — 1159F MED LIST DOCD IN RCRD: CPT | Mod: CPTII,,, | Performed by: NURSE PRACTITIONER

## 2025-04-16 PROCEDURE — 3075F SYST BP GE 130 - 139MM HG: CPT | Mod: CPTII,,, | Performed by: NURSE PRACTITIONER

## 2025-04-16 PROCEDURE — 99213 OFFICE O/P EST LOW 20 MIN: CPT | Mod: S$PBB,,, | Performed by: NURSE PRACTITIONER

## 2025-04-16 PROCEDURE — 3052F HG A1C>EQUAL 8.0%<EQUAL 9.0%: CPT | Mod: CPTII,,, | Performed by: NURSE PRACTITIONER

## 2025-04-16 PROCEDURE — 99213 OFFICE O/P EST LOW 20 MIN: CPT | Mod: PBBFAC | Performed by: NURSE PRACTITIONER

## 2025-04-16 PROCEDURE — 3079F DIAST BP 80-89 MM HG: CPT | Mod: CPTII,,, | Performed by: NURSE PRACTITIONER

## 2025-05-27 ENCOUNTER — ANESTHESIA EVENT (OUTPATIENT)
Dept: SURGERY | Facility: HOSPITAL | Age: 55
End: 2025-05-27
Payer: MEDICAID

## 2025-05-27 RX ORDER — INSULIN ASPART 100 [IU]/ML
2-9 INJECTION, SOLUTION INTRAVENOUS; SUBCUTANEOUS DAILY PRN
Status: CANCELLED | OUTPATIENT
Start: 2025-05-27

## 2025-05-27 NOTE — ANESTHESIA PREPROCEDURE EVALUATION
05/27/2025  Antoine Banegas is a 54 y.o., female with PMHx of HLD, smoking, HOLLY, ETOH abuse, DM, h/o rectal CA, anxiety/depression presents for rectal EUA.    NO BETA BLOCKER USE    Active Ambulatory Problems     Diagnosis Date Noted    Anxiety disorder 05/18/2022    History of rectal or anal cancer 05/18/2022    Chronic low back pain 05/18/2022    Depressive disorder 05/18/2022    Hyperlipidemia 05/18/2022    Malignant neoplasm of anus 05/18/2022    Obstructive sleep apnea syndrome 05/18/2022    Obesity 05/18/2022    Osteoarthritis 05/18/2022    Pain of lower extremity 05/18/2022    Paraparesis 05/18/2022    Tobacco user 05/18/2022    Vitamin D deficiency 05/18/2022    Dermatitis associated with moisture 05/18/2022    Dermatitis 06/08/2022    Effect, radiation 07/07/2022    Lumbar stenosis with neurogenic claudication 08/11/2022    Latent syphilis 08/11/2022    Radiation dermatitis 08/24/2022    Mixed conductive and sensorineural hearing loss of right ear with restricted hearing of left ear 08/31/2022    Sensorineural hearing loss (SNHL) of left ear with restricted hearing of right ear 08/31/2022    Squamous cell cancer, anus 12/07/2022    Tobacco abuse 12/07/2022    Alcohol abuse 12/07/2022    Rectal lesion 08/16/2023    Skin tag of anus 08/16/2023    Rectal or anal pain 08/16/2023    Scar tissue 08/16/2023    Other specified disorders of the skin and subcutaneous tissue related to radiation 08/30/2023    History of latent syphilis 11/27/2023    Encounter for follow-up surveillance of anal cancer 11/27/2023     Resolved Ambulatory Problems     Diagnosis Date Noted    Open wound of buttock with complication 05/18/2022    Tinnitus, left ear 08/11/2022    Healthcare maintenance 08/11/2022    Rectal bleeding 12/07/2022     Past Medical History:   Diagnosis Date    Abnormal Pap smear of cervix     Anxiety      Cancer     Circulatory anomaly        Pre-op Assessment    I have reviewed the NPO Status.      Review of Systems  Anesthesia Hx:  No problems with previous Anesthesia                Social:  Smoker, Alcohol Use       Hematology/Oncology:                        --  Cancer in past history:       Other (see Oncology comments)       chemotherapy, surgery and radiation       Cardiovascular:                hyperlipidemia                               Pulmonary:        Sleep Apnea, CPAP                Renal/:  Renal/ Normal                 Hepatic/GI:  Hepatic/GI Normal                    Neurological:  Neurology Normal                                      Endocrine:  Diabetes, well controlled, type 2         Obesity / BMI > 30  Psych:   anxiety depression              Vitals:    05/28/25 0504 05/28/25 0520 05/28/25 0631   BP:  133/88 122/77   Pulse:  77 78   Resp:   20   Temp:  36.8 °C (98.2 °F) 36.3 °C (97.3 °F)   TempSrc:  Oral Temporal   SpO2:  100% 100%   Weight: 96 kg (211 lb 9.6 oz)           Physical Exam  General: Alert, Cooperative and Well nourished    Airway:  Mallampati: II   Mouth Opening: Normal  TM Distance: Normal  Tongue: Normal  Neck ROM: Normal ROM    Dental:  Intact    Chest/Lungs:  Clear to auscultation, Normal Respiratory Rate    Heart:  Rate: Normal  Rhythm: Regular Rhythm  Sounds: Normal       Latest Reference Range & Units 05/28/25 05:28   hCG Qualitative, Urine Negative  Negative      Latest Reference Range & Units 05/28/25 05:21   POCT Glucose 70 - 110 mg/dL 153 (H)   (H): Data is abnormally high    Lab Results   Component Value Date    WBC 9.41 12/12/2024    HGB 14.1 12/12/2024    HCT 43.6 12/12/2024    MCV 84.5 12/12/2024     12/12/2024       CMP  Sodium   Date Value Ref Range Status   12/12/2024 138 136 - 145 mmol/L Final     Potassium   Date Value Ref Range Status   12/12/2024 4.5 3.5 - 5.1 mmol/L Final     Chloride   Date Value Ref Range Status   12/12/2024 105 98 - 107  mmol/L Final     CO2   Date Value Ref Range Status   12/12/2024 24 22 - 29 mmol/L Final     Glucose   Date Value Ref Range Status   12/12/2024 271 (H) 74 - 100 mg/dL Final     Blood Urea Nitrogen   Date Value Ref Range Status   12/12/2024 7.7 (L) 9.8 - 20.1 mg/dL Final     Creatinine   Date Value Ref Range Status   03/26/2025 0.81 0.55 - 1.02 mg/dL Final     Calcium   Date Value Ref Range Status   12/12/2024 9.9 8.4 - 10.2 mg/dL Final     Protein Total   Date Value Ref Range Status   12/12/2024 7.9 6.4 - 8.3 gm/dL Final     Albumin   Date Value Ref Range Status   12/12/2024 3.6 3.5 - 5.0 g/dL Final     Bilirubin Total   Date Value Ref Range Status   12/12/2024 0.4 <=1.5 mg/dL Final     ALP   Date Value Ref Range Status   12/12/2024 103 40 - 150 unit/L Final     AST   Date Value Ref Range Status   12/12/2024 30 5 - 34 unit/L Final     ALT   Date Value Ref Range Status   12/12/2024 49 0 - 55 unit/L Final     eGFR   Date Value Ref Range Status   03/26/2025 >60 mL/min/1.73/m2 Final     Comment:     Estimated GFR calculated using the CKD-EPI creatinine (2021) equation.     Lab Results   Component Value Date    HGBA1C 6.3 03/18/2024             Anesthesia Plan  Type of Anesthesia, risks & benefits discussed:    Anesthesia Type: Gen ETT  Intra-op Monitoring Plan: Standard ASA Monitors  Post Op Pain Control Plan: IV/PO Opioids PRN  Induction:  IV  Airway Plan: Direct  Informed Consent: Informed consent signed with the Patient and all parties understand the risks and agree with anesthesia plan.  All questions answered.   ASA Score: 3  Day of Surgery Review of History & Physical: H&P Update referred to the surgeon/provider.    Ready For Surgery From Anesthesia Perspective.     .

## 2025-05-28 ENCOUNTER — ANESTHESIA (OUTPATIENT)
Dept: SURGERY | Facility: HOSPITAL | Age: 55
End: 2025-05-28
Payer: MEDICAID

## 2025-05-28 ENCOUNTER — HOSPITAL ENCOUNTER (OUTPATIENT)
Facility: HOSPITAL | Age: 55
Discharge: HOME | End: 2025-05-28
Attending: COLON & RECTAL SURGERY | Admitting: COLON & RECTAL SURGERY
Payer: MEDICAID

## 2025-05-28 DIAGNOSIS — C21.0 SQUAMOUS CELL CANCER, ANUS: ICD-10-CM

## 2025-05-28 DIAGNOSIS — C21.0 ANAL SQUAMOUS CELL CARCINOMA: ICD-10-CM

## 2025-05-28 DIAGNOSIS — E78.5 HYPERLIPIDEMIA, UNSPECIFIED HYPERLIPIDEMIA TYPE: Primary | ICD-10-CM

## 2025-05-28 LAB
B-HCG UR QL: NEGATIVE
CTP QC/QA: YES
POCT GLUCOSE: 153 MG/DL (ref 70–110)
POCT GLUCOSE: 166 MG/DL (ref 70–110)

## 2025-05-28 PROCEDURE — 82962 GLUCOSE BLOOD TEST: CPT | Performed by: COLON & RECTAL SURGERY

## 2025-05-28 PROCEDURE — 63600175 PHARM REV CODE 636 W HCPCS: Performed by: ANESTHESIOLOGY

## 2025-05-28 PROCEDURE — 81025 URINE PREGNANCY TEST: CPT | Performed by: SPECIALIST

## 2025-05-28 PROCEDURE — 37000008 HC ANESTHESIA 1ST 15 MINUTES: Performed by: COLON & RECTAL SURGERY

## 2025-05-28 PROCEDURE — 71000015 HC POSTOP RECOV 1ST HR: Performed by: COLON & RECTAL SURGERY

## 2025-05-28 PROCEDURE — 63600175 PHARM REV CODE 636 W HCPCS: Performed by: NURSE ANESTHETIST, CERTIFIED REGISTERED

## 2025-05-28 PROCEDURE — 36000705 HC OR TIME LEV I EA ADD 15 MIN: Performed by: COLON & RECTAL SURGERY

## 2025-05-28 PROCEDURE — 45990 SURG DX EXAM ANORECTAL: CPT | Mod: ,,, | Performed by: COLON & RECTAL SURGERY

## 2025-05-28 PROCEDURE — 36000704 HC OR TIME LEV I 1ST 15 MIN: Performed by: COLON & RECTAL SURGERY

## 2025-05-28 PROCEDURE — 37000009 HC ANESTHESIA EA ADD 15 MINS: Performed by: COLON & RECTAL SURGERY

## 2025-05-28 RX ORDER — SODIUM CHLORIDE 0.9 % (FLUSH) 0.9 %
10 SYRINGE (ML) INJECTION
Status: DISCONTINUED | OUTPATIENT
Start: 2025-05-28 | End: 2025-05-28 | Stop reason: HOSPADM

## 2025-05-28 RX ORDER — MEPERIDINE HYDROCHLORIDE 25 MG/ML
12.5 INJECTION INTRAMUSCULAR; INTRAVENOUS; SUBCUTANEOUS EVERY 10 MIN PRN
Status: DISCONTINUED | OUTPATIENT
Start: 2025-05-28 | End: 2025-05-28 | Stop reason: HOSPADM

## 2025-05-28 RX ORDER — MORPHINE SULFATE 2 MG/ML
2 INJECTION, SOLUTION INTRAMUSCULAR; INTRAVENOUS EVERY 5 MIN PRN
Status: DISCONTINUED | OUTPATIENT
Start: 2025-05-28 | End: 2025-05-28 | Stop reason: HOSPADM

## 2025-05-28 RX ORDER — SODIUM CHLORIDE, SODIUM LACTATE, POTASSIUM CHLORIDE, CALCIUM CHLORIDE 600; 310; 30; 20 MG/100ML; MG/100ML; MG/100ML; MG/100ML
1000 INJECTION, SOLUTION INTRAVENOUS CONTINUOUS
Status: DISCONTINUED | OUTPATIENT
Start: 2025-05-28 | End: 2025-05-28 | Stop reason: HOSPADM

## 2025-05-28 RX ORDER — CEFAZOLIN 2 G/1
INJECTION, POWDER, FOR SOLUTION INTRAMUSCULAR; INTRAVENOUS
Status: DISCONTINUED | OUTPATIENT
Start: 2025-05-28 | End: 2025-05-28

## 2025-05-28 RX ORDER — MIDAZOLAM HYDROCHLORIDE 2 MG/2ML
2 INJECTION, SOLUTION INTRAMUSCULAR; INTRAVENOUS ONCE AS NEEDED
Status: COMPLETED | OUTPATIENT
Start: 2025-05-28 | End: 2025-05-28

## 2025-05-28 RX ORDER — ONDANSETRON HYDROCHLORIDE 2 MG/ML
4 INJECTION, SOLUTION INTRAVENOUS DAILY PRN
Status: DISCONTINUED | OUTPATIENT
Start: 2025-05-28 | End: 2025-05-28 | Stop reason: HOSPADM

## 2025-05-28 RX ORDER — LIDOCAINE HYDROCHLORIDE 20 MG/ML
INJECTION INTRAVENOUS
Status: DISCONTINUED | OUTPATIENT
Start: 2025-05-28 | End: 2025-05-28

## 2025-05-28 RX ORDER — INSULIN ASPART 100 [IU]/ML
4-12 INJECTION, SOLUTION INTRAVENOUS; SUBCUTANEOUS DAILY PRN
Status: DISCONTINUED | OUTPATIENT
Start: 2025-05-28 | End: 2025-05-28 | Stop reason: HOSPADM

## 2025-05-28 RX ORDER — GLUCAGON 1 MG
1 KIT INJECTION
Status: DISCONTINUED | OUTPATIENT
Start: 2025-05-28 | End: 2025-05-28 | Stop reason: HOSPADM

## 2025-05-28 RX ORDER — OXYCODONE HYDROCHLORIDE 5 MG/1
5 TABLET ORAL
Status: DISCONTINUED | OUTPATIENT
Start: 2025-05-28 | End: 2025-05-28 | Stop reason: HOSPADM

## 2025-05-28 RX ORDER — ONDANSETRON 4 MG/1
8 TABLET, ORALLY DISINTEGRATING ORAL EVERY 8 HOURS PRN
Status: DISCONTINUED | OUTPATIENT
Start: 2025-05-28 | End: 2025-05-28 | Stop reason: HOSPADM

## 2025-05-28 RX ORDER — HEPARIN SODIUM 5000 [USP'U]/ML
5000 INJECTION, SOLUTION INTRAVENOUS; SUBCUTANEOUS EVERY 8 HOURS
Status: DISCONTINUED | OUTPATIENT
Start: 2025-05-28 | End: 2025-05-28 | Stop reason: HOSPADM

## 2025-05-28 RX ORDER — CEFAZOLIN SODIUM 1 G/3ML
2 INJECTION, POWDER, FOR SOLUTION INTRAMUSCULAR; INTRAVENOUS
Status: DISCONTINUED | OUTPATIENT
Start: 2025-05-28 | End: 2025-05-28 | Stop reason: HOSPADM

## 2025-05-28 RX ORDER — PROPOFOL 10 MG/ML
INJECTION, EMULSION INTRAVENOUS CONTINUOUS PRN
Status: DISCONTINUED | OUTPATIENT
Start: 2025-05-28 | End: 2025-05-28

## 2025-05-28 RX ORDER — SODIUM CHLORIDE, SODIUM LACTATE, POTASSIUM CHLORIDE, CALCIUM CHLORIDE 600; 310; 30; 20 MG/100ML; MG/100ML; MG/100ML; MG/100ML
INJECTION, SOLUTION INTRAVENOUS CONTINUOUS
Status: DISCONTINUED | OUTPATIENT
Start: 2025-05-28 | End: 2025-05-28 | Stop reason: HOSPADM

## 2025-05-28 RX ORDER — SODIUM CHLORIDE 9 MG/ML
INJECTION, SOLUTION INTRAVENOUS CONTINUOUS
Status: DISCONTINUED | OUTPATIENT
Start: 2025-05-28 | End: 2025-05-28 | Stop reason: HOSPADM

## 2025-05-28 RX ADMIN — LIDOCAINE HYDROCHLORIDE 50 MG: 20 INJECTION INTRAVENOUS at 07:05

## 2025-05-28 RX ADMIN — SODIUM CHLORIDE, POTASSIUM CHLORIDE, SODIUM LACTATE AND CALCIUM CHLORIDE: 600; 310; 30; 20 INJECTION, SOLUTION INTRAVENOUS at 06:05

## 2025-05-28 RX ADMIN — MIDAZOLAM HYDROCHLORIDE 2 MG: 1 INJECTION, SOLUTION INTRAMUSCULAR; INTRAVENOUS at 06:05

## 2025-05-28 RX ADMIN — CEFAZOLIN 2 G: 2 INJECTION, POWDER, FOR SOLUTION INTRAMUSCULAR; INTRAVENOUS at 07:05

## 2025-05-28 RX ADMIN — PROPOFOL 120 MCG/KG/MIN: 10 INJECTION, EMULSION INTRAVENOUS at 07:05

## 2025-05-28 NOTE — TRANSFER OF CARE
Anesthesia Transfer of Care Note    Patient: Antoine Banegas    Procedure(s) Performed: Procedure(s) (LRB):  EXAM UNDER ANESTHESIA, DIGITAL, RECTUM (N/A)    Patient location: OPS    Anesthesia Type: MAC    Transport from OR: Transported from OR on room air with adequate spontaneous ventilation    Post pain: adequate analgesia    Post assessment: no apparent anesthetic complications    Post vital signs: stable    Level of consciousness: awake    Nausea/Vomiting: no nausea/vomiting    Complications: none    Transfer of care protocol was followed      Last vitals: Visit Vitals  /77   Pulse 78   Temp 36.3 °C (97.3 °F) (Temporal)   Resp 20   Wt 96 kg (211 lb 9.6 oz)   LMP  (LMP Unknown)   SpO2 100%   Breastfeeding No   BMI 35.21 kg/m²

## 2025-05-28 NOTE — OP NOTE
[facility] - Periop Services  Operative Note      Date of Procedure: 5/28/2025     Procedure: Procedure(s) (LRB):  EXAM UNDER ANESTHESIA, DIGITAL, RECTUM (N/A)     Surgeons and Role:     * Lai Robertson MD - Primary     * Sahil Marion MD - Resident - Assisting        Pre-Operative Diagnosis:   Hx of SCC    Post-Operative Diagnosis: Post-Op Diagnosis Codes:     * Squamous cell cancer, anus [C21.0]    Anesthesia: Choice    Operative Findings  - No evidence of squamous cell carcinoma recurrence    Description of Technical Procedures:   The patient was taken to the operating room and general anesthesia was induced. She was positioned in lithotomy and the ano-genital region was prepped and draped in usual sterile fashion. A digital rectal exam was performed and did not reveal any evidence of pathology. A Arthur-Clay retractor was then used to examine the anal canal and proximal rectum. Again, there was no evidence of SCC recurrence or any other pathology. The patient tolerated the procedure well and there were no complications.    Estimated Blood Loss (EBL): 10cc           Implants: * No implants in log *    Specimens:   Specimen (24h ago, onward)      None           * No specimens in log *           Condition: Good    Disposition: PACU - hemodynamically stable.    Attestation: I was present and scrubbed for the entire procedure.    Discharge Note    OUTCOME: Patient tolerated treatment/procedure well without complication and is now ready for discharge.    DISPOSITION: Home or Self Care    FINAL DIAGNOSIS:  <principal problem not specified>    FOLLOWUP: With Dr. Cast and in clinic for continued surveilance    DISCHARGE INSTRUCTIONS:  No discharge procedures on file.    Clifton Simon MD  [employer] General Surgery, PGY3

## 2025-05-28 NOTE — ANESTHESIA POSTPROCEDURE EVALUATION
Anesthesia Post Evaluation    Patient: Antoine Banegas    Procedure(s) Performed: Procedure(s) (LRB):  EXAM UNDER ANESTHESIA, DIGITAL, RECTUM (N/A)    Final Anesthesia Type: MAC      Patient location during evaluation: OPS  Patient participation: Yes- Able to Participate  Level of consciousness: awake and alert  Post-procedure vital signs: reviewed and stable  Pain management: adequate  Airway patency: patent    PONV status at discharge: No PONV  Anesthetic complications: no      Cardiovascular status: blood pressure returned to baseline  Respiratory status: unassisted  Hydration status: euvolemic  Follow-up not needed.              Vitals Value Taken Time   /77 05/28/25 06:31   Temp 36.3 °C (97.3 °F) 05/28/25 06:31   Pulse 78 05/28/25 06:31   Resp 20 05/28/25 06:31   SpO2 100 % 05/28/25 06:31         No case tracking events are documented in the log.      Pain/Alesia Score: No data recorded

## 2025-05-28 NOTE — H&P
[facility] Clinic Note     HPI:   Antoine Banegas is a 53 y.o. female with a PMHx of HOLLY on CPAP, T2DM, chronic tobacco and alcohol use, HLD, s/p hysterectomy and BSO 3/2017 of squamous cell carcinoma of the anus s/p chemoradiation Katlyn protocol in 3/2022 who is currently on maintenance MPF therapy every three months and following with Dr. Quinones at [facility] presenting to clinic for a 6 month follow up following anoscopy. Last anoscopy 11/2024 with next exam due May 2025.      Patient with no concerns today. She denies any anal pain, bleeding from rectum or blood in stool, fever, chills, nausea. Vomiting, changes in stool caliber, consistency, or color.      Most recent colonoscopy and mammogram 2024 at [facility]      PMH:        Past Medical History:   Diagnosis Date    Abnormal Pap smear of cervix      Anxiety      Cancer      Circulatory anomaly      Hyperlipidemia        Meds: [Current Medications]    [Current Medications]     Current Outpatient Medications:     atorvastatin (LIPITOR) 40 MG tablet, Take 1 tablet (40 mg total) by mouth every evening., Disp: 90 tablet, Rfl: 3    clotrimazole-betamethasone 1-0.05% (LOTRISONE) cream, Apply topically 2 (two) times daily. To external affected vaginal area, Disp: 45 g, Rfl: 1    EScitalopram oxalate (LEXAPRO) 10 MG tablet, Take 1 tablet (10 mg total) by mouth once daily., Disp: 90 tablet, Rfl: 3    hydrocortisone valerate (WEST-CESAR) 0.2 % ointment, Apply topically 2 (two) times daily. PRN Use only during flares, Disp: 15 g, Rfl: 1    metFORMIN (GLUCOPHAGE) 1000 MG tablet, Take 1 tablet (1,000 mg total) by mouth every evening., Disp: 90 tablet, Rfl: 3    omega-3s-dha-epa-fish oil (OMEGA-3 FISH OIL) 300-1,000 mg Cap, Take 1,000 mg by mouth. (Patient not taking: Reported on 3/19/2025), Disp: , Rfl:     QUEtiapine (SEROQUEL) 25 MG Tab, Take 1 tablet (25 mg total) by mouth every evening., Disp: 90 tablet, Rfl: 3    valACYclovir (VALTREX) 1000 MG tablet, Take 1 tablet  (1,000 mg total) by mouth every 12 (twelve) hours. For initial episode. Drink plenty of fluid. for 10 days, Disp: 20 tablet, Rfl: 0  No current facility-administered medications for this visit.     Facility-Administered Medications Ordered in Other Visits:     diazePAM tablet 10 mg, 10 mg, Oral, Once, Marion Salgado FNP    lactated ringers infusion, , Intravenous, Continuous, Breonna Sibley MD, Last Rate: 10 mL/hr at 09/13/23 0559, New Bag at 04/24/24 0706    lactated ringers infusion, , Intravenous, Continuous, Candida Jj MD, Last Rate: 10 mL/hr at 04/24/24 0602, New Bag at 04/24/24 0602    LIDOcaine (PF) 10 mg/ml (1%) injection 10 mg, 1 mL, Intradermal, Once, Marion Salgado FNP  Allergies: Review of patient's allergies indicates:  No Known Allergies  Social History: [Social History]    [Social History]        Tobacco Use    Smoking status: Every Day       Current packs/day: 1.00       Average packs/day: 1 pack/day for 33.7 years (33.7 ttl pk-yrs)       Types: Cigarettes       Start date: 8/11/1991       Passive exposure: Never    Smokeless tobacco: Never   Substance Use Topics    Alcohol use: Yes       Alcohol/week: 6.0 standard drinks of alcohol       Types: 6 Cans of beer per week       Comment: daily    Drug use: Never     Family History:          Family History   Problem Relation Name Age of Onset    Seizures Mother        Hypertension Mother        Diabetes Mother        No Known Problems Maternal Aunt        No Known Problems Maternal Uncle        No Known Problems Paternal Aunt        No Known Problems Paternal Uncle        No Known Problems Paternal Grandmother        No Known Problems Paternal Grandfather        No Known Problems Maternal Grandmother        No Known Problems Maternal Grandfather        No Known Problems Other          Surgical History:         Past Surgical History:   Procedure Laterality Date    COLONOSCOPY   08/28/2020    EXAMINATION UNDER ANESTHESIA N/A  2/17/2023     Procedure: Anal exam under anesthesia;  Surgeon: Lai Robertson MD;  Location: [facility] OR;  Service: Colon and Rectal;  Laterality: N/A;    EXAMINATION UNDER ANESTHESIA N/A 9/13/2023     Procedure: ANAL Exam under anesthesia;  Surgeon: Lai Robertson MD;  Location: [facility] OR;  Service: Colon and Rectal;  Laterality: N/A;    EXAMINATION UNDER ANESTHESIA N/A 4/24/2024     Procedure: Exam under anesthesia;  Surgeon: Lai Robertson MD;  Location: [facility] OR;  Service: Colon and Rectal;  Laterality: N/A;  Anoscopy  Patient is outpatient    EXAMINATION UNDER ANESTHESIA N/A 11/15/2024     Procedure: Anoscopy and exam under anesthesia;  Surgeon: Lai Robertson MD;  Location: [facility] OR;  Service: Colon and Rectal;  Laterality: N/A;    HYSTERECTOMY   03/16/2017    INSERTION OF SUBCUTANEOUS PORT Right 10/31/2021    MRI PELVIS UNDER ANESTHESIA   10/01/2021    perianal surgery        PROCTOSCOPY N/A 4/24/2024     Procedure: PROCTOSCOPY;  Surgeon: Lai Robertson MD;  Location: [facility] OR;  Service: Colon and Rectal;  Laterality: N/A;    RECTAL EXAMINATION UNDER ANESTHESIA   06/16/2021      Review of Systems:  Skin: No rashes or itching.  Head: Denies headache or recent trauma.  Eyes: Denies eye pain or double vision.  Neck: Denies swelling or hoarseness of voice.  Respiratory: Denies shortness of breath or chest pain  Cardiac: Denies palpitations or swelling in hands/feet.  Gastrointestinal: Denies nausea, denies vomiting.   Urinary: Denies dysuria or hematuria.  Vascular: Denies claudication or leg swelling.  Neuro: Denies motor deficits. Denies weakness.  Endocrine: Denies excessive sweating or cold intolerance.  Psych: Denies memory problems. Denies anxiety.     Objective:     Vitals:      Vitals:     04/15/25 0828   BP: 127/82   Pulse: 82   Temp: 98.3 °F (36.8 °C)         Physical Exam:  Gen: NAD  Neuro: awake, alert, answering questions appropriately  CV: RR  Resp: non-labored breathing,  HU  Abd: soft, ND  :  refused   Ext: moves all 4 spontaneously and purposefully  Skin: warm, well perfused     Pertinent Labs:  N/A     Imaging:  CT CAP (3/26/2025):  No evidence of residual recurrent or metastatic disease seen  Hepatomegaly and hepatic steatosis     CT Lumbar spine (3/26/2025):  No evidence of osseous metastatic disease or acute osseous abnormality.      CT Thoracic Spine (3/26/2025):  Mild multilevel thoracic disc degeneration.     No evidence of osseous metastatic disease.     No significant central canal or foraminal stenosis.     Diffuse hepatic steatosis     Micro/Path/Other:  N/A     Assessment/Plan:  Antoine Banegas is a 53 y.o. female with a history of squamous cell carcinoma of the anus s/p chemoradiation Katlyn protocol in 3/22 who is currently on maintenance MPF therapy every three months and following with Dr. Quinones at [facility]. Anoscopy for anal exam under anesthesia on 4/24/2024 with no masses, ulceration, or mucosal defects identified. NCCN guidelines recommend physical exam with PERLA and inguinal lymph node exam every 6 months as well as anoscopy every 6-12 months for 3 years (5/2022-5/2025). Patient amenable to EUA/anoscopy on 5/28/2025. Will obtain consent then.     - Anoscopy scheduled for May 28, 2025  - RTC 6 months after anoscopy for serial PERLA q6 months until 5/2027 per previous notes        Teodoro Grimes MD. PhD  [employer] General Surgery, PGY1

## 2025-05-29 VITALS
DIASTOLIC BLOOD PRESSURE: 82 MMHG | RESPIRATION RATE: 19 BRPM | TEMPERATURE: 98 F | BODY MASS INDEX: 35.21 KG/M2 | OXYGEN SATURATION: 97 % | SYSTOLIC BLOOD PRESSURE: 118 MMHG | WEIGHT: 211.63 LBS | HEART RATE: 70 BPM

## 2025-06-11 ENCOUNTER — TELEPHONE (OUTPATIENT)
Dept: HEMATOLOGY/ONCOLOGY | Facility: CLINIC | Age: 55
End: 2025-06-11
Payer: MEDICAID

## 2025-07-01 NOTE — PROGRESS NOTES
Reason for Follow-up:  -squamous cell carcinoma of anus, cT4 cN1 cM0, stage IIIC  Rectal bleeding   Tobacco abuse, alcohol abuse    Current Treatment:  Surveillance    Treatment history:  1. Capecitabine 825 mg/m² p.o. twice daily, Monday-Friday, on each day that radiation therapy is given, throughout the duration of radiation therapy (typically 28 treatment days);  2. Mitomycin 10 mg/m² days 1 and 29. Chemotherapy 03/02/2022-03/31/2022  Definitive CRT; completed 5/4/2022    Past medical history: Chronic low back pain.  Chronic neck pain.  Dyslipidemia.  Obesity.  HOLLY, on CPAP.  Osteoarthritis.  Tobacco abuse.  Alcohol abuse.  Uterine fibroids.  Vitamin D deficiency.  Decreased hearing left ear.  Procedure/surgical history: Left eye surgery.  Left knee surgery.  Hip joint surgery (04/03/2003).  Total laparoscopic hysterectomy and bilateral salpingectomy for abnormal uterine bleeding, uterine fibroids, uteromegaly (03/16/2017).  Colon polypectomy (08/28/2020).  Uterine artery embolization (08/17/2016) for abnormal uterine bleeding secondary to enlarged uterus with multiple fibroids.  Social history: Single.  Lives in [place], [place].  Has 1 child.  Does not work.  Has been smoking a pack of cigarettes daily for 18 years.  Has been drinking 24 beers over the weekend for 16 years.  Denies illicit drug abuse.    Family history: Maternal grandmother experienced lung cancer in her 80s; used to smoke.  Health maintenance:   -07/20/2014: ThinPrep cervical Pap smear: NIL   -08/28/2020: Screening colonoscopy (positive FIT): 5 mm flat polyp ascending colon, removed (pathology: Ascending colon polyp, polypectomy: Adenomatous polyp; no evidence of malignancy) (rescope in 5 years)   -09/25/2020: Screening mammogram (comparison: 05/15/2019): Bilateral breast negative (BI-RADS 1)  Menstrual and OB/GYN history: S/p laparoscopic hysterectomy and bilateral salpingectomy for abnormal uterine bleeding, uterine fibroids, and  uteromegaly (03/16/2017)    History of Present Illness:   50-year-old female referred by surgery, for evaluation and management of anal cancer.    Squamous cell carcinoma of anus:   -Presentation (05/2021): Enlarging anal lump for 1 year; anal pain and intermittent bleeding for 3 weeks   -Fungating anal mass on the right anal verge, approximately 3 cm (05/19/2021)   -On EUA, anal mass extending from right anterior midline almost to posterior midline into anal canal dentate line   -Borderline mesorectal lymph node, 0.8 cm on CT A/P (05/19/2021)   -Anal mass not visualized on CT A/P   -EUA and biopsy (06/16/2021)   -08/12/2021: CT chest with contrast (staging): No acute abnormality   -08/24/2021: PET/CT (comparison: CT chest 08/12/2021; CT A/P 05/19/2021): FDG uptake around the anal canal likely corresponding to known malignancy (maximum SUV 20.8); stable size of small mildly hypermetabolic right mesorectal, pelvic sidewall, and inguinal lymph nodes, nonspecific   -10/01/2021: MRI pelvis with and without contrast (comparison: PET/CT 08/24/2021): Mass along the right anal canal measuring up to 55 mm; suspected area of soft tissue extension into the posterior lower vagina/introitus; several suspicious perirectal lymph nodes; nonspecific borderline enlarged bilateral iliac and inguinal lymph nodes (mass 42 x 17 x 55 mm, extension anal margin, most likely extends through the external sphincter; several prominent perirectal lymph nodes measuring up to 8 mm in short axis; bilateral external iliac lymph nodes also measure up to 8 mm in short axis; bilateral inguinal lymph nodes measure up to 10 mm)   -10/13/2021: Mediport placed   -11/17/2021: Screening mammogram (comparison: 09/25/2020 mammogram): Right breast benign (BI-RADS 2); left breast negative (BI-RADS 1)   -12/09/2021: She called our office stating that she wants to start chemoradiation therapy ASAP and that she is passing blood clots and that tumor in the anus is  bleeding a lot   -01/05/2022: Presented to ED with worsening depression for 3 days; suicidal ideation; feeling overwhelmed; [facility]   -Follows up with ID for latent syphilis (antibody + 01/06/2022 at [facility]; no prior treatment; ID planning to treat with Bicillin LA 2,400,000 units weekly x3 weeks)   -01/31/2022: Restaging CT C/A/P with contrast: Soft tissue along the right anal canal is stable to perhaps slightly larger since 10/01/2021 (43 x 31 mm, previously 42 x 23 mm); slight enlargement of the left internal iliac lymph node, now measuring up to 10 mm; multiple other pelvic lymph nodes are stable; no metastasis; trace ascites   -01/31/2022: Whole-body nuclear medicine bone scan: No bone metastasis   -01/21/2022: CMP unremarkable; mild neutrophilic leukocytosis, hemoglobin 11.9   -01/25/2022: Syphilis antibody positive; HIV negative; RPR nonreactive   -Chemotherapy started 03/02/2022   -Day 29 mitomycin C on 03/31/2022   -Follows up with ID for history of latent syphilis; s/p Bicillin LA 2,400,000 units weekly x3 (01/25/2022, 02/01/2022, 02/08/2022)  -S/P radiotherapy to pelvis 03/10/2022-05/04/2022  -09/07/2022: CT temporal bone without contrast (mixed conductive and sensorineural hearing loss):  Unremarkable temporal bone CT  -09/07/2022:  MRI temporal bones with and without contrast (hearing loss):  1. No acute intracranial abnormality.  2. Right IAC vascular loop, type II.  Otherwise no abnormality of the internal auditory canals or membranous labyrinths.  -10/28/2022: Seen in Oncology Clinic (NP):  2 week history of bright red bleeding from rectum.  -11/01/2022: Seen in surgical clinic for evaluation of blood both on toilet paper and when she wipes and also sees in toilet; occasional blood clots on tissue paper.  ER referred her to surgery for EUA and anoscopy.  Rectal examination by them showed postradiation scarring of skin around the anus but no masses palpable  on PERLA.  -11/29/2022:  CTs C/A/P with contrast (comparison:  01/31/2022):  1. Some bladder and rectal wall thickening may be treatment related.  2. Similar small pelvic sidewall and iliac lymph nodes.  3. No new suspicious findings chest, abdomen or pelvis.    Interval History 7/2/25: Patient presents alone for scheduled follow up for history of rectal cancer.   She reports doing well without any reportable symptoms. She reports normal and consistent bowel movements.  She denies any abdominal pain, blood in urine/stool, changes in bowel or bladder patterns, back pain, rectal pain or any unintentional weight loss.  She reports good appetite and energy level. She is current with surveillance CT scans.  Lab work reviewed with patient, hemanth.   Discussed plan of care and follow-up appointments with the patient.    Review of Systems   All other systems reviewed and are negative.    Physical Exam  Constitutional:       Appearance: Normal appearance.   HENT:      Head: Normocephalic.      Mouth/Throat:      Mouth: Mucous membranes are moist.   Eyes:      Pupils: Pupils are equal, round, and reactive to light.   Cardiovascular:      Rate and Rhythm: Normal rate and regular rhythm.      Pulses: Normal pulses.      Heart sounds: Normal heart sounds.   Pulmonary:      Effort: Pulmonary effort is normal.      Breath sounds: Normal breath sounds.   Abdominal:      General: Bowel sounds are normal.      Palpations: Abdomen is soft.   Musculoskeletal:         General: Normal range of motion.      Cervical back: Normal range of motion.   Skin:     General: Skin is warm and dry.      Capillary Refill: Capillary refill takes less than 2 seconds.   Neurological:      General: No focal deficit present.      Mental Status: She is alert and oriented to person, place, and time.   Psychiatric:         Attention and Perception: Attention normal.         Mood and Affect: Affect normal.         Speech: Speech normal.         Behavior:  Behavior normal.         Thought Content: Thought content normal.         VITAL SIGNS:   There were no vitals filed for this visit.          Assessment:  Squamous cell carcinoma of anus:   -Presentation (05/2021): Enlarging anal lump for 1 year; anal pain and intermittent bleeding for 3 weeks   -Fungating anal mass right anal verge   -S/p EUA (06/16/2021)   -Mass 3 cm on physical exam (05/19/2021); mass not visualized on CT A/P; mass 55 mm on MRI; mass extending into lower vagina/introitus on MRI   -Several suspicious perirectal and nonspecific borderline enlarged bilateral iliac, mesorectal, and inguinal lymph nodes on MRI and PET/CT   >>> 01/21/2022: cT4 cN1c M0, at least stage IIIC   -Noncompliant with follow-up   -Restaging CTs and bone scan (01/31/2022): 43 x 31 mm right anal canal soft tissue (anal cancer); left internal iliac lymph node 10 mm, slightly enlarged; other pelvic lymph nodes stable; no metastasis   -Of note, her restaging PET/CT and pelvic MRI scan was denied by her insurance.   -S/p chemotherapy 03/02/2022-03/31/2022  -S/P radiotherapy to pelvis 03/10/2022-05/04/2022  -rectal bleeding, starting 10/2022; evaluated by surgery 11/01/2022; EUA and anoscopy planned  -no recurrence or metastasis on CTs C/A/P with contrast 11/01/2022  -02/17/2023:  Anal exam under anesthesia:  Operative findings:  Radiation proctitis; no ulceration or masses appreciated  -03/21/2023 follow-up visit: Doing well  -07/13/2023: Bilateral screening mammogram with tomosynthesis (comparison:  11/17/2021 mammogram, etc.):  Right breast benign (BI-RADS 2); left breast negative (BI-RADS 1)  -09/13/2023:  Anal exam under anesthesia:  Right anterior radiation scar tissue present at the site of prior radiation therapy; no masses or lesions noted  -ALEX on surveillance CTs C/A/P with contrast 11/15/2023    #Tobacco abuse, alcohol abuse     #History of depression, suicidal ideation    #Latent syphilis: Syphilis antibody positive  (11/25/2022); following up with ID   -S/p Bicillin LA 2,400,000 units weekly x3 (01/25/2022, 02/01/2022, 02/08/2022)    #Comorbidities: Chronic low back pain, chronic neck pain, obesity, HOLLY, on CPAP, etc.      Plan:   Squamous cell carcinoma of anus    -S/p chemotherapy 03/02/2022-03/31/2022  -S/P radiotherapy to pelvis 03/10/2022-05/04/2022  >>>  Continue surveillance (see below for plan)  PERLA every 3-6 months for 5 years (deferred to surgery)   Inguinal lymph node palpation every 3-6 months for 5 years (05/2022-05/2027)  Anoscopy every 6-12 months for 3 years (will defer to surgery)   CT C/A/P with contrast or CT chest without contrast and abdominal/pelvic MRI with contrast annually for 3 years (stages 2-3) (05/2022-05/2025)  >>>  -rectal bleeding, starting 10/2022; evaluated by surgery 11/01/2022; EUA and anoscopy planned  -no recurrence or metastasis on CTs C/A/P with contrast 11/01/2022  -02/17/2023:  Anal exam under anesthesia:  Operative findings:  Radiation proctitis; no ulceration or masses appreciated  >>>    ALEX recurrence, Continue surveillance  Schedule CT Chest/Abd/pelvis in November- orders placed today   RTC with MD in Nov 1 week after CT scan with labs prior (CBC/CMP) to discuss CT scans- Rectal cancer surveillance patient   Continue to Follow-up with surgery for anoscopy/examination under anesthesia  Surveillance CT scans of C/A/P with contrast in November 2025      Chemotherapy administered concurrent with radiotherapy:   1.  Capecitabine 825 mg/m² p.o. twice daily, Monday-Friday, on each day that radiation therapy is given, throughout the duration of radiation therapy (typically 28 treatment days);   2.  Mitomycin 10 mg/m² days 1 and 29.     -It is not the standard of care to repeat imaging studies after completion of chemoradiation therapy.  Imaging studies are recommended only if, on 8-12-week evaluation, there is persistent disease.      Surveillance: (After complete remission):   1.  PERLA  every 3-6 months for 5 years (will defer to surgery)   2.  Inguinal lymph node palpation every 3-6 months for 5 years   (05/2022-05/2027)   3.  Anoscopy every 6-12 months for 3 years (will defer to surgery)   4.  CT chest/abdomen/pelvis with contrast or chest CT without contrast and abdominal/pelvic MRI with contrast annually for 3 years (stage II-III)   (05/2022-05/2025)       Above discussed at length with the patient.  All questions answered.   She understands and agrees this plan.   ------------------  For response assessment, see below.    Follow-up:  Evaluate in 8-12 weeks (after completion of chemoradiation therapy) with exam + PERLA:   1.  If complete remission, then: Surveillance;     2.  If persistent disease, then: Reevaluate in 4 weeks, including imaging studies; if progression or no progression on serial exams, then continue observation and reevaluation at 3-month intervals, utilizing imaging studies; if complete remission, then surveillance   (Follow patients who have not achieved a complete clinical response with persistent anal cancer up to 6 months following completion of RT and chemotherapy as long as there is no evidence of progressive disease during this period of follow-up; persistent disease may continue to regress even at 26 weeks from the start of treatment)     3.  If progressive disease, then: If biopsy-proven, then restage, including imaging studies; if locally recurrent, then, APR/groin dissection if positive inguinal lymph nodes, followed by surveillance; if, on restaging, metastatic disease, then, systemic therapy   (Follow patients who have not achieved a complete clinical response with persistent anal cancer up to 6 months following completion of RT and chemotherapy as long as there is no evidence of progressive disease during this period of follow-up; persistent disease may continue to regress even at 26 weeks from the start of treatment)      Surveillance: (After complete  remission):   1.  PERLA every 3-6 months for 5 years (will defer to surgery)   2.  Inguinal lymph node palpation every 3-6 months for 5 years   3.  Anoscopy every 6-12 months for 3 years (will defer to surgery)   4.  CT chest/abdomen/pelvis with contrast or chest CT without contrast and abdominal/pelvic MRI with contrast annually for 3 years (stage II-III)

## 2025-07-02 ENCOUNTER — OFFICE VISIT (OUTPATIENT)
Dept: HEMATOLOGY/ONCOLOGY | Facility: CLINIC | Age: 55
End: 2025-07-02
Attending: INTERNAL MEDICINE
Payer: MEDICAID

## 2025-07-02 VITALS
HEIGHT: 65 IN | BODY MASS INDEX: 35.79 KG/M2 | RESPIRATION RATE: 16 BRPM | TEMPERATURE: 99 F | DIASTOLIC BLOOD PRESSURE: 84 MMHG | WEIGHT: 214.81 LBS | SYSTOLIC BLOOD PRESSURE: 127 MMHG | HEART RATE: 96 BPM | OXYGEN SATURATION: 99 %

## 2025-07-02 DIAGNOSIS — Z85.048 ENCOUNTER FOR FOLLOW-UP SURVEILLANCE OF ANAL CANCER: ICD-10-CM

## 2025-07-02 DIAGNOSIS — Z85.048 HISTORY OF RECTAL OR ANAL CANCER: Primary | ICD-10-CM

## 2025-07-02 DIAGNOSIS — Z08 ENCOUNTER FOR FOLLOW-UP SURVEILLANCE OF ANAL CANCER: ICD-10-CM

## 2025-07-02 DIAGNOSIS — Z85.048 HISTORY OF RECTAL OR ANAL CANCER: ICD-10-CM

## 2025-07-02 DIAGNOSIS — C21.0 SQUAMOUS CELL CANCER, ANUS: ICD-10-CM

## 2025-07-02 LAB
ALBUMIN SERPL-MCNC: 3.6 G/DL (ref 3.5–5)
ALBUMIN/GLOB SERPL: 0.9 RATIO (ref 1.1–2)
ALP SERPL-CCNC: 82 UNIT/L (ref 40–150)
ALT SERPL-CCNC: 53 UNIT/L (ref 0–55)
ANION GAP SERPL CALC-SCNC: 8 MEQ/L
AST SERPL-CCNC: 35 UNIT/L (ref 11–45)
BASOPHILS # BLD AUTO: 0.02 X10(3)/MCL
BASOPHILS NFR BLD AUTO: 0.2 %
BILIRUB SERPL-MCNC: 0.6 MG/DL
BUN SERPL-MCNC: 9.1 MG/DL (ref 9.8–20.1)
CALCIUM SERPL-MCNC: 9.3 MG/DL (ref 8.4–10.2)
CHLORIDE SERPL-SCNC: 107 MMOL/L (ref 98–107)
CO2 SERPL-SCNC: 23 MMOL/L (ref 22–29)
CREAT SERPL-MCNC: 0.94 MG/DL (ref 0.55–1.02)
CREAT/UREA NIT SERPL: 10
EOSINOPHIL # BLD AUTO: 0.13 X10(3)/MCL (ref 0–0.9)
EOSINOPHIL NFR BLD AUTO: 1.5 %
ERYTHROCYTE [DISTWIDTH] IN BLOOD BY AUTOMATED COUNT: 15.2 % (ref 11.5–17)
GFR SERPLBLD CREATININE-BSD FMLA CKD-EPI: >60 ML/MIN/1.73/M2
GLOBULIN SER-MCNC: 4.2 GM/DL (ref 2.4–3.5)
GLUCOSE SERPL-MCNC: 169 MG/DL (ref 74–100)
HCT VFR BLD AUTO: 40.5 % (ref 37–47)
HGB BLD-MCNC: 12.9 G/DL (ref 12–16)
IMM GRANULOCYTES # BLD AUTO: 0.03 X10(3)/MCL (ref 0–0.04)
IMM GRANULOCYTES NFR BLD AUTO: 0.3 %
LYMPHOCYTES # BLD AUTO: 1.58 X10(3)/MCL (ref 0.6–4.6)
LYMPHOCYTES NFR BLD AUTO: 17.8 %
MCH RBC QN AUTO: 27 PG (ref 27–31)
MCHC RBC AUTO-ENTMCNC: 31.9 G/DL (ref 33–36)
MCV RBC AUTO: 84.7 FL (ref 80–94)
MONOCYTES # BLD AUTO: 0.63 X10(3)/MCL (ref 0.1–1.3)
MONOCYTES NFR BLD AUTO: 7.1 %
NEUTROPHILS # BLD AUTO: 6.51 X10(3)/MCL (ref 2.1–9.2)
NEUTROPHILS NFR BLD AUTO: 73.1 %
NRBC BLD AUTO-RTO: 0 %
PLATELET # BLD AUTO: 201 X10(3)/MCL (ref 130–400)
PMV BLD AUTO: 9.1 FL (ref 7.4–10.4)
POTASSIUM SERPL-SCNC: 4 MMOL/L (ref 3.5–5.1)
PROT SERPL-MCNC: 7.8 GM/DL (ref 6.4–8.3)
RBC # BLD AUTO: 4.78 X10(6)/MCL (ref 4.2–5.4)
SODIUM SERPL-SCNC: 138 MMOL/L (ref 136–145)
WBC # BLD AUTO: 8.9 X10(3)/MCL (ref 4.5–11.5)

## 2025-07-02 PROCEDURE — 99214 OFFICE O/P EST MOD 30 MIN: CPT | Mod: PBBFAC

## 2025-07-02 PROCEDURE — 85025 COMPLETE CBC W/AUTO DIFF WBC: CPT

## 2025-07-02 PROCEDURE — 80053 COMPREHEN METABOLIC PANEL: CPT

## 2025-07-02 NOTE — Clinical Note
- Schedule CT Chest/Abd/pelvis in November - RTC with MD in Nov 1 week after CT scan with labs prior (CBC/CMP) to discuss CT scans- Rectal cancer surveillance patient

## 2025-07-08 ENCOUNTER — INFUSION (OUTPATIENT)
Dept: INFUSION THERAPY | Facility: HOSPITAL | Age: 55
End: 2025-07-08
Attending: INTERNAL MEDICINE
Payer: MEDICAID

## 2025-07-08 VITALS
HEART RATE: 91 BPM | TEMPERATURE: 98 F | BODY MASS INDEX: 36.29 KG/M2 | DIASTOLIC BLOOD PRESSURE: 96 MMHG | RESPIRATION RATE: 18 BRPM | HEIGHT: 65 IN | SYSTOLIC BLOOD PRESSURE: 136 MMHG | WEIGHT: 217.81 LBS | OXYGEN SATURATION: 98 %

## 2025-07-08 DIAGNOSIS — Z85.048 HISTORY OF RECTAL OR ANAL CANCER: Primary | ICD-10-CM

## 2025-07-08 PROCEDURE — 96523 IRRIG DRUG DELIVERY DEVICE: CPT

## 2025-07-08 PROCEDURE — A4216 STERILE WATER/SALINE, 10 ML: HCPCS | Performed by: INTERNAL MEDICINE

## 2025-07-08 PROCEDURE — 63600175 PHARM REV CODE 636 W HCPCS: Performed by: INTERNAL MEDICINE

## 2025-07-08 PROCEDURE — 25000003 PHARM REV CODE 250: Performed by: INTERNAL MEDICINE

## 2025-07-08 RX ORDER — SODIUM CHLORIDE 0.9 % (FLUSH) 0.9 %
10 SYRINGE (ML) INJECTION
OUTPATIENT
Start: 2025-07-08

## 2025-07-08 RX ORDER — HEPARIN 100 UNIT/ML
500 SYRINGE INTRAVENOUS
Status: DISCONTINUED | OUTPATIENT
Start: 2025-07-08 | End: 2025-07-08 | Stop reason: HOSPADM

## 2025-07-08 RX ORDER — HEPARIN 100 UNIT/ML
500 SYRINGE INTRAVENOUS
OUTPATIENT
Start: 2025-07-08

## 2025-07-08 RX ORDER — SODIUM CHLORIDE 0.9 % (FLUSH) 0.9 %
10 SYRINGE (ML) INJECTION
Status: DISCONTINUED | OUTPATIENT
Start: 2025-07-08 | End: 2025-07-08 | Stop reason: HOSPADM

## 2025-07-08 RX ADMIN — SODIUM CHLORIDE, PRESERVATIVE FREE 10 ML: 5 INJECTION INTRAVENOUS at 08:07

## 2025-07-08 RX ADMIN — HEPARIN 500 UNITS: 100 SYRINGE at 08:07

## 2025-07-08 NOTE — NURSING
Pt ambulated to room with steady gait, alert & oriented x4. Pt requested ice to numb access site. Educated pt on applying numbing cream at least 30-45 mins before arriving to appt.

## 2025-07-09 ENCOUNTER — LAB VISIT (OUTPATIENT)
Dept: LAB | Facility: HOSPITAL | Age: 55
End: 2025-07-09
Attending: INTERNAL MEDICINE
Payer: MEDICAID

## 2025-07-09 ENCOUNTER — OFFICE VISIT (OUTPATIENT)
Dept: INTERNAL MEDICINE | Facility: CLINIC | Age: 55
End: 2025-07-09
Payer: MEDICAID

## 2025-07-09 VITALS
BODY MASS INDEX: 36.14 KG/M2 | SYSTOLIC BLOOD PRESSURE: 124 MMHG | TEMPERATURE: 99 F | WEIGHT: 217.19 LBS | OXYGEN SATURATION: 100 % | HEART RATE: 86 BPM | RESPIRATION RATE: 20 BRPM | DIASTOLIC BLOOD PRESSURE: 79 MMHG

## 2025-07-09 DIAGNOSIS — M54.41 CHRONIC BILATERAL LOW BACK PAIN WITH BILATERAL SCIATICA: ICD-10-CM

## 2025-07-09 DIAGNOSIS — F32.A DEPRESSIVE DISORDER: ICD-10-CM

## 2025-07-09 DIAGNOSIS — B00.9 HERPES: Primary | Chronic | ICD-10-CM

## 2025-07-09 DIAGNOSIS — E08.00 DIABETES MELLITUS DUE TO UNDERLYING CONDITION WITH HYPEROSMOLARITY WITHOUT COMA, WITHOUT LONG-TERM CURRENT USE OF INSULIN: ICD-10-CM

## 2025-07-09 DIAGNOSIS — F41.9 ANXIETY DISORDER, UNSPECIFIED TYPE: ICD-10-CM

## 2025-07-09 DIAGNOSIS — G89.29 CHRONIC BILATERAL LOW BACK PAIN WITH BILATERAL SCIATICA: ICD-10-CM

## 2025-07-09 DIAGNOSIS — E78.5 HYPERLIPIDEMIA, UNSPECIFIED HYPERLIPIDEMIA TYPE: ICD-10-CM

## 2025-07-09 DIAGNOSIS — E11.65 TYPE 2 DIABETES MELLITUS WITH HYPERGLYCEMIA, UNSPECIFIED WHETHER LONG TERM INSULIN USE: ICD-10-CM

## 2025-07-09 DIAGNOSIS — M54.42 CHRONIC BILATERAL LOW BACK PAIN WITH BILATERAL SCIATICA: ICD-10-CM

## 2025-07-09 DIAGNOSIS — C21.0 SQUAMOUS CELL CANCER, ANUS: ICD-10-CM

## 2025-07-09 DIAGNOSIS — N89.8 VAGINAL IRRITATION: ICD-10-CM

## 2025-07-09 DIAGNOSIS — Z12.39 ENCOUNTER FOR SCREENING FOR MALIGNANT NEOPLASM OF BREAST, UNSPECIFIED SCREENING MODALITY: ICD-10-CM

## 2025-07-09 PROBLEM — E11.9 DM (DIABETES MELLITUS), TYPE 2: Status: ACTIVE | Noted: 2025-07-09

## 2025-07-09 LAB
ALBUMIN SERPL-MCNC: 3.6 G/DL (ref 3.5–5)
ALBUMIN/GLOB SERPL: 0.8 RATIO (ref 1.1–2)
ALP SERPL-CCNC: 94 UNIT/L (ref 40–150)
ALT SERPL-CCNC: 94 UNIT/L (ref 0–55)
ANION GAP SERPL CALC-SCNC: 12 MEQ/L
AST SERPL-CCNC: 59 UNIT/L (ref 11–45)
BASOPHILS # BLD AUTO: 0.02 X10(3)/MCL
BASOPHILS NFR BLD AUTO: 0.3 %
BILIRUB SERPL-MCNC: 0.4 MG/DL
BUN SERPL-MCNC: 7.7 MG/DL (ref 9.8–20.1)
CALCIUM SERPL-MCNC: 9.2 MG/DL (ref 8.4–10.2)
CHLORIDE SERPL-SCNC: 106 MMOL/L (ref 98–107)
CO2 SERPL-SCNC: 23 MMOL/L (ref 22–29)
CREAT SERPL-MCNC: 0.75 MG/DL (ref 0.55–1.02)
CREAT/UREA NIT SERPL: 10
EOSINOPHIL # BLD AUTO: 0.15 X10(3)/MCL (ref 0–0.9)
EOSINOPHIL NFR BLD AUTO: 2.2 %
ERYTHROCYTE [DISTWIDTH] IN BLOOD BY AUTOMATED COUNT: 14.9 % (ref 11.5–17)
EST. AVERAGE GLUCOSE BLD GHB EST-MCNC: 162.8 MG/DL
GFR SERPLBLD CREATININE-BSD FMLA CKD-EPI: >60 ML/MIN/1.73/M2
GLOBULIN SER-MCNC: 4.7 GM/DL (ref 2.4–3.5)
GLUCOSE SERPL-MCNC: 137 MG/DL (ref 74–100)
HBA1C MFR BLD: 7.3 %
HCT VFR BLD AUTO: 43.1 % (ref 37–47)
HGB BLD-MCNC: 13.9 G/DL (ref 12–16)
IMM GRANULOCYTES # BLD AUTO: 0.02 X10(3)/MCL (ref 0–0.04)
IMM GRANULOCYTES NFR BLD AUTO: 0.3 %
LYMPHOCYTES # BLD AUTO: 1.67 X10(3)/MCL (ref 0.6–4.6)
LYMPHOCYTES NFR BLD AUTO: 24.7 %
MCH RBC QN AUTO: 26.8 PG (ref 27–31)
MCHC RBC AUTO-ENTMCNC: 32.3 G/DL (ref 33–36)
MCV RBC AUTO: 83.2 FL (ref 80–94)
MONOCYTES # BLD AUTO: 0.66 X10(3)/MCL (ref 0.1–1.3)
MONOCYTES NFR BLD AUTO: 9.7 %
NEUTROPHILS # BLD AUTO: 4.25 X10(3)/MCL (ref 2.1–9.2)
NEUTROPHILS NFR BLD AUTO: 62.8 %
NRBC BLD AUTO-RTO: 0 %
PLATELET # BLD AUTO: 203 X10(3)/MCL (ref 130–400)
PMV BLD AUTO: 8.6 FL (ref 7.4–10.4)
POTASSIUM SERPL-SCNC: 4.1 MMOL/L (ref 3.5–5.1)
PROT SERPL-MCNC: 8.3 GM/DL (ref 6.4–8.3)
RBC # BLD AUTO: 5.18 X10(6)/MCL (ref 4.2–5.4)
SODIUM SERPL-SCNC: 141 MMOL/L (ref 136–145)
WBC # BLD AUTO: 6.77 X10(3)/MCL (ref 4.5–11.5)

## 2025-07-09 PROCEDURE — 99215 OFFICE O/P EST HI 40 MIN: CPT | Mod: PBBFAC

## 2025-07-09 PROCEDURE — 83036 HEMOGLOBIN GLYCOSYLATED A1C: CPT

## 2025-07-09 PROCEDURE — 85025 COMPLETE CBC W/AUTO DIFF WBC: CPT

## 2025-07-09 PROCEDURE — 80053 COMPREHEN METABOLIC PANEL: CPT

## 2025-07-09 PROCEDURE — 36415 COLL VENOUS BLD VENIPUNCTURE: CPT

## 2025-07-09 RX ORDER — OMEGA-3S/DHA/EPA/FISH OIL 300-1000MG
1000 CAPSULE ORAL DAILY
Qty: 30 CAPSULE | Refills: 3 | Status: SHIPPED | OUTPATIENT
Start: 2025-07-09

## 2025-07-09 RX ORDER — ESCITALOPRAM OXALATE 10 MG/1
10 TABLET ORAL DAILY
Qty: 90 TABLET | Refills: 3 | Status: SHIPPED | OUTPATIENT
Start: 2025-07-09 | End: 2026-07-09

## 2025-07-09 RX ORDER — SEMAGLUTIDE 0.68 MG/ML
0.25 INJECTION, SOLUTION SUBCUTANEOUS
Qty: 4 EACH | Refills: 0 | Status: SHIPPED | OUTPATIENT
Start: 2025-07-09

## 2025-07-09 RX ORDER — VALACYCLOVIR HYDROCHLORIDE 1 G/1
1000 TABLET, FILM COATED ORAL EVERY 12 HOURS
Qty: 20 TABLET | Refills: 0 | Status: SHIPPED | OUTPATIENT
Start: 2025-07-09 | End: 2025-07-19

## 2025-07-09 RX ORDER — ATORVASTATIN CALCIUM 40 MG/1
40 TABLET, FILM COATED ORAL NIGHTLY
Qty: 90 TABLET | Refills: 3 | Status: SHIPPED | OUTPATIENT
Start: 2025-07-09 | End: 2026-07-04
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
## 2025-07-10 DIAGNOSIS — Z12.11 SCREEN FOR COLON CANCER: Primary | ICD-10-CM

## 2025-07-10 RX ORDER — POLYETHYLENE GLYCOL 3350, SODIUM SULFATE, SODIUM CHLORIDE, POTASSIUM CHLORIDE, SODIUM ASCORBATE, AND ASCORBIC ACID 7.5-2.691G
KIT ORAL
Qty: 1 KIT | Refills: 0 | Status: SHIPPED | OUTPATIENT
Start: 2025-07-10

## 2025-07-17 ENCOUNTER — HOSPITAL ENCOUNTER (OUTPATIENT)
Facility: HOSPITAL | Age: 55
Discharge: HOME | End: 2025-07-17
Attending: INTERNAL MEDICINE | Admitting: INTERNAL MEDICINE
Payer: MEDICAID

## 2025-07-17 ENCOUNTER — ANESTHESIA (OUTPATIENT)
Dept: ENDOSCOPY | Facility: HOSPITAL | Age: 55
End: 2025-07-17
Payer: MEDICAID

## 2025-07-17 ENCOUNTER — ANESTHESIA EVENT (OUTPATIENT)
Dept: ENDOSCOPY | Facility: HOSPITAL | Age: 55
End: 2025-07-17
Payer: MEDICAID

## 2025-07-17 DIAGNOSIS — Z98.890 S/P COLONOSCOPY: Primary | ICD-10-CM

## 2025-07-17 LAB — POCT GLUCOSE: 164 MG/DL (ref 70–110)

## 2025-07-17 PROCEDURE — 45378 DIAGNOSTIC COLONOSCOPY: CPT | Mod: 52,,, | Performed by: INTERNAL MEDICINE

## 2025-07-17 PROCEDURE — G0121 COLON CA SCRN NOT HI RSK IND: HCPCS | Mod: 74 | Performed by: INTERNAL MEDICINE

## 2025-07-17 PROCEDURE — 63600175 PHARM REV CODE 636 W HCPCS: Performed by: NURSE ANESTHETIST, CERTIFIED REGISTERED

## 2025-07-17 PROCEDURE — 82962 GLUCOSE BLOOD TEST: CPT | Performed by: INTERNAL MEDICINE

## 2025-07-17 PROCEDURE — 37000009 HC ANESTHESIA EA ADD 15 MINS: Performed by: INTERNAL MEDICINE

## 2025-07-17 PROCEDURE — 45378 DIAGNOSTIC COLONOSCOPY: CPT | Performed by: INTERNAL MEDICINE

## 2025-07-17 PROCEDURE — 63600175 PHARM REV CODE 636 W HCPCS: Performed by: ANESTHESIOLOGY

## 2025-07-17 PROCEDURE — 37000008 HC ANESTHESIA 1ST 15 MINUTES: Performed by: INTERNAL MEDICINE

## 2025-07-17 RX ORDER — SODIUM CHLORIDE, SODIUM LACTATE, POTASSIUM CHLORIDE, CALCIUM CHLORIDE 600; 310; 30; 20 MG/100ML; MG/100ML; MG/100ML; MG/100ML
INJECTION, SOLUTION INTRAVENOUS CONTINUOUS
Status: DISCONTINUED | OUTPATIENT
Start: 2025-07-17 | End: 2025-07-17 | Stop reason: HOSPADM

## 2025-07-17 RX ORDER — PROPOFOL 10 MG/ML
VIAL (ML) INTRAVENOUS
Status: DISCONTINUED | OUTPATIENT
Start: 2025-07-17 | End: 2025-07-17

## 2025-07-17 RX ORDER — LIDOCAINE HYDROCHLORIDE 20 MG/ML
INJECTION, SOLUTION EPIDURAL; INFILTRATION; INTRACAUDAL; PERINEURAL
Status: DISCONTINUED | OUTPATIENT
Start: 2025-07-17 | End: 2025-07-17

## 2025-07-17 RX ORDER — LIDOCAINE HYDROCHLORIDE 10 MG/ML
1 INJECTION, SOLUTION EPIDURAL; INFILTRATION; INTRACAUDAL; PERINEURAL ONCE
OUTPATIENT
Start: 2025-07-17 | End: 2025-07-17

## 2025-07-17 RX ADMIN — PROPOFOL 30 MG: 10 INJECTION, EMULSION INTRAVENOUS at 10:07

## 2025-07-17 RX ADMIN — PROPOFOL 40 MG: 10 INJECTION, EMULSION INTRAVENOUS at 09:07

## 2025-07-17 RX ADMIN — PROPOFOL 30 MG: 10 INJECTION, EMULSION INTRAVENOUS at 09:07

## 2025-07-17 RX ADMIN — PROPOFOL 40 MG: 10 INJECTION, EMULSION INTRAVENOUS at 10:07

## 2025-07-17 RX ADMIN — SODIUM CHLORIDE, POTASSIUM CHLORIDE, SODIUM LACTATE AND CALCIUM CHLORIDE: 600; 310; 30; 20 INJECTION, SOLUTION INTRAVENOUS at 09:07

## 2025-07-17 RX ADMIN — LIDOCAINE HYDROCHLORIDE 100 MG: 20 INJECTION, SOLUTION EPIDURAL; INFILTRATION; INTRACAUDAL; PERINEURAL at 09:07

## 2025-07-17 RX ADMIN — PROPOFOL 50 MG: 10 INJECTION, EMULSION INTRAVENOUS at 10:07

## 2025-07-17 RX ADMIN — PROPOFOL 130 MG: 10 INJECTION, EMULSION INTRAVENOUS at 09:07

## 2025-07-17 NOTE — ANESTHESIA PREPROCEDURE EVALUATION
"                                                                                                             07/17/2025  Antoine Banegas is a 54 y.o., female.    Pre-operative evaluation for Procedure(s) (LRB):  COLONOSCOPY (N/A)    Antoine Banegas is a 54 y.o. female     Problem List[1]    Review of patient's allergies indicates:  No Known Allergies    Medications Ordered Prior to Encounter[2]    Past Surgical History:   Procedure Laterality Date    COLONOSCOPY  08/28/2020    DIGITAL RECTAL EXAMINATION UNDER ANESTHESIA N/A 5/28/2025    Procedure: EXAM UNDER ANESTHESIA, DIGITAL, RECTUM;  Surgeon: Lai Robertson MD;  Location: [facility] OR;  Service: General;  Laterality: N/A;    EXAMINATION UNDER ANESTHESIA N/A 2/17/2023    Procedure: Anal exam under anesthesia;  Surgeon: Lai Robertson MD;  Location: [facility] OR;  Service: Colon and Rectal;  Laterality: N/A;    EXAMINATION UNDER ANESTHESIA N/A 9/13/2023    Procedure: ANAL Exam under anesthesia;  Surgeon: Lai Robertson MD;  Location: [facility] OR;  Service: Colon and Rectal;  Laterality: N/A;    EXAMINATION UNDER ANESTHESIA N/A 4/24/2024    Procedure: Exam under anesthesia;  Surgeon: Lai Robertson MD;  Location: [facility] OR;  Service: Colon and Rectal;  Laterality: N/A;  Anoscopy  Patient is outpatient    EXAMINATION UNDER ANESTHESIA N/A 11/15/2024    Procedure: Anoscopy and exam under anesthesia;  Surgeon: Lai Robertson MD;  Location: [facility] OR;  Service: Colon and Rectal;  Laterality: N/A;    HYSTERECTOMY  03/16/2017    INSERTION OF SUBCUTANEOUS PORT Right 10/31/2021    MRI PELVIS UNDER ANESTHESIA  10/01/2021    perianal surgery      PROCTOSCOPY N/A 4/24/2024    Procedure: PROCTOSCOPY;  Surgeon: Lai Robertson MD;  [facility];  Service: Colon and Rectal;  Laterality: N/A;    RECTAL EXAMINATION UNDER ANESTHESIA  06/16/2021       Social History[3]      CBC: No results for input(s): "WBC", "RBC", "HGB", "HCT", "PLT", "MCV", "MCH", " ""MCHC" in the last 72 hours.    CMP: No results for input(s): "NA", "K", "CL", "CO2", "BUN", "CREATININE", "GLU", "MG", "PHOS", "CALCIUM", "ALBUMIN", "PROT", "ALKPHOS", "ALT", "AST", "BILITOT" in the last 72 hours.    INR  No results for input(s): "PT", "INR", "PROTIME", "APTT" in the last 72 hours.        Diagnostic Studies:  CXR :    EKG :      2D Echo :  No results found for this or any previous visit.  Pre-op Assessment    I have reviewed the Patient Summary Reports.     I have reviewed the Nursing Notes. I have reviewed the NPO Status.   I have reviewed the Medications.     Review of Systems  Anesthesia Hx:  No problems with previous Anesthesia   History of prior surgery of interest to airway management or planning:  Previous anesthesia: General 4/24/2024 EUA Proctoscopy: EM w OAW. DL Mil2. GrIIa cords partial ET7.52/23 Exam under anesth anal: LMA; Hysrectomy:; with general anesthesia.  Procedure performed at an [facility].      Airway issues documented on chart review include laryngeal mask airway used, mask, easy, GETA, easy direct laryngoscopy , view on direct laryngoscopy Grade II    Denies Family Hx of Anesthesia complications.    Denies Personal Hx of Anesthesia complications.                    Social:  Smoker, Alcohol Use       Hematology/Oncology:  Hematology Normal                       --  Cancer in past history:       Other (see Oncology comments)       chemotherapy, surgery and radiation   Oncology Comments: Anal CA s/p chemo/RTx     EENT/Dental:  EENT/Dental Normal           Cardiovascular:     Denies Pacemaker.    Denies MI.     Denies CABG/stent.        hyperlipidemia   ECG has been reviewed.                            Pulmonary:        Sleep Apnea, CPAP                Renal/:  Renal/ Normal                 Hepatic/GI:  Hepatic/GI Normal Bowel Prep.    Denies GERD.    Not Taking GLP-1 Agonists            Musculoskeletal:  Arthritis               Neurological:  Neurology Normal   Denies " CVA.    Denies Seizures.                                Endocrine:  Diabetes, well controlled, type 2         Obesity / BMI > 30Denies Morbid Obesity / BMI > 40  Dermatological:  Skin Normal    Psych:   anxiety depression                Physical Exam  General: Cooperative, Alert, Oriented and Flat Affect  Raspy voice  Airway:  Mallampati: III / III  Mouth Opening: Small, but > 3cm  TM Distance: > 6 cm  Neck ROM: Normal ROM    Dental:  Lower molars fractured few    Anesthesia Plan  Type of Anesthesia, risks & benefits discussed:    Anesthesia Type: Gen Natural Airway  Intra-op Monitoring Plan: Standard ASA Monitors  Post Op Pain Control Plan: IV/PO Opioids PRN  Induction:  IV  Informed Consent: Informed consent signed with the Patient and all parties understand the risks and agree with anesthesia plan.  All questions answered.   ASA Score: 3  Day of Surgery Review of History & Physical: H&P Update referred to the surgeon/provider.I have interviewed and examined the patient. I have reviewed the patient's H&P dated:   Anesthesia Plan Notes: TIVA with mask/NC, GA back-up.   Nasal cannula vs facemask supplemental oxygenation   For patients with HOLLY/obesity, may consider SuperNoval Nasal CPAP      Ready For Surgery From Anesthesia Perspective.     .           [1]   Patient Active Problem List  Diagnosis    Anxiety disorder    History of rectal or anal cancer    Chronic low back pain    Depressive disorder    Hyperlipidemia    Malignant neoplasm of anus    Obstructive sleep apnea syndrome    Obesity    Osteoarthritis    Pain of lower extremity    Paraparesis    Tobacco user    Vitamin D deficiency    Dermatitis associated with moisture    Dermatitis    Effect, radiation    Lumbar stenosis with neurogenic claudication    Latent syphilis    Radiation dermatitis    Mixed conductive and sensorineural hearing loss of right ear with restricted hearing of left ear    Sensorineural hearing loss (SNHL) of left ear with restricted  hearing of right ear    Squamous cell cancer, anus    Tobacco abuse    Alcohol abuse    Rectal lesion    Skin tag of anus    Rectal or anal pain    Scar tissue    Other specified disorders of the skin and subcutaneous tissue related to radiation    History of latent syphilis    Encounter for follow-up surveillance of anal cancer    Herpes    DM (diabetes mellitus), type 2   [2]   Current Facility-Administered Medications on File Prior to Encounter   Medication Dose Route Frequency Provider Last Rate Last Admin    diazePAM tablet 10 mg  10 mg Oral Once Marion Salgado FNP        lactated ringers infusion   Intravenous Continuous Breonna Sibley MD 10 mL/hr at 09/13/23 0559 New Bag at 04/24/24 0706    lactated ringers infusion   Intravenous Continuous Candida Jj MD 10 mL/hr at 04/24/24 0602 New Bag at 04/24/24 0602    LIDOcaine (PF) 10 mg/ml (1%) injection 10 mg  1 mL Intradermal Once Marion Salgado FNP         Current Outpatient Medications on File Prior to Encounter   Medication Sig Dispense Refill    atorvastatin (LIPITOR) 40 MG tablet Take 1 tablet (40 mg total) by mouth every evening. 90 tablet 3    EScitalopram oxalate (LEXAPRO) 10 MG tablet Take 1 tablet (10 mg total) by mouth once daily. 90 tablet 3    metFORMIN (GLUCOPHAGE) 1000 MG tablet Take 1 tablet (1,000 mg total) by mouth every evening. 90 tablet 3    omega-3s-dha-epa-fish oil (OMEGA-3 FISH OIL) 300-1,000 mg Cap Take 1,000 mg by mouth once daily. 30 capsule 3    QUEtiapine (SEROQUEL) 25 MG Tab Take 1 tablet (25 mg total) by mouth every evening. 90 tablet 3    valACYclovir (VALTREX) 1000 MG tablet Take 1 tablet (1,000 mg total) by mouth every 12 (twelve) hours. For initial episode. Drink plenty of fluid. for 10 days 20 tablet 0    clotrimazole-betamethasone 1-0.05% (LOTRISONE) cream Apply topically 2 (two) times daily. To external affected vaginal area (Patient not taking: Reported on 4/16/2025) 45 g 1    hydrocortisone  valerate (WEST-CESAR) 0.2 % ointment Apply topically 2 (two) times daily. PRN Use only during flares (Patient not taking: Reported on 4/16/2025) 15 g 1    semaglutide (OZEMPIC) 0.25 mg or 0.5 mg (2 mg/3 mL) pen injector Inject 0.25 mg into the skin every 7 days. 4 each 0   [3]   Social History  Socioeconomic History    Marital status: Single   Tobacco Use    Smoking status: Every Day     Current packs/day: 1.00     Average packs/day: 1 pack/day for 33.9 years (33.9 ttl pk-yrs)     Types: Cigarettes     Start date: 8/11/1991     Passive exposure: Never    Smokeless tobacco: Never   Substance and Sexual Activity    Alcohol use: Yes     Alcohol/week: 6.0 standard drinks of alcohol     Types: 6 Cans of beer per week     Comment: daily    Drug use: Never    Sexual activity: Not Currently     Partners: Male     Social Drivers of Health     Financial Resource Strain: Medium Risk (3/19/2025)    Overall Financial Resource Strain (CARDIA)     Difficulty of Paying Living Expenses: Somewhat hard   Food Insecurity: No Food Insecurity (3/19/2025)    Hunger Vital Sign     Worried About Running Out of Food in the Last Year: Never true     Ran Out of Food in the Last Year: Never true   Transportation Needs: No Transportation Needs (3/19/2025)    PRAPARE - Transportation     Lack of Transportation (Medical): No     Lack of Transportation (Non-Medical): No   Physical Activity: Inactive (3/19/2025)    Exercise Vital Sign     Days of Exercise per Week: 0 days     Minutes of Exercise per Session: 0 min   Stress: Stress Concern Present (3/19/2025)    [employer] [place] [employer] - Occupational Stress Questionnaire     Feeling of Stress : To some extent   Housing Stability: Low Risk  (3/19/2025)    Housing Stability Vital Sign     Unable to Pay for Housing in the Last Year: No     Homeless in the Last Year: No

## 2025-07-17 NOTE — H&P
History and Physical    Patient Name: Antoine Banegas  YOB: 1970  Date: 07/17/2025 8:42 AM  Date of Admission: 7/17/2025  HD#0  POD#* Day of Surgery *    PRESENTING HISTORY   Chief Complaint/Reason for Admission: Colonoscopy     History of Present Illness:  54 y.o. female with PMH seen below presenting today for screening colonoscopy    Denies any recent fever, chills, nausea, vomiting, chest pain, abdominal pain. Patient reports bloody bowel movements which is normal for her.   Abdominal symptoms - Negative  Family history - Negative  Prior colonoscopy - 5 years  Anticoagulation - Negative    Review of Systems:  12 point ROS negative except as stated in HPI    PAST HISTORY:   Past medical history:  Past Medical History:   Diagnosis Date    Abnormal Pap smear of cervix     Anxiety     Cancer     Circulatory anomaly     Herpes 7/9/2025    Hyperlipidemia      Past surgical history:  Past Surgical History:   Procedure Laterality Date    COLONOSCOPY  08/28/2020    DIGITAL RECTAL EXAMINATION UNDER ANESTHESIA N/A 5/28/2025    Procedure: EXAM UNDER ANESTHESIA, DIGITAL, RECTUM;  Surgeon: Lai Robertson MD;  Location: [facility];  Service: General;  Laterality: N/A;    EXAMINATION UNDER ANESTHESIA N/A 2/17/2023    Procedure: Anal exam under anesthesia;  Surgeon: Lai Robertson MD;  Location: [facility] OR;  Service: Colon and Rectal;  Laterality: N/A;    EXAMINATION UNDER ANESTHESIA N/A 9/13/2023    Procedure: ANAL Exam under anesthesia;  Surgeon: Lai Robertson MD;  Location: [facility] OR;  Service: Colon and Rectal;  Laterality: N/A;    EXAMINATION UNDER ANESTHESIA N/A 4/24/2024    Procedure: Exam under anesthesia;  Surgeon: Lai Robertson MD;  Location: [facility] OR;  Service: Colon and Rectal;  Laterality: N/A;  Anoscopy  Patient is outpatient    EXAMINATION UNDER ANESTHESIA N/A 11/15/2024    Procedure: Anoscopy and exam under anesthesia;  Surgeon: Lai Robertson MD;  Location: [facility] OR;   Service: Colon and Rectal;  Laterality: N/A;    HYSTERECTOMY  03/16/2017    INSERTION OF SUBCUTANEOUS PORT Right 10/31/2021    MRI PELVIS UNDER ANESTHESIA  10/01/2021    perianal surgery      PROCTOSCOPY N/A 4/24/2024    Procedure: PROCTOSCOPY;  Surgeon: Lai Robertson MD;  Location: [facility] OR;  Service: Colon and Rectal;  Laterality: N/A;    RECTAL EXAMINATION UNDER ANESTHESIA  06/16/2021     Family history:  Family History   Problem Relation Name Age of Onset    Seizures Mother      Hypertension Mother      Diabetes Mother      No Known Problems Maternal Aunt      No Known Problems Maternal Uncle      No Known Problems Paternal Aunt      No Known Problems Paternal Uncle      No Known Problems Paternal Grandmother      No Known Problems Paternal Grandfather      No Known Problems Maternal Grandmother      No Known Problems Maternal Grandfather      No Known Problems Other       Social history:  Social History     Socioeconomic History    Marital status: Single   Tobacco Use    Smoking status: Every Day     Current packs/day: 1.00     Average packs/day: 1 pack/day for 33.9 years (33.9 ttl pk-yrs)     Types: Cigarettes     Start date: 8/11/1991     Passive exposure: Never    Smokeless tobacco: Never   Substance and Sexual Activity    Alcohol use: Yes     Alcohol/week: 6.0 standard drinks of alcohol     Types: 6 Cans of beer per week     Comment: daily    Drug use: Never    Sexual activity: Not Currently     Partners: Male     Social Drivers of Health     Financial Resource Strain: Medium Risk (3/19/2025)    Overall Financial Resource Strain (CARDIA)     Difficulty of Paying Living Expenses: Somewhat hard   Food Insecurity: No Food Insecurity (3/19/2025)    Hunger Vital Sign     Worried About Running Out of Food in the Last Year: Never true     Ran Out of Food in the Last Year: Never true   Transportation Needs: No Transportation Needs (3/19/2025)    PRAPARE - Transportation     Lack of Transportation (Medical):  No     Lack of Transportation (Non-Medical): No   Physical Activity: Inactive (3/19/2025)    Exercise Vital Sign     Days of Exercise per Week: 0 days     Minutes of Exercise per Session: 0 min   Stress: Stress Concern Present (3/19/2025)    [employer] - Occupational Stress Questionnaire     Feeling of Stress : To some extent   Housing Stability: Low Risk  (3/19/2025)    Housing Stability Vital Sign     Unable to Pay for Housing in the Last Year: No     Homeless in the Last Year: No     Tobacco Use History[1]   Social History     Substance and Sexual Activity   Alcohol Use Yes    Alcohol/week: 6.0 standard drinks of alcohol    Types: 6 Cans of beer per week    Comment: daily      MEDICATIONS & ALLERGIES:     Current Facility-Administered Medications on File Prior to Encounter   Medication    diazePAM tablet 10 mg    lactated ringers infusion    lactated ringers infusion    LIDOcaine (PF) 10 mg/ml (1%) injection 10 mg     Current Outpatient Medications on File Prior to Encounter   Medication Sig    atorvastatin (LIPITOR) 40 MG tablet Take 1 tablet (40 mg total) by mouth every evening.    EScitalopram oxalate (LEXAPRO) 10 MG tablet Take 1 tablet (10 mg total) by mouth once daily.    metFORMIN (GLUCOPHAGE) 1000 MG tablet Take 1 tablet (1,000 mg total) by mouth every evening.    omega-3s-dha-epa-fish oil (OMEGA-3 FISH OIL) 300-1,000 mg Cap Take 1,000 mg by mouth once daily.    QUEtiapine (SEROQUEL) 25 MG Tab Take 1 tablet (25 mg total) by mouth every evening.    valACYclovir (VALTREX) 1000 MG tablet Take 1 tablet (1,000 mg total) by mouth every 12 (twelve) hours. For initial episode. Drink plenty of fluid. for 10 days    clotrimazole-betamethasone 1-0.05% (LOTRISONE) cream Apply topically 2 (two) times daily. To external affected vaginal area (Patient not taking: Reported on 4/16/2025)    hydrocortisone valerate (WEST-CESAR) 0.2 % ointment Apply topically 2 (two) times daily. PRN Use only  "during flares (Patient not taking: Reported on 4/16/2025)    semaglutide (OZEMPIC) 0.25 mg or 0.5 mg (2 mg/3 mL) pen injector Inject 0.25 mg into the skin every 7 days.     Allergies: Review of patient's allergies indicates:  No Known Allergies    OBJECTIVE:   Vital Signs:  VITAL SIGNS: 24 HR MIN & MAX LAST   No data recorded      No data recorded       No data recorded       No data recorded       No data recorded         HT: 5' 6" (167.6 cm)  WT: 98.4 kg (217 lb)  BMI: 35     Intake/output:  Intake/Output - Last 3 Shifts       None          No intake or output data in the 24 hours ending 07/17/25 0842      Physical Exam:  General: Well developed, well nourished, no acute distress  HEENT: Normocephalic, atraumatic, PERRL  CV: RR  Resp: NWOB  GI:  Abdomen soft, non-tender, non-distended, no guarding, no rebound, normoactive bowel sounds, no masses   :  Deferred  MSK: No muscle atrophy, cyanosis, peripheral edema, moving all extremities spontaneously  Skin/wounds:  No rashes, ulcers, erythema  Neuro:  CNII-XII grossly intact, alert and oriented to person, place, and time    Labs:  Troponin:  No results for input(s): "TROPONINI" in the last 72 hours.  CBC:  No results for input(s): "WBC", "RBC", "HGB", "HCT", "PLT", "MCV", "MCH", "MCHC" in the last 72 hours.  CMP:  No results for input(s): "GLU", "CALCIUM", "ALBUMIN", "PROT", "NA", "K", "CO2", "CL", "BUN", "CREATININE", "ALKPHOS", "ALT", "AST", "BILITOT" in the last 72 hours.  Lactic Acid:  No results for input(s): "LACTATE" in the last 72 hours.  ETOH:  No results for input(s): "ETHANOL" in the last 72 hours.   Urine Drug Screen:  No results for input(s): "COCAINE", "OPIATE", "BARBITURATE", "AMPHETAMINE", "FENTANYL", "CANNABINOIDS", "MDMA" in the last 72 hours.    Invalid input(s): "BENZODIAZEPINE", "PHENCYCLIDINE"   ABG:  No results for input(s): "PH", "PO2", "PCO2", "HCO3", "BE" in the last 168 hours.     Diagnostic Results:  No orders to display     ASSESSMENT & " PLAN:    Antoine Banegas is a 54 y.o. female presenting today for screening colonoscopy, prep complete, npo, understands risk and benefits and willing to proceed with procedure.     Consent signed at bedside  Endo suite today for screening colonoscopy    Addison Weiss MD  [employer] General Surgery, PGY-2  07/17/2025         [1]   Social History  Tobacco Use   Smoking Status Every Day    Current packs/day: 1.00    Average packs/day: 1 pack/day for 33.9 years (33.9 ttl pk-yrs)    Types: Cigarettes    Start date: 8/11/1991    Passive exposure: Never   Smokeless Tobacco Never

## 2025-07-17 NOTE — ANESTHESIA POSTPROCEDURE EVALUATION
Anesthesia Post Evaluation    Patient: Antoine Banegas    Procedure(s) Performed: Procedure(s) (LRB):  COLONOSCOPY (N/A)    Final Anesthesia Type: general      Patient location during evaluation: GI PACU  Patient participation: Yes- Able to Participate  Level of consciousness: awake and alert  Post-procedure vital signs: reviewed and stable  Pain management: adequate  Airway patency: patent  HOLLY mitigation strategies: Preoperative initiation of continuous positive airway pressure (CPAP) or non-invasive positive pressure ventilation (NIPPV) and Intraoperative administration of CPAP, nasopharyngeal airway, or oral appliance during sedation  PONV status at discharge: No PONV  Anesthetic complications: no      Cardiovascular status: hemodynamically stable  Respiratory status: spontaneous ventilation  Hydration status: euvolemic  Follow-up not needed.              Vitals Value Taken Time   /94 07/17/25 09:16   Temp 36.8 °C (98.2 °F) 07/17/25 09:16   Pulse 70 07/17/25 09:17   Resp 18 07/17/25 09:16   SpO2 100 % 07/17/25 09:16         No case tracking events are documented in the log.      Pain/Alesia Score: No data recorded

## 2025-07-17 NOTE — [employer]ATION PATIENT INSTRUCTIONS
Discharge Summary/Instructions after an Endoscopic Procedure  Patient Name: Antoine Banegas  Patient MRN: [id number]  Patient YOB: 1970 Thursday, July 17, 2025  Kirk Macias MD  Dear patient,  As a result of recent federal legislation (The Federal Cures Act), you may   receive lab or pathology results from your procedure in your [facility]   account before your physician is able to contact you. Your physician or   their representative will relay the results to you with their   recommendations at their soonest availability.  Thank you,  RESTRICTIONS:  During your procedure today, you received medications for sedation.  These   medications may affect your judgment, balance and coordination.  Therefore,   for 24 hours, you have the following restrictions:   - DO NOT drive a car, operate machinery, make legal/financial decisions,   sign important papers or drink alcohol.    ACTIVITY:  Today: no heavy lifting, straining or running due to procedural   sedation/anesthesia.  The following day: return to full activity including work.  DIET:  Eat and drink normally unless instructed otherwise.     TREATMENT FOR COMMON SIDE EFFECTS:  - Mild abdominal pain, nausea, belching, bloating or excessive gas:  rest,   eat lightly and use a heating pad.  - Sore Throat: treat with throat lozenges and/or gargle with warm salt   water.  - Because air was used during the procedure, expelling large amounts of air   from your rectum or belching is normal.  - If a bowel prep was taken, you may not have a bowel movement for 1-3 days.    This is normal.  SYMPTOMS TO WATCH FOR AND REPORT TO YOUR PHYSICIAN:  1. Abdominal pain or bloating, other than gas cramps.  2. Chest pain.  3. Back pain.  4. Signs of infection such as: chills or fever occurring within 24 hours   after the procedure.  5. Rectal bleeding, which would show as bright red, maroon, or black stools.   (A tablespoon of blood from the rectum is not serious, especially  if   hemorrhoids are present.)  6. Vomiting.  7. Weakness or dizziness.  GO DIRECTLY TO THE NEAREST EMERGENCY ROOM IF YOU HAVE ANY OF THE FOLLOWING:      Difficulty breathing              Chills and/or fever over 101 F   Persistent vomiting and/or vomiting blood   Severe abdominal pain   Severe chest pain   Black, tarry stools   Bleeding- more than one tablespoon   Any other symptom or condition that you feel may need urgent attention  Your doctor recommends these additional instructions:  If any biopsies were taken, your doctors clinic will contact you in 1 to 2   weeks with any results.  Recommendations:  - Discharge patient to home (ambulatory).   - Perform a single contrast barium enema at appointment to be scheduled.   - Resume previous diet.   - Continue present medications.   - Patient has a contact number available for emergencies.  The signs and   symptoms of potential delayed complications were discussed with the   patient.  Return to normal activities tomorrow.  Written discharge   instructions were provided to the patient.   - Repeat colonoscopy at next available appointment (within 3 months) because   of difficult anatomy.  Impressions:  - Tortuous colon.   - No specimens collected.  For questions, problems or results please call your physician - Kirk Macias MD at Work:  [phone].  [facility] , EMERGENCY ROOM PHONE NUMBER: [phone]  IF A COMPLICATION OR EMERGENCY SITUATION ARISES AND YOU ARE UNABLE TO REACH   YOUR PHYSICIAN - GO DIRECTLY TO THE EMERGENCY ROOM.  MD Kirk Hernández MD  7/17/2025 11:05:44 AM  This report has been verified and signed electronically.  Dear patient,  As a result of recent federal legislation (The Federal Cures Act), you may   receive lab or pathology results from your procedure in your [facility]   account before your physician is able to contact you. Your physician or   their representative will relay the results to you  with their   recommendations at their soonest availability.  Thank you,  PROVATION

## 2025-07-17 NOTE — PLAN OF CARE
Back from procedure. Awake and alert. No acute distress. Discharge instructions given and reviewed. Pending md post visit

## 2025-07-17 NOTE — TRANSFER OF CARE
"Anesthesia Transfer of Care Note    Patient: Antoine Banegas    Procedure(s) Performed: Procedure(s) (LRB):  COLONOSCOPY (N/A)    Patient location: GI    Anesthesia Type: general    Post pain: adequate analgesia    Post assessment: no apparent anesthetic complications    Post vital signs: stable    Level of consciousness: sedated    Nausea/Vomiting: no nausea/vomiting    Complications: none    Transfer of care protocol was followed      Last vitals: Visit Vitals  BP (!) 148/94 (BP Location: Left arm)   Pulse 70   Temp 36.8 °C (98.2 °F) (Oral)   Resp 18   Ht 5' 6" (1.676 m)   Wt 98.9 kg (218 lb)   LMP  (LMP Unknown)   SpO2 100%   Breastfeeding No   BMI 35.19 kg/m²     "

## 2025-07-18 VITALS
DIASTOLIC BLOOD PRESSURE: 73 MMHG | SYSTOLIC BLOOD PRESSURE: 140 MMHG | TEMPERATURE: 98 F | HEART RATE: 83 BPM | RESPIRATION RATE: 16 BRPM | WEIGHT: 218 LBS | HEIGHT: 66 IN | BODY MASS INDEX: 35.03 KG/M2 | OXYGEN SATURATION: 96 %

## (undated) DEVICE — GLOVE PROTEXIS NEOPRENE 7.5

## (undated) DEVICE — KIT SURGICAL TURNOVER

## (undated) DEVICE — MANIFOLD 4 PORT

## (undated) DEVICE — SPONGE LAP 18X18 PREWASHED

## (undated) DEVICE — SOL POVIDONE PREP IODINE 4 OZ

## (undated) DEVICE — SOL NACL IRR 1000ML BTL

## (undated) DEVICE — SOL 9P NACL IRR PIC IL

## (undated) DEVICE — GLOVE SENSICARE PI GRN 8

## (undated) DEVICE — GLOVE SIGNATURE MICRO LTX 7.5

## (undated) DEVICE — GLOVE PROTEXIS HYDROGEL SZ7

## (undated) DEVICE — GOWN POLY REINF BRTH SLV XL

## (undated) DEVICE — GLOVE SENSICARE PI GRN 6.5

## (undated) DEVICE — Device

## (undated) DEVICE — JELLY SURGILUBE LUBE PKT 3GM

## (undated) DEVICE — GLOVE SIGNATURE ESSNTL LTX 6.5

## (undated) DEVICE — GLOVE SENSICARE PI GRN 7.5

## (undated) DEVICE — PAD CURAD NONADH 3X4IN

## (undated) DEVICE — KIT SURGICAL COLON .25 1.1OZ

## (undated) DEVICE — DRAPE LEGGINGS CUFF 31X48IN

## (undated) DEVICE — GLOVE SIGNATURE MICRO LTX 8

## (undated) DEVICE — YANKAUER FLEX NO VENT REG CAP

## (undated) DEVICE — SEE L#152161

## (undated) DEVICE — DRAPE UNDERBUTTOCKS PCH STRL

## (undated) DEVICE — GLOVE SENSICARE PI GRN 7

## (undated) DEVICE — SOLIDIFIER BOTTLE 1500CC

## (undated) DEVICE — HANDLE DEVON RIGID OR LIGHT

## (undated) DEVICE — DRAPE FULL SHEET 70X100IN

## (undated) DEVICE — TRAY SKIN SCRUB WET PREMIUM

## (undated) DEVICE — GLOVE SIGNATURE MICRO LTX 7

## (undated) DEVICE — DEFOAMER WATER SOLUBLE ENDO

## (undated) DEVICE — DRESSING TELFA STRL 4X3 LF

## (undated) DEVICE — ELECTRODE PATIENT RETURN DISP

## (undated) DEVICE — GLOVE PROTEXIS BLUE LATEX 6.5

## (undated) DEVICE — TOWEL OR DISP STRL BLUE 4/PK

## (undated) DEVICE — NDL HYPO REG 25G X 1 1/2

## (undated) DEVICE — GLOVE SIGNATURE MICRO LTX 6.5

## (undated) DEVICE — GLOVE PROTEXIS BLUE LATEX 7

## (undated) DEVICE — GLOVE PROTEXIS LTX MICRO 6

## (undated) DEVICE — GLOVE SIGNATURE ESSNTL LTX 7

## (undated) DEVICE — GLOVE PROTEXIS BLUE LATEX 8

## (undated) DEVICE — BULB BLADDER ASSEMBLY STRL

## (undated) DEVICE — GLOVE PROTEXIS LTX MICRO  7.5

## (undated) DEVICE — SYR 10CC LUER LOCK